# Patient Record
Sex: MALE | Race: OTHER | NOT HISPANIC OR LATINO | URBAN - METROPOLITAN AREA
[De-identification: names, ages, dates, MRNs, and addresses within clinical notes are randomized per-mention and may not be internally consistent; named-entity substitution may affect disease eponyms.]

---

## 2017-07-22 ENCOUNTER — EMERGENCY (EMERGENCY)
Facility: HOSPITAL | Age: 57
LOS: 1 days | Discharge: HOME | End: 2017-07-22
Attending: EMERGENCY MEDICINE | Admitting: EMERGENCY MEDICINE
Payer: COMMERCIAL

## 2017-07-22 VITALS
SYSTOLIC BLOOD PRESSURE: 120 MMHG | HEART RATE: 56 BPM | DIASTOLIC BLOOD PRESSURE: 72 MMHG | OXYGEN SATURATION: 100 % | TEMPERATURE: 98 F | RESPIRATION RATE: 14 BRPM

## 2017-07-22 VITALS
RESPIRATION RATE: 18 BRPM | SYSTOLIC BLOOD PRESSURE: 108 MMHG | OXYGEN SATURATION: 97 % | DIASTOLIC BLOOD PRESSURE: 72 MMHG | TEMPERATURE: 98 F | HEART RATE: 73 BPM

## 2017-07-22 LAB
ALBUMIN SERPL ELPH-MCNC: 4.3 G/DL — SIGNIFICANT CHANGE UP (ref 3.3–5)
ALP SERPL-CCNC: 85 U/L — SIGNIFICANT CHANGE UP (ref 40–120)
ALT FLD-CCNC: 27 U/L — SIGNIFICANT CHANGE UP (ref 4–41)
APTT BLD: 28.5 SEC — SIGNIFICANT CHANGE UP (ref 27.5–37.4)
AST SERPL-CCNC: 30 U/L — SIGNIFICANT CHANGE UP (ref 4–40)
BASOPHILS # BLD AUTO: 0.02 K/UL — SIGNIFICANT CHANGE UP (ref 0–0.2)
BASOPHILS NFR BLD AUTO: 0.6 % — SIGNIFICANT CHANGE UP (ref 0–2)
BILIRUB SERPL-MCNC: 0.8 MG/DL — SIGNIFICANT CHANGE UP (ref 0.2–1.2)
BUN SERPL-MCNC: 13 MG/DL — SIGNIFICANT CHANGE UP (ref 7–23)
CALCIUM SERPL-MCNC: 9.3 MG/DL — SIGNIFICANT CHANGE UP (ref 8.4–10.5)
CHLORIDE SERPL-SCNC: 102 MMOL/L — SIGNIFICANT CHANGE UP (ref 98–107)
CO2 SERPL-SCNC: 27 MMOL/L — SIGNIFICANT CHANGE UP (ref 22–31)
CREAT SERPL-MCNC: 1.23 MG/DL — SIGNIFICANT CHANGE UP (ref 0.5–1.3)
EOSINOPHIL # BLD AUTO: 0.04 K/UL — SIGNIFICANT CHANGE UP (ref 0–0.5)
EOSINOPHIL NFR BLD AUTO: 1.3 % — SIGNIFICANT CHANGE UP (ref 0–6)
GLUCOSE SERPL-MCNC: 75 MG/DL — SIGNIFICANT CHANGE UP (ref 70–99)
HCT VFR BLD CALC: 40.3 % — SIGNIFICANT CHANGE UP (ref 39–50)
HGB BLD-MCNC: 13.4 G/DL — SIGNIFICANT CHANGE UP (ref 13–17)
IMM GRANULOCYTES # BLD AUTO: 0 # — SIGNIFICANT CHANGE UP
IMM GRANULOCYTES NFR BLD AUTO: 0 % — SIGNIFICANT CHANGE UP (ref 0–1.5)
INR BLD: 1.24 — HIGH (ref 0.88–1.17)
LYMPHOCYTES # BLD AUTO: 1.42 K/UL — SIGNIFICANT CHANGE UP (ref 1–3.3)
LYMPHOCYTES # BLD AUTO: 45.2 % — HIGH (ref 13–44)
MCHC RBC-ENTMCNC: 32.6 PG — SIGNIFICANT CHANGE UP (ref 27–34)
MCHC RBC-ENTMCNC: 33.3 % — SIGNIFICANT CHANGE UP (ref 32–36)
MCV RBC AUTO: 98.1 FL — SIGNIFICANT CHANGE UP (ref 80–100)
MONOCYTES # BLD AUTO: 0.34 K/UL — SIGNIFICANT CHANGE UP (ref 0–0.9)
MONOCYTES NFR BLD AUTO: 10.8 % — SIGNIFICANT CHANGE UP (ref 2–14)
NEUTROPHILS # BLD AUTO: 1.32 K/UL — LOW (ref 1.8–7.4)
NEUTROPHILS NFR BLD AUTO: 42.1 % — LOW (ref 43–77)
NRBC # FLD: 0 — SIGNIFICANT CHANGE UP
PLATELET # BLD AUTO: 165 K/UL — SIGNIFICANT CHANGE UP (ref 150–400)
PMV BLD: 10.1 FL — SIGNIFICANT CHANGE UP (ref 7–13)
POTASSIUM SERPL-MCNC: 4.7 MMOL/L — SIGNIFICANT CHANGE UP (ref 3.5–5.3)
POTASSIUM SERPL-SCNC: 4.7 MMOL/L — SIGNIFICANT CHANGE UP (ref 3.5–5.3)
PROT SERPL-MCNC: 7.5 G/DL — SIGNIFICANT CHANGE UP (ref 6–8.3)
PROTHROM AB SERPL-ACNC: 13.9 SEC — HIGH (ref 9.8–13.1)
RBC # BLD: 4.11 M/UL — LOW (ref 4.2–5.8)
RBC # FLD: 12 % — SIGNIFICANT CHANGE UP (ref 10.3–14.5)
SODIUM SERPL-SCNC: 139 MMOL/L — SIGNIFICANT CHANGE UP (ref 135–145)
WBC # BLD: 3.14 K/UL — LOW (ref 3.8–10.5)
WBC # FLD AUTO: 3.14 K/UL — LOW (ref 3.8–10.5)

## 2017-07-22 PROCEDURE — 74177 CT ABD & PELVIS W/CONTRAST: CPT | Mod: 26

## 2017-07-22 PROCEDURE — 70450 CT HEAD/BRAIN W/O DYE: CPT | Mod: 26

## 2017-07-22 PROCEDURE — 99285 EMERGENCY DEPT VISIT HI MDM: CPT | Mod: 25

## 2017-07-22 PROCEDURE — 71020: CPT | Mod: 26

## 2017-07-22 PROCEDURE — 99053 MED SERV 10PM-8AM 24 HR FAC: CPT

## 2017-07-22 RX ORDER — HALOPERIDOL DECANOATE 100 MG/ML
5 INJECTION INTRAMUSCULAR ONCE
Qty: 0 | Refills: 0 | Status: COMPLETED | OUTPATIENT
Start: 2017-07-22 | End: 2017-07-22

## 2017-07-22 NOTE — ED BEHAVIORAL HEALTH NOTE - BEHAVIORAL HEALTH NOTE
Psychiatry was consult to make a capacity determination whether the patient refused CT with contrast as purposed by the EM team to find out if the patient has internal bleeding. The patient was given the information and the patient unable to explained back and neither rationally manipulate the information.  In the light of risks to benefit, benefits clearly outweigh the risk. The patient lack the capacity to refuse CT with contrast.

## 2017-07-22 NOTE — ED ADULT NURSE NOTE - OBJECTIVE STATEMENT
Patient is a 58 y/o male, awake, A&O x 3, NAD, from [facility], presents to ED complaining of left flank pain after falling out of bed.  Patient is Xarelto for DVT of right leg per patient.  Patient has a history of seizure, DVT and paranoid schizophrenia.  Patient denies SOB, chest pain, dizziness, nausea, or vomiting.  Patient is lying on the stretcher and seem to be mumbling and talking to self.  No edema, redness or bruising noted to left flank, mild tenderness upon palpation.  IV placed, labs drawn and sent, vitals taken, MD at bedside and will continue to monitor patient.  Primary RN Michael given patient report.

## 2017-07-22 NOTE — ED PROVIDER NOTE - PROGRESS NOTE DETAILS
Head CT w/ frontal scalp swelling. CXR neg for rib fxs. Will get CT abd/pel based on pt's medical condition. Pt initially refused CT scan abd/pel, psych consulted and pt found to lack capacity to make decisions. Pt later consented to study. CT abd w/ concern for malignancy in L renal kidney. Discussed findings with [facility] staff and reiterated the need for imaging follow up w/ pt's physician.

## 2017-07-22 NOTE — ED PROVIDER NOTE - MEDICAL DECISION MAKING DETAILS
Poor historian on DVT, fell from bed. Will get labs, head CT w/o contrast, CXR and CT abd/pel. Reassess.

## 2017-07-22 NOTE — ED PROVIDER NOTE - ATTENDING CONTRIBUTION TO CARE
DR Bloch- Patient with schizoaffective disorder, from [facility], hx of Subdural, dvt, on xarelto, c/o fall off bed c/o rib pain hit on wood of bed. pain on inspiration, no abd pain,  Patient verbally aggressive, no change in mental status, NCAT heent nml, lungs clear, no ecchymosis, heart sounds nml, abd soft nontender. In view of poor hx on xarelto and location of pain left lat lower ribs, will CT abd.CT head, cxr ;abs

## 2017-07-22 NOTE — ED PROVIDER NOTE - OBJECTIVE STATEMENT
56yo male hx of schizoaffective disorder and DVT on Xarelton p/w from [facility] after a fall. Pt is a poor historian, but reports that he was sleeping and rolled off his bed falling on his L side. Worse on inspiration. Denies hitting his head or LOC. Denies sob, n/v, fever, dizziness. 58yo male hx of schizoaffective disorder and DVT on Xarelto on p/w from [facility] after a fall last night Pt is a poor historian, but reports that he was sleeping and rolled off his bed falling on his L side. Worse on inspiration. Denies hitting his head or LOC. Denies sob, n/v, fever, dizziness.No abdominal pain, n/v, no change in MS 58yo male hx of schizoaffective disorder and DVT on Xarelto on p/w from [facility] after a fall last night Pt is a poor historian, but reports that he was sleeping and rolled off his bed falling on his L side. Worse on inspiration. Denies hitting his head or LOC. Denies sob, n/v, fever, dizziness. No abdominal pain, n/v, no change in MS

## 2017-07-22 NOTE — ED ADULT NURSE NOTE - CHIEF COMPLAINT QUOTE
Pt arrives via EMS from [facility] In[facility] accompanied by two [facility] workers. Pt states he fell out of bed this morning and his L rib cage slammed against the wooden base of the bed. Pt is on Xarelto. Pt denies LOC, denies hitting head.

## 2017-07-22 NOTE — ED PROVIDER NOTE - PLAN OF CARE
We did a chest xray and there were no fractures found. A Head CT was ordered to check for acute bleed or trauma. We also ordered a CT scan of your abdomen and pelvis to rule out any intraabdominal bleed. The results of your blood work and imaging has been printed out. Please follow up with your PCP within a week for further management. Come to the ED immediately for worsening symptoms like increased abdominal pain, fever, difficulty breathing, chest pain.

## 2017-07-22 NOTE — ED PROVIDER NOTE - CARE PLAN
Principal Discharge DX:	Fall Principal Discharge DX:	Fall  Instructions for follow-up, activity and diet:	We did a chest xray and there were no fractures found. A Head CT was ordered to check for acute bleed or trauma. We also ordered a CT scan of your abdomen and pelvis to rule out any intraabdominal bleed. The results of your blood work and imaging has been printed out. Please follow up with your PCP within a week for further management. Come to the ED immediately for worsening symptoms like increased abdominal pain, fever, difficulty breathing, chest pain.

## 2017-07-22 NOTE — ED ADULT TRIAGE NOTE - CHIEF COMPLAINT QUOTE
Pt arrives via EMS from [facility], In[facility] accompanied by two [facility] workers. Pt states he fell out of bed this morning and his L rib cage slammed against the wooden base of the bed. Pt is on Xarelto. Pt denies LOC, denies hitting head.

## 2017-08-14 ENCOUNTER — APPOINTMENT (OUTPATIENT)
Dept: UROLOGY | Facility: CLINIC | Age: 57
End: 2017-08-14

## 2017-09-07 ENCOUNTER — APPOINTMENT (OUTPATIENT)
Dept: MRI IMAGING | Facility: IMAGING CENTER | Age: 57
End: 2017-09-07
Payer: COMMERCIAL

## 2017-09-07 ENCOUNTER — OUTPATIENT (OUTPATIENT)
Dept: OUTPATIENT SERVICES | Facility: HOSPITAL | Age: 57
LOS: 1 days | End: 2017-09-07
Payer: COMMERCIAL

## 2017-09-07 DIAGNOSIS — Z00.8 ENCOUNTER FOR OTHER GENERAL EXAMINATION: ICD-10-CM

## 2017-09-07 PROCEDURE — 72197 MRI PELVIS W/O & W/DYE: CPT | Mod: 26

## 2017-09-07 PROCEDURE — 74183 MRI ABD W/O CNTR FLWD CNTR: CPT | Mod: 26

## 2017-09-07 PROCEDURE — 72197 MRI PELVIS W/O & W/DYE: CPT

## 2017-09-07 PROCEDURE — A9585: CPT

## 2017-09-07 PROCEDURE — 74183 MRI ABD W/O CNTR FLWD CNTR: CPT

## 2017-10-05 ENCOUNTER — APPOINTMENT (OUTPATIENT)
Dept: UROLOGY | Facility: CLINIC | Age: 57
End: 2017-10-05
Payer: COMMERCIAL

## 2017-10-05 DIAGNOSIS — D49.519 NEOPLASM OF UNSPECIFIED BEHAVIOR OF UNSPECIFIED KIDNEY: ICD-10-CM

## 2017-10-05 DIAGNOSIS — Z12.5 ENCOUNTER FOR SCREENING FOR MALIGNANT NEOPLASM OF PROSTATE: ICD-10-CM

## 2017-10-05 PROCEDURE — 99205 OFFICE O/P NEW HI 60 MIN: CPT

## 2017-10-08 ENCOUNTER — RESULT REVIEW (OUTPATIENT)
Age: 57
End: 2017-10-08

## 2017-10-08 LAB
APPEARANCE: ABNORMAL
BACTERIA: ABNORMAL
BILIRUBIN URINE: NEGATIVE
BLOOD URINE: NEGATIVE
COLOR: YELLOW
CORE LAB FLUID CYTOLOGY: NORMAL
GLUCOSE QUALITATIVE U: NEGATIVE MG/DL
KETONES URINE: NEGATIVE
LEUKOCYTE ESTERASE URINE: ABNORMAL
MICROSCOPIC-UA: NORMAL
NITRITE URINE: NEGATIVE
PH URINE: 6
PROTEIN URINE: NEGATIVE MG/DL
PSA SERPL-MCNC: 1.22 NG/ML
RED BLOOD CELLS URINE: 2 /HPF
SPECIFIC GRAVITY URINE: 1.02
SQUAMOUS EPITHELIAL CELLS: 3 /HPF
UROBILINOGEN URINE: NEGATIVE MG/DL
WHITE BLOOD CELLS URINE: 5 /HPF

## 2018-01-04 ENCOUNTER — MOBILE ON CALL (OUTPATIENT)
Age: 58
End: 2018-01-04

## 2018-01-08 ENCOUNTER — APPOINTMENT (OUTPATIENT)
Dept: UROLOGY | Facility: CLINIC | Age: 58
End: 2018-01-08

## 2018-04-23 ENCOUNTER — EMERGENCY (EMERGENCY)
Facility: HOSPITAL | Age: 58
LOS: 1 days | Discharge: HOME | End: 2018-04-23
Attending: EMERGENCY MEDICINE | Admitting: EMERGENCY MEDICINE
Payer: COMMERCIAL

## 2018-04-23 VITALS
DIASTOLIC BLOOD PRESSURE: 76 MMHG | RESPIRATION RATE: 17 BRPM | SYSTOLIC BLOOD PRESSURE: 122 MMHG | HEART RATE: 74 BPM | OXYGEN SATURATION: 100 % | TEMPERATURE: 98 F

## 2018-04-23 LAB
ALBUMIN SERPL ELPH-MCNC: 4.4 G/DL — SIGNIFICANT CHANGE UP (ref 3.3–5)
ALP SERPL-CCNC: 123 U/L — HIGH (ref 40–120)
ALT FLD-CCNC: 26 U/L — SIGNIFICANT CHANGE UP (ref 4–41)
APPEARANCE UR: CLEAR — SIGNIFICANT CHANGE UP
APTT BLD: 30.4 SEC — SIGNIFICANT CHANGE UP (ref 27.5–37.4)
AST SERPL-CCNC: 27 U/L — SIGNIFICANT CHANGE UP (ref 4–40)
BACTERIA # UR AUTO: SIGNIFICANT CHANGE UP
BASOPHILS # BLD AUTO: 0.01 K/UL — SIGNIFICANT CHANGE UP (ref 0–0.2)
BASOPHILS NFR BLD AUTO: 0.2 % — SIGNIFICANT CHANGE UP (ref 0–2)
BILIRUB SERPL-MCNC: 0.6 MG/DL — SIGNIFICANT CHANGE UP (ref 0.2–1.2)
BILIRUB UR-MCNC: NEGATIVE — SIGNIFICANT CHANGE UP
BLOOD UR QL VISUAL: NEGATIVE — SIGNIFICANT CHANGE UP
BUN SERPL-MCNC: 12 MG/DL — SIGNIFICANT CHANGE UP (ref 7–23)
CALCIUM SERPL-MCNC: 9.7 MG/DL — SIGNIFICANT CHANGE UP (ref 8.4–10.5)
CHLORIDE SERPL-SCNC: 100 MMOL/L — SIGNIFICANT CHANGE UP (ref 98–107)
CO2 SERPL-SCNC: 29 MMOL/L — SIGNIFICANT CHANGE UP (ref 22–31)
COLOR SPEC: SIGNIFICANT CHANGE UP
CREAT SERPL-MCNC: 0.89 MG/DL — SIGNIFICANT CHANGE UP (ref 0.5–1.3)
EOSINOPHIL # BLD AUTO: 0.11 K/UL — SIGNIFICANT CHANGE UP (ref 0–0.5)
EOSINOPHIL NFR BLD AUTO: 2.5 % — SIGNIFICANT CHANGE UP (ref 0–6)
GLUCOSE SERPL-MCNC: 89 MG/DL — SIGNIFICANT CHANGE UP (ref 70–99)
GLUCOSE UR-MCNC: NEGATIVE — SIGNIFICANT CHANGE UP
HCT VFR BLD CALC: 40.1 % — SIGNIFICANT CHANGE UP (ref 39–50)
HGB BLD-MCNC: 13.6 G/DL — SIGNIFICANT CHANGE UP (ref 13–17)
IMM GRANULOCYTES # BLD AUTO: 0 # — SIGNIFICANT CHANGE UP
IMM GRANULOCYTES NFR BLD AUTO: 0 % — SIGNIFICANT CHANGE UP (ref 0–1.5)
INR BLD: 1.39 — HIGH (ref 0.88–1.17)
KETONES UR-MCNC: NEGATIVE — SIGNIFICANT CHANGE UP
LEUKOCYTE ESTERASE UR-ACNC: NEGATIVE — SIGNIFICANT CHANGE UP
LYMPHOCYTES # BLD AUTO: 1.6 K/UL — SIGNIFICANT CHANGE UP (ref 1–3.3)
LYMPHOCYTES # BLD AUTO: 36.3 % — SIGNIFICANT CHANGE UP (ref 13–44)
MCHC RBC-ENTMCNC: 32.9 PG — SIGNIFICANT CHANGE UP (ref 27–34)
MCHC RBC-ENTMCNC: 33.9 % — SIGNIFICANT CHANGE UP (ref 32–36)
MCV RBC AUTO: 96.9 FL — SIGNIFICANT CHANGE UP (ref 80–100)
MONOCYTES # BLD AUTO: 0.52 K/UL — SIGNIFICANT CHANGE UP (ref 0–0.9)
MONOCYTES NFR BLD AUTO: 11.8 % — SIGNIFICANT CHANGE UP (ref 2–14)
MUCOUS THREADS # UR AUTO: SIGNIFICANT CHANGE UP
NEUTROPHILS # BLD AUTO: 2.17 K/UL — SIGNIFICANT CHANGE UP (ref 1.8–7.4)
NEUTROPHILS NFR BLD AUTO: 49.2 % — SIGNIFICANT CHANGE UP (ref 43–77)
NITRITE UR-MCNC: NEGATIVE — SIGNIFICANT CHANGE UP
NON-SQ EPI CELLS # UR AUTO: <1 — SIGNIFICANT CHANGE UP
NRBC # FLD: 0 — SIGNIFICANT CHANGE UP
PH UR: 6 — SIGNIFICANT CHANGE UP (ref 4.6–8)
PLATELET # BLD AUTO: 199 K/UL — SIGNIFICANT CHANGE UP (ref 150–400)
PMV BLD: 9.8 FL — SIGNIFICANT CHANGE UP (ref 7–13)
POTASSIUM SERPL-MCNC: 4.1 MMOL/L — SIGNIFICANT CHANGE UP (ref 3.5–5.3)
POTASSIUM SERPL-SCNC: 4.1 MMOL/L — SIGNIFICANT CHANGE UP (ref 3.5–5.3)
PROT SERPL-MCNC: 7.8 G/DL — SIGNIFICANT CHANGE UP (ref 6–8.3)
PROT UR-MCNC: NEGATIVE MG/DL — SIGNIFICANT CHANGE UP
PROTHROM AB SERPL-ACNC: 15.5 SEC — HIGH (ref 9.8–13.1)
RBC # BLD: 4.14 M/UL — LOW (ref 4.2–5.8)
RBC # FLD: 12.3 % — SIGNIFICANT CHANGE UP (ref 10.3–14.5)
RBC CASTS # UR COMP ASSIST: SIGNIFICANT CHANGE UP (ref 0–?)
SODIUM SERPL-SCNC: 140 MMOL/L — SIGNIFICANT CHANGE UP (ref 135–145)
SP GR SPEC: 1.01 — SIGNIFICANT CHANGE UP (ref 1–1.04)
SQUAMOUS # UR AUTO: SIGNIFICANT CHANGE UP
UROBILINOGEN FLD QL: NORMAL MG/DL — SIGNIFICANT CHANGE UP
WBC # BLD: 4.41 K/UL — SIGNIFICANT CHANGE UP (ref 3.8–10.5)
WBC # FLD AUTO: 4.41 K/UL — SIGNIFICANT CHANGE UP (ref 3.8–10.5)
WBC UR QL: SIGNIFICANT CHANGE UP (ref 0–?)

## 2018-04-23 PROCEDURE — 99285 EMERGENCY DEPT VISIT HI MDM: CPT | Mod: GC

## 2018-04-23 RX ORDER — DEXAMETHASONE 0.5 MG/5ML
10 ELIXIR ORAL ONCE
Qty: 0 | Refills: 0 | Status: COMPLETED | OUTPATIENT
Start: 2018-04-23 | End: 2018-04-23

## 2018-04-23 RX ADMIN — Medication 102 MILLIGRAM(S): at 20:09

## 2018-04-23 NOTE — ED PROVIDER NOTE - OBJECTIVE STATEMENT
58M with PMH of seizure disorder, DVT, schizoaffective disorder presenting from [facility] for weakness x2 weeks. Patient states that his legs feel like they are giving out and has been dependent on a wheelchair. Endorses chronic shoulder and b/l leg pain x4 years, unchanged in nature. As per aid patient also has been having episodes where he refuses to walk to the bathroom and refuses a bucket/urinal and urinates on self. No events of nocturnal urinary incontinence. Denies fever, chills, cp, sob, n/v/d, dizziness, headache, changes in vision. 58M with PMH of seizure disorder, DVT, schizoaffective disorder presenting from [facility] for weakness x2 weeks. Patient states that his legs feel like they are giving out and has been dependent on a wheelchair. Endorses chronic shoulder and b/l leg pain x4 years, unchanged in nature. As per aid patient also has been having episodes where he refuses to walk to the bathroom and refuses a bucket/urinal and urinates on self. No events of nocturnal urinary incontinence. As per covering MD at [facility], patient was sent to ED to r/o cord compression given symptoms of weakness and urinary retention. Denies fever, chills, cp, sob, n/v/d, dizziness, headache, changes in vision. 58M with PMH of seizure disorder, DVT, schizoaffective disorder presenting from [facility] for weakness x2 weeks. Patient states that his legs feel like they are giving out and has been dependent on a wheelchair. Endorses chronic shoulder and b/l leg pain x4 years, unchanged in nature. As per aid patient also has been having episodes where he refuses to walk to the bathroom and refuses a bucket/urinal and urinates on self. No events of nocturnal urinary incontinence. As per covering MD at [facility], patient was sent to ED to r/o cord compression given symptoms of weakness and urinary retention. Denies fever, chills, cp, sob, n/v/d, dizziness, headache, changes in vision. Tetanus UTD upon chart review. 58M with PMH of seizure disorder, DVT, schizoaffective disorder presenting from [facility] for weakness x2 weeks. Patient states that his legs feel like they are giving out and has been dependent on a wheelchair. Endorses chronic shoulder and b/l leg pain x4 years, unchanged in nature. As per aid patient also has been having episodes where he refuses to walk to the bathroom and refuses a bucket/urinal and urinates on self. No events of nocturnal urinary incontinence. As per covering MD at [facility], patient was sent to ED to r/o cord compression given symptoms of weakness and urinary retention. Denies fever, chills, cp, sob, n/v/d, dizziness, headache, changes in vision, night sweats, weight loss. Tetanus UTD upon chart review.

## 2018-04-23 NOTE — ED ADULT NURSE NOTE - CHIEF COMPLAINT QUOTE
pt from [facility]. as per staff pt has been refusing to walking and urinating on himself. pt states he prefers the wheelchair and is aware of when he need to urinate but the staff " will not get him the urinal quick enough" pt able to move all extremities in triage.

## 2018-04-23 NOTE — ED ADULT NURSE REASSESSMENT NOTE - NS ED NURSE REASSESS COMMENT FT1
RN Break Coverage: Alert and oriented x 4. Pt received from [facility]ASH[facility] Renteria in no distress. VSS. Pt resting well. [facility] staff at bedside. Will continue to monitor.

## 2018-04-23 NOTE — ED PROVIDER NOTE - ATTENDING CONTRIBUTION TO CARE
I performed a face to face bedside interview with patient regarding history of present illness, review of symptoms and past medical history. I completed an independent physical exam.  I have discussed patient's plan of care.   I agree with note as stated above, having amended the EMR as needed to reflect my findings. I have discussed the assessment and plan of care.  This includes during the time I functioned as the attending physician for this patient.  Attending Contribution to Care: agree with plan of resident. pt p/w concern fro spinal issue after inability to ambulate over last 2 weeks. pt with normal rectal tone, has appropriate strength in prox and distal muscles. pt pending MRI at sign out. labs wnl. pt sleeping comfortbalby in bed. unlikely cord compression but possible secondary to story and pt's mental status

## 2018-04-23 NOTE — ED PROVIDER NOTE - PROGRESS NOTE DETAILS
bedside post void residual at 103cc Resident: patient signed out to me. MRI prelim read negative for cord compression, shows stenosis. Will dc back to [facility].

## 2018-04-23 NOTE — ED ADULT TRIAGE NOTE - CHIEF COMPLAINT QUOTE
pt from [facility]. as per staff pt has been refusing to walking and urinating on himself. pt states he prefers the wheelchair and is aware of when he need to urinate but the staff " will not get him the urinal quick enough" pt able to move all extremities in triage.

## 2018-04-23 NOTE — ED PROVIDER NOTE - MEDICAL DECISION MAKING DETAILS
58M presenting from [facility] for r/o cord compression. Low suspicion of cord compression with full strength except for mild weakness of dorsiflexion of foot. Patient however, unable to ambulate stating weakness. Normal rectal tone. No focal neurological deficits. Will obtain basics, urine and reassess

## 2018-04-24 VITALS
HEART RATE: 66 BPM | SYSTOLIC BLOOD PRESSURE: 120 MMHG | OXYGEN SATURATION: 100 % | DIASTOLIC BLOOD PRESSURE: 77 MMHG | TEMPERATURE: 98 F

## 2018-04-24 PROCEDURE — 72148 MRI LUMBAR SPINE W/O DYE: CPT | Mod: 26

## 2018-04-26 ENCOUNTER — EMERGENCY (EMERGENCY)
Facility: HOSPITAL | Age: 58
LOS: 1 days | Discharge: HOME | End: 2018-04-26
Attending: EMERGENCY MEDICINE | Admitting: EMERGENCY MEDICINE
Payer: COMMERCIAL

## 2018-04-26 VITALS
HEART RATE: 90 BPM | RESPIRATION RATE: 16 BRPM | SYSTOLIC BLOOD PRESSURE: 122 MMHG | TEMPERATURE: 98 F | DIASTOLIC BLOOD PRESSURE: 78 MMHG | OXYGEN SATURATION: 100 %

## 2018-04-26 VITALS
RESPIRATION RATE: 16 BRPM | OXYGEN SATURATION: 99 % | HEART RATE: 68 BPM | SYSTOLIC BLOOD PRESSURE: 129 MMHG | TEMPERATURE: 99 F | DIASTOLIC BLOOD PRESSURE: 73 MMHG

## 2018-04-26 LAB
HCT VFR BLD CALC: 43.4 % — SIGNIFICANT CHANGE UP (ref 39–50)
HGB BLD-MCNC: 14.4 G/DL — SIGNIFICANT CHANGE UP (ref 13–17)
MANUAL SMEAR VERIFICATION: SIGNIFICANT CHANGE UP
MCHC RBC-ENTMCNC: 32.8 PG — SIGNIFICANT CHANGE UP (ref 27–34)
MCHC RBC-ENTMCNC: 33.2 % — SIGNIFICANT CHANGE UP (ref 32–36)
MCV RBC AUTO: 98.9 FL — SIGNIFICANT CHANGE UP (ref 80–100)
NRBC # FLD: 0 — SIGNIFICANT CHANGE UP
PLATELET # BLD AUTO: 80 K/UL — LOW (ref 150–400)
PLATELET CLUMP BLD QL SMEAR: SIGNIFICANT CHANGE UP
PLATELET COUNT - ESTIMATE: NORMAL — SIGNIFICANT CHANGE UP
PMV BLD: 11.4 FL — SIGNIFICANT CHANGE UP (ref 7–13)
RBC # BLD: 4.39 M/UL — SIGNIFICANT CHANGE UP (ref 4.2–5.8)
RBC # FLD: 12.2 % — SIGNIFICANT CHANGE UP (ref 10.3–14.5)
WBC # BLD: 4.58 K/UL — SIGNIFICANT CHANGE UP (ref 3.8–10.5)
WBC # FLD AUTO: 4.58 K/UL — SIGNIFICANT CHANGE UP (ref 3.8–10.5)

## 2018-04-26 PROCEDURE — 73030 X-RAY EXAM OF SHOULDER: CPT | Mod: 26,50

## 2018-04-26 PROCEDURE — 99284 EMERGENCY DEPT VISIT MOD MDM: CPT | Mod: 25,GC

## 2018-04-26 PROCEDURE — 70450 CT HEAD/BRAIN W/O DYE: CPT | Mod: 26

## 2018-04-26 PROCEDURE — 72125 CT NECK SPINE W/O DYE: CPT | Mod: 26

## 2018-04-26 NOTE — ED ADULT NURSE REASSESSMENT NOTE - NS ED NURSE REASSESS COMMENT FT1
unable to get iv line- attempts made by jamie fagan and jamie gresham- cbc was drawn and sent- resident aware- no new orders.

## 2018-04-26 NOTE — ED PROVIDER NOTE - MEDICAL DECISION MAKING DETAILS
57y/o M hx schizophrenia sent from [facility] for fall, no LOC, received in C-collar, with bilateral shoulder pain, will order CT head and C-spine, bilateral shoulder x-rays. Andrew att: 59y/o M hx schizophrenia sent from [facility] for fall, no LOC, received in C-collar, with bilateral shoulder pain, will order CT head and C-spine, bilateral shoulder x-rays, and will d/c back to [facility] if imaging is negative.

## 2018-04-26 NOTE — ED ADULT NURSE NOTE - CHIEF COMPLAINT QUOTE
pt arrives from [facility] trying to get out of bed and is wheelchair bound and fell sustaining laceration to left side eye. pt also c/o neck and back pain.  pt arrives w/ c=collar in place. pt is an involuntary admission here with two staff members from [facility] more

## 2018-04-26 NOTE — ED ADULT NURSE NOTE - OBJECTIVE STATEMENT
Pt received into room 24 c-collar in place C/O head, neck and B/L shoulder pain S/P fall OOB. Pt A&Ox4, appears comfortable and in NAD. Pt involuntary admission to [facility] for Schizophrenia was attempting to get OOB to urinate, fell down and hit left side of face. Pt states he is non-ambulatory and wheelchair bound, pt able to move lower extremities slowly and appears weak. Pt has approximately 2cm laceration to left cheek line, small amount dry blood observed, no active bleeding. Pt states he is on coumadin for treatment of DVT. [facility] staff at bedside. Pt awaiting Md evaluations. VS as noted. Will continue to monitor.

## 2018-04-26 NOTE — ED ADULT TRIAGE NOTE - CHIEF COMPLAINT QUOTE
pt arrives from [facility] trying to get out of bed and is wheelchair bound and fell sustaining laceration to left side eye. pt also c/o neck and back pain.  pt arrives w/ c=collar in place. pt is an involuntary admission here with two staff members from [facility] more

## 2018-04-26 NOTE — ED PROVIDER NOTE - PROGRESS NOTE DETAILS
Patient seen and examined at the bedside, comfortable appearing, complaining of bilateral shoulder pain , will order CT head and C-spine CT head, C spine and bilateral xrays negative, will d/c to [facility]

## 2018-04-26 NOTE — ED PROVIDER NOTE - OBJECTIVE STATEMENT
59y/o M PMH schizophrenia with involuntary admission to [facility], presenting from [facility] for fall. Patient has had lower extremity weakness for a month and has been wheelchair bound. Pt states he was attempting to get out of bed to use the urinal but fell down an hit the side of left face. He is also complaining of bilateral shoulder pain. No headache, no dizziness, chest pain, shortness of breath.

## 2018-05-10 ENCOUNTER — INPATIENT (INPATIENT)
Facility: HOSPITAL | Age: 58
LOS: 24 days | Discharge: NURSING, REHAB OR LONG-TERM CARE | End: 2018-06-04
Attending: HOSPITALIST | Admitting: HOSPITALIST
Payer: MEDICAID

## 2018-05-10 VITALS
TEMPERATURE: 98 F | RESPIRATION RATE: 18 BRPM | SYSTOLIC BLOOD PRESSURE: 113 MMHG | HEART RATE: 100 BPM | OXYGEN SATURATION: 100 % | DIASTOLIC BLOOD PRESSURE: 88 MMHG

## 2018-05-10 DIAGNOSIS — R29.898 OTHER SYMPTOMS AND SIGNS INVOLVING THE MUSCULOSKELETAL SYSTEM: ICD-10-CM

## 2018-05-10 LAB
ALBUMIN SERPL ELPH-MCNC: 3.9 G/DL — SIGNIFICANT CHANGE UP (ref 3.3–5)
ALP SERPL-CCNC: 131 U/L — HIGH (ref 40–120)
ALT FLD-CCNC: 34 U/L — SIGNIFICANT CHANGE UP (ref 4–41)
APTT BLD: 29.1 SEC — SIGNIFICANT CHANGE UP (ref 27.5–37.4)
AST SERPL-CCNC: 52 U/L — HIGH (ref 4–40)
BASOPHILS # BLD AUTO: 0.01 K/UL — SIGNIFICANT CHANGE UP (ref 0–0.2)
BASOPHILS NFR BLD AUTO: 0.2 % — SIGNIFICANT CHANGE UP (ref 0–2)
BILIRUB SERPL-MCNC: 0.5 MG/DL — SIGNIFICANT CHANGE UP (ref 0.2–1.2)
BUN SERPL-MCNC: 21 MG/DL — SIGNIFICANT CHANGE UP (ref 7–23)
CALCIUM SERPL-MCNC: 9.6 MG/DL — SIGNIFICANT CHANGE UP (ref 8.4–10.5)
CHLORIDE SERPL-SCNC: 97 MMOL/L — LOW (ref 98–107)
CO2 SERPL-SCNC: 17 MMOL/L — LOW (ref 22–31)
CREAT SERPL-MCNC: 0.95 MG/DL — SIGNIFICANT CHANGE UP (ref 0.5–1.3)
EOSINOPHIL # BLD AUTO: 0.11 K/UL — SIGNIFICANT CHANGE UP (ref 0–0.5)
EOSINOPHIL NFR BLD AUTO: 2.1 % — SIGNIFICANT CHANGE UP (ref 0–6)
GLUCOSE SERPL-MCNC: 83 MG/DL — SIGNIFICANT CHANGE UP (ref 70–99)
HCT VFR BLD CALC: 44.9 % — SIGNIFICANT CHANGE UP (ref 39–50)
HGB BLD-MCNC: 14.9 G/DL — SIGNIFICANT CHANGE UP (ref 13–17)
IMM GRANULOCYTES # BLD AUTO: 0.01 # — SIGNIFICANT CHANGE UP
IMM GRANULOCYTES NFR BLD AUTO: 0.2 % — SIGNIFICANT CHANGE UP (ref 0–1.5)
INR BLD: 1.04 — SIGNIFICANT CHANGE UP (ref 0.88–1.17)
LYMPHOCYTES # BLD AUTO: 1.55 K/UL — SIGNIFICANT CHANGE UP (ref 1–3.3)
LYMPHOCYTES # BLD AUTO: 29.5 % — SIGNIFICANT CHANGE UP (ref 13–44)
MCHC RBC-ENTMCNC: 33.1 PG — SIGNIFICANT CHANGE UP (ref 27–34)
MCHC RBC-ENTMCNC: 33.2 % — SIGNIFICANT CHANGE UP (ref 32–36)
MCV RBC AUTO: 99.8 FL — SIGNIFICANT CHANGE UP (ref 80–100)
MONOCYTES # BLD AUTO: 0.65 K/UL — SIGNIFICANT CHANGE UP (ref 0–0.9)
MONOCYTES NFR BLD AUTO: 12.4 % — SIGNIFICANT CHANGE UP (ref 2–14)
NEUTROPHILS # BLD AUTO: 2.93 K/UL — SIGNIFICANT CHANGE UP (ref 1.8–7.4)
NEUTROPHILS NFR BLD AUTO: 55.6 % — SIGNIFICANT CHANGE UP (ref 43–77)
NRBC # FLD: 0 — SIGNIFICANT CHANGE UP
PLATELET # BLD AUTO: 208 K/UL — SIGNIFICANT CHANGE UP (ref 150–400)
PMV BLD: 11.1 FL — SIGNIFICANT CHANGE UP (ref 7–13)
POTASSIUM SERPL-MCNC: 5.6 MMOL/L — HIGH (ref 3.5–5.3)
POTASSIUM SERPL-SCNC: 5.6 MMOL/L — HIGH (ref 3.5–5.3)
PROT SERPL-MCNC: 8 G/DL — SIGNIFICANT CHANGE UP (ref 6–8.3)
PROTHROM AB SERPL-ACNC: 11.5 SEC — SIGNIFICANT CHANGE UP (ref 9.8–13.1)
RBC # BLD: 4.5 M/UL — SIGNIFICANT CHANGE UP (ref 4.2–5.8)
RBC # FLD: 12.6 % — SIGNIFICANT CHANGE UP (ref 10.3–14.5)
SODIUM SERPL-SCNC: 134 MMOL/L — LOW (ref 135–145)
WBC # BLD: 5.26 K/UL — SIGNIFICANT CHANGE UP (ref 3.8–10.5)
WBC # FLD AUTO: 5.26 K/UL — SIGNIFICANT CHANGE UP (ref 3.8–10.5)

## 2018-05-10 PROCEDURE — 70450 CT HEAD/BRAIN W/O DYE: CPT | Mod: 26

## 2018-05-10 NOTE — ED PROVIDER NOTE - MEDICAL DECISION MAKING DETAILS
57[facility] weakness (R>L), bladder incontinence, concern for cord compression vs CVA - will send for mri and CT head, basic labs, consult neuro and admit.

## 2018-05-10 NOTE — ED ADULT TRIAGE NOTE - CHIEF COMPLAINT QUOTE
pt coming from [facility], sent in for eval of b/l lower extremity weakness, unable to ambulate. sent in for CT scan.

## 2018-05-10 NOTE — ED PROVIDER NOTE - ATTENDING CONTRIBUTION TO CARE
MD Forde:  I performed a face to face bedside interview with patient regarding history of present illness, review of symptoms and past medical history. I completed an independent physical exam(documented below).  I have discussed patient's plan of care with resident.   I agree with note as stated above, having amended the EMR as needed to reflect my findings. I have discussed the assessment and plan of care.  This includes during the time I functioned as the attending physician for this patient.  PE:  Gen: Alert, NAD  Head: NC, AT,  EOMI, normal lids/conjunctiva  ENT:  normal hearing, patent oropharynx without erythema/exudate, uvula midline  Neck: +supple, no tenderness/meningismus/JVD, +Trachea midline  Chest: no chest wall tenderness, equal chest rise  Pulm: Bilateral BS, normal resp effort, no wheeze/stridor/retractions  CV: tachy, no M/R/G, +dist pulses  Abd: soft, NT/ND, +BS, no hepatosplenomegaly  Rectal: deferred  Mskel: no edema/erythema/cyanosis  Skin: no rash  Neuro: AAOx3, 4/5 R hip flexion/knee flexion/knee extension/plantar flexion; 2/5 dorsiflexion of R foot  MDM:  57yo M, pmh inclusive of DM, seizure disorder, schizophrenia, inpt at [facility], sent in for eval of worsening RLE weakness for unknown time. Per staff, pt has had difficulty walking for months, progressively worsening and requiring wheelchair a few wks ago because of severe difficulty ambulating. Pt was evaluated by neurologist who seems to be concerned for retroperitoneal process and possibly cord compression. Pt c/o midline lower thoracic/upper lumbar pain, urinary incontinence, and has objective neuro deficits (weakness of RLE hip flexion/knee flexion, and severe weakness of R foot dorsiflexion). Denies IV drug use. No perineal paresthesias. Ddx includes CVA vs cord compression. Will bladder scan to r/o urinary retention, labs, CT head, MRI spine, likely admission.

## 2018-05-10 NOTE — ED ADULT NURSE NOTE - CHIEF COMPLAINT QUOTE
pt coming from [facility], sent in for eval of b/l lower extremity weakness, unable to ambulate. sent in for CT scan.

## 2018-05-10 NOTE — ED PROVIDER NOTE - OBJECTIVE STATEMENT
59yo M pmhx schizophrenia, dm, seizure disorder p/w CC weakness, inability to ambulate. Pt. states that symptoms have been worsening for past 3 days, also states he is urinating on himself, when asked about pain he says "my spine hurts and my shoulders". denies fever chills cp sob n/v/d.

## 2018-05-10 NOTE — ED ADULT NURSE REASSESSMENT NOTE - NS ED NURSE REASSESS COMMENT FT1
report received from day shift rn- pt in nad, vss. two aides from [facility] at bedside. pt awaiting mri, will continue to monitor.

## 2018-05-10 NOTE — ED ADULT NURSE NOTE - OBJECTIVE STATEMENT
Pt from [facility] accompanied by 2 staff members c/o incontinence, back pain, and increasing inability to ambulate x2-3 days.  Staff states symptoms have been getting worse over a longer period of time but pt is unable to ambulate today.  Denies trauma.  Presents with left sided weakness.  PMH schizophrenia.  PA at bedside

## 2018-05-11 DIAGNOSIS — G40.909 EPILEPSY, UNSPECIFIED, NOT INTRACTABLE, WITHOUT STATUS EPILEPTICUS: ICD-10-CM

## 2018-05-11 DIAGNOSIS — R29.898 OTHER SYMPTOMS AND SIGNS INVOLVING THE MUSCULOSKELETAL SYSTEM: ICD-10-CM

## 2018-05-11 DIAGNOSIS — F20.9 SCHIZOPHRENIA, UNSPECIFIED: ICD-10-CM

## 2018-05-11 DIAGNOSIS — Z29.9 ENCOUNTER FOR PROPHYLACTIC MEASURES, UNSPECIFIED: ICD-10-CM

## 2018-05-11 DIAGNOSIS — R33.9 RETENTION OF URINE, UNSPECIFIED: ICD-10-CM

## 2018-05-11 LAB
ALBUMIN SERPL ELPH-MCNC: 3.5 G/DL — SIGNIFICANT CHANGE UP (ref 3.3–5)
ALP SERPL-CCNC: 118 U/L — SIGNIFICANT CHANGE UP (ref 40–120)
ALT FLD-CCNC: 26 U/L — SIGNIFICANT CHANGE UP (ref 4–41)
APPEARANCE UR: CLEAR — SIGNIFICANT CHANGE UP
AST SERPL-CCNC: 22 U/L — SIGNIFICANT CHANGE UP (ref 4–40)
BILIRUB SERPL-MCNC: 0.5 MG/DL — SIGNIFICANT CHANGE UP (ref 0.2–1.2)
BILIRUB UR-MCNC: NEGATIVE — SIGNIFICANT CHANGE UP
BLOOD UR QL VISUAL: NEGATIVE — SIGNIFICANT CHANGE UP
BUN SERPL-MCNC: 20 MG/DL — SIGNIFICANT CHANGE UP (ref 7–23)
CALCIUM SERPL-MCNC: 9 MG/DL — SIGNIFICANT CHANGE UP (ref 8.4–10.5)
CHLORIDE SERPL-SCNC: 100 MMOL/L — SIGNIFICANT CHANGE UP (ref 98–107)
CK SERPL-CCNC: 73 U/L — SIGNIFICANT CHANGE UP (ref 30–200)
CO2 SERPL-SCNC: 25 MMOL/L — SIGNIFICANT CHANGE UP (ref 22–31)
COLOR SPEC: SIGNIFICANT CHANGE UP
CREAT SERPL-MCNC: 0.9 MG/DL — SIGNIFICANT CHANGE UP (ref 0.5–1.3)
CRP SERPL-MCNC: < 5 MG/L — SIGNIFICANT CHANGE UP
GLUCOSE BLDC GLUCOMTR-MCNC: 88 MG/DL — SIGNIFICANT CHANGE UP (ref 70–99)
GLUCOSE SERPL-MCNC: 100 MG/DL — HIGH (ref 70–99)
GLUCOSE UR-MCNC: NEGATIVE — SIGNIFICANT CHANGE UP
HCT VFR BLD CALC: 38.9 % — LOW (ref 39–50)
HGB BLD-MCNC: 13.4 G/DL — SIGNIFICANT CHANGE UP (ref 13–17)
KETONES UR-MCNC: NEGATIVE — SIGNIFICANT CHANGE UP
LEUKOCYTE ESTERASE UR-ACNC: HIGH
MAGNESIUM SERPL-MCNC: 2 MG/DL — SIGNIFICANT CHANGE UP (ref 1.6–2.6)
MCHC RBC-ENTMCNC: 33.8 PG — SIGNIFICANT CHANGE UP (ref 27–34)
MCHC RBC-ENTMCNC: 34.4 % — SIGNIFICANT CHANGE UP (ref 32–36)
MCV RBC AUTO: 98 FL — SIGNIFICANT CHANGE UP (ref 80–100)
MUCOUS THREADS # UR AUTO: SIGNIFICANT CHANGE UP
NITRITE UR-MCNC: NEGATIVE — SIGNIFICANT CHANGE UP
NRBC # FLD: 0 — SIGNIFICANT CHANGE UP
PH UR: 7.5 — SIGNIFICANT CHANGE UP (ref 4.6–8)
PHOSPHATE SERPL-MCNC: 2.9 MG/DL — SIGNIFICANT CHANGE UP (ref 2.5–4.5)
PLATELET # BLD AUTO: 153 K/UL — SIGNIFICANT CHANGE UP (ref 150–400)
PMV BLD: 10.3 FL — SIGNIFICANT CHANGE UP (ref 7–13)
POTASSIUM SERPL-MCNC: 4 MMOL/L — SIGNIFICANT CHANGE UP (ref 3.5–5.3)
POTASSIUM SERPL-SCNC: 4 MMOL/L — SIGNIFICANT CHANGE UP (ref 3.5–5.3)
PROT SERPL-MCNC: 6.8 G/DL — SIGNIFICANT CHANGE UP (ref 6–8.3)
PROT UR-MCNC: NEGATIVE MG/DL — SIGNIFICANT CHANGE UP
RBC # BLD: 3.97 M/UL — LOW (ref 4.2–5.8)
RBC # FLD: 12.4 % — SIGNIFICANT CHANGE UP (ref 10.3–14.5)
RBC CASTS # UR COMP ASSIST: HIGH (ref 0–?)
SODIUM SERPL-SCNC: 136 MMOL/L — SIGNIFICANT CHANGE UP (ref 135–145)
SP GR SPEC: 1.01 — SIGNIFICANT CHANGE UP (ref 1–1.04)
UROBILINOGEN FLD QL: NORMAL MG/DL — SIGNIFICANT CHANGE UP
WBC # BLD: 3.84 K/UL — SIGNIFICANT CHANGE UP (ref 3.8–10.5)
WBC # FLD AUTO: 3.84 K/UL — SIGNIFICANT CHANGE UP (ref 3.8–10.5)
WBC UR QL: HIGH (ref 0–?)

## 2018-05-11 PROCEDURE — 72157 MRI CHEST SPINE W/O & W/DYE: CPT | Mod: 26

## 2018-05-11 PROCEDURE — 72158 MRI LUMBAR SPINE W/O & W/DYE: CPT | Mod: 26

## 2018-05-11 PROCEDURE — 99223 1ST HOSP IP/OBS HIGH 75: CPT | Mod: GC

## 2018-05-11 PROCEDURE — 99222 1ST HOSP IP/OBS MODERATE 55: CPT

## 2018-05-11 PROCEDURE — 72156 MRI NECK SPINE W/O & W/DYE: CPT | Mod: 26

## 2018-05-11 PROCEDURE — 72127 CT NECK SPINE W/O & W/DYE: CPT | Mod: 26

## 2018-05-11 PROCEDURE — 12345: CPT | Mod: GC,NC

## 2018-05-11 PROCEDURE — 70498 CT ANGIOGRAPHY NECK: CPT | Mod: 26

## 2018-05-11 RX ORDER — HALOPERIDOL DECANOATE 100 MG/ML
10 INJECTION INTRAMUSCULAR
Qty: 0 | Refills: 0 | Status: DISCONTINUED | OUTPATIENT
Start: 2018-05-11 | End: 2018-06-04

## 2018-05-11 RX ORDER — DOCUSATE SODIUM 100 MG
100 CAPSULE ORAL THREE TIMES A DAY
Qty: 0 | Refills: 0 | Status: DISCONTINUED | OUTPATIENT
Start: 2018-05-11 | End: 2018-06-04

## 2018-05-11 RX ORDER — PETROLATUM,WHITE
1 JELLY (GRAM) TOPICAL
Qty: 0 | Refills: 0 | Status: DISCONTINUED | OUTPATIENT
Start: 2018-05-11 | End: 2018-06-04

## 2018-05-11 RX ORDER — QUETIAPINE FUMARATE 200 MG/1
200 TABLET, FILM COATED ORAL
Qty: 0 | Refills: 0 | Status: DISCONTINUED | OUTPATIENT
Start: 2018-05-11 | End: 2018-06-04

## 2018-05-11 RX ORDER — BENZTROPINE MESYLATE 1 MG
1 TABLET ORAL
Qty: 0 | Refills: 0 | Status: DISCONTINUED | OUTPATIENT
Start: 2018-05-11 | End: 2018-06-04

## 2018-05-11 RX ORDER — LEVETIRACETAM 250 MG/1
1000 TABLET, FILM COATED ORAL
Qty: 0 | Refills: 0 | Status: DISCONTINUED | OUTPATIENT
Start: 2018-05-11 | End: 2018-06-04

## 2018-05-11 RX ORDER — MEMANTINE HYDROCHLORIDE 10 MG/1
5 TABLET ORAL AT BEDTIME
Qty: 0 | Refills: 0 | Status: DISCONTINUED | OUTPATIENT
Start: 2018-05-11 | End: 2018-06-04

## 2018-05-11 RX ORDER — SENNA PLUS 8.6 MG/1
2 TABLET ORAL AT BEDTIME
Qty: 0 | Refills: 0 | Status: DISCONTINUED | OUTPATIENT
Start: 2018-05-11 | End: 2018-06-03

## 2018-05-11 RX ORDER — ENOXAPARIN SODIUM 100 MG/ML
40 INJECTION SUBCUTANEOUS DAILY
Qty: 0 | Refills: 0 | Status: DISCONTINUED | OUTPATIENT
Start: 2018-05-11 | End: 2018-05-21

## 2018-05-11 RX ADMIN — ENOXAPARIN SODIUM 40 MILLIGRAM(S): 100 INJECTION SUBCUTANEOUS at 11:41

## 2018-05-11 RX ADMIN — HALOPERIDOL DECANOATE 10 MILLIGRAM(S): 100 INJECTION INTRAMUSCULAR at 06:09

## 2018-05-11 RX ADMIN — Medication 1 MILLIGRAM(S): at 17:46

## 2018-05-11 RX ADMIN — Medication 1 APPLICATION(S): at 21:46

## 2018-05-11 RX ADMIN — Medication 1 APPLICATION(S): at 06:09

## 2018-05-11 RX ADMIN — QUETIAPINE FUMARATE 200 MILLIGRAM(S): 200 TABLET, FILM COATED ORAL at 06:09

## 2018-05-11 RX ADMIN — HALOPERIDOL DECANOATE 10 MILLIGRAM(S): 100 INJECTION INTRAMUSCULAR at 17:46

## 2018-05-11 RX ADMIN — QUETIAPINE FUMARATE 200 MILLIGRAM(S): 200 TABLET, FILM COATED ORAL at 17:46

## 2018-05-11 RX ADMIN — LEVETIRACETAM 1000 MILLIGRAM(S): 250 TABLET, FILM COATED ORAL at 06:09

## 2018-05-11 RX ADMIN — LEVETIRACETAM 1000 MILLIGRAM(S): 250 TABLET, FILM COATED ORAL at 17:46

## 2018-05-11 RX ADMIN — Medication 1 MILLIGRAM(S): at 06:09

## 2018-05-11 RX ADMIN — Medication 1 TABLET(S): at 11:41

## 2018-05-11 RX ADMIN — MEMANTINE HYDROCHLORIDE 5 MILLIGRAM(S): 10 TABLET ORAL at 21:46

## 2018-05-11 NOTE — CONSULT NOTE ADULT - SUBJECTIVE AND OBJECTIVE BOX
Neurology Consult    Name  DOMINIC WHITE    HPI:  59 yearold male from [facility] with PMH of schizophrenia, seizure disorder presents for weakness and urinary incontinence for 4 months. Patient is poor historian and does not provide alot of information so most of the information is from chart review. Patient difficulty progressively LE weakness started 4 months ago that acutely worsened in the past 2 months. He has increasing difficulty with ambulation to the point that he became wheel chair dependent two months ago. He reports urinary incontinence for 4 months with no associated dysuria, hematuria. ED reports that he has had urinary incontinence for the past 3 days. Patient is not sure why he currently has a balbuena and does not answer questions appropriately He also endorses constipation for the past few weeks and that he has to push to get stool out but after 5-10 minutes it comes out. He reports 9/10 cervical spinal and b/l shoulder pain. He states that weakness started when he was playing basketball and fell inuring his right ankle. He also states that he has had RUE weakness for the past 1.5 years but that is not documented anywhere. Has shoulder pain. Per [facility] documentation, pt was seen by neurology for progressive weakness and neurology recommended to sent patient to ED to t/o retroperitoneal process or cord compression.            MEDICATIONS  (STANDING):  AQUAPHOR (petrolatum Ointment) 1 Application(s) Topical two times a day  benztropine 1 milliGRAM(s) Oral two times a day  clotrimazole 1% Cream 1 Application(s) Topical two times a day  enoxaparin Injectable 40 milliGRAM(s) SubCutaneous daily  haloperidol     Tablet 10 milliGRAM(s) Oral two times a day  levETIRAcetam 1000 milliGRAM(s) Oral two times a day  memantine 5 milliGRAM(s) Oral at bedtime  multivitamin 1 Tablet(s) Oral daily  QUEtiapine 200 milliGRAM(s) Oral two times a day    MEDICATIONS  (PRN):  docusate sodium 100 milliGRAM(s) Oral three times a day PRN Constipation  senna 2 Tablet(s) Oral at bedtime PRN Constipation      Allergies    No Known Allergies    Intolerances        Objective:   Vital Signs Last 24 Hrs  T(C): 36.9 (11 May 2018 05:00), Max: 36.9 (11 May 2018 05:00)  T(F): 98.5 (11 May 2018 05:00), Max: 98.5 (11 May 2018 05:00)  HR: 87 (11 May 2018 05:00) (72 - 100)  BP: 127/72 (11 May 2018 05:00) (113/88 - 127/72)  BP(mean): --  RR: 18 (11 May 2018 05:00) (16 - 18)  SpO2: 100% (11 May 2018 05:00) (99% - 100%)    General Exam:   General appearance: No acute distress                   Neurological Exam:  Mental Status: AAOx3, fluent speech, follows commands    Cranial Nerves: EOMI, V1-V3 intact, facial symmetry intact, moderate dysarthria, tongue midline    Motor: 4+/5 RUE, 5/5 LUE, 4/5 R hip flexion/knee flexion/ knee extension/plantar flexion; 3/5 dorsiflexion of R foot      Sensation: decreased sensation to LT on RUE and RLE    Coordination: FTN intact b/l    Reflexes: 2+ bilateral biceps, brachioradialis. 3+ rt patellar, 2+ left paterllar         Labs:    05-10    134<L>  |  97<L>  |  21  ----------------------------<  83  5.6<H>   |  17<L>  |  0.95    Ca    9.6      10 May 2018 19:20    TPro  8.0  /  Alb  3.9  /  TBili  0.5  /  DBili  x   /  AST  52<H>  /  ALT  34  /  AlkPhos  131<H>  05-10    LIVER FUNCTIONS - ( 10 May 2018 19:20 )  Alb: 3.9 g/dL / Pro: 8.0 g/dL / ALK PHOS: 131 u/L / ALT: 34 u/L / AST: 52 u/L / GGT: x           CBC Full  -  ( 10 May 2018 19:20 )  WBC Count : 5.26 K/uL  Hemoglobin : 14.9 g/dL  Hematocrit : 44.9 %  Platelet Count - Automated : 208 K/uL  Mean Cell Volume : 99.8 fL  Mean Cell Hemoglobin : 33.1 pg  Mean Cell Hemoglobin Concentration : 33.2 %  Auto Neutrophil # : 2.93 K/uL  Auto Lymphocyte # : 1.55 K/uL  Auto Monocyte # : 0.65 K/uL  Auto Eosinophil # : 0.11 K/uL  Auto Basophil # : 0.01 K/uL  Auto Neutrophil % : 55.6 %  Auto Lymphocyte % : 29.5 %  Auto Monocyte % : 12.4 %  Auto Eosinophil % : 2.1 %  Auto Basophil % : 0.2 %      Radiology  CTH:  MRI Brain: Neurology Consult    Name  DOMINIC WHITE    HPI:  59 yearold male from [facility] with PMH of schizophrenia, seizure disorder presents for weakness and urinary incontinence for 4 months. Patient is poor historian and does not provide alot of information so most of the information is from chart review. Patient difficulty progressively LE weakness started 4 months ago that acutely worsened in the past 2 months. He has increasing difficulty with ambulation to the point that he became wheel chair dependent two months ago. He reports urinary incontinence for 4 months with no associated dysuria, hematuria. ED reports that he has had urinary incontinence for the past 3 days. Patient is not sure why he currently has a balbuena and does not answer questions appropriately He also endorses constipation for the past few weeks and that he has to push to get stool out but after 5-10 minutes it comes out. Denies saddle anesthesia although primary team states that he does have it along with decreased rectal tone. He reports 9/10 cervical spinal and b/l shoulder pain. He states that weakness started when he was playing basketball and fell inuring his right ankle. He also states that he has had RUE weakness for the past 1.5 years but that is not documented anywhere. Has shoulder pain. Per [facility] documentation, pt was seen by neurology for progressive weakness and neurology recommended to sent patient to ED to t/o retroperitoneal process or cord compression.            MEDICATIONS  (STANDING):  AQUAPHOR (petrolatum Ointment) 1 Application(s) Topical two times a day  benztropine 1 milliGRAM(s) Oral two times a day  clotrimazole 1% Cream 1 Application(s) Topical two times a day  enoxaparin Injectable 40 milliGRAM(s) SubCutaneous daily  haloperidol     Tablet 10 milliGRAM(s) Oral two times a day  levETIRAcetam 1000 milliGRAM(s) Oral two times a day  memantine 5 milliGRAM(s) Oral at bedtime  multivitamin 1 Tablet(s) Oral daily  QUEtiapine 200 milliGRAM(s) Oral two times a day    MEDICATIONS  (PRN):  docusate sodium 100 milliGRAM(s) Oral three times a day PRN Constipation  senna 2 Tablet(s) Oral at bedtime PRN Constipation      Allergies    No Known Allergies    Intolerances        Objective:   Vital Signs Last 24 Hrs  T(C): 36.9 (11 May 2018 05:00), Max: 36.9 (11 May 2018 05:00)  T(F): 98.5 (11 May 2018 05:00), Max: 98.5 (11 May 2018 05:00)  HR: 87 (11 May 2018 05:00) (72 - 100)  BP: 127/72 (11 May 2018 05:00) (113/88 - 127/72)  BP(mean): --  RR: 18 (11 May 2018 05:00) (16 - 18)  SpO2: 100% (11 May 2018 05:00) (99% - 100%)    General Exam:   General appearance: No acute distress                   Neurological Exam:  Mental Status: AAOx3, fluent speech, follows commands    Cranial Nerves: EOMI, V1-V3 intact, facial symmetry intact, moderate dysarthria, tongue midline    Motor: 4+/5 RUE, 5/5 LUE, 4/5 R hip flexion/knee flexion/ knee extension/plantar flexion; 3/5 dorsiflexion of R foot      Sensation: decreased sensation to LT on RUE and RLE    Coordination: FTN intact b/l    Reflexes: 2+ bilateral biceps, brachioradialis. 3+ rt patellar, 2+ left paterllar         Labs:    05-10    134<L>  |  97<L>  |  21  ----------------------------<  83  5.6<H>   |  17<L>  |  0.95    Ca    9.6      10 May 2018 19:20    TPro  8.0  /  Alb  3.9  /  TBili  0.5  /  DBili  x   /  AST  52<H>  /  ALT  34  /  AlkPhos  131<H>  05-10    LIVER FUNCTIONS - ( 10 May 2018 19:20 )  Alb: 3.9 g/dL / Pro: 8.0 g/dL / ALK PHOS: 131 u/L / ALT: 34 u/L / AST: 52 u/L / GGT: x           CBC Full  -  ( 10 May 2018 19:20 )  WBC Count : 5.26 K/uL  Hemoglobin : 14.9 g/dL  Hematocrit : 44.9 %  Platelet Count - Automated : 208 K/uL  Mean Cell Volume : 99.8 fL  Mean Cell Hemoglobin : 33.1 pg  Mean Cell Hemoglobin Concentration : 33.2 %  Auto Neutrophil # : 2.93 K/uL  Auto Lymphocyte # : 1.55 K/uL  Auto Monocyte # : 0.65 K/uL  Auto Eosinophil # : 0.11 K/uL  Auto Basophil # : 0.01 K/uL  Auto Neutrophil % : 55.6 %  Auto Lymphocyte % : 29.5 %  Auto Monocyte % : 12.4 %  Auto Eosinophil % : 2.1 %  Auto Basophil % : 0.2 %      Radiology  CTH:  MRI Brain: Neurology Consult    Name  DOMINIC WHITE    HPI:  59 yearold male from [facility] with PMH of schizophrenia, seizure disorder presents for weakness and urinary incontinence for 4 months. Patient is poor historian and does not provide alot of information so most of the information is from chart review. Patient difficulty progressively LE weakness started 4 months ago that acutely worsened in the past 2 months. He has increasing difficulty with ambulation to the point that he became wheel chair dependent two months ago. He reports urinary incontinence for 4 months with no associated dysuria, hematuria. ED reports that he has had urinary incontinence for the past 3 days. Patient is not sure why he currently has a balbuena and does not answer questions appropriately He also endorses constipation for the past few weeks and that he has to push to get stool out but after 5-10 minutes it comes out. Denies saddle anesthesia although primary team states that he does have it along with decreased rectal tone. He reports 9/10 cervical spinal and b/l shoulder pain. He states that weakness started when he was playing basketball and fell inuring his right ankle. He also states that he has had RUE weakness for the past 1.5 years but that is not documented anywhere. Has shoulder pain. Per [facility] documentation, pt was seen by neurology for progressive weakness and neurology recommended to sent patient to ED to t/o retroperitoneal process or cord compression.            MEDICATIONS  (STANDING):  AQUAPHOR (petrolatum Ointment) 1 Application(s) Topical two times a day  benztropine 1 milliGRAM(s) Oral two times a day  clotrimazole 1% Cream 1 Application(s) Topical two times a day  enoxaparin Injectable 40 milliGRAM(s) SubCutaneous daily  haloperidol     Tablet 10 milliGRAM(s) Oral two times a day  levETIRAcetam 1000 milliGRAM(s) Oral two times a day  memantine 5 milliGRAM(s) Oral at bedtime  multivitamin 1 Tablet(s) Oral daily  QUEtiapine 200 milliGRAM(s) Oral two times a day    MEDICATIONS  (PRN):  docusate sodium 100 milliGRAM(s) Oral three times a day PRN Constipation  senna 2 Tablet(s) Oral at bedtime PRN Constipation      Allergies    No Known Allergies    Intolerances        Objective:   Vital Signs Last 24 Hrs  T(C): 36.9 (11 May 2018 05:00), Max: 36.9 (11 May 2018 05:00)  T(F): 98.5 (11 May 2018 05:00), Max: 98.5 (11 May 2018 05:00)  HR: 87 (11 May 2018 05:00) (72 - 100)  BP: 127/72 (11 May 2018 05:00) (113/88 - 127/72)  BP(mean): --  RR: 18 (11 May 2018 05:00) (16 - 18)  SpO2: 100% (11 May 2018 05:00) (99% - 100%)    General Exam:   General appearance: No acute distress                   Neurological Exam:  Mental Status: AAOx3, fluent speech, follows commands    Cranial Nerves: EOMI, V1-V3 intact, facial symmetry intact, moderate dysarthria, tongue midline    Motor: 4+/5 RUE, 5/5 LUE, 4/5 R hip flexion/knee flexion/ knee extension/plantar flexion; 3/5 dorsiflexion of R foot      Sensation: decreased sensation to LT on RUE and RLE    Coordination: FTN intact b/l    Reflexes: 2+ bilateral biceps, brachioradialis. 3+ rt patellar, 2+ left paterllar         Labs:    05-10    134<L>  |  97<L>  |  21  ----------------------------<  83  5.6<H>   |  17<L>  |  0.95    Ca    9.6      10 May 2018 19:20    TPro  8.0  /  Alb  3.9  /  TBili  0.5  /  DBili  x   /  AST  52<H>  /  ALT  34  /  AlkPhos  131<H>  05-10    LIVER FUNCTIONS - ( 10 May 2018 19:20 )  Alb: 3.9 g/dL / Pro: 8.0 g/dL / ALK PHOS: 131 u/L / ALT: 34 u/L / AST: 52 u/L / GGT: x           CBC Full  -  ( 10 May 2018 19:20 )  WBC Count : 5.26 K/uL  Hemoglobin : 14.9 g/dL  Hematocrit : 44.9 %  Platelet Count - Automated : 208 K/uL  Mean Cell Volume : 99.8 fL  Mean Cell Hemoglobin : 33.1 pg  Mean Cell Hemoglobin Concentration : 33.2 %  Auto Neutrophil # : 2.93 K/uL  Auto Lymphocyte # : 1.55 K/uL  Auto Monocyte # : 0.65 K/uL  Auto Eosinophil # : 0.11 K/uL  Auto Basophil # : 0.01 K/uL  Auto Neutrophil % : 55.6 %  Auto Lymphocyte % : 29.5 %  Auto Monocyte % : 12.4 %  Auto Eosinophil % : 2.1 %  Auto Basophil % : 0.2 %      Radiology  CTH: IMPRESSION: No acute intracranial hemorrhage, mass effect, or shift of   the midline structures.     Microvascular disease.

## 2018-05-11 NOTE — H&P ADULT - HISTORY OF PRESENT ILLNESS
57 yo M [facility] resident with PMH of schizophrenia, seizure disorder sent in 57 yo M [facility] resident with PMH of schizophrenia, seizure disorder presents for weakness and urinary incontinence for 4 months. Patient difficulty progressively LE weakness started 4 months ago that acutely worsened in the past 2 months. He has increasing difficulty with ambulation to the point that he became wheel chair dependent two months ago. He reports urinary incontinence for 4 months with no associated dysuria, hematuria. 57 yo M [facility] resident with PMH of schizophrenia, seizure disorder presents for weakness and urinary incontinence for 4 months. Patient difficulty progressively LE weakness started 4 months ago that acutely worsened in the past 2 months. He has increasing difficulty with ambulation to the point that he became wheel chair dependent two months ago. He reports urinary incontinence for 4 months with no associated dysuria, hematuria. He also endorses constipation for the past few weeks and that he has to push to get stool out. He reports 9/10 cervical spinal and b/l shoulder pain. No f/c, recent trauma, n/v/d, HA, dizziness, numbness, tingling. Per [facility] documentation, pt was seen by neurology for progressive weakness and neurology recommended to sent patient to ED to t/o retroperitoneal process or cord compression.    In ED, T 98, -->72, /88, RR 18, Sat 100 RA. 59 yo M [facility] resident with PMH of schizophrenia, seizure disorder presents for weakness and urinary incontinence for 4 months. Patient difficulty progressively LE weakness started 4 months ago that acutely worsened in the past 2 months. He has increasing difficulty with ambulation to the point that he became wheel chair dependent two months ago. He reports urinary incontinence for 4 months with no associated dysuria, hematuria. He also endorses constipation for the past few weeks and that he has to push to get stool out. He reports 9/10 cervical spinal and b/l shoulder pain. No f/c, recent trauma, n/v/d, HA, dizziness, numbness, tingling, SI. Per [facility] documentation, pt was seen by neurology for progressive weakness and neurology recommended to sent patient to ED to t/o retroperitoneal process or cord compression.    In ED, T 98, -->72, /88, RR 18, Sat 100 RA. CTH noted microvascular disease and no acute pathology. MRI c/t/l ordered. 57 yo M [facility] resident with PMH of schizophrenia, seizure disorder presents for weakness and urinary incontinence for 4 months. Patient difficulty progressively LE weakness started 4 months ago that acutely worsened in the past 2 months. He has increasing difficulty with ambulation to the point that he became wheel chair dependent two months ago. He reports urinary incontinence for 4 months with no associated dysuria, hematuria. He also endorses constipation for the past few weeks and that he has to push to get stool out. He reports 9/10 cervical spinal and b/l shoulder pain. No f/c, recent trauma, weight loss, night sweat, n/v/d, HA, dizziness, numbness, tingling, SI. Per [facility] documentation, pt was seen by neurology for progressive weakness and neurology recommended to sent patient to ED to t/o retroperitoneal process or cord compression.    In ED, T 98, -->72, /88, RR 18, Sat 100 RA. CTH noted microvascular disease and no acute pathology. MRI c/t/l ordered.

## 2018-05-11 NOTE — CONSULT NOTE ADULT - ATTENDING COMMENTS
58 year old presents with neck pain, imaging suggests a C4 C5 facet joint infection.    He is afebrile with a normal white blood cell counts.  His symptoms have been ongoing for weeks.     I would favor establishing a micro diagnosis prior to starting abx.    Check blood cultures times two.    Pursue tissue diagnosis- surgical or IR    Check path and micro (routine, fungal, afb)    Can observe off antibiotics at this time. 58 year old presents with neck pain, imaging suggests a C4 C5 facet joint infection.    He is afebrile with a normal white blood cell counts.  His symptoms have been ongoing for weeks.     I would favor establishing a micro diagnosis prior to starting abx.    Check blood cultures times two.    Pursue tissue diagnosis- surgical or IR    Check path and micro (routine, fungal, afb)    Check HIV status (pt provided verbal consent)    Can observe off antibiotics at this time.

## 2018-05-11 NOTE — H&P ADULT - PROBLEM SELECTOR PLAN 2
-c/w home keppra for seizure ppx -bladder scan noted 375cc of urine  -balbuena ordered  -likely 2/2 neurogenic bladder in the setting associated neurologic c/o with reported urinary incontinence  -monitor Cr

## 2018-05-11 NOTE — CONSULT NOTE ADULT - PROBLEM SELECTOR RECOMMENDATION 9
Case to be d/w Dr. Johnson ESR/CRP  Blood Cultures  ID consult  UA/UC  Medical Clearance  Case to be d/w Dr. Johnson regarding role of surgery ESR/CRP  Blood Cultures  ID consult  UA/UC  Medical Clearance  CT w/wo contrast of cervical spine  Case to be d/w Dr. Johnson regarding role of surgery ESR/CRP  Blood Cultures  ID consult  UA/UC  Medical Clearance  CT w/wo contrast of cervical spine  CTA neck to evaluate R Vertebral artery stenosis w/ contrast  Case to be d/w Dr. Johnson regarding role of surgery

## 2018-05-11 NOTE — CONSULT NOTE ADULT - ATTENDING COMMENTS
Patient seen and examined with neurology team and above note reviewed and I agree with assessment and plan as outlined. Patient with neck and lower back pain and weakness in bilateral extremities. He has a balbuena however he cannot recall the reason for it.   He has weakness in his right> left leg and sensation is preserved throughout.  I reviewed his MRI of the cervical spine with neuroradiology and no evidence of cord compression however he has at C3, C4 and C5 right sided facet inflammation and soft tissue enhancement suggestive of an infectious or inflammatory process. No pheglom collection seen per se.  T and L spine no compression.   Would check ESR and CRP and ID consult and neurospine evaluation.   All questions answered.

## 2018-05-11 NOTE — H&P ADULT - NSHPREVIEWOFSYSTEMS_GEN_ALL_CORE
REVIEW OF SYSTEMS:  CONSTITUTIONAL: No fever, weight loss, or fatigue  EYES: No eye pain, visual disturbances, or discharge  ENMT:  No difficulty hearing, tinnitus, vertigo; No sinus or throat pain  NECK: No pain or stiffness  RESPIRATORY: No cough, wheezing, chills or hemoptysis; No shortness of breath  CARDIOVASCULAR: No chest pain, palpitations, dizziness, or leg swelling  GASTROINTESTINAL: No abdominal or epigastric pain. No nausea, vomiting, or hematemesis; No diarrhea or constipation. No melena or hematochezia.  GENITOURINARY: No dysuria, frequency, hematuria, or incontinence  NEUROLOGICAL: No headaches, memory loss, loss of strength, numbness, or tremors  SKIN: No itching, burning, rashes, or lesions   LYMPH NODES: No enlarged glands  ENDOCRINE: No heat or cold intolerance; No hair loss  MUSCULOSKELETAL: No joint pain or swelling; No muscle, back, or extremity pain  PSYCHIATRIC: No depression, anxiety, mood swings, or difficulty sleeping REVIEW OF SYSTEMS:  CONSTITUTIONAL: No fever, chill  EYES: No eye pain, visual disturbances, or discharge  ENMT:  No difficulty hearing, tinnitus, vertigo; No sinus or throat pain  NECK: +neck pain  RESPIRATORY: No cough, wheezing, chills or hemoptysis; No shortness of breath  CARDIOVASCULAR: No chest pain, palpitations, dizziness, or leg swelling  GASTROINTESTINAL: No abdominal or epigastric pain. No nausea, vomiting, or hematemesis; No melena or hematochezia, +constipation  GENITOURINARY: No dysuria, hematuria, + incontinence  NEUROLOGICAL: No headaches, memory loss, +weakness  SKIN: No itching, burning, rashes, or lesions   LYMPH NODES: No enlarged glands  ENDOCRINE: No heat or cold intolerance  MUSCULOSKELETAL: + neck pain and shoulder pain CONSTITUTIONAL: No fever, chill  EYES: No eye pain, visual disturbances, or discharge  ENMT:  No difficulty hearing, tinnitus, vertigo; No sinus or throat pain  NECK: +neck pain  RESPIRATORY: No cough, wheezing, chills or hemoptysis; No shortness of breath  CARDIOVASCULAR: No chest pain, palpitations, dizziness, or leg swelling  GASTROINTESTINAL: No abdominal or epigastric pain. No nausea, vomiting, or hematemesis; No melena or hematochezia, +constipation  GENITOURINARY: No dysuria, hematuria, + incontinence  NEUROLOGICAL: No headaches, memory loss, +weakness  SKIN: No itching, burning, rashes, or lesions   LYMPH NODES: No enlarged glands  ENDOCRINE: No heat or cold intolerance  MUSCULOSKELETAL: + neck pain and shoulder pain

## 2018-05-11 NOTE — H&P ADULT - PROBLEM SELECTOR PLAN 1
-progress LE weakness with associated urinary incontinence/retention, decreased anal sphincter tone concerning for cord compression  -CTH noted no acute pathology  -check MRI C/T/L for further evaluation  -fall precaution  -PT evaluation  -hold home atorvastatin -progress LE weakness with associated urinary incontinence/retention, decreased anal sphincter tone concerning for cord compression  -CTH noted no acute pathology  -check MRI C/T/L for further evaluation  -fall precaution  -PT evaluation  -hold home atorvastatin  -neurology consulted, f/u recc in AM -progress LE weakness with associated urinary incontinence/retention, decreased anal sphincter tone concerning for cord compression  -CTH noted no acute pathology  -check MRI C/T/L for further evaluation  -fall precaution  -PT evaluation  -hold home atorvastatin  -neurology consulted, f/u recc in AM  -check CK

## 2018-05-11 NOTE — PROGRESS NOTE ADULT - SUBJECTIVE AND OBJECTIVE BOX
Gregor Quezada MD, PhD, PGY1  Medicine Team 2  Pager: [phone]       Patient is a 58y old  Male who presents with a chief complaint of LE weakness, urinary incontinence. (11 May 2018 03:28)      Overnight Events: Admitted for progressive weakness from [facility] w/ concern for spinal compression.  Subjective: Pt with complaint of weakness this morning, chronic as in H&P. No new complaints. Patient with tired and with flat affect.    PHYSICAL EXAM:  Vitals: T(F): 98.5  HR: 87  BP: 127/72  RR: 18  SpO2: 100% on RA  GENERAL: NAD, well-developed, affect is flat  HEAD:  Atraumatic, Normocephalic  EYES: EOMI, PERRLA, conjunctiva and sclera clear  NECK: Tenderness of posterior neck/shoulders  CHEST/LUNG: Clear to auscultation bilaterally; No wheeze  HEART: Regular rate and rhythm; No murmurs, rubs, or gallops  ABDOMEN: Soft, Nontender, Nondistended; Bowel sounds present  EXTREMITIES:  2+ Peripheral Pulses, No clubbing, cyanosis, or edema  PSYCH: AAOx3  NEUROLOGY: Profound weakness of the lower extremity. Patient unable to lift legs against resistance. Mild distal weakness of the UE bilaterally.  SKIN: No rashes or lesions    LABS:  CAPILLARY BLOOD GLUCOSE        I&O's Summary    10 May 2018 07:01  -  11 May 2018 07:00  --------------------------------------------------------  IN: 0 mL / OUT: 400 mL / NET: -400 mL                              13.4   3.84  )-----------( 153      ( 11 May 2018 06:00 )             38.9     WBC Trend: 3.84<--, 5.26<--  05-11    136  |  100  |  20  ----------------------------<  100<H>  4.0   |  25  |  0.90    Ca    9.0      11 May 2018 06:00  Phos  2.9     05-11  Mg     2.0     05-11    TPro  6.8  /  Alb  3.5  /  TBili  0.5  /  DBili  x   /  AST  22  /  ALT  26  /  AlkPhos  118  05-11    Creatinine Trend: 0.90<--, 0.95<--  PT/INR - ( 10 May 2018 19:30 )   PT: 11.5 SEC;   INR: 1.04          PTT - ( 10 May 2018 19:30 )  PTT:29.1 SEC  CARDIAC MARKERS ( 11 May 2018 06:00 )  x     / x     / 73 u/L / x     / x                  MEDICATIONS  (STANDING):  AQUAPHOR (petrolatum Ointment) 1 Application(s) Topical two times a day  benztropine 1 milliGRAM(s) Oral two times a day  clotrimazole 1% Cream 1 Application(s) Topical two times a day  enoxaparin Injectable 40 milliGRAM(s) SubCutaneous daily  haloperidol     Tablet 10 milliGRAM(s) Oral two times a day  levETIRAcetam 1000 milliGRAM(s) Oral two times a day  memantine 5 milliGRAM(s) Oral at bedtime  multivitamin 1 Tablet(s) Oral daily  QUEtiapine 200 milliGRAM(s) Oral two times a day    MEDICATIONS  (PRN):  docusate sodium 100 milliGRAM(s) Oral three times a day PRN Constipation  senna 2 Tablet(s) Oral at bedtime PRN Constipation

## 2018-05-11 NOTE — CONSULT NOTE ADULT - ATTENDING COMMENTS
Patient seen and examined. Agree with the above assessment and plan. Patient would likely benefit from a cervical decompression given the severity of his weakness. That said, symptoms have been going on for sometime and patient has significant co-morbidities. Would recommend cardiac clearance prior to surgery. Will plan for OR early next week unless acute change in neurological status.

## 2018-05-11 NOTE — H&P ADULT - ASSESSMENT
59 yo M [facility] resident with PMH of schizophrenia, seizure disorder a/w progressing LE weakness and likely neurogenic bladder concerning for possible cord compression.

## 2018-05-11 NOTE — H&P ADULT - NSHPLABSRESULTS_GEN_ALL_CORE
.  LABS:                         14.9   5.26  )-----------( 208      ( 10 May 2018 19:20 )             44.9     05-10    134<L>  |  97<L>  |  21  ----------------------------<  83  5.6<H>   |  17<L>  |  0.95    Ca    9.6      10 May 2018 19:20    TPro  8.0  /  Alb  3.9  /  TBili  0.5  /  DBili  x   /  AST  52<H>  /  ALT  34  /  AlkPhos  131<H>  05-10    PT/INR - ( 10 May 2018 19:30 )   PT: 11.5 SEC;   INR: 1.04          PTT - ( 10 May 2018 19:30 )  PTT:29.1 SEC    < from: CT Head No Cont (05.10.18 @ 20:25) >    FINDINGS: There is no acute intracranial hemorrhage, mass effect, shift   of the midline structures, herniation, extra-axial fluid collection, or   hydrocephalus.    There is diffuse cerebral volume loss with prominence of the sulci,   fissures, and cisternal spaces which is normal for the patient's age.   There is mild deep and periventricular white matter hypoattenuation   statistically compatible with microvascular changes given calcific   atherosclerotic disease of the intracranial arteries.    The paranasal sinuses and mastoid air cells are clear. The calvarium is   intact. The imaged orbits are unremarkable.    IMPRESSION: No acute intracranial hemorrhage, mass effect, or shift of   the midline structures.     Microvascular disease.    < end of copied text >
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
## 2018-05-11 NOTE — H&P ADULT - NSHPPHYSICALEXAM_GEN_ALL_CORE
Vital Signs Last 24 Hrs  T(C): 36.8 (11 May 2018 00:45), Max: 36.8 (11 May 2018 00:45)  T(F): 98.2 (11 May 2018 00:45), Max: 98.2 (11 May 2018 00:45)  HR: 94 (11 May 2018 00:45) (72 - 100)  BP: 124/75 (11 May 2018 00:45) (113/88 - 124/75)  BP(mean): --  RR: 16 (11 May 2018 00:45) (16 - 18)  SpO2: 99% (11 May 2018 00:45) (99% - 100%)      PHYSICAL EXAM:  GENERAL: NAD, well-groomed, well-developed, flat affect  HEAD:  Atraumatic, Normocephalic  EYES: EOMI, PERRLA, conjunctiva and sclera clear  ENMT: No tonsillar erythema, exudates, or enlargement; Moist mucous membranes,   NECK: Supple, No JVD, Normal thyroid  HEART: Regular rate and rhythm; No murmurs, rubs, or gallops  RESPIRATORY: CTA B/L, No W/R/R  ABDOMEN: Soft, Nontender, Nondistended; Bowel sounds present  NEUROLOGY: A&Ox3, CN II-XII grossly intact, sensation intact, strength 4/5 RLE hip flexion/knee flexion, 2/5 R foot dorsiflexion, 5/5 all other extremities  EXTREMITIES:  2+ Peripheral Pulses, No clubbing, cyanosis, or edema  SKIN: warm, dry, normal color, no rash or abnormal lesions  Rectal: decreased anal sphincter tone Vital Signs Last 24 Hrs  T(C): 36.8 (11 May 2018 00:45), Max: 36.8 (11 May 2018 00:45)  T(F): 98.2 (11 May 2018 00:45), Max: 98.2 (11 May 2018 00:45)  HR: 94 (11 May 2018 00:45) (72 - 100)  BP: 124/75 (11 May 2018 00:45) (113/88 - 124/75)  BP(mean): --  RR: 16 (11 May 2018 00:45) (16 - 18)  SpO2: 99% (11 May 2018 00:45) (99% - 100%)      PHYSICAL EXAM:  GENERAL: NAD, well-groomed, well-developed, flat affect  EYES: EOMI, PERRLA, conjunctiva and sclera clear  ENMT: No tonsillar erythema, exudates, or enlargement; Moist mucous membranes,   NECK: Supple, No JVD, Normal thyroid  HEART: Regular rate and rhythm; No murmurs, rubs, or gallops  RESPIRATORY: CTA B/L, No W/R/R  ABDOMEN: Soft, Nontender, Nondistended; Bowel sounds present  NEUROLOGY: A&Ox3, CN II-XII grossly intact, sensation intact, strength 4/5 RLE hip flexion/knee flexion, 2/5 R foot dorsiflexion, 5/5 all other extremities  EXTREMITIES:  2+ Peripheral Pulses, No clubbing, cyanosis, or edema  SKIN: warm, dry, normal color, no rash or abnormal lesions  Rectal: decreased anal sphincter tone Vital Signs Last 24 Hrs  T(C): 36.8 (11 May 2018 00:45), Max: 36.8 (11 May 2018 00:45)  T(F): 98.2 (11 May 2018 00:45), Max: 98.2 (11 May 2018 00:45)  HR: 94 (11 May 2018 00:45) (72 - 100)  BP: 124/75 (11 May 2018 00:45) (113/88 - 124/75)  BP(mean): --  RR: 16 (11 May 2018 00:45) (16 - 18)  SpO2: 99% (11 May 2018 00:45) (99% - 100%)      PHYSICAL EXAM:  GENERAL: NAD, well-groomed, well-developed, flat affect  EYES: EOMI, PERRLA, conjunctiva and sclera clear  ENMT: No tonsillar erythema, exudates, or enlargement; Moist mucous membranes,   NECK: Supple, No JVD, Normal thyroid  HEART: Regular rate and rhythm; No murmurs, rubs, or gallops  RESPIRATORY: CTA B/L, No W/R/R  ABDOMEN: Soft, Nontender, Nondistended; Bowel sounds present  NEUROLOGY: A&Ox3, CN II-XII grossly intact, sensation intact, strength 4/5 RLE hip flexion/knee flexion, 2/5 R foot dorsiflexion, 5/5 all other extremities, +cervical spinal tenderness, active and passive ROM intact for neck  EXTREMITIES:  2+ Peripheral Pulses, No clubbing, cyanosis, or edema  SKIN: warm, dry, normal color, no rash or abnormal lesions  Rectal: decreased anal sphincter tone Vital Signs Last 24 Hrs  T(C): 36.8 (11 May 2018 00:45), Max: 36.8 (11 May 2018 00:45)  T(F): 98.2 (11 May 2018 00:45), Max: 98.2 (11 May 2018 00:45)  HR: 94 (11 May 2018 00:45) (72 - 100)  BP: 124/75 (11 May 2018 00:45) (113/88 - 124/75)  BP(mean): --  RR: 16 (11 May 2018 00:45) (16 - 18)  SpO2: 99% (11 May 2018 00:45) (99% - 100%)      PHYSICAL EXAM:  GENERAL: NAD, well-groomed, well-developed, flat affect  EYES: EOMI, PERRLA, conjunctiva and sclera clear  ENMT: No tonsillar erythema, exudates, or enlargement; Moist mucous membranes,   HEART: Regular rate and rhythm; No murmurs, rubs, or gallops  RESPIRATORY: CTA B/L, No W/R/R  ABDOMEN: Soft, Nontender, Nondistended; Bowel sounds present  NEUROLOGY: A&Ox3, CN II-XII grossly intact, sensation intact, strength 4/5 RLE hip flexion/knee flexion, 2/5 R foot dorsiflexion, 5/5 all other extremities, +cervical spinal tenderness, active and passive ROM intact for neck  EXTREMITIES:  2+ Peripheral Pulses, No clubbing, cyanosis, or edema  SKIN: warm, dry, normal color, no rash or abnormal lesions  Rectal: decreased anal sphincter tone

## 2018-05-11 NOTE — H&P ADULT - NSHPSOCIALHISTORY_GEN_ALL_CORE
[facility] resident for 3 years. former smoker with 5 pack year. Former EtoH use, last drink few months ago. No illicit drug use.

## 2018-05-11 NOTE — CONSULT NOTE ADULT - SUBJECTIVE AND OBJECTIVE BOX
HPI:  57 yo M [facility] resident with PMH of schizophrenia, seizure disorder presents for weakness and urinary incontinence for 4 months. Patient difficulty progressively LE weakness started 4 months ago that acutely worsened in the past 2 months. He has increasing difficulty with ambulation to the point that he became wheel chair dependent two months ago. He reports urinary incontinence for 4 months with no associated dysuria, hematuria. He also endorses constipation for the past few weeks and that he has to push to get stool out. He reports 9/10 cervical spinal and b/l shoulder pain. No f/c, recent trauma, weight loss, night sweat, n/v/d, HA, dizziness, numbness, tingling, SI. Per [facility] documentation, pt was seen by neurology for progressive weakness and neurology recommended to sent patient to ED to t/o retroperitoneal process or cord compression.    In ED, T 98, -->72, /88, RR 18, Sat 100 RA. CTH noted microvascular disease and no acute pathology. MRI c/t/l ordered. (11 May 2018 03:28)  Neurosurgery consulted for MRI of C spine showing C3-C5 abscess. Pt reports difficulty ambulated for 3 months due to leg weakness. He also reports numbness and tingling in hands.    PAST MEDICAL & SURGICAL HISTORY:  Schizophrenia  Seizure disorder  No significant past surgical history      AQUAPHOR (petrolatum Ointment) 1 Application(s) Topical two times a day  benztropine 1 milliGRAM(s) Oral two times a day  clotrimazole 1% Cream 1 Application(s) Topical two times a day  docusate sodium 100 milliGRAM(s) Oral three times a day PRN  enoxaparin Injectable 40 milliGRAM(s) SubCutaneous daily  haloperidol     Tablet 10 milliGRAM(s) Oral two times a day  levETIRAcetam 1000 milliGRAM(s) Oral two times a day  memantine 5 milliGRAM(s) Oral at bedtime  multivitamin 1 Tablet(s) Oral daily  QUEtiapine 200 milliGRAM(s) Oral two times a day  senna 2 Tablet(s) Oral at bedtime PRN    SOCIAL HISTORY:  FAMILY HISTORY:  No pertinent family history in first degree relatives    Vital Signs Last 24 Hrs  T(C): 36.9 (11 May 2018 05:00), Max: 36.9 (11 May 2018 05:00)  T(F): 98.5 (11 May 2018 05:00), Max: 98.5 (11 May 2018 05:00)  HR: 87 (11 May 2018 05:00) (72 - 100)  BP: 127/72 (11 May 2018 05:00) (113/88 - 127/72)  BP(mean): --  RR: 18 (11 May 2018 05:00) (16 - 18)  SpO2: 100% (11 May 2018 05:00) (99% - 100%)    PHYSICAL EXAM:  Awake Alert Attentive Affect appropriate Ox3  PERRL EOMI  Motor:   Tone: normal.                  Strength:     [X] Upper extremity                      Delt       Bicep    Tricep         Finger Abduction                                               R   4+/5       4+/5    4+/5       4-/5        2/5                                               L  4+/5        4+/5     4+/5       4-/5       2/5  [X] Lower extremity                       HF          KE          KF        DF         PF                                               R    5/5        5/5        5/5       0/5       5/5                                               L     5/5        5/5       5/5       5/5        5/5  +Hoffmans  No Clonus  3+ Reflexes throughout  LABS:                          13.4   3.84  )-----------( 153      ( 11 May 2018 06:00 )             38.9     05-11    136  |  100  |  20  ----------------------------<  100<H>  4.0   |  25  |  0.90    Ca    9.0      11 May 2018 06:00  Phos  2.9     05-11  Mg     2.0     05-11    TPro  6.8  /  Alb  3.5  /  TBili  0.5  /  DBili  x   /  AST  22  /  ALT  26  /  AlkPhos  118  05-11    PT/INR - ( 10 May 2018 19:30 )   PT: 11.5 SEC;   INR: 1.04          PTT - ( 10 May 2018 19:30 )  PTT:29.1 SEC      RADIOLOGY & ADDITIONAL STUDIES:  < from: MR Cervical Spine w/wo IV Cont (05.11.18 @ 11:42) >  Impression: Right facet joint infection is suspected at the C4-5 level   with surrounding area of abnormal signal enhancement in the soft tissue.   There is also evidence of compromise of flow involving the adjacent right   vertebral artery and involvement of the epidural space seen which is   likely due to phlegmon. CTA or MRA can be done for further evaluation. No   abnormal collections are seen at this time.  < end of copied text >

## 2018-05-12 LAB
BASOPHILS # BLD AUTO: 0.01 K/UL — SIGNIFICANT CHANGE UP (ref 0–0.2)
BASOPHILS NFR BLD AUTO: 0.2 % — SIGNIFICANT CHANGE UP (ref 0–2)
BUN SERPL-MCNC: 20 MG/DL — SIGNIFICANT CHANGE UP (ref 7–23)
CALCIUM SERPL-MCNC: 9.3 MG/DL — SIGNIFICANT CHANGE UP (ref 8.4–10.5)
CHLORIDE SERPL-SCNC: 103 MMOL/L — SIGNIFICANT CHANGE UP (ref 98–107)
CO2 SERPL-SCNC: 24 MMOL/L — SIGNIFICANT CHANGE UP (ref 22–31)
CREAT SERPL-MCNC: 0.9 MG/DL — SIGNIFICANT CHANGE UP (ref 0.5–1.3)
EOSINOPHIL # BLD AUTO: 0.12 K/UL — SIGNIFICANT CHANGE UP (ref 0–0.5)
EOSINOPHIL NFR BLD AUTO: 2.9 % — SIGNIFICANT CHANGE UP (ref 0–6)
ERYTHROCYTE [SEDIMENTATION RATE] IN BLOOD: 10 MM/HR — SIGNIFICANT CHANGE UP (ref 1–15)
GLUCOSE SERPL-MCNC: 96 MG/DL — SIGNIFICANT CHANGE UP (ref 70–99)
HCT VFR BLD CALC: 42.8 % — SIGNIFICANT CHANGE UP (ref 39–50)
HGB BLD-MCNC: 14.5 G/DL — SIGNIFICANT CHANGE UP (ref 13–17)
HIV 1+2 AB+HIV1 P24 AG SERPL QL IA: SIGNIFICANT CHANGE UP
IMM GRANULOCYTES # BLD AUTO: 0.01 # — SIGNIFICANT CHANGE UP
IMM GRANULOCYTES NFR BLD AUTO: 0.2 % — SIGNIFICANT CHANGE UP (ref 0–1.5)
LYMPHOCYTES # BLD AUTO: 1.51 K/UL — SIGNIFICANT CHANGE UP (ref 1–3.3)
LYMPHOCYTES # BLD AUTO: 36.6 % — SIGNIFICANT CHANGE UP (ref 13–44)
MAGNESIUM SERPL-MCNC: 1.9 MG/DL — SIGNIFICANT CHANGE UP (ref 1.6–2.6)
MCHC RBC-ENTMCNC: 33 PG — SIGNIFICANT CHANGE UP (ref 27–34)
MCHC RBC-ENTMCNC: 33.9 % — SIGNIFICANT CHANGE UP (ref 32–36)
MCV RBC AUTO: 97.3 FL — SIGNIFICANT CHANGE UP (ref 80–100)
MONOCYTES # BLD AUTO: 0.5 K/UL — SIGNIFICANT CHANGE UP (ref 0–0.9)
MONOCYTES NFR BLD AUTO: 12.1 % — SIGNIFICANT CHANGE UP (ref 2–14)
NEUTROPHILS # BLD AUTO: 1.98 K/UL — SIGNIFICANT CHANGE UP (ref 1.8–7.4)
NEUTROPHILS NFR BLD AUTO: 48 % — SIGNIFICANT CHANGE UP (ref 43–77)
NRBC # FLD: 0 — SIGNIFICANT CHANGE UP
PHOSPHATE SERPL-MCNC: 2.9 MG/DL — SIGNIFICANT CHANGE UP (ref 2.5–4.5)
PLATELET # BLD AUTO: 175 K/UL — SIGNIFICANT CHANGE UP (ref 150–400)
PMV BLD: 9.6 FL — SIGNIFICANT CHANGE UP (ref 7–13)
POTASSIUM SERPL-MCNC: 4.2 MMOL/L — SIGNIFICANT CHANGE UP (ref 3.5–5.3)
POTASSIUM SERPL-SCNC: 4.2 MMOL/L — SIGNIFICANT CHANGE UP (ref 3.5–5.3)
RBC # BLD: 4.4 M/UL — SIGNIFICANT CHANGE UP (ref 4.2–5.8)
RBC # FLD: 12.4 % — SIGNIFICANT CHANGE UP (ref 10.3–14.5)
SODIUM SERPL-SCNC: 138 MMOL/L — SIGNIFICANT CHANGE UP (ref 135–145)
SPECIMEN SOURCE: SIGNIFICANT CHANGE UP
WBC # BLD: 4.13 K/UL — SIGNIFICANT CHANGE UP (ref 3.8–10.5)
WBC # FLD AUTO: 4.13 K/UL — SIGNIFICANT CHANGE UP (ref 3.8–10.5)

## 2018-05-12 PROCEDURE — 99232 SBSQ HOSP IP/OBS MODERATE 35: CPT | Mod: GC

## 2018-05-12 PROCEDURE — 99233 SBSQ HOSP IP/OBS HIGH 50: CPT

## 2018-05-12 RX ADMIN — LEVETIRACETAM 1000 MILLIGRAM(S): 250 TABLET, FILM COATED ORAL at 17:41

## 2018-05-12 RX ADMIN — LEVETIRACETAM 1000 MILLIGRAM(S): 250 TABLET, FILM COATED ORAL at 07:01

## 2018-05-12 RX ADMIN — Medication 1 MILLIGRAM(S): at 17:40

## 2018-05-12 RX ADMIN — HALOPERIDOL DECANOATE 10 MILLIGRAM(S): 100 INJECTION INTRAMUSCULAR at 07:01

## 2018-05-12 RX ADMIN — Medication 1 TABLET(S): at 12:03

## 2018-05-12 RX ADMIN — Medication 1 APPLICATION(S): at 07:02

## 2018-05-12 RX ADMIN — ENOXAPARIN SODIUM 40 MILLIGRAM(S): 100 INJECTION SUBCUTANEOUS at 12:03

## 2018-05-12 RX ADMIN — Medication 1 APPLICATION(S): at 17:41

## 2018-05-12 RX ADMIN — MEMANTINE HYDROCHLORIDE 5 MILLIGRAM(S): 10 TABLET ORAL at 21:48

## 2018-05-12 RX ADMIN — Medication 1 APPLICATION(S): at 06:58

## 2018-05-12 RX ADMIN — HALOPERIDOL DECANOATE 10 MILLIGRAM(S): 100 INJECTION INTRAMUSCULAR at 17:41

## 2018-05-12 RX ADMIN — QUETIAPINE FUMARATE 200 MILLIGRAM(S): 200 TABLET, FILM COATED ORAL at 07:01

## 2018-05-12 RX ADMIN — Medication 1 MILLIGRAM(S): at 07:01

## 2018-05-12 RX ADMIN — QUETIAPINE FUMARATE 200 MILLIGRAM(S): 200 TABLET, FILM COATED ORAL at 17:41

## 2018-05-12 NOTE — PROGRESS NOTE ADULT - SUBJECTIVE AND OBJECTIVE BOX
ID Coverage    Patient is a 58y old  Male who presents with a chief complaint of LE weakness, urinary incontinence. (11 May 2018 03:28)    Being followed by ID for ?discitis    Interval history:Still some neck pain-occasional radiation to arms   No acute events      ROS:  No cough,SOB,CP  No N/V/D./abd pain  No other complaints      Antimicrobials:None       Other medications reviewed    Vital Signs Last 24 Hrs  T(C): 36.8 (05-12-18 @ 06:57), Max: 36.8 (05-11-18 @ 15:02)  T(F): 98.2 (05-12-18 @ 06:57), Max: 98.2 (05-11-18 @ 15:02)  HR: 73 (05-12-18 @ 06:57) (65 - 81)  BP: 114/65 (05-12-18 @ 06:57) (114/65 - 133/89)  BP(mean): --  RR: 18 (05-12-18 @ 06:57) (17 - 18)  SpO2: 100% (05-12-18 @ 06:57) (100% - 100%)    Physical Exam:        HEENT PERRLA EOMI    No oral exudate or erythema    Chest Good AE,CTA    CVS RRR S1 S2 WNl No murmur or rub or gallop    Abd soft BS normal No tenderness no masses    IV site no erythema tenderness or discharge    CNS AAO X 3 RLE 4/5 otherwise no focal     Lab Data:                          14.5   4.13  )-----------( 175      ( 12 May 2018 06:30 )             42.8       05-12    138  |  103  |  20  ----------------------------<  96  4.2   |  24  |  0.90    Ca    9.3      12 May 2018 06:30  Phos  2.9     05-12  Mg     1.9     05-12    TPro  6.8  /  Alb  3.5  /  TBili  0.5  /  DBili  x   /  AST  22  /  ALT  26  /  AlkPhos  118  05-11      Sedimentation Rate, Erythrocyte (05.12.18 @ 06:30)    Sedimentation Rate, Erythrocyte: 10 mm/hr    C-Reactive Protein, Serum: < 5.0 mg/L (05.11.18 @ 16:10)            < from: MR Cervical Spine w/wo IV Cont (05.11.18 @ 11:42) >    Impression: Right facet joint infection is suspected at the C4-5 level   with surrounding area of abnormal signal enhancement in the soft tissue.   There is also evidence of compromise of flow involving the adjacent right   vertebral artery and involvement of the epidural space seen which is   likely due to phlegmon. CTA or MRA can be done for further evaluation. No   abnormal collections are seen at this time.    < end of copied text >

## 2018-05-12 NOTE — PROGRESS NOTE ADULT - SUBJECTIVE AND OBJECTIVE BOX
Gregor Quezada MD, PhD, PGY1  Medicine Team 2  Pager: [phone]       Patient is a 58y old  Male who presents with a chief complaint of LE weakness, urinary incontinence. (11 May 2018 03:28)      Interval Events: MRI showed facet joint infection of cervical vertebra with extension into epidural space. Neurosurgery plans for surgery. ID recommends monitor off abx for now. Transferred to psychiatric unit.  Subjective: Continues with significant weakness, numbness in b/l hands, unable to write with right hand. No other complaints.  General: No fever or chills.  Head: No lightheadedness, dizziness, ha.  Chest: No SOB, cp, palpitations.  Abdomen: No n/v,  no d/c, no dysuria  Extremities: No swelling  Skin: No rashes, pruritis    PHYSICAL EXAM:  Vitals: T(F): 98.2  HR: 73  BP: 114/65  RR: 18  SpO2: 100% on RA  GENERAL: NAD, well-developed, affect less flat compared to yesterday  HEAD:  Atraumatic, Normocephalic  EYES: EOMI, PERRLA, conjunctiva and sclera clear  NECK: Tenderness of posterior neck/shoulders  CHEST/LUNG: Clear to auscultation bilaterally; No wheeze  HEART: Regular rate and rhythm; No murmurs, rubs, or gallops  ABDOMEN: Soft, Nontender, Nondistended; Bowel sounds present  EXTREMITIES:  2+ Peripheral Pulses, No clubbing, cyanosis, or edema  PSYCH: AAOx3, improved affect  NEUROLOGY: Profound weakness of the lower extremity. Patient unable to lift legs against resistance. Mild distal weakness of the UE bilaterally. Unchanged from yesterday  SKIN: No rashes or lesions    LABS:  CAPILLARY BLOOD GLUCOSE      POCT Blood Glucose.: 88 mg/dL (11 May 2018 12:00)    I&O's Summary    11 May 2018 07:01  -  12 May 2018 07:00  --------------------------------------------------------  IN: 0 mL / OUT: 1800 mL / NET: -1800 mL                              14.5   4.13  )-----------( 175      ( 12 May 2018 06:30 )             42.8     WBC Trend: 4.13<--, 3.84<--, 5.26<--  05-12    138  |  103  |  20  ----------------------------<  96  4.2   |  24  |  0.90    Ca    9.3      12 May 2018 06:30  Phos  2.9     05-12  Mg     1.9     12    TPro  6.8  /  Alb  3.5  /  TBili  0.5  /  DBili  x   /  AST  22  /  ALT  26  /  AlkPhos  118  05-11    Creatinine Trend: 0.90<--, 0.90<--, 0.95<--  PT/INR - ( 10 May 2018 19:30 )   PT: 11.5 SEC;   INR: 1.04          PTT - ( 10 May 2018 19:30 )  PTT:29.1 SEC  CARDIAC MARKERS ( 11 May 2018 06:00 )  x     / x     / 73 u/L / x     / x          Urinalysis Basic - ( 11 May 2018 14:40 )    Color: PLYEL / Appearance: CLEAR / S.012 / pH: 7.5  Gluc: NEGATIVE / Ketone: NEGATIVE  / Bili: NEGATIVE / Urobili: NORMAL mg/dL   Blood: NEGATIVE / Protein: NEGATIVE mg/dL / Nitrite: NEGATIVE   Leuk Esterase: SMALL / RBC: 5-10 / WBC 5-10   Sq Epi: x / Non Sq Epi: x / Bacteria: x       MR Cervical Spine w/wo IV Cont (18 @ 11:42)   Impression: Right facet joint infection is suspected at the C4-5 level   with surrounding area of abnormal signal enhancement in the soft tissue.   There is also evidence of compromise of flow involving the adjacent right   vertebral artery and involvement of the epidural space seen which is   likely due to phlegmon. CTA or MRA can be done for further evaluation. No   abnormal collections are seen at this time.     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 There is no significant stenosis by NASCET criteria   along extracranial vasculature.    Spine imaging redemonstrates changes of right C4-C5 facet changes with   widening of the facet joint, and bordering epidural phlegmon with   resultant severe spinal canal stenosis.            MEDICATIONS  (STANDING):  AQUAPHOR (petrolatum Ointment) 1 Application(s) Topical two times a day  benztropine 1 milliGRAM(s) Oral two times a day  clotrimazole 1% Cream 1 Application(s) Topical two times a day  enoxaparin Injectable 40 milliGRAM(s) SubCutaneous daily  haloperidol     Tablet 10 milliGRAM(s) Oral two times a day  levETIRAcetam 1000 milliGRAM(s) Oral two times a day  memantine 5 milliGRAM(s) Oral at bedtime  multivitamin 1 Tablet(s) Oral daily  QUEtiapine 200 milliGRAM(s) Oral two times a day    MEDICATIONS  (PRN):  docusate sodium 100 milliGRAM(s) Oral three times a day PRN Constipation  senna 2 Tablet(s) Oral at bedtime PRN Constipation

## 2018-05-12 NOTE — PROGRESS NOTE ADULT - ATTENDING COMMENTS
Patient seen and examined at bedside. Discussed case with PGY-1 Dr. Quezada and agree with his note above with following addendum.    58M schizophrenia and lives in long term psychiatric facility is admitted with progressive lower extremity weakness and bladder retention and is found on imaging to have a spinal cord collection causing cord compression. ID Dr. Garcia attending and NSGY Dr. Menchaca attending recommendations are appreciated, plan is to monitor off antibiotics then to perform drainage of spinal cord collection, this way if there is abscess the specific pathogen can be identified and antimicrobial can be appropriately targeted.    Francisco Szymanski M.D.  Medicine Attending  [phone] ([facility]) [phone] ([facility])

## 2018-05-13 ENCOUNTER — TRANSCRIPTION ENCOUNTER (OUTPATIENT)
Age: 58
End: 2018-05-13

## 2018-05-13 LAB
BACTERIA UR CULT: SIGNIFICANT CHANGE UP
BASOPHILS # BLD AUTO: 0.01 K/UL — SIGNIFICANT CHANGE UP (ref 0–0.2)
BASOPHILS NFR BLD AUTO: 0.2 % — SIGNIFICANT CHANGE UP (ref 0–2)
BUN SERPL-MCNC: 18 MG/DL — SIGNIFICANT CHANGE UP (ref 7–23)
CALCIUM SERPL-MCNC: 9.3 MG/DL — SIGNIFICANT CHANGE UP (ref 8.4–10.5)
CHLORIDE SERPL-SCNC: 101 MMOL/L — SIGNIFICANT CHANGE UP (ref 98–107)
CO2 SERPL-SCNC: 25 MMOL/L — SIGNIFICANT CHANGE UP (ref 22–31)
CREAT SERPL-MCNC: 0.92 MG/DL — SIGNIFICANT CHANGE UP (ref 0.5–1.3)
EOSINOPHIL # BLD AUTO: 0.12 K/UL — SIGNIFICANT CHANGE UP (ref 0–0.5)
EOSINOPHIL NFR BLD AUTO: 2.9 % — SIGNIFICANT CHANGE UP (ref 0–6)
GLUCOSE SERPL-MCNC: 101 MG/DL — HIGH (ref 70–99)
HCT VFR BLD CALC: 42.1 % — SIGNIFICANT CHANGE UP (ref 39–50)
HGB BLD-MCNC: 14.4 G/DL — SIGNIFICANT CHANGE UP (ref 13–17)
IMM GRANULOCYTES # BLD AUTO: 0.01 # — SIGNIFICANT CHANGE UP
IMM GRANULOCYTES NFR BLD AUTO: 0.2 % — SIGNIFICANT CHANGE UP (ref 0–1.5)
LYMPHOCYTES # BLD AUTO: 1.51 K/UL — SIGNIFICANT CHANGE UP (ref 1–3.3)
LYMPHOCYTES # BLD AUTO: 37 % — SIGNIFICANT CHANGE UP (ref 13–44)
MAGNESIUM SERPL-MCNC: 1.9 MG/DL — SIGNIFICANT CHANGE UP (ref 1.6–2.6)
MCHC RBC-ENTMCNC: 33 PG — SIGNIFICANT CHANGE UP (ref 27–34)
MCHC RBC-ENTMCNC: 34.2 % — SIGNIFICANT CHANGE UP (ref 32–36)
MCV RBC AUTO: 96.3 FL — SIGNIFICANT CHANGE UP (ref 80–100)
MONOCYTES # BLD AUTO: 0.61 K/UL — SIGNIFICANT CHANGE UP (ref 0–0.9)
MONOCYTES NFR BLD AUTO: 15 % — HIGH (ref 2–14)
NEUTROPHILS # BLD AUTO: 1.82 K/UL — SIGNIFICANT CHANGE UP (ref 1.8–7.4)
NEUTROPHILS NFR BLD AUTO: 44.7 % — SIGNIFICANT CHANGE UP (ref 43–77)
NRBC # FLD: 0 — SIGNIFICANT CHANGE UP
PHOSPHATE SERPL-MCNC: 3 MG/DL — SIGNIFICANT CHANGE UP (ref 2.5–4.5)
PLATELET # BLD AUTO: 181 K/UL — SIGNIFICANT CHANGE UP (ref 150–400)
PMV BLD: 9.9 FL — SIGNIFICANT CHANGE UP (ref 7–13)
POTASSIUM SERPL-MCNC: 4 MMOL/L — SIGNIFICANT CHANGE UP (ref 3.5–5.3)
POTASSIUM SERPL-SCNC: 4 MMOL/L — SIGNIFICANT CHANGE UP (ref 3.5–5.3)
RBC # BLD: 4.37 M/UL — SIGNIFICANT CHANGE UP (ref 4.2–5.8)
RBC # FLD: 12.6 % — SIGNIFICANT CHANGE UP (ref 10.3–14.5)
SODIUM SERPL-SCNC: 138 MMOL/L — SIGNIFICANT CHANGE UP (ref 135–145)
SPECIMEN SOURCE: SIGNIFICANT CHANGE UP
WBC # BLD: 4.08 K/UL — SIGNIFICANT CHANGE UP (ref 3.8–10.5)
WBC # FLD AUTO: 4.08 K/UL — SIGNIFICANT CHANGE UP (ref 3.8–10.5)

## 2018-05-13 PROCEDURE — 99232 SBSQ HOSP IP/OBS MODERATE 35: CPT | Mod: GC

## 2018-05-13 RX ADMIN — Medication 1 MILLIGRAM(S): at 17:12

## 2018-05-13 RX ADMIN — LEVETIRACETAM 1000 MILLIGRAM(S): 250 TABLET, FILM COATED ORAL at 05:52

## 2018-05-13 RX ADMIN — HALOPERIDOL DECANOATE 10 MILLIGRAM(S): 100 INJECTION INTRAMUSCULAR at 05:52

## 2018-05-13 RX ADMIN — Medication 1 APPLICATION(S): at 17:13

## 2018-05-13 RX ADMIN — ENOXAPARIN SODIUM 40 MILLIGRAM(S): 100 INJECTION SUBCUTANEOUS at 11:52

## 2018-05-13 RX ADMIN — Medication 1 MILLIGRAM(S): at 05:52

## 2018-05-13 RX ADMIN — QUETIAPINE FUMARATE 200 MILLIGRAM(S): 200 TABLET, FILM COATED ORAL at 05:52

## 2018-05-13 RX ADMIN — Medication 1 APPLICATION(S): at 05:50

## 2018-05-13 RX ADMIN — MEMANTINE HYDROCHLORIDE 5 MILLIGRAM(S): 10 TABLET ORAL at 21:01

## 2018-05-13 RX ADMIN — Medication 1 TABLET(S): at 11:52

## 2018-05-13 RX ADMIN — LEVETIRACETAM 1000 MILLIGRAM(S): 250 TABLET, FILM COATED ORAL at 17:12

## 2018-05-13 RX ADMIN — QUETIAPINE FUMARATE 200 MILLIGRAM(S): 200 TABLET, FILM COATED ORAL at 17:13

## 2018-05-13 RX ADMIN — HALOPERIDOL DECANOATE 10 MILLIGRAM(S): 100 INJECTION INTRAMUSCULAR at 17:12

## 2018-05-13 RX ADMIN — Medication 1 APPLICATION(S): at 05:52

## 2018-05-13 RX ADMIN — Medication 1 APPLICATION(S): at 17:14

## 2018-05-13 NOTE — DISCHARGE NOTE ADULT - HOSPITAL COURSE
Mr. Betancourt is a 58y M with a history of schizophrenia (resident of [facility]) who presented to [facility] on May 10th for concern of 4 months of progressive weakness in the upper and lower extremities. There were no remarkable infectious complaints on presentation including no f/c, and there was no lab abnormalities to suggest infectious etiology. Neurology was consulted and an MRI of the spine (cervical, thoracic, and lumbar) were performed. The MRI demonstrated a facet joint infection at the C4-C5 level with extension into the epidural space forming a phlegmon, compressing the spinal cord. Neurosurgery and infectious diseases were consulted. He was monitored off antibiotics pending surgery of the cervical spine given his lack of septic features. Mr. Betancourt is a 58y M with a history of schizophrenia (resident of [facility]) who presented to [facility] on May 10th for concern of 4 months of progressive weakness in the upper and lower extremities. There were no remarkable infectious complaints on presentation including no fevers, and there was no lab abnormalities to suggest infectious etiology.  Neurology was consulted and an MRI of the spine (cervical, thoracic, and lumbar) were performed. The MRI demonstrated a facet joint infection at the C4-C5 level with extension into the epidural space forming a phlegmon, compressing the spinal cord. Neurosurgery and infectious diseases were consulted. He was monitored off antibiotics pending surgery of the cervical spine given his lack of septic features.  Neurosurgery performed a spinal decompression and facet joint removal.  Pathology of the specimen did not reveal any active infection, and surgical cultures continued to be negative.  The infectious disease doctor felt that the patient did not need antibiotics.  Physical therapy did an evaluation and felt that the patient would benefit from rehabilitation services for the leg weakness.  He was medically stable to discharge to rehab. Mr. Betancourt is a 58y M with a history of schizophrenia (resident of [facility]) who presented to [facility] on May 10th for concern of 4 months of progressive weakness in the upper and lower extremities. There were no remarkable infectious complaints on presentation including no fevers, and there was no lab abnormalities to suggest infectious etiology.  Neurology was consulted and an MRI of the spine (cervical, thoracic, and lumbar) were performed. The MRI demonstrated a facet joint infection at the C4-C5 level with extension into the epidural space forming a phlegmon, compressing the spinal cord. Neurosurgery and infectious diseases were consulted. He was monitored off antibiotics pending surgery of the cervical spine given his lack of septic features.  Neurosurgery performed a spinal decompression and facet joint removal.  Pathology of the specimen did not reveal any active infection, and surgical cultures continued to be negative.  The infectious disease doctor felt that the patient did not need antibiotics.  Physical medicine and rehabilitation services evaluated the patient and felt that he would benefit from acute rehabilitation services.  He was medically stable to discharge to rehab.

## 2018-05-13 NOTE — DISCHARGE NOTE ADULT - MEDICATION SUMMARY - MEDICATIONS TO STOP TAKING
I will STOP taking the medications listed below when I get home from the hospital:    lactulose 10 g/15 mL oral solution  -- 20 gram(s) by mouth 2 times a day

## 2018-05-13 NOTE — DISCHARGE NOTE ADULT - PLAN OF CARE
Prevention of increasing symptoms You were admitted to the hospital with lower extremity weakness which was due to spinal cord impingement by a phlegmon.  You had neurosurgery to relieve the obstruction.  No organisms were grown on the surgical specimen, and during this hospitalization you had no fevers.  An infectious disease doctor evaluated you and felt that you did not need antibiotics.  If you experience high fevers please contact your physician. Your leg weakness was due to the cervical stenosis.  After surgery, your leg weakness can improve, but you will have to participate as fully as possible in rehabilitation services. Please continue your home medications. Please continue home medications.

## 2018-05-13 NOTE — DISCHARGE NOTE ADULT - CARE PROVIDER_API CALL
Torrie Johnson), [facility] Neurosurgery  General  [address] [address]  Phone: [phone]  Fax: [phone]

## 2018-05-13 NOTE — PROGRESS NOTE ADULT - SUBJECTIVE AND OBJECTIVE BOX
Kevin Enrique MD PGY2  [phone] / [phone]     Patient is a 58y old  Male who presents with a chief complaint of LE weakness, urinary incontinence. (13 May 2018 07:53)      SUBJECTIVE / OVERNIGHT EVENTS:  No acute overnight events.   Patient reports continued weakness and numbness in mainly his RLE but also a little in RUE.   Denies any CP, SOB, N/V, abd pain, F/C or palpitations.       MEDICATIONS  (STANDING):  AQUAPHOR (petrolatum Ointment) 1 Application(s) Topical two times a day  benztropine 1 milliGRAM(s) Oral two times a day  clotrimazole 1% Cream 1 Application(s) Topical two times a day  enoxaparin Injectable 40 milliGRAM(s) SubCutaneous daily  haloperidol     Tablet 10 milliGRAM(s) Oral two times a day  levETIRAcetam 1000 milliGRAM(s) Oral two times a day  memantine 5 milliGRAM(s) Oral at bedtime  multivitamin 1 Tablet(s) Oral daily  QUEtiapine 200 milliGRAM(s) Oral two times a day    MEDICATIONS  (PRN):  docusate sodium 100 milliGRAM(s) Oral three times a day PRN Constipation  senna 2 Tablet(s) Oral at bedtime PRN Constipation      Vital Signs Last 24 Hrs  T(C): 36.4 (13 May 2018 05:47), Max: 36.9 (12 May 2018 14:44)  T(F): 97.6 (13 May 2018 05:47), Max: 98.5 (12 May 2018 14:44)  HR: 84 (13 May 2018 05:47) (77 - 88)  BP: 133/81 (13 May 2018 05:47) (133/81 - 149/88)  BP(mean): --  RR: 18 (13 May 2018 05:47) (17 - 18)  SpO2: 100% (13 May 2018 05:47) (100% - 100%)  CAPILLARY BLOOD GLUCOSE        I&O's Summary    12 May 2018 07:01  -  13 May 2018 07:00  --------------------------------------------------------  IN: 0 mL / OUT: 2700 mL / NET: -2700 mL        PHYSICAL EXAM:  GENERAL: NAD, well-developed  HEAD:  Atraumatic, Normocephalic  EYES: EOMI, PERRLA, conjunctiva and sclera clear  NECK: Supple, No JVD  CHEST/LUNG: Clear to auscultation bilaterally; No wheeze  HEART: Regular rate and rhythm; No murmurs, rubs, or gallops  ABDOMEN: Soft, Nontender, Nondistended; Bowel sounds present  EXTREMITIES:  2+ Peripheral Pulses, No clubbing, cyanosis, or edema  PSYCH: AAOx3  NEUROLOGY: strength RLE 2/5, RUE 4/5, LUE and LLE 5/5, decreased sensation in RLE and RUE  SKIN: No rashes or lesions    LABS:                        14.4   4.08  )-----------( 181      ( 13 May 2018 05:50 )             42.1     05-13    138  |  101  |  18  ----------------------------<  101<H>  4.0   |  25  |  0.92    Ca    9.3      13 May 2018 05:50  Phos  3.0     05-13  Mg     1.9     05-13            Urinalysis Basic - ( 11 May 2018 14:40 )    Color: PLYEL / Appearance: CLEAR / S.012 / pH: 7.5  Gluc: NEGATIVE / Ketone: NEGATIVE  / Bili: NEGATIVE / Urobili: NORMAL mg/dL   Blood: NEGATIVE / Protein: NEGATIVE mg/dL / Nitrite: NEGATIVE   Leuk Esterase: SMALL / RBC: 5-10 / WBC 5-10   Sq Epi: x / Non Sq Epi: x / Bacteria: x        RADIOLOGY & ADDITIONAL TESTS:    Imaging Personally Reviewed:    Consultant(s) Notes Reviewed:      Care Discussed with Consultants/Other Providers:

## 2018-05-13 NOTE — DISCHARGE NOTE ADULT - CARE PLAN
Principal Discharge DX:	Cervical stenosis of spinal canal  Goal:	Prevention of increasing symptoms  Assessment and plan of treatment:	You were admitted to the hospital with lower extremity weakness which was due to spinal cord impingement by a phlegmon.  You had neurosurgery to relieve the obstruction.  No organisms were grown on the surgical specimen, and during this hospitalization you had no fevers.  An infectious disease doctor evaluated you and felt that you did not need antibiotics.  If you experience high fevers please contact your physician.  Secondary Diagnosis:	Leg weakness  Assessment and plan of treatment:	Your leg weakness was due to the cervical stenosis.  After surgery, your leg weakness can improve, but you will have to participate as fully as possible in rehabilitation services.  Secondary Diagnosis:	Schizophrenia  Assessment and plan of treatment:	Please continue your home medications.  Secondary Diagnosis:	Seizure disorder  Goal:	Please continue home medications. Principal Discharge DX:	Cervical stenosis of spinal canal  Goal:	Prevention of increasing symptoms  Assessment and plan of treatment:	You were admitted to the hospital with lower extremity weakness which was due to spinal cord impingement by a phlegmon.  You had neurosurgery to relieve the obstruction.  No organisms were grown on the surgical specimen, and during this hospitalization you had no fevers.  An infectious disease doctor evaluated you and felt that you did not need antibiotics.  If you experience high fevers please contact your physician.  Secondary Diagnosis:	Leg weakness  Assessment and plan of treatment:	Your leg weakness was due to the cervical stenosis.  After surgery, your leg weakness can improve, but you will have to participate as fully as possible in rehabilitation services.  Secondary Diagnosis:	Schizophrenia  Assessment and plan of treatment:	Please continue your home medications.  Secondary Diagnosis:	Seizure disorder  Assessment and plan of treatment:	Please continue home medications.

## 2018-05-13 NOTE — DISCHARGE NOTE ADULT - MEDICATION SUMMARY - MEDICATIONS TO TAKE
I will START or STAY ON the medications listed below when I get home from the hospital:    Tylenol 325 mg oral tablet  -- 2 tab(s) by mouth every 4 hours, As Needed  -- Indication: For Cervical stenosis of spinal canal    levETIRAcetam 1000 mg oral tablet  -- 1 tab(s) by mouth 2 times a day  -- Indication: For Schizophrenia    atorvastatin 10 mg oral tablet  -- 1 tab(s) by mouth once a day  -- Indication: For Hyperlipidemia    benztropine 1 mg oral tablet  -- 1 tab(s) by mouth 2 times a day  -- Indication: For Schizophrenia    Haldol 10 mg oral tablet  -- 1 tab(s) by mouth 2 times a day  -- Indication: For Schizophrenia    QUEtiapine 200 mg oral tablet  -- 1 tab(s) by mouth 2 times a day  -- Indication: For Schizophrenia    petrolatum topical ointment  -- Apply on skin to affected area 2 times a day  -- Indication: For Dry Skin    clotrimazole 1% topical cream  -- Apply on skin to affected area 2 times a day  -- Indication: For Dry Skin    polyethylene glycol 3350 oral powder for reconstitution  -- 17 gram(s) by mouth 2 times a day, As needed, Constipation  -- Indication: For Constipation    senna oral tablet  -- 2 tab(s) by mouth once a day (at bedtime)  -- Indication: For Constipation    memantine 5 mg oral tablet  -- 1 tab(s) by mouth once a day (at bedtime)  -- Indication: For Schizophrenia    Multiple Vitamins with Minerals oral tablet, chewable  -- 1 tab(s) by mouth once a day  -- Indication: For Health Maintenance

## 2018-05-13 NOTE — DISCHARGE NOTE ADULT - OTHER SIGNIFICANT FINDINGS
< from: MR Cervical Spine w/wo IV Cont (05.11.18 @ 11:42) >  Impression: Right facet joint infection is suspected at the C4-5 level   with surrounding area of abnormal signal enhancement in the soft tissue.   There is also evidence of compromise of flow involving the adjacent right   vertebral artery and involvement of the epidural space seen which is   likely due to phlegmon. CTA or MRA can be done for further evaluation. No   abnormal collections are seen at this time.    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 There is no significant stenosis by NASCET criteria   along extracranial vasculature.    Spine imaging redemonstrates changes of right C4-C5 facet changes with   widening of the facet joint, and bordering epidural phlegmon with   resultant severe spinal canal stenosis.    < end of copied text >

## 2018-05-13 NOTE — PROGRESS NOTE ADULT - ATTENDING COMMENTS
Patient seen and examined at bedside. Discussed case with PGY-2 Dr. Enrique and agree with his note above with following addendum.    58M schizophrenia and lives in long term psychiatric facility is admitted with progressive lower extremity weakness and bladder retention and is found on imaging to have a spinal cord collection causing cord compression. ID Dr. Garcia attending and NSGY Dr. Menchaca attending recommendations are appreciated, plan is to monitor off antibiotics then to perform neurosurgery of affected area, this way if there is infection the specific pathogen can be identified and antimicrobial can be appropriately targeted.    Francisco Szymanski M.D.  Medicine Attending  [phone] ([place]) [phone] ([facility])

## 2018-05-14 PROCEDURE — 93010 ELECTROCARDIOGRAM REPORT: CPT

## 2018-05-14 PROCEDURE — 99232 SBSQ HOSP IP/OBS MODERATE 35: CPT

## 2018-05-14 PROCEDURE — 99233 SBSQ HOSP IP/OBS HIGH 50: CPT | Mod: GC

## 2018-05-14 RX ADMIN — Medication 1 APPLICATION(S): at 05:34

## 2018-05-14 RX ADMIN — LEVETIRACETAM 1000 MILLIGRAM(S): 250 TABLET, FILM COATED ORAL at 05:34

## 2018-05-14 RX ADMIN — Medication 1 APPLICATION(S): at 17:10

## 2018-05-14 RX ADMIN — QUETIAPINE FUMARATE 200 MILLIGRAM(S): 200 TABLET, FILM COATED ORAL at 17:06

## 2018-05-14 RX ADMIN — LEVETIRACETAM 1000 MILLIGRAM(S): 250 TABLET, FILM COATED ORAL at 17:07

## 2018-05-14 RX ADMIN — Medication 1 MILLIGRAM(S): at 05:34

## 2018-05-14 RX ADMIN — HALOPERIDOL DECANOATE 10 MILLIGRAM(S): 100 INJECTION INTRAMUSCULAR at 17:07

## 2018-05-14 RX ADMIN — Medication 1 TABLET(S): at 11:48

## 2018-05-14 RX ADMIN — ENOXAPARIN SODIUM 40 MILLIGRAM(S): 100 INJECTION SUBCUTANEOUS at 11:48

## 2018-05-14 RX ADMIN — HALOPERIDOL DECANOATE 10 MILLIGRAM(S): 100 INJECTION INTRAMUSCULAR at 05:34

## 2018-05-14 RX ADMIN — QUETIAPINE FUMARATE 200 MILLIGRAM(S): 200 TABLET, FILM COATED ORAL at 05:34

## 2018-05-14 RX ADMIN — Medication 1 MILLIGRAM(S): at 17:07

## 2018-05-14 RX ADMIN — MEMANTINE HYDROCHLORIDE 5 MILLIGRAM(S): 10 TABLET ORAL at 21:36

## 2018-05-14 NOTE — PROGRESS NOTE ADULT - ATTENDING COMMENTS
58M schizophrenia and lives in long term psychiatric facility is admitted with progressive lower extremity weakness and bladder retention and is found on imaging to have a spinal cord collection causing cord compression.  --Appreciate ID recs, monitor off abx and await surgical path and cultures to guide therapy  --Plan for OR tomorrow vs 5/18 depending on surgeon availability 58M schizophrenia and lives in long term psychiatric facility is admitted with progressive lower extremity weakness and bladder retention and is found on imaging to have a spinal cord collection causing cord compression.  --Appreciate ID recs, monitor off abx and await surgical path and cultures to guide therapy  --Patient is medically optimized and may proceed to surgery without further testing  --Plan for OR tomorrow vs 5/18 depending on surgeon availability

## 2018-05-14 NOTE — PROGRESS NOTE ADULT - SUBJECTIVE AND OBJECTIVE BOX
Follow Up:      Inverval History/ROS:Patient is a 58y old  Male who presents with a chief complaint of LE weakness, urinary incontinence. (13 May 2018 07:53)  Moves all ext.   no fever.       Allergies    No Known Allergies    Intolerances        ANTIMICROBIALS:      OTHER MEDS:  AQUAPHOR (petrolatum Ointment) 1 Application(s) Topical two times a day  benztropine 1 milliGRAM(s) Oral two times a day  clotrimazole 1% Cream 1 Application(s) Topical two times a day  docusate sodium 100 milliGRAM(s) Oral three times a day PRN  enoxaparin Injectable 40 milliGRAM(s) SubCutaneous daily  haloperidol     Tablet 10 milliGRAM(s) Oral two times a day  levETIRAcetam 1000 milliGRAM(s) Oral two times a day  memantine 5 milliGRAM(s) Oral at bedtime  multivitamin 1 Tablet(s) Oral daily  QUEtiapine 200 milliGRAM(s) Oral two times a day  senna 2 Tablet(s) Oral at bedtime PRN      Vital Signs Last 24 Hrs  T(C): 36.6 (14 May 2018 13:11), Max: 37 (13 May 2018 20:58)  T(F): 97.9 (14 May 2018 13:11), Max: 98.6 (13 May 2018 20:58)  HR: 81 (14 May 2018 13:11) (74 - 92)  BP: 137/77 (14 May 2018 13:11) (116/72 - 137/77)  BP(mean): --  RR: 18 (14 May 2018 13:11) (18 - 20)  SpO2: 100% (14 May 2018 13:11) (100% - 100%)    PHYSICAL EXAM:  General: [x non-toxic  HEAD/EYES: [ ] PERRL [x ] white sclera [ ] icterus  ENT:  [ ] normal [x ] supple [ ] thrush [ ] pharyngeal exudate  Cardiovascular:   [ ] murmur [x ] normal [ ] PPM/AICD  Respiratory:  [ x] clear to ausculation bilaterally  GI:  [x ] soft, non-tender, normal bowel sounds  :  [ ] balbuena [ ] no CVA tenderness   Musculoskeletal:  [ ] no synovitis  Neurologic:  [ ] non-focal exam   Skin:  [x ] no rash  Lymph: [ ] no lymphadenopathy  Psychiatric:  [ ] appropriate affect [ ] alert & oriented  Lines:  [x ] no phlebitis [ ] central line                                14.4   4.08  )-----------( 181      ( 13 May 2018 05:50 )             42.1       05-13    138  |  101  |  18  ----------------------------<  101<H>  4.0   |  25  |  0.92    Ca    9.3      13 May 2018 05:50  Phos  3.0     05-13  Mg     1.9     05-13            MICROBIOLOGY:    RADIOLOGY:

## 2018-05-14 NOTE — PROGRESS NOTE ADULT - SUBJECTIVE AND OBJECTIVE BOX
Patient is a 58y old  Male who presents with a chief complaint of LE weakness, urinary incontinence. (13 May 2018 07:53)      SUBJECTIVE / OVERNIGHT EVENTS:  No events overnight.  The patient has no complaints this AM.      MEDICATIONS  (STANDING):  AQUAPHOR (petrolatum Ointment) 1 Application(s) Topical two times a day  benztropine 1 milliGRAM(s) Oral two times a day  clotrimazole 1% Cream 1 Application(s) Topical two times a day  enoxaparin Injectable 40 milliGRAM(s) SubCutaneous daily  haloperidol     Tablet 10 milliGRAM(s) Oral two times a day  levETIRAcetam 1000 milliGRAM(s) Oral two times a day  memantine 5 milliGRAM(s) Oral at bedtime  multivitamin 1 Tablet(s) Oral daily  QUEtiapine 200 milliGRAM(s) Oral two times a day      MEDICATIONS  (PRN):  docusate sodium 100 milliGRAM(s) Oral three times a day PRN Constipation  senna 2 Tablet(s) Oral at bedtime PRN Constipation      Vital Signs Last 24 Hrs  T(C): 36.9 (14 May 2018 05:33), Max: 37 (13 May 2018 20:58)  T(F): 98.5 (14 May 2018 05:33), Max: 98.6 (13 May 2018 20:58)  HR: 74 (14 May 2018 05:33) (74 - 94)  BP: 119/74 (14 May 2018 05:33) (116/72 - 140/80)  BP(mean): --  RR: 18 (14 May 2018 05:33) (18 - 20)  SpO2: 100% (14 May 2018 05:33) (100% - 100%)      I&O's Summary    13 May 2018 07:01  -  14 May 2018 07:00  --------------------------------------------------------  IN: 0 mL / OUT: 500 mL / NET: -500 mL      PHYSICAL EXAM:  GENERAL:  NAD, sitting upright in bed comfortably  EYES:  EOMI  ENMT: Dentition intact  NECK: No JVP noted  CHEST/LUNG: CTA bilaterally  HEART:  Regular rate and rhythm  ABDOMEN:  No tenderness, distension noted  EXTREMITIES:  Non-palpable peripheral pulses  PSYCH:  Flat affect, but euthymic  NEUROLOGY:  RLE:  Plantar flexion/extension 2/5, 1/5 on leg raise.  LLE:  Plantar flexion/extension 4/5, 2/5 on leg raise but questionable effort.  Bilateral UE 5/5, but coordination is dysmetria.    SKIN: Intact to gross observation      LABS:                        14.4   4.08  )-----------( 181      ( 13 May 2018 05:50 )             42.1     05-13    138  |  101  |  18  ----------------------------<  101<H>  4.0   |  25  |  0.92    Ca    9.3      13 May 2018 05:50  Phos  3.0     05-13  Mg     1.9     05-13      RADIOLOGY & ADDITIONAL TESTS:  Imaging Personally Reviewed: YES    Consultant(s) Notes Reviewed: YES    Care Discussed with Consultants/Other Providers: YES      Michael Gabriel MD PGY-1  Department of Medicine  [phone]

## 2018-05-15 PROCEDURE — 99233 SBSQ HOSP IP/OBS HIGH 50: CPT | Mod: GC

## 2018-05-15 PROCEDURE — 99232 SBSQ HOSP IP/OBS MODERATE 35: CPT

## 2018-05-15 RX ADMIN — QUETIAPINE FUMARATE 200 MILLIGRAM(S): 200 TABLET, FILM COATED ORAL at 05:20

## 2018-05-15 RX ADMIN — LEVETIRACETAM 1000 MILLIGRAM(S): 250 TABLET, FILM COATED ORAL at 17:45

## 2018-05-15 RX ADMIN — HALOPERIDOL DECANOATE 10 MILLIGRAM(S): 100 INJECTION INTRAMUSCULAR at 05:20

## 2018-05-15 RX ADMIN — HALOPERIDOL DECANOATE 10 MILLIGRAM(S): 100 INJECTION INTRAMUSCULAR at 17:45

## 2018-05-15 RX ADMIN — Medication 1 APPLICATION(S): at 05:20

## 2018-05-15 RX ADMIN — Medication 1 MILLIGRAM(S): at 05:20

## 2018-05-15 RX ADMIN — Medication 1 MILLIGRAM(S): at 17:45

## 2018-05-15 RX ADMIN — Medication 1 APPLICATION(S): at 17:46

## 2018-05-15 RX ADMIN — LEVETIRACETAM 1000 MILLIGRAM(S): 250 TABLET, FILM COATED ORAL at 05:20

## 2018-05-15 RX ADMIN — ENOXAPARIN SODIUM 40 MILLIGRAM(S): 100 INJECTION SUBCUTANEOUS at 11:53

## 2018-05-15 RX ADMIN — QUETIAPINE FUMARATE 200 MILLIGRAM(S): 200 TABLET, FILM COATED ORAL at 17:45

## 2018-05-15 RX ADMIN — MEMANTINE HYDROCHLORIDE 5 MILLIGRAM(S): 10 TABLET ORAL at 21:40

## 2018-05-15 RX ADMIN — Medication 1 TABLET(S): at 11:53

## 2018-05-15 NOTE — PROGRESS NOTE ADULT - ATTENDING COMMENTS
58M schizophrenia and lives in long term psychiatric facility is admitted with progressive lower extremity weakness and bladder retention and is found on imaging to have a spinal cord collection causing cord compression.  --Appreciate ID recs, monitor off abx and await surgical path and cultures to guide therapy  --Patient is medically optimized and may proceed to surgery without further testing  --Plan for OR 5/18  --Patient is agreeable with neurosurgical intervention but lacks capacity to adequately understand the risks and benefits of the procedure due to his chronic persistent severe mental illness.  Since surgical intervention cannot be delayed due to potentially life-threatening medical condition, consent should be provided by C, the neurosurgeon and I will concur.  Please see psych documentation today for concurring opinion regarding patient's decisional capacity

## 2018-05-15 NOTE — CHART NOTE - NSCHARTNOTEFT_GEN_A_CORE
Discussed case with primary attending, Dr. Mooney regarding need for consent for procedure. Dr. Mooney informs that pt lacks capacity to consent for treatment at this time. Needing concurrent opinion.  Mr. Bell is a 59yo male with a hx of schizophrenia presenting with a cervical abscess in need of urgent intervention. Mr. Bell presents as A&Ox3 (knows name, is in hospital, and the month and year).  States that he takes coumadin and Seroquel but that Seroquel is to make his "blood flow in the right way." Pt endorsed past hx of visual hallucinations and current auditory hallucinations. Positive for grandiose delusions, stating that he is God and people know that he is God. Can not give a logical account for the onset of current symptoms, stating that this is the direct result of a basketball injury 15 years prior. Can not give history of intervening years. Can not state the implications of his current illness. Agreeable to surgery when explained. Patient does not appreciate the severity of his illness nor weigh the benefits or risks of intervention. As a result, I concur with the opinion that the patient lacks capacity to make medical decisions at this time.  As such, patient lacks capacity to consent for treatment and, per primary attending, pt is medically acute and is in urgent need for neurosurgical intervention. Treatment can not be delayed without significant risk, including but not limited to loss of function and loss of life. Pt has no known next of kin, as per [place] documentation. Due to the urgent need for intervention and the patient's current lack of capacity to consent, combined with no known next of kin, it is recommended that the necessary procedures be consented through a 2 Physician Consent (2PC). Therefore  the attending physician performing the procedure should be one signator and the medical attending the other to meet the patient's medical needs.

## 2018-05-15 NOTE — PROGRESS NOTE ADULT - SUBJECTIVE AND OBJECTIVE BOX
Patient is a 58y old  Male who presents with a chief complaint of LE weakness, urinary incontinence. (13 May 2018 07:53)      SUBJECTIVE / OVERNIGHT EVENTS:  No events overnight.  No complaints this AM.      MEDICATIONS  (STANDING):  AQUAPHOR (petrolatum Ointment) 1 Application(s) Topical two times a day  benztropine 1 milliGRAM(s) Oral two times a day  clotrimazole 1% Cream 1 Application(s) Topical two times a day  enoxaparin Injectable 40 milliGRAM(s) SubCutaneous daily  haloperidol     Tablet 10 milliGRAM(s) Oral two times a day  levETIRAcetam 1000 milliGRAM(s) Oral two times a day  memantine 5 milliGRAM(s) Oral at bedtime  multivitamin 1 Tablet(s) Oral daily  QUEtiapine 200 milliGRAM(s) Oral two times a day      MEDICATIONS  (PRN):  docusate sodium 100 milliGRAM(s) Oral three times a day PRN Constipation  senna 2 Tablet(s) Oral at bedtime PRN Constipation      Vital Signs Last 24 Hrs  T(C): 36.8 (15 May 2018 05:19), Max: 36.8 (14 May 2018 19:41)  T(F): 98.2 (15 May 2018 05:19), Max: 98.3 (14 May 2018 19:41)  HR: 69 (15 May 2018 05:19) (69 - 88)  BP: 110/73 (15 May 2018 05:19) (110/73 - 137/77)  BP(mean): --  RR: 18 (15 May 2018 05:19) (18 - 20)  SpO2: 100% (15 May 2018 05:19) (100% - 100%)      I&O's Summary    14 May 2018 07:01  -  15 May 2018 07:00  --------------------------------------------------------  IN: 0 mL / OUT: 1500 mL / NET: -1500 mL    15 May 2018 07:01  -  15 May 2018 10:08  --------------------------------------------------------  IN: 0 mL / OUT: 300 mL / NET: -300 mL      PHYSICAL EXAM:  GENERAL:  NAD, sitting upright in bed comfortably  EYES:  EOMI  ENMT: Dentition intact  NECK: No JVP noted  CHEST/LUNG: CTA bilaterally  HEART:  Regular rate and rhythm  ABDOMEN:  No tenderness, distension noted  EXTREMITIES:  Non-palpable peripheral pulses  PSYCH:  Flat affect, but euthymic  NEUROLOGY:  RLE:  Plantar flexion/extension 2/5, 1/5 on leg raise.  LLE:  Plantar flexion/extension 4/5, 2/5 on leg raise but questionable effort.  Bilateral UE 5/5, but coordination is dysmetria.    SKIN: Intact to gross observation      RADIOLOGY & ADDITIONAL TESTS:  Imaging Personally Reviewed: YES    Consultant(s) Notes Reviewed: YES    Care Discussed with Consultants/Other Providers: YES      Michael Gabriel MD PGY-1  Department of Medicine  [phone]

## 2018-05-15 NOTE — PROGRESS NOTE ADULT - SUBJECTIVE AND OBJECTIVE BOX
Follow Up:      Inver[place] History/ROS:Patient is a 58y old  Male who presents with a chief complaint of LE weakness, urinary incontinence. (13 May 2018 07:53)      No events. moves all ext.     Allergies    No Known Allergies    Intolerances        ANTIMICROBIALS:      OTHER MEDS:  AQUAPHOR (petrolatum Ointment) 1 Application(s) Topical two times a day  benztropine 1 milliGRAM(s) Oral two times a day  clotrimazole 1% Cream 1 Application(s) Topical two times a day  docusate sodium 100 milliGRAM(s) Oral three times a day PRN  enoxaparin Injectable 40 milliGRAM(s) SubCutaneous daily  haloperidol     Tablet 10 milliGRAM(s) Oral two times a day  levETIRAcetam 1000 milliGRAM(s) Oral two times a day  memantine 5 milliGRAM(s) Oral at bedtime  multivitamin 1 Tablet(s) Oral daily  QUEtiapine 200 milliGRAM(s) Oral two times a day  senna 2 Tablet(s) Oral at bedtime PRN      Vital Signs Last 24 Hrs  T(C): 36.5 (15 May 2018 14:04), Max: 36.8 (14 May 2018 19:41)  T(F): 97.7 (15 May 2018 14:04), Max: 98.3 (14 May 2018 19:41)  HR: 91 (15 May 2018 14:04) (69 - 91)  BP: 106/58 (15 May 2018 14:04) (106/58 - 120/60)  BP(mean): --  RR: 18 (15 May 2018 14:04) (18 - 20)  SpO2: 99% (15 May 2018 14:04) (99% - 100%)    PHYSICAL EXAM:  General: [ x] non-toxic  HEAD/EYES: [ ] PERRL [x ] white sclera [ ] icterus  ENT:  [ ] normal [x ] supple [ ] thrush [ ] pharyngeal exudate  Cardiovascular:   [ ] murmur [ x] normal [ ] PPM/AICD  Respiratory:  [ x] clear to ausculation bilaterally  GI:  [ x] soft, non-tender, normal bowel sounds  :  [ ] balbuena [ ] no CVA tenderness   Musculoskeletal:  [ ] no synovitis  Neurologic:  [ ] non-focal exam   Skin:  [ x] no rash  Lymph: [ ] no lymphadenopathy  Psychiatric:  [ x] appropriate affect [ ] alert & oriented  Lines:  [x ] no phlebitis [ ] central line                          MICROBIOLOGY:    RADIOLOGY:

## 2018-05-16 LAB
BACTERIA BLD CULT: SIGNIFICANT CHANGE UP
HCT VFR BLD CALC: 44 % — SIGNIFICANT CHANGE UP (ref 39–50)
HGB BLD-MCNC: 14.9 G/DL — SIGNIFICANT CHANGE UP (ref 13–17)
MCHC RBC-ENTMCNC: 33.3 PG — SIGNIFICANT CHANGE UP (ref 27–34)
MCHC RBC-ENTMCNC: 33.9 % — SIGNIFICANT CHANGE UP (ref 32–36)
MCV RBC AUTO: 98.2 FL — SIGNIFICANT CHANGE UP (ref 80–100)
NRBC # FLD: 0 — SIGNIFICANT CHANGE UP
PLATELET # BLD AUTO: 165 K/UL — SIGNIFICANT CHANGE UP (ref 150–400)
PMV BLD: 9.4 FL — SIGNIFICANT CHANGE UP (ref 7–13)
RBC # BLD: 4.48 M/UL — SIGNIFICANT CHANGE UP (ref 4.2–5.8)
RBC # FLD: 12.4 % — SIGNIFICANT CHANGE UP (ref 10.3–14.5)
WBC # BLD: 4.49 K/UL — SIGNIFICANT CHANGE UP (ref 3.8–10.5)
WBC # FLD AUTO: 4.49 K/UL — SIGNIFICANT CHANGE UP (ref 3.8–10.5)

## 2018-05-16 PROCEDURE — 99233 SBSQ HOSP IP/OBS HIGH 50: CPT | Mod: GC

## 2018-05-16 RX ADMIN — Medication 1 APPLICATION(S): at 05:03

## 2018-05-16 RX ADMIN — HALOPERIDOL DECANOATE 10 MILLIGRAM(S): 100 INJECTION INTRAMUSCULAR at 05:03

## 2018-05-16 RX ADMIN — LEVETIRACETAM 1000 MILLIGRAM(S): 250 TABLET, FILM COATED ORAL at 19:07

## 2018-05-16 RX ADMIN — Medication 1 MILLIGRAM(S): at 19:07

## 2018-05-16 RX ADMIN — MEMANTINE HYDROCHLORIDE 5 MILLIGRAM(S): 10 TABLET ORAL at 21:55

## 2018-05-16 RX ADMIN — Medication 1 APPLICATION(S): at 18:07

## 2018-05-16 RX ADMIN — Medication 1 MILLIGRAM(S): at 05:03

## 2018-05-16 RX ADMIN — HALOPERIDOL DECANOATE 10 MILLIGRAM(S): 100 INJECTION INTRAMUSCULAR at 19:07

## 2018-05-16 RX ADMIN — QUETIAPINE FUMARATE 200 MILLIGRAM(S): 200 TABLET, FILM COATED ORAL at 19:07

## 2018-05-16 RX ADMIN — Medication 100 MILLIGRAM(S): at 19:00

## 2018-05-16 RX ADMIN — QUETIAPINE FUMARATE 200 MILLIGRAM(S): 200 TABLET, FILM COATED ORAL at 05:03

## 2018-05-16 RX ADMIN — ENOXAPARIN SODIUM 40 MILLIGRAM(S): 100 INJECTION SUBCUTANEOUS at 12:30

## 2018-05-16 RX ADMIN — Medication 1 TABLET(S): at 13:30

## 2018-05-16 RX ADMIN — LEVETIRACETAM 1000 MILLIGRAM(S): 250 TABLET, FILM COATED ORAL at 05:03

## 2018-05-16 NOTE — PROGRESS NOTE ADULT - ATTENDING COMMENTS
58M schizophrenia and lives in long term psychiatric facility is admitted with progressive lower extremity weakness and bladder retention and is found on imaging to have a spinal cord collection causing cord compression.  --Appreciate ID recs, monitor off abx and await surgical path and cultures to guide therapy  --Patient is medically optimized and may proceed to surgery without further testing  --Plan for OR 5/18  --Patient is agreeable with neurosurgical intervention but lacks capacity to adequately understand the risks and benefits of the procedure due to his chronic persistent severe mental illness.  Since surgical intervention cannot be delayed due to potentially life-threatening medical condition, consent should be provided by C, the neurosurgeon and I will concur.  Please see psych documentation on 5/15 Chart Note for concurring opinion regarding patient's decisional capacity

## 2018-05-16 NOTE — PROGRESS NOTE ADULT - SUBJECTIVE AND OBJECTIVE BOX
Patient is a 58y old  Male who presents with a chief complaint of LE weakness, urinary incontinence. (13 May 2018 07:53)      SUBJECTIVE / OVERNIGHT EVENTS:  No events overnight.  No concerns this AM.      MEDICATIONS  (STANDING):  AQUAPHOR (petrolatum Ointment) 1 Application(s) Topical two times a day  benztropine 1 milliGRAM(s) Oral two times a day  clotrimazole 1% Cream 1 Application(s) Topical two times a day  enoxaparin Injectable 40 milliGRAM(s) SubCutaneous daily  haloperidol     Tablet 10 milliGRAM(s) Oral two times a day  levETIRAcetam 1000 milliGRAM(s) Oral two times a day  memantine 5 milliGRAM(s) Oral at bedtime  multivitamin 1 Tablet(s) Oral daily  QUEtiapine 200 milliGRAM(s) Oral two times a day      MEDICATIONS  (PRN):  docusate sodium 100 milliGRAM(s) Oral three times a day PRN Constipation  senna 2 Tablet(s) Oral at bedtime PRN Constipation        Vital Signs Last 24 Hrs  T(C): 36.9 (16 May 2018 05:02), Max: 36.9 (16 May 2018 05:02)  T(F): 98.4 (16 May 2018 05:02), Max: 98.4 (16 May 2018 05:02)  HR: 75 (16 May 2018 05:02) (75 - 91)  BP: 136/80 (16 May 2018 05:02) (106/58 - 136/80)  BP(mean): --  RR: 18 (16 May 2018 05:02) (18 - 18)  SpO2: 99% (16 May 2018 05:02) (99% - 100%)      I&O's Summary    15 May 2018 07:01  -  16 May 2018 07:00  --------------------------------------------------------  IN: 0 mL / OUT: 700 mL / NET: -700 mL      PHYSICAL EXAM:  GENERAL:  NAD, sitting upright in bed comfortably  EYES:  EOMI  ENMT: Dentition intact  NECK: No JVP noted  CHEST/LUNG: CTA bilaterally  HEART:  Regular rate and rhythm  ABDOMEN:  No tenderness, distension noted  EXTREMITIES:  Non-palpable peripheral pulses  PSYCH:  Flat affect, but euthymic  NEUROLOGY:  RLE:  Plantar flexion/extension 2/5, 1/5 on leg raise.  LLE:  Plantar flexion/extension 4/5, 2/5 on leg raise but questionable effort.  Bilateral UE 5/5, but coordination is dysmetria.    SKIN: Intact to gross observation      LABS:                        14.9   4.49  )-----------( 165      ( 16 May 2018 05:20 )             44.0       RADIOLOGY & ADDITIONAL TESTS:  Imaging Personally Reviewed: YES    Consultant(s) Notes Reviewed: YES    Care Discussed with Consultants/Other Providers: YES      Michael Gabriel MD PGY-1  Department of Medicine  [phone]

## 2018-05-17 PROCEDURE — 99233 SBSQ HOSP IP/OBS HIGH 50: CPT | Mod: GC

## 2018-05-17 RX ADMIN — Medication 1 MILLIGRAM(S): at 05:23

## 2018-05-17 RX ADMIN — ENOXAPARIN SODIUM 40 MILLIGRAM(S): 100 INJECTION SUBCUTANEOUS at 11:31

## 2018-05-17 RX ADMIN — Medication 1 APPLICATION(S): at 05:23

## 2018-05-17 RX ADMIN — Medication 1 MILLIGRAM(S): at 17:29

## 2018-05-17 RX ADMIN — LEVETIRACETAM 1000 MILLIGRAM(S): 250 TABLET, FILM COATED ORAL at 17:32

## 2018-05-17 RX ADMIN — MEMANTINE HYDROCHLORIDE 5 MILLIGRAM(S): 10 TABLET ORAL at 21:17

## 2018-05-17 RX ADMIN — HALOPERIDOL DECANOATE 10 MILLIGRAM(S): 100 INJECTION INTRAMUSCULAR at 05:23

## 2018-05-17 RX ADMIN — QUETIAPINE FUMARATE 200 MILLIGRAM(S): 200 TABLET, FILM COATED ORAL at 05:23

## 2018-05-17 RX ADMIN — Medication 1 APPLICATION(S): at 17:28

## 2018-05-17 RX ADMIN — QUETIAPINE FUMARATE 200 MILLIGRAM(S): 200 TABLET, FILM COATED ORAL at 17:32

## 2018-05-17 RX ADMIN — HALOPERIDOL DECANOATE 10 MILLIGRAM(S): 100 INJECTION INTRAMUSCULAR at 17:32

## 2018-05-17 RX ADMIN — Medication 1 TABLET(S): at 11:31

## 2018-05-17 RX ADMIN — LEVETIRACETAM 1000 MILLIGRAM(S): 250 TABLET, FILM COATED ORAL at 05:23

## 2018-05-17 RX ADMIN — Medication 1 APPLICATION(S): at 17:29

## 2018-05-17 NOTE — PROGRESS NOTE ADULT - ATTENDING COMMENTS
58M schizophrenia and lives in long term psychiatric facility is admitted with progressive lower extremity weakness and bladder retention and is found on imaging to have a spinal cord collection causing cord compression.  --Appreciate ID recs, monitor off abx and await surgical path and cultures to guide therapy  --Patient is medically optimized and may proceed to surgery without further testing  --Plan for OR now 5/21  --Patient is agreeable with neurosurgical intervention but lacks capacity to adequately understand the risks and benefits of the procedure due to his chronic persistent severe mental illness.  Since surgical intervention cannot be delayed due to potentially life-threatening medical condition, consent should be provided by C, the neurosurgeon and I will concur.  Please see psych documentation on 5/15 Chart Note for concurring opinion regarding patient's decisional capacity

## 2018-05-17 NOTE — PROGRESS NOTE ADULT - SUBJECTIVE AND OBJECTIVE BOX
Patient is a 58y old  Male who presents with a chief complaint of LE weakness, urinary incontinence. (13 May 2018 07:53)      SUBJECTIVE / OVERNIGHT EVENTS:  No events overnight.  No concerns this AM.      MEDICATIONS  (STANDING):  AQUAPHOR (petrolatum Ointment) 1 Application(s) Topical two times a day  benztropine 1 milliGRAM(s) Oral two times a day  clotrimazole 1% Cream 1 Application(s) Topical two times a day  enoxaparin Injectable 40 milliGRAM(s) SubCutaneous daily  haloperidol     Tablet 10 milliGRAM(s) Oral two times a day  levETIRAcetam 1000 milliGRAM(s) Oral two times a day  memantine 5 milliGRAM(s) Oral at bedtime  multivitamin 1 Tablet(s) Oral daily  QUEtiapine 200 milliGRAM(s) Oral two times a day      MEDICATIONS  (PRN):  docusate sodium 100 milliGRAM(s) Oral three times a day PRN Constipation  senna 2 Tablet(s) Oral at bedtime PRN Constipation      Vital Signs Last 24 Hrs  T(C): 36.8 (17 May 2018 06:07), Max: 37.1 (16 May 2018 19:59)  T(F): 98.3 (17 May 2018 06:07), Max: 98.8 (16 May 2018 19:59)  HR: 70 (17 May 2018 06:07) (70 - 95)  BP: 109/67 (17 May 2018 06:07) (109/67 - 129/90)  BP(mean): --  RR: 18 (17 May 2018 06:07) (18 - 18)  SpO2: 100% (17 May 2018 06:07) (98% - 100%)      PHYSICAL EXAM:  GENERAL:  NAD, sitting upright in bed comfortably  EYES:  EOMI  ENMT: Dentition intact  NECK: No JVP noted  CHEST/LUNG: CTA bilaterally  HEART:  Regular rate and rhythm  ABDOMEN:  No tenderness, distension noted  EXTREMITIES:  Non-palpable peripheral pulses  PSYCH:  Flat affect, but euthymic  NEUROLOGY:  RLE:  Plantar flexion/extension 2/5, 1/5 on leg raise.  LLE:  Plantar flexion/extension 4/5, 2/5 on leg raise but questionable effort.  Bilateral UE 5/5, but coordination is dysmetria.    SKIN: Intact to gross observation      LABS:                        14.9   4.49  )-----------( 165      ( 16 May 2018 05:20 )             44.0       RADIOLOGY & ADDITIONAL TESTS:  Imaging Personally Reviewed: YES    Consultant(s) Notes Reviewed: YES    Care Discussed with Consultants/Other Providers: YES      Michael Gabriel MD PGY-1  Department of Medicine  [phone]

## 2018-05-18 PROCEDURE — 99233 SBSQ HOSP IP/OBS HIGH 50: CPT | Mod: GC

## 2018-05-18 RX ADMIN — Medication 1 APPLICATION(S): at 05:21

## 2018-05-18 RX ADMIN — LEVETIRACETAM 1000 MILLIGRAM(S): 250 TABLET, FILM COATED ORAL at 05:20

## 2018-05-18 RX ADMIN — Medication 1 MILLIGRAM(S): at 05:20

## 2018-05-18 RX ADMIN — Medication 1 APPLICATION(S): at 17:51

## 2018-05-18 RX ADMIN — Medication 1 TABLET(S): at 13:38

## 2018-05-18 RX ADMIN — HALOPERIDOL DECANOATE 10 MILLIGRAM(S): 100 INJECTION INTRAMUSCULAR at 17:51

## 2018-05-18 RX ADMIN — HALOPERIDOL DECANOATE 10 MILLIGRAM(S): 100 INJECTION INTRAMUSCULAR at 05:20

## 2018-05-18 RX ADMIN — ENOXAPARIN SODIUM 40 MILLIGRAM(S): 100 INJECTION SUBCUTANEOUS at 13:38

## 2018-05-18 RX ADMIN — Medication 1 MILLIGRAM(S): at 17:51

## 2018-05-18 RX ADMIN — QUETIAPINE FUMARATE 200 MILLIGRAM(S): 200 TABLET, FILM COATED ORAL at 17:51

## 2018-05-18 RX ADMIN — MEMANTINE HYDROCHLORIDE 5 MILLIGRAM(S): 10 TABLET ORAL at 21:00

## 2018-05-18 RX ADMIN — QUETIAPINE FUMARATE 200 MILLIGRAM(S): 200 TABLET, FILM COATED ORAL at 05:21

## 2018-05-18 RX ADMIN — LEVETIRACETAM 1000 MILLIGRAM(S): 250 TABLET, FILM COATED ORAL at 17:51

## 2018-05-18 NOTE — DIETITIAN INITIAL EVALUATION ADULT. - PROBLEM SELECTOR PLAN 1
-progress LE weakness with associated urinary incontinence/retention, decreased anal sphincter tone concerning for cord compression  -CTH noted no acute pathology  -check MRI C/T/L for further evaluation  -fall precaution  -PT evaluation  -hold home atorvastatin  -neurology consulted, f/u recc in AM  -check CK

## 2018-05-18 NOTE — PROGRESS NOTE ADULT - ATTENDING COMMENTS
58M schizophrenia and lives in long term psychiatric facility is admitted with progressive lower extremity weakness and is found on imaging to have a spinal cord collection causing cord compression.  --Appreciate ID recs, monitor off abx and await surgical path and cultures to guide therapy  --Patient is medically optimized and may proceed to surgery without further testing  --Plan for OR 5/21  --Patient is agreeable with neurosurgical intervention but lacks capacity to adequately understand the risks and benefits of the procedure due to his chronic persistent severe mental illness.  Since surgical intervention cannot be delayed due to potentially life-threatening medical condition, consent should be provided by C, the neurosurgeon and I will concur.  Please see psych documentation on 5/15 Chart Note for concurring opinion regarding patient's decisional capacity

## 2018-05-18 NOTE — DIETITIAN INITIAL EVALUATION ADULT. - NS AS NUTRI INTERV STRATEGIES
1- Continue current diet order, which remains appropriate at this time. 2- Monitor weights, labs, BM's, skin integrity, p.o. intake. 3- Please Encourage po intake, assist with meals and menu selections, provide alternatives PRN. 4- RD remains available, re-consult as needed. Alan Guzman RD Pager #[phone]

## 2018-05-18 NOTE — DIETITIAN INITIAL EVALUATION ADULT. - PROBLEM SELECTOR PLAN 2
-bladder scan noted 375cc of urine  -balbuena ordered  -likely 2/2 neurogenic bladder in the setting associated neurologic c/o with reported urinary incontinence  -monitor Cr

## 2018-05-18 NOTE — CHART NOTE - NSCHARTNOTEFT_GEN_A_CORE
[facility] Two physician administrative consent for neurosurgical procedure and anesthesia     Patient is agreeable with neurosurgical intervention but lacks capacity to adequately understand the risks and benefits of the procedure due to his chronic persistent severe mental illness.  The patient lacks next of kin or legal guardian to provide informed consent.  Since surgical intervention cannot be delayed due to potentially life-threatening medical condition, Dr. Johnson and I concur that the benefits of the procedure with anesthesia far outweigh the potential risks. Please see psych documentation on 5/15 Chart Note for concurring opinion regarding patient's decisional capacity.

## 2018-05-18 NOTE — DIETITIAN INITIAL EVALUATION ADULT. - OTHER INFO
Initial Dietitian Evaluation 2/2 to extended length of stay. Patient with good PO intake.  No GI distress (nausea/vomiting/diarrhea/constipation.) no difficulties chewing and swallowing. Able to make menu selections with assistance.

## 2018-05-18 NOTE — PROGRESS NOTE ADULT - SUBJECTIVE AND OBJECTIVE BOX
Patient is a 58y old  Male who presents with a chief complaint of LE weakness, urinary incontinence. (13 May 2018 07:53)      SUBJECTIVE / OVERNIGHT EVENTS:  No events overnight.  No complaints this AM.  Anticipating neurosurgery on Monday.      MEDICATIONS  (STANDING):  AQUAPHOR (petrolatum Ointment) 1 Application(s) Topical two times a day  benztropine 1 milliGRAM(s) Oral two times a day  clotrimazole 1% Cream 1 Application(s) Topical two times a day  enoxaparin Injectable 40 milliGRAM(s) SubCutaneous daily  haloperidol     Tablet 10 milliGRAM(s) Oral two times a day  levETIRAcetam 1000 milliGRAM(s) Oral two times a day  memantine 5 milliGRAM(s) Oral at bedtime  multivitamin 1 Tablet(s) Oral daily  QUEtiapine 200 milliGRAM(s) Oral two times a day      MEDICATIONS  (PRN):  docusate sodium 100 milliGRAM(s) Oral three times a day PRN Constipation  senna 2 Tablet(s) Oral at bedtime PRN Constipation      Vital Signs Last 24 Hrs  T(C): 36.4 (18 May 2018 05:13), Max: 36.8 (17 May 2018 13:43)  T(F): 97.5 (18 May 2018 05:13), Max: 98.2 (17 May 2018 13:43)  HR: 71 (18 May 2018 05:13) (71 - 87)  BP: 111/75 (18 May 2018 05:13) (111/75 - 125/82)  BP(mean): --  RR: 18 (18 May 2018 05:13) (18 - 18)  SpO2: 100% (18 May 2018 05:13) (99% - 100%)      PHYSICAL EXAM:  GENERAL:  NAD, sitting upright in bed comfortably  EYES:  EOMI  ENMT: Dentition intact  NECK: No JVP noted  CHEST/LUNG: CTA bilaterally  HEART:  Regular rate and rhythm  ABDOMEN:  No tenderness, distension noted  EXTREMITIES:  Non-palpable peripheral pulses  PSYCH:  Flat affect, but euthymic  NEUROLOGY:  RLE:  Plantar flexion/extension 2/5, 1/5 on leg raise.  LLE:  Plantar flexion/extension 4/5, 2/5 on leg raise but questionable effort.  Bilateral UE 5/5, but coordination is dysmetria.    SKIN: Intact to gross observation        RADIOLOGY & ADDITIONAL TESTS:  Imaging Personally Reviewed: YES    Consultant(s) Notes Reviewed: YES    Care Discussed with Consultants/Other Providers: YES      Michael Gabriel MD PGY-1  Department of Medicine  [phone]

## 2018-05-19 PROCEDURE — 99233 SBSQ HOSP IP/OBS HIGH 50: CPT | Mod: GC

## 2018-05-19 RX ADMIN — Medication 1 APPLICATION(S): at 17:29

## 2018-05-19 RX ADMIN — ENOXAPARIN SODIUM 40 MILLIGRAM(S): 100 INJECTION SUBCUTANEOUS at 12:53

## 2018-05-19 RX ADMIN — QUETIAPINE FUMARATE 200 MILLIGRAM(S): 200 TABLET, FILM COATED ORAL at 17:31

## 2018-05-19 RX ADMIN — Medication 1 APPLICATION(S): at 05:09

## 2018-05-19 RX ADMIN — LEVETIRACETAM 1000 MILLIGRAM(S): 250 TABLET, FILM COATED ORAL at 05:09

## 2018-05-19 RX ADMIN — Medication 1 MILLIGRAM(S): at 05:09

## 2018-05-19 RX ADMIN — Medication 1 TABLET(S): at 17:30

## 2018-05-19 RX ADMIN — Medication 1 MILLIGRAM(S): at 17:30

## 2018-05-19 RX ADMIN — Medication 1 APPLICATION(S): at 17:30

## 2018-05-19 RX ADMIN — MEMANTINE HYDROCHLORIDE 5 MILLIGRAM(S): 10 TABLET ORAL at 21:13

## 2018-05-19 RX ADMIN — HALOPERIDOL DECANOATE 10 MILLIGRAM(S): 100 INJECTION INTRAMUSCULAR at 05:10

## 2018-05-19 RX ADMIN — LEVETIRACETAM 1000 MILLIGRAM(S): 250 TABLET, FILM COATED ORAL at 17:30

## 2018-05-19 RX ADMIN — HALOPERIDOL DECANOATE 10 MILLIGRAM(S): 100 INJECTION INTRAMUSCULAR at 17:30

## 2018-05-19 RX ADMIN — QUETIAPINE FUMARATE 200 MILLIGRAM(S): 200 TABLET, FILM COATED ORAL at 05:09

## 2018-05-19 NOTE — PROGRESS NOTE ADULT - SUBJECTIVE AND OBJECTIVE BOX
Patient is a 58y old  Male who presents with a chief complaint of LE weakness, urinary incontinence. (13 May 2018 07:53)      SUBJECTIVE / OVERNIGHT EVENTS:  No events overnight.  No complaints this AM.      MEDICATIONS  (STANDING):  AQUAPHOR (petrolatum Ointment) 1 Application(s) Topical two times a day  benztropine 1 milliGRAM(s) Oral two times a day  clotrimazole 1% Cream 1 Application(s) Topical two times a day  enoxaparin Injectable 40 milliGRAM(s) SubCutaneous daily  haloperidol     Tablet 10 milliGRAM(s) Oral two times a day  levETIRAcetam 1000 milliGRAM(s) Oral two times a day  memantine 5 milliGRAM(s) Oral at bedtime  multivitamin 1 Tablet(s) Oral daily  QUEtiapine 200 milliGRAM(s) Oral two times a day      MEDICATIONS  (PRN):  docusate sodium 100 milliGRAM(s) Oral three times a day PRN Constipation  senna 2 Tablet(s) Oral at bedtime PRN Constipation      Vital Signs Last 24 Hrs  T(C): 37.1 (19 May 2018 04:49), Max: 37.1 (19 May 2018 04:49)  T(F): 98.8 (19 May 2018 04:49), Max: 98.8 (19 May 2018 04:49)  HR: 84 (19 May 2018 04:49) (80 - 90)  BP: 122/78 (19 May 2018 04:49) (116/74 - 130/75)  BP(mean): --  RR: 18 (19 May 2018 04:49) (18 - 20)  SpO2: 100% (19 May 2018 04:49) (100% - 100%)      PHYSICAL EXAM:  GENERAL:  NAD, sitting upright in bed comfortably  EYES:  EOMI  ENMT: Dentition intact  NECK: No JVP noted  CHEST/LUNG: CTA bilaterally  HEART:  Regular rate and rhythm  ABDOMEN:  No tenderness, distension noted  EXTREMITIES:  Non-palpable peripheral pulses  PSYCH:  Flat affect, but euthymic  NEUROLOGY:  RLE:  Plantar flexion/extension 2/5, 1/5 on leg raise.  LLE:  Plantar flexion/extension 4/5, 2/5 on leg raise but questionable effort.  Bilateral UE 5/5, but coordination is dysmetria.    SKIN: Intact to gross observation      RADIOLOGY & ADDITIONAL TESTS:  Imaging Personally Reviewed: YES    Consultant(s) Notes Reviewed: YES    Care Discussed with Consultants/Other Providers: YES      Michael Gabriel MD PGY-1  Department of Medicine  [phone]

## 2018-05-20 LAB
APTT BLD: 25.1 SEC — LOW (ref 27.5–37.4)
BLD GP AB SCN SERPL QL: NEGATIVE — SIGNIFICANT CHANGE UP
BUN SERPL-MCNC: 17 MG/DL — SIGNIFICANT CHANGE UP (ref 7–23)
CALCIUM SERPL-MCNC: 9 MG/DL — SIGNIFICANT CHANGE UP (ref 8.4–10.5)
CHLORIDE SERPL-SCNC: 103 MMOL/L — SIGNIFICANT CHANGE UP (ref 98–107)
CO2 SERPL-SCNC: 25 MMOL/L — SIGNIFICANT CHANGE UP (ref 22–31)
CREAT SERPL-MCNC: 0.9 MG/DL — SIGNIFICANT CHANGE UP (ref 0.5–1.3)
GLUCOSE SERPL-MCNC: 93 MG/DL — SIGNIFICANT CHANGE UP (ref 70–99)
HCT VFR BLD CALC: 42 % — SIGNIFICANT CHANGE UP (ref 39–50)
HGB BLD-MCNC: 14.3 G/DL — SIGNIFICANT CHANGE UP (ref 13–17)
INR BLD: 1.06 — SIGNIFICANT CHANGE UP (ref 0.88–1.17)
MAGNESIUM SERPL-MCNC: 1.9 MG/DL — SIGNIFICANT CHANGE UP (ref 1.6–2.6)
MCHC RBC-ENTMCNC: 32.5 PG — SIGNIFICANT CHANGE UP (ref 27–34)
MCHC RBC-ENTMCNC: 34 % — SIGNIFICANT CHANGE UP (ref 32–36)
MCV RBC AUTO: 95.5 FL — SIGNIFICANT CHANGE UP (ref 80–100)
NRBC # FLD: 0 — SIGNIFICANT CHANGE UP
PHOSPHATE SERPL-MCNC: 2.6 MG/DL — SIGNIFICANT CHANGE UP (ref 2.5–4.5)
PLATELET # BLD AUTO: 184 K/UL — SIGNIFICANT CHANGE UP (ref 150–400)
PMV BLD: 9.3 FL — SIGNIFICANT CHANGE UP (ref 7–13)
POTASSIUM SERPL-MCNC: 4.4 MMOL/L — SIGNIFICANT CHANGE UP (ref 3.5–5.3)
POTASSIUM SERPL-SCNC: 4.4 MMOL/L — SIGNIFICANT CHANGE UP (ref 3.5–5.3)
PROTHROM AB SERPL-ACNC: 11.8 SEC — SIGNIFICANT CHANGE UP (ref 9.8–13.1)
RBC # BLD: 4.4 M/UL — SIGNIFICANT CHANGE UP (ref 4.2–5.8)
RBC # FLD: 12.3 % — SIGNIFICANT CHANGE UP (ref 10.3–14.5)
RH IG SCN BLD-IMP: POSITIVE — SIGNIFICANT CHANGE UP
RH IG SCN BLD-IMP: POSITIVE — SIGNIFICANT CHANGE UP
SODIUM SERPL-SCNC: 139 MMOL/L — SIGNIFICANT CHANGE UP (ref 135–145)
WBC # BLD: 4.82 K/UL — SIGNIFICANT CHANGE UP (ref 3.8–10.5)
WBC # FLD AUTO: 4.82 K/UL — SIGNIFICANT CHANGE UP (ref 3.8–10.5)

## 2018-05-20 PROCEDURE — 99233 SBSQ HOSP IP/OBS HIGH 50: CPT | Mod: GC

## 2018-05-20 RX ADMIN — HALOPERIDOL DECANOATE 10 MILLIGRAM(S): 100 INJECTION INTRAMUSCULAR at 17:20

## 2018-05-20 RX ADMIN — HALOPERIDOL DECANOATE 10 MILLIGRAM(S): 100 INJECTION INTRAMUSCULAR at 05:51

## 2018-05-20 RX ADMIN — QUETIAPINE FUMARATE 200 MILLIGRAM(S): 200 TABLET, FILM COATED ORAL at 05:51

## 2018-05-20 RX ADMIN — Medication 1 TABLET(S): at 17:20

## 2018-05-20 RX ADMIN — QUETIAPINE FUMARATE 200 MILLIGRAM(S): 200 TABLET, FILM COATED ORAL at 17:20

## 2018-05-20 RX ADMIN — Medication 1 MILLIGRAM(S): at 05:51

## 2018-05-20 RX ADMIN — Medication 1 APPLICATION(S): at 05:51

## 2018-05-20 RX ADMIN — ENOXAPARIN SODIUM 40 MILLIGRAM(S): 100 INJECTION SUBCUTANEOUS at 11:53

## 2018-05-20 RX ADMIN — Medication 1 APPLICATION(S): at 17:20

## 2018-05-20 RX ADMIN — LEVETIRACETAM 1000 MILLIGRAM(S): 250 TABLET, FILM COATED ORAL at 17:20

## 2018-05-20 RX ADMIN — MEMANTINE HYDROCHLORIDE 5 MILLIGRAM(S): 10 TABLET ORAL at 21:10

## 2018-05-20 RX ADMIN — Medication 1 MILLIGRAM(S): at 17:20

## 2018-05-20 RX ADMIN — LEVETIRACETAM 1000 MILLIGRAM(S): 250 TABLET, FILM COATED ORAL at 05:51

## 2018-05-20 NOTE — PROGRESS NOTE ADULT - SUBJECTIVE AND OBJECTIVE BOX
Note Author: Nicolas Napier, PGY 2  [facility] Pager: [phone]  [facility] Pager: [phone]    Patient is a 58y old  Male who presents with a chief complaint of LE weakness, urinary incontinence. (13 May 2018 07:53)      SUBJECTIVE / OVERNIGHT EVENTS:  No acute events overnight. Patient seen and examined in AM. C/o of right ankle discomfort, otherwise no complaints at time of interview.     Patient denied fevers, CP, SOB, lower extremity pain.     MEDICATIONS  (STANDING):  AQUAPHOR (petrolatum Ointment) 1 Application(s) Topical two times a day  benztropine 1 milliGRAM(s) Oral two times a day  clotrimazole 1% Cream 1 Application(s) Topical two times a day  enoxaparin Injectable 40 milliGRAM(s) SubCutaneous daily  haloperidol     Tablet 10 milliGRAM(s) Oral two times a day  levETIRAcetam 1000 milliGRAM(s) Oral two times a day  memantine 5 milliGRAM(s) Oral at bedtime  multivitamin 1 Tablet(s) Oral daily  QUEtiapine 200 milliGRAM(s) Oral two times a day    MEDICATIONS  (PRN):  docusate sodium 100 milliGRAM(s) Oral three times a day PRN Constipation  senna 2 Tablet(s) Oral at bedtime PRN Constipation      CAPILLARY BLOOD GLUCOSE        I&O's Summary      Vital Signs Last 24 Hrs  T(C): 36.6 (20 May 2018 12:56), Max: 37 (20 May 2018 05:59)  T(F): 97.9 (20 May 2018 12:56), Max: 98.6 (20 May 2018 05:59)  HR: 91 (20 May 2018 12:56) (91 - 103)  BP: 116/70 (20 May 2018 12:56) (116/70 - 120/75)  BP(mean): --  RR: 18 (20 May 2018 12:56) (18 - 18)  SpO2: 99% (20 May 2018 12:56) (99% - 100%)    PHYSICAL EXAM:  General: NAD, well-developed  Eyes: EOMI  Neck: Supple, No JVD  Chest/Lung: Clear to auscultation bilaterally; No wheezes  Heart: Regular rate and rhythm; No murmurs, rubs, or gallops. Capillary refill WNL  Abdome: Soft, Nontender, Nondistended; Bowel sounds present  Extremities: No lower extremity edema.   Psych: AAOx3  Neurology: lumbar spinal TTP, Cervical spinal TTP, BL UE str 5/5,   - RLE:  Plantar flexion/extension 2/5, 2/5 on leg raise.    - LLE:  Plantar flexion/extension 4/5, 3/5 on leg raise but questionable effort.  Skin: No rashes or lesions    LABS:    EKG:   CXR:       RADIOLOGY & ADDITIONAL TESTS:    Imaging Personally Reviewed:    Consultant(s) Notes Reviewed:      Care Discussed with Consultants/Other Providers:

## 2018-05-21 DIAGNOSIS — M48.02 SPINAL STENOSIS, CERVICAL REGION: ICD-10-CM

## 2018-05-21 PROCEDURE — 99232 SBSQ HOSP IP/OBS MODERATE 35: CPT

## 2018-05-21 PROCEDURE — 99233 SBSQ HOSP IP/OBS HIGH 50: CPT | Mod: GC

## 2018-05-21 RX ADMIN — Medication 1 APPLICATION(S): at 05:25

## 2018-05-21 RX ADMIN — Medication 1 TABLET(S): at 17:13

## 2018-05-21 RX ADMIN — Medication 1 MILLIGRAM(S): at 05:24

## 2018-05-21 RX ADMIN — Medication 1 APPLICATION(S): at 17:12

## 2018-05-21 RX ADMIN — HALOPERIDOL DECANOATE 10 MILLIGRAM(S): 100 INJECTION INTRAMUSCULAR at 05:24

## 2018-05-21 RX ADMIN — LEVETIRACETAM 1000 MILLIGRAM(S): 250 TABLET, FILM COATED ORAL at 05:24

## 2018-05-21 RX ADMIN — QUETIAPINE FUMARATE 200 MILLIGRAM(S): 200 TABLET, FILM COATED ORAL at 05:24

## 2018-05-21 RX ADMIN — Medication 1 MILLIGRAM(S): at 17:12

## 2018-05-21 RX ADMIN — LEVETIRACETAM 1000 MILLIGRAM(S): 250 TABLET, FILM COATED ORAL at 17:13

## 2018-05-21 RX ADMIN — HALOPERIDOL DECANOATE 10 MILLIGRAM(S): 100 INJECTION INTRAMUSCULAR at 17:13

## 2018-05-21 RX ADMIN — QUETIAPINE FUMARATE 200 MILLIGRAM(S): 200 TABLET, FILM COATED ORAL at 17:12

## 2018-05-21 RX ADMIN — MEMANTINE HYDROCHLORIDE 5 MILLIGRAM(S): 10 TABLET ORAL at 22:01

## 2018-05-21 NOTE — PROGRESS NOTE ADULT - SUBJECTIVE AND OBJECTIVE BOX
Patient is a 58y old  Male who presents with a chief complaint of LE weakness, urinary incontinence. (13 May 2018 07:53)      SUBJECTIVE / OVERNIGHT EVENTS:  No events overnight.  No complaints this AM.      MEDICATIONS  (STANDING):  AQUAPHOR (petrolatum Ointment) 1 Application(s) Topical two times a day  benztropine 1 milliGRAM(s) Oral two times a day  clotrimazole 1% Cream 1 Application(s) Topical two times a day  enoxaparin Injectable 40 milliGRAM(s) SubCutaneous daily  haloperidol     Tablet 10 milliGRAM(s) Oral two times a day  levETIRAcetam 1000 milliGRAM(s) Oral two times a day  memantine 5 milliGRAM(s) Oral at bedtime  multivitamin 1 Tablet(s) Oral daily  QUEtiapine 200 milliGRAM(s) Oral two times a day      MEDICATIONS  (PRN):  docusate sodium 100 milliGRAM(s) Oral three times a day PRN Constipation  senna 2 Tablet(s) Oral at bedtime PRN Constipation      Vital Signs Last 24 Hrs  T(C): 36.5 (21 May 2018 05:22), Max: 36.9 (20 May 2018 19:06)  T(F): 97.7 (21 May 2018 05:22), Max: 98.4 (20 May 2018 19:06)  HR: 67 (21 May 2018 05:22) (67 - 98)  BP: 100/62 (21 May 2018 05:22) (100/62 - 118/68)  BP(mean): --  RR: 17 (21 May 2018 05:22) (17 - 18)  SpO2: 100% (21 May 2018 05:22) (98% - 100%)      PHYSICAL EXAM:  General: NAD, well-developed, sitting upright comfortably, reading  Eyes: EOMI  Neck: Supple, No JVD  Chest/Lung: Clear to auscultation bilaterally; No wheezes  Heart: Regular rate and rhythm; No murmurs, rubs, or gallops  Abdome: Soft, NT/ND; Bowel sounds present  Extremities: No lower extremity edema.   Psych: AAOx3  Neurology: lumbar spinal TTP, Cervical spinal TTP, BL UE str 5/5,   - RLE:  Plantar flexion/extension 2/5, 2/5 on leg raise.    - LLE:  Plantar flexion/extension 4/5, 3/5 on leg raise but questionable effort.  Skin: No rashes or lesions      LABS:                        14.3   4.82  )-----------( 184      ( 20 May 2018 15:50 )             42.0     05-20    139  |  103  |  17  ----------------------------<  93  4.4   |  25  |  0.90    Ca    9.0      20 May 2018 15:50  Phos  2.6     05-20  Mg     1.9     05-20    PT/INR - ( 20 May 2018 15:50 )   PT: 11.8 SEC;   INR: 1.06       PTT - ( 20 May 2018 15:50 )  PTT:25.1 SEC      RADIOLOGY & ADDITIONAL TESTS:  Imaging Personally Reviewed: YES    Consultant(s) Notes Reviewed: YES    Care Discussed with Consultants/Other Providers: YES      Michael Gabriel MD PGY-1  Department of Medicine  [phone]

## 2018-05-21 NOTE — PROGRESS NOTE ADULT - SUBJECTIVE AND OBJECTIVE BOX
Pre-op Note:   МАРИНА ALFAROT58yMale    Patient scheduled for C4-5 ACDF tomorrow at 13:00.      HPI:  57 yo M [facility] resident with PMH of schizophrenia, seizure disorder presents for weakness and urinary incontinence for 4 months. Patient difficulty progressively LE weakness started 4 months ago that acutely worsened in the past 2 months. He has increasing difficulty with ambulation to the point that he became wheel chair dependent two months ago. He reports urinary incontinence for 4 months with no associated dysuria, hematuria. He also endorses constipation for the past few weeks and that he has to push to get stool out. He reports 9/10 cervical spinal and b/l shoulder pain. No f/c, recent trauma, weight loss, night sweat, n/v/d, HA, dizziness, numbness, tingling, SI. Per [facility] documentation, pt was seen by neurology for progressive weakness and neurology recommended to sent patient to ED to t/o retroperitoneal process or cord compression.    PAST MEDICAL & SURGICAL HISTORY:  Schizophrenia  Seizure disorder  No significant past surgical history  No Known Allergies    T(C): 36.6 (05-21-18 @ 12:44), Max: 36.9 (05-20-18 @ 19:06)  HR: 85 (05-21-18 @ 12:44) (67 - 98)  BP: 143/84 (05-21-18 @ 12:44) (100/62 - 143/84)  RR: 18 (05-21-18 @ 12:44) (17 - 18)  SpO2: 96% (05-21-18 @ 12:44) (96% - 100%)  Wt(kg): --    05-20    139  |  103  |  17  ----------------------------<  93  4.4   |  25  |  0.90    Ca    9.0      20 May 2018 15:50  Phos  2.6     05-20  Mg     1.9     05-20    CBC Full  -  ( 20 May 2018 15:50 )  WBC Count : 4.82 K/uL  Hemoglobin : 14.3 g/dL  Hematocrit : 42.0 %  Platelet Count - Automated : 184 K/uL  Mean Cell Volume : 95.5 fL  Mean Cell Hemoglobin : 32.5 pg  Mean Cell Hemoglobin Concentration : 34.0 %  Auto Neutrophil # : x  Auto Lymphocyte # : x  Auto Monocyte # : x  Auto Eosinophil # : x  Auto Basophil # : x  Auto Neutrophil % : x  Auto Lymphocyte % : x  Auto Monocyte % : x  Auto Eosinophil % : x  Auto Basophil % : x    PT/INR - ( 20 May 2018 15:50 )   PT: 11.8 SEC;   INR: 1.06     PTT - ( 20 May 2018 15:50 )  PTT:25.1 SEC    Type & Screen (in past 72hrs): in blood bank 5/21/18    Medical clearance: in chart  Anticoagulants: none  Consent: in chart

## 2018-05-22 PROCEDURE — 99233 SBSQ HOSP IP/OBS HIGH 50: CPT | Mod: GC

## 2018-05-22 RX ORDER — SODIUM CHLORIDE 9 MG/ML
1000 INJECTION INTRAMUSCULAR; INTRAVENOUS; SUBCUTANEOUS
Qty: 0 | Refills: 0 | Status: DISCONTINUED | OUTPATIENT
Start: 2018-05-22 | End: 2018-05-25

## 2018-05-22 RX ADMIN — Medication 1 APPLICATION(S): at 06:17

## 2018-05-22 RX ADMIN — HALOPERIDOL DECANOATE 10 MILLIGRAM(S): 100 INJECTION INTRAMUSCULAR at 17:59

## 2018-05-22 RX ADMIN — LEVETIRACETAM 1000 MILLIGRAM(S): 250 TABLET, FILM COATED ORAL at 06:16

## 2018-05-22 RX ADMIN — Medication 1 MILLIGRAM(S): at 17:59

## 2018-05-22 RX ADMIN — HALOPERIDOL DECANOATE 10 MILLIGRAM(S): 100 INJECTION INTRAMUSCULAR at 06:17

## 2018-05-22 RX ADMIN — Medication 1 APPLICATION(S): at 17:55

## 2018-05-22 RX ADMIN — LEVETIRACETAM 1000 MILLIGRAM(S): 250 TABLET, FILM COATED ORAL at 17:59

## 2018-05-22 RX ADMIN — QUETIAPINE FUMARATE 200 MILLIGRAM(S): 200 TABLET, FILM COATED ORAL at 06:16

## 2018-05-22 RX ADMIN — Medication 1 MILLIGRAM(S): at 06:16

## 2018-05-22 RX ADMIN — Medication 1 APPLICATION(S): at 17:56

## 2018-05-22 RX ADMIN — Medication 1 TABLET(S): at 17:59

## 2018-05-22 RX ADMIN — MEMANTINE HYDROCHLORIDE 5 MILLIGRAM(S): 10 TABLET ORAL at 22:22

## 2018-05-22 RX ADMIN — QUETIAPINE FUMARATE 200 MILLIGRAM(S): 200 TABLET, FILM COATED ORAL at 17:59

## 2018-05-22 NOTE — PROGRESS NOTE ADULT - SUBJECTIVE AND OBJECTIVE BOX
Patient is a 58y old  Male who presents with a chief complaint of LE weakness, urinary incontinence. (13 May 2018 07:53)      SUBJECTIVE / OVERNIGHT EVENTS:  No events overnight.  Patient NPO for neurosurgery today.      MEDICATIONS  (STANDING):  AQUAPHOR (petrolatum Ointment) 1 Application(s) Topical two times a day  benztropine 1 milliGRAM(s) Oral two times a day  clotrimazole 1% Cream 1 Application(s) Topical two times a day  haloperidol     Tablet 10 milliGRAM(s) Oral two times a day  levETIRAcetam 1000 milliGRAM(s) Oral two times a day  memantine 5 milliGRAM(s) Oral at bedtime  multivitamin 1 Tablet(s) Oral daily  QUEtiapine 200 milliGRAM(s) Oral two times a day  sodium chloride 0.9%. 1000 milliLiter(s) (75 mL/Hr) IV Continuous <Continuous>      MEDICATIONS  (PRN):  docusate sodium 100 milliGRAM(s) Oral three times a day PRN Constipation  senna 2 Tablet(s) Oral at bedtime PRN Constipation      Vital Signs Last 24 Hrs  T(C): 36.6 (22 May 2018 06:08), Max: 36.6 (21 May 2018 12:44)  T(F): 97.8 (22 May 2018 06:08), Max: 97.9 (21 May 2018 12:44)  HR: 76 (22 May 2018 06:08) (76 - 94)  BP: 120/79 (22 May 2018 06:08) (120/79 - 148/78)  BP(mean): --  RR: 17 (22 May 2018 06:08) (17 - 18)  SpO2: 100% (22 May 2018 06:08) (96% - 100%)      PHYSICAL EXAM:  General: NAD, well-developed, sitting upright comfortably, reading  Eyes: EOMI  Neck: Supple, No JVD  Chest/Lung: Clear to auscultation bilaterally; No wheezes  Heart: Regular rate and rhythm; No murmurs, rubs, or gallops  Abdome: Soft, NT/ND; Bowel sounds present  Extremities: No lower extremity edema.   Psych:  Persistent bizarre thoughts and behavior  Neurology: lumbar spinal TTP, Cervical spinal TTP, BL UE str 5/5,   - RLE:  Plantar flexion/extension 2/5, 2/5 on leg raise.    - LLE:  Plantar flexion/extension 4/5, 3/5 on leg raise but questionable effort.  Skin: No rashes or lesions      LABS:                        14.3   4.82  )-----------( 184      ( 20 May 2018 15:50 )             42.0     05-20    139  |  103  |  17  ----------------------------<  93  4.4   |  25  |  0.90    Ca    9.0      20 May 2018 15:50  Phos  2.6     05-20  Mg     1.9     05-20    PT/INR - ( 20 May 2018 15:50 )   PT: 11.8 SEC;   INR: 1.06       PTT - ( 20 May 2018 15:50 )  PTT:25.1 SEC      RADIOLOGY & ADDITIONAL TESTS:  Imaging Personally Reviewed: YES    Consultant(s) Notes Reviewed: YES    Care Discussed with Consultants/Other Providers: YES      Michael Gabriel MD PGY-1  Department of Medicine  [phone]

## 2018-05-22 NOTE — PROGRESS NOTE ADULT - ATTENDING COMMENTS
58M schizophrenia and lives in long term psychiatric facility is admitted with progressive lower extremity weakness and is found on imaging to have a spinal cord collection causing cord compression.  --Appreciate ID recs, monitor off abx and await surgical path and cultures to guide therapy  --Patient is medically optimized and may proceed to surgery without further testing  --Plan for OR 5/22  --Patient is agreeable with neurosurgical intervention but lacks capacity to adequately understand the risks and benefits of the procedure due to his chronic persistent severe mental illness.  Since surgical intervention cannot be delayed due to potentially life-threatening medical condition, consent should be provided by C, the neurosurgeon and I will concur.  Please see psych documentation on 5/15 Chart Note for concurring opinion regarding patient's decisional capacity

## 2018-05-22 NOTE — PROGRESS NOTE ADULT - SUBJECTIVE AND OBJECTIVE BOX
Patient has been evaluated and followed by the neurosurgery team. He has moderate cord compression at C4-5 consistent with a chronic epidural abscess. He has significant neurological deficits from this lesion which may progress if he continues to go untreated. I am therefore recommending he undergo a C4-5 ACDF for decompression of a likely infectious process. This will provide both stability to hopefully prevent further neurologic decline and potentially an infectious diagnosis. The benefits of this surgery outweigh the risks and I have provided a signed adminstrative consent alongside Dr. Mooney to allow for surgery to proceed.

## 2018-05-23 ENCOUNTER — TRANSCRIPTION ENCOUNTER (OUTPATIENT)
Age: 58
End: 2018-05-23

## 2018-05-23 LAB
ALBUMIN SERPL ELPH-MCNC: 4.1 G/DL — SIGNIFICANT CHANGE UP (ref 3.3–5)
ALP SERPL-CCNC: 172 U/L — HIGH (ref 40–120)
ALT FLD-CCNC: 36 U/L — SIGNIFICANT CHANGE UP (ref 4–41)
AST SERPL-CCNC: 43 U/L — HIGH (ref 4–40)
BILIRUB SERPL-MCNC: 0.7 MG/DL — SIGNIFICANT CHANGE UP (ref 0.2–1.2)
BLD GP AB SCN SERPL QL: NEGATIVE — SIGNIFICANT CHANGE UP
BUN SERPL-MCNC: 18 MG/DL — SIGNIFICANT CHANGE UP (ref 7–23)
CALCIUM SERPL-MCNC: 9.9 MG/DL — SIGNIFICANT CHANGE UP (ref 8.4–10.5)
CHLORIDE SERPL-SCNC: 97 MMOL/L — LOW (ref 98–107)
CO2 SERPL-SCNC: 17 MMOL/L — LOW (ref 22–31)
CREAT SERPL-MCNC: 0.77 MG/DL — SIGNIFICANT CHANGE UP (ref 0.5–1.3)
GLUCOSE SERPL-MCNC: 75 MG/DL — SIGNIFICANT CHANGE UP (ref 70–99)
HCT VFR BLD CALC: 49.1 % — SIGNIFICANT CHANGE UP (ref 39–50)
HGB BLD-MCNC: 16.4 G/DL — SIGNIFICANT CHANGE UP (ref 13–17)
MAGNESIUM SERPL-MCNC: 2 MG/DL — SIGNIFICANT CHANGE UP (ref 1.6–2.6)
MCHC RBC-ENTMCNC: 32.7 PG — SIGNIFICANT CHANGE UP (ref 27–34)
MCHC RBC-ENTMCNC: 33.4 % — SIGNIFICANT CHANGE UP (ref 32–36)
MCV RBC AUTO: 97.8 FL — SIGNIFICANT CHANGE UP (ref 80–100)
NRBC # FLD: 0 — SIGNIFICANT CHANGE UP
PHOSPHATE SERPL-MCNC: 3.5 MG/DL — SIGNIFICANT CHANGE UP (ref 2.5–4.5)
PLATELET # BLD AUTO: 181 K/UL — SIGNIFICANT CHANGE UP (ref 150–400)
PMV BLD: 9.8 FL — SIGNIFICANT CHANGE UP (ref 7–13)
POTASSIUM SERPL-MCNC: 5.2 MMOL/L — SIGNIFICANT CHANGE UP (ref 3.5–5.3)
POTASSIUM SERPL-SCNC: 5.2 MMOL/L — SIGNIFICANT CHANGE UP (ref 3.5–5.3)
PROT SERPL-MCNC: 9 G/DL — HIGH (ref 6–8.3)
RBC # BLD: 5.02 M/UL — SIGNIFICANT CHANGE UP (ref 4.2–5.8)
RBC # FLD: 12.5 % — SIGNIFICANT CHANGE UP (ref 10.3–14.5)
RH IG SCN BLD-IMP: POSITIVE — SIGNIFICANT CHANGE UP
SODIUM SERPL-SCNC: 131 MMOL/L — LOW (ref 135–145)
WBC # BLD: 5.08 K/UL — SIGNIFICANT CHANGE UP (ref 3.8–10.5)
WBC # FLD AUTO: 5.08 K/UL — SIGNIFICANT CHANGE UP (ref 3.8–10.5)

## 2018-05-23 PROCEDURE — 99233 SBSQ HOSP IP/OBS HIGH 50: CPT | Mod: GC

## 2018-05-23 RX ADMIN — Medication 1 APPLICATION(S): at 17:01

## 2018-05-23 RX ADMIN — MEMANTINE HYDROCHLORIDE 5 MILLIGRAM(S): 10 TABLET ORAL at 21:58

## 2018-05-23 RX ADMIN — HALOPERIDOL DECANOATE 10 MILLIGRAM(S): 100 INJECTION INTRAMUSCULAR at 05:16

## 2018-05-23 RX ADMIN — Medication 1 APPLICATION(S): at 05:16

## 2018-05-23 RX ADMIN — HALOPERIDOL DECANOATE 10 MILLIGRAM(S): 100 INJECTION INTRAMUSCULAR at 17:00

## 2018-05-23 RX ADMIN — Medication 1 TABLET(S): at 16:59

## 2018-05-23 RX ADMIN — QUETIAPINE FUMARATE 200 MILLIGRAM(S): 200 TABLET, FILM COATED ORAL at 05:16

## 2018-05-23 RX ADMIN — QUETIAPINE FUMARATE 200 MILLIGRAM(S): 200 TABLET, FILM COATED ORAL at 17:00

## 2018-05-23 RX ADMIN — LEVETIRACETAM 1000 MILLIGRAM(S): 250 TABLET, FILM COATED ORAL at 05:16

## 2018-05-23 RX ADMIN — Medication 1 MILLIGRAM(S): at 05:16

## 2018-05-23 RX ADMIN — Medication 1 APPLICATION(S): at 05:17

## 2018-05-23 RX ADMIN — LEVETIRACETAM 1000 MILLIGRAM(S): 250 TABLET, FILM COATED ORAL at 17:00

## 2018-05-23 RX ADMIN — Medication 1 MILLIGRAM(S): at 17:00

## 2018-05-23 NOTE — PROGRESS NOTE ADULT - ATTENDING COMMENTS
58M schizophrenia and lives in long term psychiatric facility is admitted with progressive lower extremity weakness and is found on imaging to have a spinal cord collection causing cord compression.  --Appreciate ID recs, monitor off abx and await surgical path and cultures to guide therapy  --Patient is medically optimized and may proceed to surgery without further testing  --Plan for OR 5/24  --Patient is agreeable with neurosurgical intervention but lacks capacity to adequately understand the risks and benefits of the procedure due to his chronic persistent severe mental illness.  Since surgical intervention cannot be delayed due to potentially life-threatening medical condition, consent should be provided by C, the neurosurgeon and I will concur.  Please see psych documentation on 5/15 Chart Note for concurring opinion regarding patient's decisional capacity

## 2018-05-23 NOTE — PROGRESS NOTE ADULT - SUBJECTIVE AND OBJECTIVE BOX
Patient is a 58y old  Male who presents with a chief complaint of LE weakness, urinary incontinence. (13 May 2018 07:53)      SUBJECTIVE / OVERNIGHT EVENTS:  Patient had been scheduled for surgery yesterday, but due to legal issues had to be rescheduled to Thursday.    This AM patient has no complaints.      MEDICATIONS  (STANDING):  AQUAPHOR (petrolatum Ointment) 1 Application(s) Topical two times a day  benztropine 1 milliGRAM(s) Oral two times a day  clotrimazole 1% Cream 1 Application(s) Topical two times a day  haloperidol     Tablet 10 milliGRAM(s) Oral two times a day  levETIRAcetam 1000 milliGRAM(s) Oral two times a day  memantine 5 milliGRAM(s) Oral at bedtime  multivitamin 1 Tablet(s) Oral daily  QUEtiapine 200 milliGRAM(s) Oral two times a day  sodium chloride 0.9%. 1000 milliLiter(s) (75 mL/Hr) IV Continuous <Continuous>      MEDICATIONS  (PRN):  docusate sodium 100 milliGRAM(s) Oral three times a day PRN Constipation  senna 2 Tablet(s) Oral at bedtime PRN Constipation      Vital Signs Last 24 Hrs  T(C): 36.8 (23 May 2018 05:15), Max: 36.8 (22 May 2018 19:19)  T(F): 98.3 (23 May 2018 05:15), Max: 98.3 (23 May 2018 05:15)  HR: 80 (23 May 2018 05:15) (71 - 88)  BP: 109/75 (23 May 2018 05:15) (109/75 - 136/86)  BP(mean): --  RR: 18 (23 May 2018 05:15) (16 - 18)  SpO2: 96% (23 May 2018 05:15) (96% - 100%)      I&O's Summary    22 May 2018 07:01  -  23 May 2018 07:00  --------------------------------------------------------  IN: 0 mL / OUT: 200 mL / NET: -200 mL      PHYSICAL EXAM:  General: NAD, well-developed, sleeping comfortably  Eyes: EOMI  Neck: Supple, No JVD  Chest/Lung: Clear to auscultation bilaterally; No wheezes  Heart: Regular rate and rhythm; No murmurs, rubs, or gallops  Abdome: Soft, NT/ND; Bowel sounds present  Extremities: No lower extremity edema.   Psych:  Persistent bizarre thoughts and behavior  Neurology: lumbar spinal TTP, Cervical spinal TTP, BL UE str 5/5,   - RLE:  Plantar flexion/extension 2/5, 2/5 on leg raise.    - LLE:  Plantar flexion/extension 4/5, 3/5 on leg raise but questionable effort.  Skin: No rashes or lesions      RADIOLOGY & ADDITIONAL TESTS:  Imaging Personally Reviewed: YES    Consultant(s) Notes Reviewed: YES    Care Discussed with Consultants/Other Providers: YES      Michael Gabriel MD PGY-1  Department of Medicine  [phone]

## 2018-05-23 NOTE — CHART NOTE - NSCHARTNOTEFT_GEN_A_CORE
Patient continues to be followed closely by medicine and ID teams. He has moderate cord compression at C4-5 consistent with a chronic epidural abscess. He has significant neurological deficits from this lesion which may progress if he continues to go untreated. Treatment includes the neurosurgical procedure planned by Dr Johnson and further treatment will be guided by the findings from surgery, including treatment of an infectious etiology if diagnosed.  Progression of neurosurgical deficits may occur if there are further delays in diagnosis and treatment and therefore surgery should proceed as soon as possible. The benefits of this surgery outweigh the risks.  I have provided a signed administrative consent alongside Dr. Johnson to allow for surgery to proceed.  I have also asked an internist not involved in the treatment of this patient to review the case to provide a concurring opinion regarding the urgent need for neurosurgery with anesthesia.

## 2018-05-23 NOTE — CHART NOTE - NSCHARTNOTEFT_GEN_A_CORE
Asked to assess patient.  Discussed with the medical team.  He has cervical cord compression with an epidural abscess, with neurologic deficits that will likely continue to progress if left untreated.  This patient lacks capacity to make a decision about surgery because of his severe mental illness.  This procedure is urgently needed.  The potential benefits of the procedure greatly outweigh potential risks.

## 2018-05-24 ENCOUNTER — APPOINTMENT (OUTPATIENT)
Dept: NEUROSURGERY | Facility: HOSPITAL | Age: 58
End: 2018-05-24

## 2018-05-24 ENCOUNTER — RESULT REVIEW (OUTPATIENT)
Age: 58
End: 2018-05-24

## 2018-05-24 LAB
ALBUMIN SERPL ELPH-MCNC: 4.1 G/DL — SIGNIFICANT CHANGE UP (ref 3.3–5)
ALP SERPL-CCNC: 143 U/L — HIGH (ref 40–120)
ALT FLD-CCNC: 30 U/L — SIGNIFICANT CHANGE UP (ref 4–41)
APTT BLD: 27.5 SEC — SIGNIFICANT CHANGE UP (ref 27.5–37.4)
AST SERPL-CCNC: 21 U/L — SIGNIFICANT CHANGE UP (ref 4–40)
BILIRUB SERPL-MCNC: 0.5 MG/DL — SIGNIFICANT CHANGE UP (ref 0.2–1.2)
BUN SERPL-MCNC: 16 MG/DL — SIGNIFICANT CHANGE UP (ref 7–23)
CALCIUM SERPL-MCNC: 9.5 MG/DL — SIGNIFICANT CHANGE UP (ref 8.4–10.5)
CHLORIDE SERPL-SCNC: 100 MMOL/L — SIGNIFICANT CHANGE UP (ref 98–107)
CO2 SERPL-SCNC: 27 MMOL/L — SIGNIFICANT CHANGE UP (ref 22–31)
CREAT SERPL-MCNC: 0.94 MG/DL — SIGNIFICANT CHANGE UP (ref 0.5–1.3)
GLUCOSE SERPL-MCNC: 99 MG/DL — SIGNIFICANT CHANGE UP (ref 70–99)
GRAM STN WND: SIGNIFICANT CHANGE UP
INR BLD: 1.09 — SIGNIFICANT CHANGE UP (ref 0.88–1.17)
MAGNESIUM SERPL-MCNC: 1.9 MG/DL — SIGNIFICANT CHANGE UP (ref 1.6–2.6)
PHOSPHATE SERPL-MCNC: 3 MG/DL — SIGNIFICANT CHANGE UP (ref 2.5–4.5)
POTASSIUM SERPL-MCNC: 4.2 MMOL/L — SIGNIFICANT CHANGE UP (ref 3.5–5.3)
POTASSIUM SERPL-SCNC: 4.2 MMOL/L — SIGNIFICANT CHANGE UP (ref 3.5–5.3)
PROT SERPL-MCNC: 7.5 G/DL — SIGNIFICANT CHANGE UP (ref 6–8.3)
PROTHROM AB SERPL-ACNC: 12.5 SEC — SIGNIFICANT CHANGE UP (ref 9.8–13.1)
SODIUM SERPL-SCNC: 137 MMOL/L — SIGNIFICANT CHANGE UP (ref 135–145)
SPECIMEN SOURCE: SIGNIFICANT CHANGE UP

## 2018-05-24 PROCEDURE — 22551 ARTHRD ANT NTRBDY CERVICAL: CPT

## 2018-05-24 PROCEDURE — 88304 TISSUE EXAM BY PATHOLOGIST: CPT | Mod: 26

## 2018-05-24 PROCEDURE — 22853 INSJ BIOMECHANICAL DEVICE: CPT

## 2018-05-24 PROCEDURE — 22853 INSJ BIOMECHANICAL DEVICE: CPT | Mod: AS

## 2018-05-24 PROCEDURE — 22551 ARTHRD ANT NTRBDY CERVICAL: CPT | Mod: AS

## 2018-05-24 PROCEDURE — 99233 SBSQ HOSP IP/OBS HIGH 50: CPT | Mod: GC

## 2018-05-24 PROCEDURE — 88311 DECALCIFY TISSUE: CPT | Mod: 26

## 2018-05-24 RX ORDER — FENTANYL CITRATE 50 UG/ML
25 INJECTION INTRAVENOUS
Qty: 0 | Refills: 0 | Status: DISCONTINUED | OUTPATIENT
Start: 2018-05-24 | End: 2018-05-24

## 2018-05-24 RX ORDER — HYDROMORPHONE HYDROCHLORIDE 2 MG/ML
0.5 INJECTION INTRAMUSCULAR; INTRAVENOUS; SUBCUTANEOUS
Qty: 0 | Refills: 0 | Status: DISCONTINUED | OUTPATIENT
Start: 2018-05-24 | End: 2018-05-24

## 2018-05-24 RX ORDER — SODIUM CHLORIDE 9 MG/ML
500 INJECTION INTRAMUSCULAR; INTRAVENOUS; SUBCUTANEOUS ONCE
Qty: 0 | Refills: 0 | Status: COMPLETED | OUTPATIENT
Start: 2018-05-24 | End: 2018-05-24

## 2018-05-24 RX ORDER — CEFAZOLIN SODIUM 1 G
2000 VIAL (EA) INJECTION EVERY 8 HOURS
Qty: 0 | Refills: 0 | Status: DISCONTINUED | OUTPATIENT
Start: 2018-05-24 | End: 2018-05-25

## 2018-05-24 RX ORDER — MORPHINE SULFATE 50 MG/1
2 CAPSULE, EXTENDED RELEASE ORAL EVERY 4 HOURS
Qty: 0 | Refills: 0 | Status: DISCONTINUED | OUTPATIENT
Start: 2018-05-24 | End: 2018-05-24

## 2018-05-24 RX ORDER — OXYCODONE HYDROCHLORIDE 5 MG/1
5 TABLET ORAL EVERY 4 HOURS
Qty: 0 | Refills: 0 | Status: DISCONTINUED | OUTPATIENT
Start: 2018-05-24 | End: 2018-05-30

## 2018-05-24 RX ADMIN — HYDROMORPHONE HYDROCHLORIDE 0.5 MILLIGRAM(S): 2 INJECTION INTRAMUSCULAR; INTRAVENOUS; SUBCUTANEOUS at 16:15

## 2018-05-24 RX ADMIN — HALOPERIDOL DECANOATE 10 MILLIGRAM(S): 100 INJECTION INTRAMUSCULAR at 17:26

## 2018-05-24 RX ADMIN — Medication 1 APPLICATION(S): at 17:26

## 2018-05-24 RX ADMIN — LEVETIRACETAM 1000 MILLIGRAM(S): 250 TABLET, FILM COATED ORAL at 17:27

## 2018-05-24 RX ADMIN — HYDROMORPHONE HYDROCHLORIDE 0.5 MILLIGRAM(S): 2 INJECTION INTRAMUSCULAR; INTRAVENOUS; SUBCUTANEOUS at 16:30

## 2018-05-24 RX ADMIN — Medication 1 APPLICATION(S): at 06:24

## 2018-05-24 RX ADMIN — Medication 1 MILLIGRAM(S): at 17:26

## 2018-05-24 RX ADMIN — SODIUM CHLORIDE 1000 MILLILITER(S): 9 INJECTION INTRAMUSCULAR; INTRAVENOUS; SUBCUTANEOUS at 18:09

## 2018-05-24 RX ADMIN — LEVETIRACETAM 1000 MILLIGRAM(S): 250 TABLET, FILM COATED ORAL at 06:23

## 2018-05-24 RX ADMIN — Medication 1 MILLIGRAM(S): at 06:23

## 2018-05-24 RX ADMIN — QUETIAPINE FUMARATE 200 MILLIGRAM(S): 200 TABLET, FILM COATED ORAL at 06:23

## 2018-05-24 RX ADMIN — HALOPERIDOL DECANOATE 10 MILLIGRAM(S): 100 INJECTION INTRAMUSCULAR at 06:23

## 2018-05-24 RX ADMIN — Medication 100 MILLIGRAM(S): at 22:42

## 2018-05-24 RX ADMIN — Medication 1 TABLET(S): at 17:27

## 2018-05-24 RX ADMIN — MEMANTINE HYDROCHLORIDE 5 MILLIGRAM(S): 10 TABLET ORAL at 22:42

## 2018-05-24 RX ADMIN — QUETIAPINE FUMARATE 200 MILLIGRAM(S): 200 TABLET, FILM COATED ORAL at 17:27

## 2018-05-24 NOTE — PROGRESS NOTE ADULT - SUBJECTIVE AND OBJECTIVE BOX
Neurosurgery preop                          16.4   5.08  )-----------( 181      ( 23 May 2018 13:26 )             49.1   05-23    131<L>  |  97<L>  |  18  ----------------------------<  75  5.2   |  17<L>  |  0.77    Ca    9.9      23 May 2018 14:47  Phos  3.5     05-23  Mg     2.0     05-23    TPro  9.0<H>  /  Alb  4.1  /  TBili  0.7  /  DBili  x   /  AST  43<H>  /  ALT  36  /  AlkPhos  172<H>  05-23    Type + Screen (05.23.18 @ 11:28)    ABO Interpretation: B    Rh Interpretation: Positive    Antibody Screen: Negative

## 2018-05-24 NOTE — PROGRESS NOTE ADULT - SUBJECTIVE AND OBJECTIVE BOX
Patient is a 58y old  Male who presents with a chief complaint of LE weakness, urinary incontinence. (13 May 2018 07:53)      SUBJECTIVE / OVERNIGHT EVENTS:  Patient currently in PACU awaiting neurosurgery; will see and evaluate patient after procedure.      MEDICATIONS  (STANDING):  AQUAPHOR (petrolatum Ointment) 1 Application(s) Topical two times a day  benztropine 1 milliGRAM(s) Oral two times a day  clotrimazole 1% Cream 1 Application(s) Topical two times a day  haloperidol     Tablet 10 milliGRAM(s) Oral two times a day  levETIRAcetam 1000 milliGRAM(s) Oral two times a day  memantine 5 milliGRAM(s) Oral at bedtime  multivitamin 1 Tablet(s) Oral daily  QUEtiapine 200 milliGRAM(s) Oral two times a day  sodium chloride 0.9%. 1000 milliLiter(s) (75 mL/Hr) IV Continuous <Continuous>      MEDICATIONS  (PRN):  docusate sodium 100 milliGRAM(s) Oral three times a day PRN Constipation  senna 2 Tablet(s) Oral at bedtime PRN Constipation      Vital Signs Last 24 Hrs  T(C): 36.3 (24 May 2018 07:29), Max: 36.8 (23 May 2018 14:13)  T(F): 97.3 (24 May 2018 07:29), Max: 98.3 (23 May 2018 14:13)  HR: 82 (24 May 2018 07:29) (82 - 90)  BP: 125/76 (24 May 2018 07:29) (110/79 - 134/68)  BP(mean): --  RR: 16 (24 May 2018 07:29) (16 - 20)  SpO2: 96% (24 May 2018 07:29) (96% - 100%)      PHYSICAL EXAM:  GENERAL:   HEAD:    EYES:   ENMT:   NECK:   CHEST/LUNG:   HEART:   ABDOMEN:   EXTREMITIES:    PSYCH:   NEUROLOGY:   SKIN:       LABS:                        16.4   5.08  )-----------( 181      ( 23 May 2018 13:26 )             49.1     05-24    137  |  100  |  16  ----------------------------<  99  4.2   |  27  |  0.94    Ca    9.5      24 May 2018 06:54  Phos  3.0     05-24  Mg     1.9     05-24    TPro  7.5  /  Alb  4.1  /  TBili  0.5  /  DBili  x   /  AST  21  /  ALT  30  /  AlkPhos  143<H>  05-24    LIVER FUNCTIONS - ( 24 May 2018 06:54 )  Alb: 4.1 g/dL / Pro: 7.5 g/dL / ALK PHOS: 143 u/L / ALT: 30 u/L / AST: 21 u/L / GGT: x           PT/INR - ( 24 May 2018 06:54 )   PT: 12.5 SEC;   INR: 1.09       PTT - ( 24 May 2018 06:54 )  PTT:27.5 SEC      RADIOLOGY & ADDITIONAL TESTS:  Imaging Personally Reviewed: YES    Consultant(s) Notes Reviewed: YES    Care Discussed with Consultants/Other Providers: YES      Michael Gabriel MD PGY-1  Department of Medicine  [phone]

## 2018-05-24 NOTE — PROGRESS NOTE ADULT - ATTENDING COMMENTS
58M schizophrenia and lives in long term psychiatric facility is admitted with progressive lower extremity weakness and is found on imaging to have a spinal cord collection causing cord compression.  --Appreciate ID recs, monitor off abx and await surgical path and cultures to guide therapy  --s/p OR today  --f/u nsgy, ID recs

## 2018-05-24 NOTE — PROGRESS NOTE ADULT - SUBJECTIVE AND OBJECTIVE BOX
Visit Summary: Patient seen and evaluated at bedside s/p C4-5 ACDF. Patient is awake and moving everything well postoperatively.      Exam:  T(C): 34.9 (05-24-18 @ 16:15), Max: 36.8 (05-24-18 @ 06:14)  HR: 75 (05-24-18 @ 16:30) (75 - 90)  BP: 125/64 (05-24-18 @ 16:30) (110/79 - 139/78)  RR: 13 (05-24-18 @ 16:30) (13 - 20)  SpO2: 99% (05-24-18 @ 16:30) (96% - 100%)  Wt(kg): --    AAOx3, EOS, FC  PERRL, EOMI  CHAUDHARI 5/5, no drift  incision c/d/i                          16.4   5.08  )-----------( 181      ( 23 May 2018 13:26 )             49.1     05-24    137  |  100  |  16  ----------------------------<  99  4.2   |  27  |  0.94    Ca    9.5      24 May 2018 06:54  Phos  3.0     05-24  Mg     1.9     05-24    TPro  7.5  /  Alb  4.1  /  TBili  0.5  /  DBili  x   /  AST  21  /  ALT  30  /  AlkPhos  143<H>  05-24  PT/INR - ( 24 May 2018 06:54 )   PT: 12.5 SEC;   INR: 1.09          PTT - ( 24 May 2018 06:54 )  PTT:27.5 SEC

## 2018-05-24 NOTE — PROGRESS NOTE ADULT - SUBJECTIVE AND OBJECTIVE BOX
NEUROSURGERY    Post op Check List    HPI:  57 yo M [facility] resident with PMH of schizophrenia, seizure disorder presents for weakness and urinary incontinence for 4 months. Patient difficulty progressively LE weakness started 4 months ago that acutely worsened in the past 2 months. He has increasing difficulty with ambulation to the point that he became wheel chair dependent two months ago. He reports urinary incontinence for 4 months with no associated dysuria, hematuria. He also endorses constipation for the past few weeks and that he has to push to get stool out. He reports 9/10 cervical spinal and b/l shoulder pain. No f/c, recent trauma, weight loss, night sweat, n/v/d, HA, dizziness, numbness, tingling, SI. Per [facility] documentation, pt was seen by neurology for progressive weakness and neurology recommended to sent patient to ED to t/o retroperitoneal process or cord compression.    PAST MEDICAL & SURGICAL HISTORY:  Schizophrenia  Seizure disorder  No significant past surgical history    ICU Vital Signs Last 24 Hrs  T(C): 36.2 (24 May 2018 19:00), Max: 36.8 (24 May 2018 06:14)  T(F): 97.2 (24 May 2018 19:00), Max: 98.2 (24 May 2018 06:14)  HR: 92 (24 May 2018 19:15) (75 - 99)  BP: 124/76 (24 May 2018 19:15) (115/72 - 143/73)  BP(mean): 83 (24 May 2018 19:00) (83 - 102)  ABP: 120/54 (24 May 2018 18:30) (120/54 - 149/70)  ABP(mean): 69 (24 May 2018 18:30) (69 - 100)  RR: 12 (24 May 2018 19:15) (10 - 19)  SpO2: 100% (24 May 2018 19:15) (96% - 100%)    Exam    Awake, alert, responsive, dysarthric  PERRLA, EOMI+  FC+ on all 4 ext, Rt sided weakness 4/5 , 3/5 LE   Rt dexerity decreased  Move lt side well  Wound dressing dry and intact

## 2018-05-24 NOTE — BRIEF OPERATIVE NOTE - PROCEDURE
<<-----Click on this checkbox to enter Procedure Anterior cervical discectomy between C4 and C5  05/24/2018    Active  SMORENA

## 2018-05-25 PROCEDURE — 99232 SBSQ HOSP IP/OBS MODERATE 35: CPT

## 2018-05-25 PROCEDURE — 99233 SBSQ HOSP IP/OBS HIGH 50: CPT | Mod: GC

## 2018-05-25 RX ADMIN — Medication 1 MILLIGRAM(S): at 18:07

## 2018-05-25 RX ADMIN — MEMANTINE HYDROCHLORIDE 5 MILLIGRAM(S): 10 TABLET ORAL at 21:30

## 2018-05-25 RX ADMIN — Medication 1 MILLIGRAM(S): at 05:47

## 2018-05-25 RX ADMIN — SODIUM CHLORIDE 75 MILLILITER(S): 9 INJECTION INTRAMUSCULAR; INTRAVENOUS; SUBCUTANEOUS at 05:49

## 2018-05-25 RX ADMIN — HALOPERIDOL DECANOATE 10 MILLIGRAM(S): 100 INJECTION INTRAMUSCULAR at 05:48

## 2018-05-25 RX ADMIN — LEVETIRACETAM 1000 MILLIGRAM(S): 250 TABLET, FILM COATED ORAL at 18:07

## 2018-05-25 RX ADMIN — Medication 1 APPLICATION(S): at 18:07

## 2018-05-25 RX ADMIN — Medication 1 APPLICATION(S): at 05:48

## 2018-05-25 RX ADMIN — Medication 1 APPLICATION(S): at 18:06

## 2018-05-25 RX ADMIN — HALOPERIDOL DECANOATE 10 MILLIGRAM(S): 100 INJECTION INTRAMUSCULAR at 18:07

## 2018-05-25 RX ADMIN — QUETIAPINE FUMARATE 200 MILLIGRAM(S): 200 TABLET, FILM COATED ORAL at 18:09

## 2018-05-25 RX ADMIN — Medication 1 APPLICATION(S): at 05:47

## 2018-05-25 RX ADMIN — LEVETIRACETAM 1000 MILLIGRAM(S): 250 TABLET, FILM COATED ORAL at 05:48

## 2018-05-25 RX ADMIN — Medication 1 TABLET(S): at 18:06

## 2018-05-25 RX ADMIN — Medication 100 MILLIGRAM(S): at 05:48

## 2018-05-25 RX ADMIN — QUETIAPINE FUMARATE 200 MILLIGRAM(S): 200 TABLET, FILM COATED ORAL at 05:48

## 2018-05-25 NOTE — PHYSICAL THERAPY INITIAL EVALUATION ADULT - PERTINENT HX OF CURRENT PROBLEM, REHAB EVAL
This is a 57 yo M [facility] resident with PMH of schizophrenia, seizure disorder a/w progressing LE weakness and likely neurogenic bladder concerning for possible cord compression.

## 2018-05-25 NOTE — PROGRESS NOTE ADULT - SUBJECTIVE AND OBJECTIVE BOX
Patient is a 58y old  Male who presents with a chief complaint of LE weakness, urinary incontinence. (13 May 2018 07:53)      SUBJECTIVE / OVERNIGHT EVENTS:  No events overnight.  Yesterday patient received his surgery; this AM he complains of some neck burning, localized to the back of the neck.  He does not endorse any pain, but states it is hot.      MEDICATIONS  (STANDING):  AQUAPHOR (petrolatum Ointment) 1 Application(s) Topical two times a day  benztropine 1 milliGRAM(s) Oral two times a day  ceFAZolin   IVPB 2000 milliGRAM(s) IV Intermittent every 8 hours  clotrimazole 1% Cream 1 Application(s) Topical two times a day  haloperidol     Tablet 10 milliGRAM(s) Oral two times a day  levETIRAcetam 1000 milliGRAM(s) Oral two times a day  memantine 5 milliGRAM(s) Oral at bedtime  multivitamin 1 Tablet(s) Oral daily  QUEtiapine 200 milliGRAM(s) Oral two times a day  sodium chloride 0.9%. 1000 milliLiter(s) (75 mL/Hr) IV Continuous <Continuous>      MEDICATIONS  (PRN):  docusate sodium 100 milliGRAM(s) Oral three times a day PRN Constipation  morphine  - Injectable 2 milliGRAM(s) IV Push every 4 hours PRN Severe Pain (7 - 10)  oxyCODONE    IR 5 milliGRAM(s) Oral every 4 hours PRN Moderate Pain (4 - 6)  senna 2 Tablet(s) Oral at bedtime PRN Constipation      Vital Signs Last 24 Hrs  T(C): 36.7 (25 May 2018 06:10), Max: 36.7 (25 May 2018 06:10)  T(F): 98 (25 May 2018 06:10), Max: 98 (25 May 2018 06:10)  HR: 92 (25 May 2018 06:10) (75 - 99)  BP: 134/76 (25 May 2018 06:10) (115/72 - 143/73)  BP(mean): 83 (24 May 2018 19:00) (83 - 102)  RR: 18 (25 May 2018 06:10) (10 - 19)  SpO2: 99% (25 May 2018 06:10) (97% - 100%)      I&O's Summary    24 May 2018 07:01  -  25 May 2018 07:00  --------------------------------------------------------  IN: 1040 mL / OUT: 725 mL / NET: 315 mL      PHYSICAL EXAM:  General: NAD, well-developed, sitting in chair comfortably  Eyes: EOMI  Neck: ROM intact; bandage on anterior neck near thyroid cartilage; bandage is clear of drainage  Chest/Lung: Clear to auscultation bilaterally; No wheezes  Heart: Regular rate and rhythm; No murmurs, rubs, or gallops  Abdome: Soft, NT/ND; Bowel sounds present  Extremities: No lower extremity edema.   Psych:  Persistent bizarre thoughts and behavior  Neurology: Patient spontaneously moving all 4 extremities.  Due to patient positioning unable to evaluate lower extremity strength at this time but patient does have the strength to stand.      LABS:                        16.4   5.08  )-----------( 181      ( 23 May 2018 13:26 )             49.1     05-24    137  |  100  |  16  ----------------------------<  99  4.2   |  27  |  0.94    Ca    9.5      24 May 2018 06:54  Phos  3.0     05-24  Mg     1.9     05-24    TPro  7.5  /  Alb  4.1  /  TBili  0.5  /  DBili  x   /  AST  21  /  ALT  30  /  AlkPhos  143<H>  05-24    LIVER FUNCTIONS - ( 24 May 2018 06:54 )  Alb: 4.1 g/dL / Pro: 7.5 g/dL / ALK PHOS: 143 u/L / ALT: 30 u/L / AST: 21 u/L / GGT: x           PT/INR - ( 24 May 2018 06:54 )   PT: 12.5 SEC;   INR: 1.09        PTT - ( 24 May 2018 06:54 )  PTT:27.5 SEC      RADIOLOGY & ADDITIONAL TESTS:  Imaging Personally Reviewed: YES    Consultant(s) Notes Reviewed: YES    Care Discussed with Consultants/Other Providers: YES      Michael Gabrile MD PGY-1  Department of Medicine  [phone]

## 2018-05-25 NOTE — PROGRESS NOTE ADULT - ATTENDING COMMENTS
58M schizophrenia and lives in long term psychiatric facility is admitted with progressive lower extremity weakness and is found on imaging to have a spinal cord collection causing cord compression.  --Appreciate ID recs, monitor off abx and await surgical path and cultures to guide therapy  --s/p OR 5/24  --f/u nsgy, ID recs

## 2018-05-25 NOTE — PROGRESS NOTE ADULT - SUBJECTIVE AND OBJECTIVE BOX
Follow Up:      Inverval History/ROS:Patient is a 58y old  Male who presents with a chief complaint of LE weakness, urinary incontinence. (13 May 2018 07:53)    S/p anterior approach/ discectomy on 5/24    No fever  Rocvoering    Allergies    No Known Allergies    Intolerances        ANTIMICROBIALS:  ceFAZolin   IVPB 2000 every 8 hours      OTHER MEDS:  AQUAPHOR (petrolatum Ointment) 1 Application(s) Topical two times a day  benztropine 1 milliGRAM(s) Oral two times a day  clotrimazole 1% Cream 1 Application(s) Topical two times a day  docusate sodium 100 milliGRAM(s) Oral three times a day PRN  haloperidol     Tablet 10 milliGRAM(s) Oral two times a day  levETIRAcetam 1000 milliGRAM(s) Oral two times a day  memantine 5 milliGRAM(s) Oral at bedtime  morphine  - Injectable 2 milliGRAM(s) IV Push every 4 hours PRN  multivitamin 1 Tablet(s) Oral daily  oxyCODONE    IR 5 milliGRAM(s) Oral every 4 hours PRN  QUEtiapine 200 milliGRAM(s) Oral two times a day  senna 2 Tablet(s) Oral at bedtime PRN  sodium chloride 0.9%. 1000 milliLiter(s) IV Continuous <Continuous>      Vital Signs Last 24 Hrs  T(C): 36.7 (25 May 2018 06:10), Max: 36.7 (25 May 2018 06:10)  T(F): 98 (25 May 2018 06:10), Max: 98 (25 May 2018 06:10)  HR: 92 (25 May 2018 06:10) (75 - 99)  BP: 134/76 (25 May 2018 06:10) (115/72 - 143/73)  BP(mean): 83 (24 May 2018 19:00) (83 - 102)  RR: 18 (25 May 2018 06:10) (10 - 19)  SpO2: 99% (25 May 2018 06:10) (97% - 100%)    PHYSICAL EXAM:  General: [x ] non-toxic  HEAD/EYES: [ ] PERRL [ x] white sclera [ ] icterus  ENT:  [ ] normal [x ] supple [ ] thrush [ ] pharyngeal exudate  Cardiovascular:   [ ] murmur [x ] normal [ ] PPM/AICD  Respiratory:  [ x] clear to ausculation bilaterally  GI:  [x ] soft, non-tender, normal bowel sounds  :  [ ] balbuena [ ] no CVA tenderness   Musculoskeletal:  [ ] no synovitis  Neurologic:  [ ] non-focal exam   Skin:  [x ] no rash  Lymph: [ ] no lymphadenopathy  Psychiatric:  [ ] appropriate affect [x ] alert & oriented  Lines:  [ x] no phlebitis [ ] central line              05-24    137  |  100  |  16  ----------------------------<  99  4.2   |  27  |  0.94    Ca    9.5      24 May 2018 06:54  Phos  3.0     05-24  Mg     1.9     05-24    TPro  7.5  /  Alb  4.1  /  TBili  0.5  /  DBili  x   /  AST  21  /  ALT  30  /  AlkPhos  143<H>  05-24          MICROBIOLOGY:Culture - Surgical Site:   NO GROWTH - PRELIMINARY RESULTS (05-24-18 @ 15:04)      RADIOLOGY:

## 2018-05-25 NOTE — PROGRESS NOTE ADULT - SUBJECTIVE AND OBJECTIVE BOX
POST ANESTHESIA EVALUATION    58y Male POSTOP DAY 1 S/P ACDF    MENTAL STATUS: Patient participation [  x] Awake     [  ] Arousable     [  ] Sedated    AIRWAY PATENCY: [ x ] Satisfactory  [  ] Other:     Vital Signs Last 24 Hrs  T(C): 36.7 (25 May 2018 06:10), Max: 36.7 (25 May 2018 06:10)  T(F): 98 (25 May 2018 06:10), Max: 98 (25 May 2018 06:10)  HR: 92 (25 May 2018 06:10) (75 - 99)  BP: 134/76 (25 May 2018 06:10) (115/72 - 143/73)  BP(mean): 83 (24 May 2018 19:00) (83 - 102)  RR: 18 (25 May 2018 06:10) (10 - 19)  SpO2: 99% (25 May 2018 06:10) (97% - 100%)  I&O's Summary    24 May 2018 07:01  -  25 May 2018 07:00  --------------------------------------------------------  IN: 1040 mL / OUT: 725 mL / NET: 315 mL          NAUSEA/ VOMITTING:  [ x ] NONE  [  ] CONTROLLED [  ] OTHER     PAIN: [ x ] CONTROLLED WITH CURRENT REGIMEN  [  ] OTHER    [ x ] NO APPARENT ANESTHESIA COMPLICATIONS      Comments:

## 2018-05-26 PROCEDURE — 99233 SBSQ HOSP IP/OBS HIGH 50: CPT

## 2018-05-26 RX ORDER — POLYETHYLENE GLYCOL 3350 17 G/17G
17 POWDER, FOR SOLUTION ORAL
Qty: 0 | Refills: 0 | Status: DISCONTINUED | OUTPATIENT
Start: 2018-05-26 | End: 2018-06-04

## 2018-05-26 RX ORDER — ENOXAPARIN SODIUM 100 MG/ML
40 INJECTION SUBCUTANEOUS DAILY
Qty: 0 | Refills: 0 | Status: DISCONTINUED | OUTPATIENT
Start: 2018-05-26 | End: 2018-06-04

## 2018-05-26 RX ADMIN — Medication 100 MILLIGRAM(S): at 21:21

## 2018-05-26 RX ADMIN — POLYETHYLENE GLYCOL 3350 17 GRAM(S): 17 POWDER, FOR SOLUTION ORAL at 17:26

## 2018-05-26 RX ADMIN — Medication 1 APPLICATION(S): at 17:26

## 2018-05-26 RX ADMIN — ENOXAPARIN SODIUM 40 MILLIGRAM(S): 100 INJECTION SUBCUTANEOUS at 17:26

## 2018-05-26 RX ADMIN — HALOPERIDOL DECANOATE 10 MILLIGRAM(S): 100 INJECTION INTRAMUSCULAR at 17:26

## 2018-05-26 RX ADMIN — Medication 1 TABLET(S): at 17:26

## 2018-05-26 RX ADMIN — HALOPERIDOL DECANOATE 10 MILLIGRAM(S): 100 INJECTION INTRAMUSCULAR at 05:22

## 2018-05-26 RX ADMIN — QUETIAPINE FUMARATE 200 MILLIGRAM(S): 200 TABLET, FILM COATED ORAL at 05:21

## 2018-05-26 RX ADMIN — Medication 1 APPLICATION(S): at 05:21

## 2018-05-26 RX ADMIN — QUETIAPINE FUMARATE 200 MILLIGRAM(S): 200 TABLET, FILM COATED ORAL at 17:27

## 2018-05-26 RX ADMIN — Medication 100 MILLIGRAM(S): at 14:11

## 2018-05-26 RX ADMIN — LEVETIRACETAM 1000 MILLIGRAM(S): 250 TABLET, FILM COATED ORAL at 05:22

## 2018-05-26 RX ADMIN — OXYCODONE HYDROCHLORIDE 5 MILLIGRAM(S): 5 TABLET ORAL at 14:41

## 2018-05-26 RX ADMIN — Medication 1 MILLIGRAM(S): at 17:27

## 2018-05-26 RX ADMIN — SENNA PLUS 2 TABLET(S): 8.6 TABLET ORAL at 21:21

## 2018-05-26 RX ADMIN — MEMANTINE HYDROCHLORIDE 5 MILLIGRAM(S): 10 TABLET ORAL at 21:24

## 2018-05-26 RX ADMIN — Medication 1 APPLICATION(S): at 17:27

## 2018-05-26 RX ADMIN — Medication 1 MILLIGRAM(S): at 05:22

## 2018-05-26 RX ADMIN — LEVETIRACETAM 1000 MILLIGRAM(S): 250 TABLET, FILM COATED ORAL at 17:27

## 2018-05-26 RX ADMIN — OXYCODONE HYDROCHLORIDE 5 MILLIGRAM(S): 5 TABLET ORAL at 14:11

## 2018-05-26 NOTE — PROGRESS NOTE ADULT - SUBJECTIVE AND OBJECTIVE BOX
NEUROSURGERY    OVERNIGHT EVENTS  No interval issues    HPI:  57 yo M [facility] resident with PMH of schizophrenia, seizure disorder presents for weakness and urinary incontinence for 4 months. Patient difficulty progressively LE weakness started 4 months ago that acutely worsened in the past 2 months. He has increasing difficulty with ambulation to the point that he became wheel chair dependent two months ago. He reports urinary incontinence for 4 months with no associated dysuria, hematuria. He also endorses constipation for the past few weeks and that he has to push to get stool out. He reports 9/10 cervical spinal and b/l shoulder pain. No f/c, recent trauma, weight loss, night sweat, n/v/d, HA, dizziness, numbness, tingling, SI. Per [facility] documentation, pt was seen by neurology for progressive weakness and neurology recommended to sent patient to ED to t/o retroperitoneal process or cord compression.    ICU Vital Signs Last 24 Hrs  T(C): 36.4 (25 May 2018 21:50), Max: 36.8 (25 May 2018 14:00)  T(F): 97.6 (25 May 2018 21:50), Max: 98.3 (25 May 2018 14:00)  HR: 87 (25 May 2018 21:50) (87 - 92)  BP: 114/68 (25 May 2018 21:50) (114/68 - 134/76)  BP(mean): --  ABP: --  ABP(mean): --  RR: 18 (25 May 2018 21:50) (18 - 18)  SpO2: 100% (25 May 2018 21:50) (99% - 100%)    Exam    AAOx3, EOS, FC  PERRL, EOMI  CHAUDHARI 5/5, no drift  incision c/d/i    MEDICATIONS  (STANDING):  AQUAPHOR (petrolatum Ointment) 1 Application(s) Topical two times a day  benztropine 1 milliGRAM(s) Oral two times a day  clotrimazole 1% Cream 1 Application(s) Topical two times a day  haloperidol     Tablet 10 milliGRAM(s) Oral two times a day  levETIRAcetam 1000 milliGRAM(s) Oral two times a day  memantine 5 milliGRAM(s) Oral at bedtime  multivitamin 1 Tablet(s) Oral daily  QUEtiapine 200 milliGRAM(s) Oral two times a day    MEDICATIONS  (PRN):  docusate sodium 100 milliGRAM(s) Oral three times a day PRN Constipation  morphine  - Injectable 2 milliGRAM(s) IV Push every 4 hours PRN Severe Pain (7 - 10)  oxyCODONE    IR 5 milliGRAM(s) Oral every 4 hours PRN Moderate Pain (4 - 6)  senna 2 Tablet(s) Oral at bedtime PRN Constipation

## 2018-05-26 NOTE — PROGRESS NOTE ADULT - SUBJECTIVE AND OBJECTIVE BOX
Patient is a 58y old  Male who presents with a chief complaint of LE weakness, urinary incontinence. (13 May 2018 07:53)      SUBJECTIVE / OVERNIGHT EVENTS:  No events overnight.  Per neurosurgery, can start dispo planning.      MEDICATIONS  (STANDING):  AQUAPHOR (petrolatum Ointment) 1 Application(s) Topical two times a day  benztropine 1 milliGRAM(s) Oral two times a day  clotrimazole 1% Cream 1 Application(s) Topical two times a day  haloperidol     Tablet 10 milliGRAM(s) Oral two times a day  levETIRAcetam 1000 milliGRAM(s) Oral two times a day  memantine 5 milliGRAM(s) Oral at bedtime  multivitamin 1 Tablet(s) Oral daily  QUEtiapine 200 milliGRAM(s) Oral two times a day      MEDICATIONS  (PRN):  docusate sodium 100 milliGRAM(s) Oral three times a day PRN Constipation  morphine  - Injectable 2 milliGRAM(s) IV Push every 4 hours PRN Severe Pain (7 - 10)  oxyCODONE    IR 5 milliGRAM(s) Oral every 4 hours PRN Moderate Pain (4 - 6)  senna 2 Tablet(s) Oral at bedtime PRN Constipation      Vital Signs Last 24 Hrs  T(C): 36.7 (26 May 2018 05:22), Max: 36.8 (25 May 2018 14:00)  T(F): 98 (26 May 2018 05:22), Max: 98.3 (25 May 2018 14:00)  HR: 86 (26 May 2018 05:22) (86 - 91)  BP: 131/75 (26 May 2018 05:22) (114/68 - 132/77)  BP(mean): --  RR: 18 (26 May 2018 05:22) (18 - 18)  SpO2: 99% (26 May 2018 05:22) (99% - 100%)      PHYSICAL EXAM:  GENERAL:   HEAD:    EYES:   ENMT:   NECK:   CHEST/LUNG:   HEART:   ABDOMEN:   EXTREMITIES:    PSYCH:   NEUROLOGY:   SKIN:       RADIOLOGY & ADDITIONAL TESTS:  Imaging Personally Reviewed: YES    Consultant(s) Notes Reviewed: YES    Care Discussed with Consultants/Other Providers: YES      Michael Gabriel MD PGY-1  Department of Medicine  [phone] Patient is a 58y old  Male who presents with a chief complaint of LE weakness, urinary incontinence. (13 May 2018 07:53)      SUBJECTIVE / OVERNIGHT EVENTS:  No events overnight.  Patient complaining of continued numbness in his hands and his right leg.  Otherwise no complaints this AM.  Per neurosurgery, can start dispo planning.      MEDICATIONS  (STANDING):  AQUAPHOR (petrolatum Ointment) 1 Application(s) Topical two times a day  benztropine 1 milliGRAM(s) Oral two times a day  clotrimazole 1% Cream 1 Application(s) Topical two times a day  haloperidol     Tablet 10 milliGRAM(s) Oral two times a day  levETIRAcetam 1000 milliGRAM(s) Oral two times a day  memantine 5 milliGRAM(s) Oral at bedtime  multivitamin 1 Tablet(s) Oral daily  QUEtiapine 200 milliGRAM(s) Oral two times a day      MEDICATIONS  (PRN):  docusate sodium 100 milliGRAM(s) Oral three times a day PRN Constipation  morphine  - Injectable 2 milliGRAM(s) IV Push every 4 hours PRN Severe Pain (7 - 10)  oxyCODONE    IR 5 milliGRAM(s) Oral every 4 hours PRN Moderate Pain (4 - 6)  senna 2 Tablet(s) Oral at bedtime PRN Constipation      Vital Signs Last 24 Hrs  T(C): 36.7 (26 May 2018 05:22), Max: 36.8 (25 May 2018 14:00)  T(F): 98 (26 May 2018 05:22), Max: 98.3 (25 May 2018 14:00)  HR: 86 (26 May 2018 05:22) (86 - 91)  BP: 131/75 (26 May 2018 05:22) (114/68 - 132/77)  BP(mean): --  RR: 18 (26 May 2018 05:22) (18 - 18)  SpO2: 99% (26 May 2018 05:22) (99% - 100%)      PHYSICAL EXAM:  General: NAD, well-developed, sitting in chair comfortably  Eyes: EOMI  Neck: ROM intact; bandage on anterior neck near thyroid cartilage; bandage is clear of drainage  Chest/Lung: Clear to auscultation bilaterally; No wheezes  Heart: Regular rate and rhythm; No murmurs, rubs, or gallops  Abdome: Soft, NT/ND; Bowel sounds present  Extremities: No lower extremity edema.   Psych:  Persistent bizarre thoughts and behavior  Neurology: Patient spontaneously moving all 4 extremities.  Plantar flexion/extension 5/5 bilateral extremities; leg raise 4/5 on right and 5/5 on left.  Bilateral 5/5 upper extremities.      RADIOLOGY & ADDITIONAL TESTS:  Imaging Personally Reviewed: YES    Consultant(s) Notes Reviewed: YES    Care Discussed with Consultants/Other Providers: YES      Michael Gabriel MD PGY-1  Department of Medicine  [phone]

## 2018-05-27 PROCEDURE — 99233 SBSQ HOSP IP/OBS HIGH 50: CPT

## 2018-05-27 RX ADMIN — HALOPERIDOL DECANOATE 10 MILLIGRAM(S): 100 INJECTION INTRAMUSCULAR at 17:05

## 2018-05-27 RX ADMIN — Medication 1 APPLICATION(S): at 06:14

## 2018-05-27 RX ADMIN — Medication 1 MILLIGRAM(S): at 06:14

## 2018-05-27 RX ADMIN — OXYCODONE HYDROCHLORIDE 5 MILLIGRAM(S): 5 TABLET ORAL at 13:10

## 2018-05-27 RX ADMIN — QUETIAPINE FUMARATE 200 MILLIGRAM(S): 200 TABLET, FILM COATED ORAL at 17:05

## 2018-05-27 RX ADMIN — Medication 100 MILLIGRAM(S): at 12:40

## 2018-05-27 RX ADMIN — Medication 1 MILLIGRAM(S): at 17:05

## 2018-05-27 RX ADMIN — HALOPERIDOL DECANOATE 10 MILLIGRAM(S): 100 INJECTION INTRAMUSCULAR at 06:14

## 2018-05-27 RX ADMIN — OXYCODONE HYDROCHLORIDE 5 MILLIGRAM(S): 5 TABLET ORAL at 12:40

## 2018-05-27 RX ADMIN — Medication 1 APPLICATION(S): at 17:05

## 2018-05-27 RX ADMIN — MEMANTINE HYDROCHLORIDE 5 MILLIGRAM(S): 10 TABLET ORAL at 21:36

## 2018-05-27 RX ADMIN — QUETIAPINE FUMARATE 200 MILLIGRAM(S): 200 TABLET, FILM COATED ORAL at 06:14

## 2018-05-27 RX ADMIN — LEVETIRACETAM 1000 MILLIGRAM(S): 250 TABLET, FILM COATED ORAL at 06:14

## 2018-05-27 RX ADMIN — LEVETIRACETAM 1000 MILLIGRAM(S): 250 TABLET, FILM COATED ORAL at 17:05

## 2018-05-27 RX ADMIN — Medication 1 TABLET(S): at 17:05

## 2018-05-27 RX ADMIN — ENOXAPARIN SODIUM 40 MILLIGRAM(S): 100 INJECTION SUBCUTANEOUS at 17:05

## 2018-05-27 NOTE — PROGRESS NOTE ADULT - SUBJECTIVE AND OBJECTIVE BOX
Patient is a 58y old  Male who presents with a chief complaint of LE weakness, urinary incontinence. (13 May 2018 07:53)      SUBJECTIVE / OVERNIGHT EVENTS:  No events overnight.  Patient without concerns this AM.      MEDICATIONS  (STANDING):  AQUAPHOR (petrolatum Ointment) 1 Application(s) Topical two times a day  benztropine 1 milliGRAM(s) Oral two times a day  clotrimazole 1% Cream 1 Application(s) Topical two times a day  enoxaparin Injectable 40 milliGRAM(s) SubCutaneous daily  haloperidol     Tablet 10 milliGRAM(s) Oral two times a day  levETIRAcetam 1000 milliGRAM(s) Oral two times a day  memantine 5 milliGRAM(s) Oral at bedtime  multivitamin 1 Tablet(s) Oral daily  QUEtiapine 200 milliGRAM(s) Oral two times a day      MEDICATIONS  (PRN):  docusate sodium 100 milliGRAM(s) Oral three times a day PRN Constipation  morphine  - Injectable 2 milliGRAM(s) IV Push every 4 hours PRN Severe Pain (7 - 10)  oxyCODONE    IR 5 milliGRAM(s) Oral every 4 hours PRN Moderate Pain (4 - 6)  polyethylene glycol 3350 17 Gram(s) Oral two times a day PRN Constipation  senna 2 Tablet(s) Oral at bedtime PRN Constipation      Vital Signs Last 24 Hrs  T(C): 37.1 (27 May 2018 06:10), Max: 37.3 (26 May 2018 20:22)  T(F): 98.8 (27 May 2018 06:10), Max: 99.1 (26 May 2018 20:22)  HR: 86 (27 May 2018 06:10) (85 - 100)  BP: 118/82 (27 May 2018 06:10) (114/80 - 162/87)  BP(mean): --  RR: 18 (27 May 2018 06:10) (18 - 18)  SpO2: 99% (27 May 2018 06:10) (99% - 99%)      PHYSICAL EXAM:  General: NAD, well-developed, sitting in chair comfortably  Eyes: EOMI  Neck: ROM intact; surgical incision clear without any drainage or surrounding erythema noted  Chest/Lung: Clear to auscultation bilaterally; No wheezes  Heart: Regular rate and rhythm; No murmurs, rubs, or gallops  Abdome: Soft, NT/ND; Bowel sounds present  Extremities: No lower extremity edema.   Psych:  Persistent bizarre thoughts and behavior  Neurology: Patient spontaneously moving all 4 extremities.  Plantar flexion/extension 5/5 bilateral extremities; leg raise 4/5 on right and 5/5 on left.  Bilateral 5/5 upper extremities.        RADIOLOGY & ADDITIONAL TESTS:  Imaging Personally Reviewed: YES    Consultant(s) Notes Reviewed: YES    Care Discussed with Consultants/Other Providers: YES      Michael Gabriel MD PGY-1  Department of Medicine  [phone]

## 2018-05-28 PROCEDURE — 99233 SBSQ HOSP IP/OBS HIGH 50: CPT | Mod: GC

## 2018-05-28 RX ADMIN — HALOPERIDOL DECANOATE 10 MILLIGRAM(S): 100 INJECTION INTRAMUSCULAR at 17:24

## 2018-05-28 RX ADMIN — Medication 1 APPLICATION(S): at 05:53

## 2018-05-28 RX ADMIN — MEMANTINE HYDROCHLORIDE 5 MILLIGRAM(S): 10 TABLET ORAL at 21:18

## 2018-05-28 RX ADMIN — OXYCODONE HYDROCHLORIDE 5 MILLIGRAM(S): 5 TABLET ORAL at 09:29

## 2018-05-28 RX ADMIN — QUETIAPINE FUMARATE 200 MILLIGRAM(S): 200 TABLET, FILM COATED ORAL at 05:53

## 2018-05-28 RX ADMIN — HALOPERIDOL DECANOATE 10 MILLIGRAM(S): 100 INJECTION INTRAMUSCULAR at 05:53

## 2018-05-28 RX ADMIN — Medication 1 MILLIGRAM(S): at 17:24

## 2018-05-28 RX ADMIN — Medication 1 APPLICATION(S): at 17:24

## 2018-05-28 RX ADMIN — QUETIAPINE FUMARATE 200 MILLIGRAM(S): 200 TABLET, FILM COATED ORAL at 17:24

## 2018-05-28 RX ADMIN — Medication 1 TABLET(S): at 17:24

## 2018-05-28 RX ADMIN — LEVETIRACETAM 1000 MILLIGRAM(S): 250 TABLET, FILM COATED ORAL at 05:53

## 2018-05-28 RX ADMIN — OXYCODONE HYDROCHLORIDE 5 MILLIGRAM(S): 5 TABLET ORAL at 08:59

## 2018-05-28 RX ADMIN — LEVETIRACETAM 1000 MILLIGRAM(S): 250 TABLET, FILM COATED ORAL at 17:25

## 2018-05-28 RX ADMIN — Medication 1 MILLIGRAM(S): at 05:53

## 2018-05-28 RX ADMIN — ENOXAPARIN SODIUM 40 MILLIGRAM(S): 100 INJECTION SUBCUTANEOUS at 17:24

## 2018-05-28 NOTE — PROGRESS NOTE ADULT - SUBJECTIVE AND OBJECTIVE BOX
HPI:  59 yo M [facility] resident with PMH of schizophrenia, seizure disorder presents for weakness and urinary incontinence for 4 months. Patient difficulty progressively LE weakness started 4 months ago that acutely worsened in the past 2 months. He has increasing difficulty with ambulation to the point that he became wheel chair dependent two months ago. He reports urinary incontinence for 4 months with no associated dysuria, hematuria. He also endorses constipation for the past few weeks and that he has to push to get stool out. He reports 9/10 cervical spinal and b/l shoulder pain. No f/c, recent trauma, weight loss, night sweat, n/v/d, HA, dizziness, numbness, tingling, SI. Per [facility] documentation, pt was seen by neurology for progressive weakness and neurology recommended to sent patient to ED to t/o retroperitoneal process or cord compression.    In ED, T 98, -->72, /88, RR 18, Sat 100 RA. CTH noted microvascular disease and no acute pathology. MRI c/t/l ordered. (11 May 2018 03:28)      OVERNIGHT EVENTS:  No acute events overnight. OR culture NGTD.     Vital Signs Last 24 Hrs  T(C): 37 (28 May 2018 06:59), Max: 37 (28 May 2018 06:59)  T(F): 98.6 (28 May 2018 06:59), Max: 98.6 (28 May 2018 06:59)  HR: 84 (28 May 2018 06:59) (84 - 91)  BP: 108/62 (28 May 2018 06:59) (108/62 - 113/66)  BP(mean): --  RR: 18 (28 May 2018 06:59) (18 - 18)  SpO2: 100% (28 May 2018 06:59) (100% - 100%)    I&O's Summary      PHYSICAL EXAM:  Mental Staus: Awake, Alert, Affect appropriate  PERRL, EOMI  Motor:  MAEx4 w/ improving strength  No drift  Incision/Wound: c/d/i        CULTURES:    Culture - Surgical Site:   NO GROWTH - PRELIMINARY RESULTS  NO ORGANISMS ISOLATED AT 24 HOURS  NO ORGANISMS ISOLATED AT 48 HRS. (05-24 @ 15:04)      MEDICATIONS:  Antibiotics:    Neuro:  benztropine 1 milliGRAM(s) Oral two times a day  haloperidol     Tablet 10 milliGRAM(s) Oral two times a day  levETIRAcetam 1000 milliGRAM(s) Oral two times a day  memantine 5 milliGRAM(s) Oral at bedtime  morphine  - Injectable 2 milliGRAM(s) IV Push every 4 hours PRN  oxyCODONE    IR 5 milliGRAM(s) Oral every 4 hours PRN  QUEtiapine 200 milliGRAM(s) Oral two times a day    Anticoagulation  enoxaparin Injectable 40 milliGRAM(s) SubCutaneous daily    OTHER:  AQUAPHOR (petrolatum Ointment) 1 Application(s) Topical two times a day  clotrimazole 1% Cream 1 Application(s) Topical two times a day  docusate sodium 100 milliGRAM(s) Oral three times a day PRN  polyethylene glycol 3350 17 Gram(s) Oral two times a day PRN  senna 2 Tablet(s) Oral at bedtime PRN    IVF:  multivitamin 1 Tablet(s) Oral daily

## 2018-05-28 NOTE — PROGRESS NOTE ADULT - SUBJECTIVE AND OBJECTIVE BOX
Patient is a 58y old  Male who presents with a chief complaint of LE weakness, urinary incontinence. (13 May 2018 07:53)      SUBJECTIVE / OVERNIGHT EVENTS:  No events overnight.  No concerns this AM.      MEDICATIONS  (STANDING):  AQUAPHOR (petrolatum Ointment) 1 Application(s) Topical two times a day  benztropine 1 milliGRAM(s) Oral two times a day  clotrimazole 1% Cream 1 Application(s) Topical two times a day  enoxaparin Injectable 40 milliGRAM(s) SubCutaneous daily  haloperidol     Tablet 10 milliGRAM(s) Oral two times a day  levETIRAcetam 1000 milliGRAM(s) Oral two times a day  memantine 5 milliGRAM(s) Oral at bedtime  multivitamin 1 Tablet(s) Oral daily  QUEtiapine 200 milliGRAM(s) Oral two times a day      MEDICATIONS  (PRN):  docusate sodium 100 milliGRAM(s) Oral three times a day PRN Constipation  morphine  - Injectable 2 milliGRAM(s) IV Push every 4 hours PRN Severe Pain (7 - 10)  oxyCODONE    IR 5 milliGRAM(s) Oral every 4 hours PRN Moderate Pain (4 - 6)  polyethylene glycol 3350 17 Gram(s) Oral two times a day PRN Constipation  senna 2 Tablet(s) Oral at bedtime PRN Constipation      Vital Signs Last 24 Hrs  T(C): 37 (28 May 2018 06:59), Max: 37 (28 May 2018 06:59)  T(F): 98.6 (28 May 2018 06:59), Max: 98.6 (28 May 2018 06:59)  HR: 84 (28 May 2018 06:59) (84 - 91)  BP: 108/62 (28 May 2018 06:59) (108/62 - 113/66)  BP(mean): --  RR: 18 (28 May 2018 06:59) (18 - 18)  SpO2: 100% (28 May 2018 06:59) (100% - 100%)      PHYSICAL EXAM:  General: NAD, well-developed, sitting in chair comfortably  Eyes: EOMI  Neck: ROM intact; surgical incision clear without any drainage or surrounding erythema noted  Chest/Lung: Clear to auscultation bilaterally; No wheezes  Heart: Regular rate and rhythm; No murmurs, rubs, or gallops  Abdome: Soft, NT/ND; Bowel sounds present  Extremities: No lower extremity edema.   Psych:  Persistent bizarre thoughts and behavior  Neurology: Patient spontaneously moving all 4 extremities.  Plantar flexion/extension 5/5 bilateral extremities; leg raise 4/5 on right and 5/5 on left.  Bilateral 5/5 upper extremities.      RADIOLOGY & ADDITIONAL TESTS:  Imaging Personally Reviewed: YES    Consultant(s) Notes Reviewed: YES    Care Discussed with Consultants/Other Providers: YES      Michael Gabriel MD PGY-1  Department of Medicine  [phone]

## 2018-05-29 PROCEDURE — 99232 SBSQ HOSP IP/OBS MODERATE 35: CPT | Mod: GC

## 2018-05-29 PROCEDURE — 99232 SBSQ HOSP IP/OBS MODERATE 35: CPT

## 2018-05-29 RX ADMIN — Medication 1 APPLICATION(S): at 18:18

## 2018-05-29 RX ADMIN — LEVETIRACETAM 1000 MILLIGRAM(S): 250 TABLET, FILM COATED ORAL at 05:41

## 2018-05-29 RX ADMIN — HALOPERIDOL DECANOATE 10 MILLIGRAM(S): 100 INJECTION INTRAMUSCULAR at 18:17

## 2018-05-29 RX ADMIN — Medication 1 APPLICATION(S): at 05:41

## 2018-05-29 RX ADMIN — HALOPERIDOL DECANOATE 10 MILLIGRAM(S): 100 INJECTION INTRAMUSCULAR at 05:41

## 2018-05-29 RX ADMIN — LEVETIRACETAM 1000 MILLIGRAM(S): 250 TABLET, FILM COATED ORAL at 18:17

## 2018-05-29 RX ADMIN — POLYETHYLENE GLYCOL 3350 17 GRAM(S): 17 POWDER, FOR SOLUTION ORAL at 18:18

## 2018-05-29 RX ADMIN — QUETIAPINE FUMARATE 200 MILLIGRAM(S): 200 TABLET, FILM COATED ORAL at 18:17

## 2018-05-29 RX ADMIN — ENOXAPARIN SODIUM 40 MILLIGRAM(S): 100 INJECTION SUBCUTANEOUS at 12:30

## 2018-05-29 RX ADMIN — MEMANTINE HYDROCHLORIDE 5 MILLIGRAM(S): 10 TABLET ORAL at 21:47

## 2018-05-29 RX ADMIN — Medication 1 MILLIGRAM(S): at 05:41

## 2018-05-29 RX ADMIN — Medication 1 MILLIGRAM(S): at 18:18

## 2018-05-29 RX ADMIN — Medication 1 TABLET(S): at 12:30

## 2018-05-29 RX ADMIN — QUETIAPINE FUMARATE 200 MILLIGRAM(S): 200 TABLET, FILM COATED ORAL at 05:41

## 2018-05-29 NOTE — PROGRESS NOTE ADULT - PROBLEM SELECTOR PLAN 5
- DVT PPx: Lovenox SQ  - Dispo: PT initially recommended [facility]; however, will ask to reevaluate as patient's strength is improved. If no need for [facility], will plan for dispo back to [place] with PT

## 2018-05-29 NOTE — PROGRESS NOTE ADULT - SUBJECTIVE AND OBJECTIVE BOX
Patient is a 58y old  Male who presents with a chief complaint of LE weakness, urinary incontinence. (13 May 2018 07:53)      SUBJECTIVE / OVERNIGHT EVENTS:    MEDICATIONS  (STANDING):  AQUAPHOR (petrolatum Ointment) 1 Application(s) Topical two times a day  benztropine 1 milliGRAM(s) Oral two times a day  clotrimazole 1% Cream 1 Application(s) Topical two times a day  enoxaparin Injectable 40 milliGRAM(s) SubCutaneous daily  haloperidol     Tablet 10 milliGRAM(s) Oral two times a day  levETIRAcetam 1000 milliGRAM(s) Oral two times a day  memantine 5 milliGRAM(s) Oral at bedtime  multivitamin 1 Tablet(s) Oral daily  QUEtiapine 200 milliGRAM(s) Oral two times a day    MEDICATIONS  (PRN):  docusate sodium 100 milliGRAM(s) Oral three times a day PRN Constipation  morphine  - Injectable 2 milliGRAM(s) IV Push every 4 hours PRN Severe Pain (7 - 10)  oxyCODONE    IR 5 milliGRAM(s) Oral every 4 hours PRN Moderate Pain (4 - 6)  polyethylene glycol 3350 17 Gram(s) Oral two times a day PRN Constipation  senna 2 Tablet(s) Oral at bedtime PRN Constipation        CAPILLARY BLOOD GLUCOSE        I&O's Summary    Vital Signs Last 24 Hrs  T(C): 36.7 (29 May 2018 06:24), Max: 37 (28 May 2018 20:16)  T(F): 98.1 (29 May 2018 06:24), Max: 98.6 (28 May 2018 20:16)  HR: 81 (29 May 2018 06:24) (81 - 95)  BP: 137/71 (29 May 2018 06:24) (131/88 - 137/71)  BP(mean): --  RR: 18 (29 May 2018 06:24) (18 - 18)  SpO2: 100% (29 May 2018 06:24) (100% - 100%)    PHYSICAL EXAM:  GENERAL: NAD, well-developed  HEAD:  Atraumatic, Normocephalic  EYES: EOMI, PERRLA, conjunctiva and sclera clear  NECK: Supple, No JVD  CHEST/LUNG: Clear to auscultation bilaterally; No wheeze  HEART: Regular rate and rhythm; No murmurs, rubs, or gallops  ABDOMEN: Soft, Nontender, Nondistended; Bowel sounds present  EXTREMITIES:  2+ Peripheral Pulses, No clubbing, cyanosis, or edema  PSYCH: AAOx3  NEUROLOGY: non-focal  SKIN: No rashes or lesions    LABS:                    RADIOLOGY & ADDITIONAL TESTS:    Imaging Personally Reviewed:    Consultant(s) Notes Reviewed:    Neurosurgery    Care Discussed with Consultants/Other Providers: Patient is a 58y old  Male who presents with a chief complaint of LE weakness, urinary incontinence. (13 May 2018 07:53)    SUBJECTIVE / OVERNIGHT EVENTS:  No acute events overnight. Patient seen and examined. Patient reports that he feels well this AM. No complaints of chest pain, shortness of breath, nausea, vomiting, or abdominal pain. He is tolerating PO intake well. Patient was afebrile overnight.    MEDICATIONS  (STANDING):  AQUAPHOR (petrolatum Ointment) 1 Application(s) Topical two times a day  benztropine 1 milliGRAM(s) Oral two times a day  clotrimazole 1% Cream 1 Application(s) Topical two times a day  enoxaparin Injectable 40 milliGRAM(s) SubCutaneous daily  haloperidol     Tablet 10 milliGRAM(s) Oral two times a day  levETIRAcetam 1000 milliGRAM(s) Oral two times a day  memantine 5 milliGRAM(s) Oral at bedtime  multivitamin 1 Tablet(s) Oral daily  QUEtiapine 200 milliGRAM(s) Oral two times a day    MEDICATIONS  (PRN):  docusate sodium 100 milliGRAM(s) Oral three times a day PRN Constipation  morphine  - Injectable 2 milliGRAM(s) IV Push every 4 hours PRN Severe Pain (7 - 10)  oxyCODONE    IR 5 milliGRAM(s) Oral every 4 hours PRN Moderate Pain (4 - 6)  polyethylene glycol 3350 17 Gram(s) Oral two times a day PRN Constipation  senna 2 Tablet(s) Oral at bedtime PRN Constipation        CAPILLARY BLOOD GLUCOSE        I&O's Summary    Vital Signs Last 24 Hrs  T(C): 36.7 (29 May 2018 06:24), Max: 37 (28 May 2018 20:16)  T(F): 98.1 (29 May 2018 06:24), Max: 98.6 (28 May 2018 20:16)  HR: 81 (29 May 2018 06:24) (81 - 95)  BP: 137/71 (29 May 2018 06:24) (131/88 - 137/71)  BP(mean): --  RR: 18 (29 May 2018 06:24) (18 - 18)  SpO2: 100% (29 May 2018 06:24) (100% - 100%)    PHYSICAL EXAM:  GENERAL: NAD, well-developed  HEAD:  Atraumatic, Normocephalic  EYES: EOMI, PERRLA, conjunctiva and sclera clear  NECK: Supple, No JVD  CHEST/LUNG: Clear to auscultation bilaterally; No wheeze  HEART: Regular rate and rhythm; No murmurs, rubs, or gallops  ABDOMEN: Soft, Nontender, Nondistended; Bowel sounds present  EXTREMITIES:  2+ Peripheral Pulses, No clubbing, cyanosis, or edema  PSYCH: AAOx3  NEUROLOGY: non-focal  SKIN: No rashes or lesions    LABS:                    RADIOLOGY & ADDITIONAL TESTS:    Imaging Personally Reviewed:    Consultant(s) Notes Reviewed:    Neurosurgery    Care Discussed with Consultants/Other Providers: Patient is a 58y old  Male who presents with a chief complaint of LE weakness, urinary incontinence. (13 May 2018 07:53)    SUBJECTIVE / OVERNIGHT EVENTS:  No acute events overnight. Patient seen and examined. Patient reports that he feels well this AM. His strength is slowly improving on the R side. No complaints of chest pain, shortness of breath, nausea, vomiting, or abdominal pain. He is tolerating PO intake well. Patient was afebrile overnight.    MEDICATIONS  (STANDING):  AQUAPHOR (petrolatum Ointment) 1 Application(s) Topical two times a day  benztropine 1 milliGRAM(s) Oral two times a day  clotrimazole 1% Cream 1 Application(s) Topical two times a day  enoxaparin Injectable 40 milliGRAM(s) SubCutaneous daily  haloperidol     Tablet 10 milliGRAM(s) Oral two times a day  levETIRAcetam 1000 milliGRAM(s) Oral two times a day  memantine 5 milliGRAM(s) Oral at bedtime  multivitamin 1 Tablet(s) Oral daily  QUEtiapine 200 milliGRAM(s) Oral two times a day    MEDICATIONS  (PRN):  docusate sodium 100 milliGRAM(s) Oral three times a day PRN Constipation  morphine  - Injectable 2 milliGRAM(s) IV Push every 4 hours PRN Severe Pain (7 - 10)  oxyCODONE    IR 5 milliGRAM(s) Oral every 4 hours PRN Moderate Pain (4 - 6)  polyethylene glycol 3350 17 Gram(s) Oral two times a day PRN Constipation  senna 2 Tablet(s) Oral at bedtime PRN Constipation        CAPILLARY BLOOD GLUCOSE        I&O's Summary    Vital Signs Last 24 Hrs  T(C): 36.7 (29 May 2018 06:24), Max: 37 (28 May 2018 20:16)  T(F): 98.1 (29 May 2018 06:24), Max: 98.6 (28 May 2018 20:16)  HR: 81 (29 May 2018 06:24) (81 - 95)  BP: 137/71 (29 May 2018 06:24) (131/88 - 137/71)  BP(mean): --  RR: 18 (29 May 2018 06:24) (18 - 18)  SpO2: 100% (29 May 2018 06:24) (100% - 100%)    PHYSICAL EXAM:  GENERAL: NAD  HEAD: NC/AT, EOMI  NECK: Supple, Surgical incision on anterior neck which appears c/d/i, No surrounding erythema noted  CHEST/LUNG: CTAB; No wheeze or rhonchi appreciated, Respirations nonlabored  HEART: RRR; +S1/S2  ABDOMEN: +BS, Soft, NT, No rigidity  EXTREMITIES: No peripheral edema  NEUROLOGY: Answers questions and follows commands appropriately, Spontaneously moves all 4 extremities, Leg raise 3+-4/5 on right and 5/5 on left.  5/5 strength of upper extremities,  SKIN: Warm and dry  PSYCH: Calm and cooperative    LABS:                    RADIOLOGY & ADDITIONAL TESTS:    Imaging Personally Reviewed:    Consultant(s) Notes Reviewed:    Neurosurgery    Care Discussed with Consultants/Other Providers:

## 2018-05-29 NOTE — PROGRESS NOTE ADULT - SUBJECTIVE AND OBJECTIVE BOX
Follow Up:      [place] History/ROS:Patient is a 58y old  Male who presents with a chief complaint of LE weakness, urinary incontinence. (13 May 2018 07:53)    Pt feels well, less pain.    Allergies    No Known Allergies    Intolerances        ANTIMICROBIALS:      OTHER MEDS:  AQUAPHOR (petrolatum Ointment) 1 Application(s) Topical two times a day  benztropine 1 milliGRAM(s) Oral two times a day  clotrimazole 1% Cream 1 Application(s) Topical two times a day  docusate sodium 100 milliGRAM(s) Oral three times a day PRN  enoxaparin Injectable 40 milliGRAM(s) SubCutaneous daily  haloperidol     Tablet 10 milliGRAM(s) Oral two times a day  levETIRAcetam 1000 milliGRAM(s) Oral two times a day  memantine 5 milliGRAM(s) Oral at bedtime  morphine  - Injectable 2 milliGRAM(s) IV Push every 4 hours PRN  multivitamin 1 Tablet(s) Oral daily  oxyCODONE    IR 5 milliGRAM(s) Oral every 4 hours PRN  polyethylene glycol 3350 17 Gram(s) Oral two times a day PRN  QUEtiapine 200 milliGRAM(s) Oral two times a day  senna 2 Tablet(s) Oral at bedtime PRN      Vital Signs Last 24 Hrs  T(C): 37.4 (29 May 2018 13:37), Max: 37.4 (29 May 2018 13:37)  T(F): 99.3 (29 May 2018 13:37), Max: 99.3 (29 May 2018 13:37)  HR: 104 (29 May 2018 13:37) (81 - 104)  BP: 129/84 (29 May 2018 13:37) (129/84 - 137/71)  BP(mean): --  RR: 18 (29 May 2018 13:37) (18 - 18)  SpO2: 100% (29 May 2018 13:37) (100% - 100%)    PHYSICAL EXAM:  General: [x ] non-toxic  HEAD/EYES: [ ] PERRL [ x] white sclera [ ] icterus  ENT:  [ ] normal [x ] supple [ ] thrush [ ] pharyngeal exudate  Cardiovascular:   [ ] murmur [ x] normal [ ] PPM/AICD  Respiratory:  [x ] clear to ausculation bilaterally  GI:  [ ] soft, non-tender, normal bowel sounds  :  [ ] balbuena [ ] no CVA tenderness   Musculoskeletal:  [ ] no synovitis  Neurologic:  [x ] non-focal exam   Skin:  [x ] no rash  Lymph: [ ] no lymphadenopathy  Psychiatric:  [x ] appropriate affect [ ] alert & oriented  Lines:  [x ] no phlebitis [ ] central line                          MICROBIOLOGY:Culture - Surgical Site:   NO GROWTH - PRELIMINARY RESULTS  NO ORGANISMS ISOLATED AT 24 HOURS  NO ORGANISMS ISOLATED AT 48 HRS.  NO ORGANISMS ISOLATED AT 72 HRS.  NO GROWTH AFTER 4 DAYS INCUBATION (05-24-18 @ 15:04)      RADIOLOGY:

## 2018-05-29 NOTE — PROGRESS NOTE ADULT - ATTENDING COMMENTS
RUE and RLE strength now 4/5, much improved from admission. No further workup indicated. Anticipate D/C to [facility] with on-site physical therapy. Time planning discharge 35 minutes.

## 2018-05-30 LAB — CULTURE - SURGICAL SITE: SIGNIFICANT CHANGE UP

## 2018-05-30 PROCEDURE — 99232 SBSQ HOSP IP/OBS MODERATE 35: CPT | Mod: GC

## 2018-05-30 RX ADMIN — Medication 1 APPLICATION(S): at 06:10

## 2018-05-30 RX ADMIN — HALOPERIDOL DECANOATE 10 MILLIGRAM(S): 100 INJECTION INTRAMUSCULAR at 17:05

## 2018-05-30 RX ADMIN — Medication 1 APPLICATION(S): at 17:06

## 2018-05-30 RX ADMIN — Medication 1 MILLIGRAM(S): at 06:10

## 2018-05-30 RX ADMIN — OXYCODONE HYDROCHLORIDE 5 MILLIGRAM(S): 5 TABLET ORAL at 06:45

## 2018-05-30 RX ADMIN — QUETIAPINE FUMARATE 200 MILLIGRAM(S): 200 TABLET, FILM COATED ORAL at 17:05

## 2018-05-30 RX ADMIN — Medication 1 APPLICATION(S): at 17:05

## 2018-05-30 RX ADMIN — Medication 1 TABLET(S): at 11:41

## 2018-05-30 RX ADMIN — LEVETIRACETAM 1000 MILLIGRAM(S): 250 TABLET, FILM COATED ORAL at 17:05

## 2018-05-30 RX ADMIN — HALOPERIDOL DECANOATE 10 MILLIGRAM(S): 100 INJECTION INTRAMUSCULAR at 06:10

## 2018-05-30 RX ADMIN — OXYCODONE HYDROCHLORIDE 5 MILLIGRAM(S): 5 TABLET ORAL at 06:19

## 2018-05-30 RX ADMIN — MEMANTINE HYDROCHLORIDE 5 MILLIGRAM(S): 10 TABLET ORAL at 21:11

## 2018-05-30 RX ADMIN — LEVETIRACETAM 1000 MILLIGRAM(S): 250 TABLET, FILM COATED ORAL at 06:10

## 2018-05-30 RX ADMIN — Medication 1 MILLIGRAM(S): at 17:05

## 2018-05-30 RX ADMIN — ENOXAPARIN SODIUM 40 MILLIGRAM(S): 100 INJECTION SUBCUTANEOUS at 11:41

## 2018-05-30 RX ADMIN — QUETIAPINE FUMARATE 200 MILLIGRAM(S): 200 TABLET, FILM COATED ORAL at 06:09

## 2018-05-30 NOTE — PROGRESS NOTE ADULT - SUBJECTIVE AND OBJECTIVE BOX
Patient is neurosurgically cleared to return to [facility].   He is medically stable to return to a facility/[facility] with other patients.   He is able to resume activity, but should not lift anything heavier then 5 pounds.   No collar needed. may shower and get incision wet.

## 2018-05-30 NOTE — PROGRESS NOTE ADULT - PROBLEM SELECTOR PLAN 5
- DVT PPx: Lovenox SQ  - Dispo: PT initially recommended [facility]; however, will ask to reevaluate as patient's strength is improved. If no need for [facility], will plan for dispo back to [facility] with PT    Michael Gabriel MD PGY-1  Department of Medicine  [phone] / [phone]

## 2018-05-30 NOTE — PROGRESS NOTE ADULT - ATTENDING COMMENTS
Pt is medically stable for discharge. Unclear if [facility] will be able to accommodate PT needs for this patient. Team has faxed PT recommendations, so will await decision. Time planning discharge 35 minutes.

## 2018-05-30 NOTE — PROGRESS NOTE ADULT - SUBJECTIVE AND OBJECTIVE BOX
Patient is a 58y old  Male who presents with a chief complaint of LE weakness, urinary incontinence. (13 May 2018 07:53)      SUBJECTIVE / OVERNIGHT EVENTS:  No events overnight.  Patient still complaining of significant left leg weakness and the sensation that his hands are asleep.      MEDICATIONS  (STANDING):  AQUAPHOR (petrolatum Ointment) 1 Application(s) Topical two times a day  benztropine 1 milliGRAM(s) Oral two times a day  clotrimazole 1% Cream 1 Application(s) Topical two times a day  enoxaparin Injectable 40 milliGRAM(s) SubCutaneous daily  haloperidol     Tablet 10 milliGRAM(s) Oral two times a day  levETIRAcetam 1000 milliGRAM(s) Oral two times a day  memantine 5 milliGRAM(s) Oral at bedtime  multivitamin 1 Tablet(s) Oral daily  QUEtiapine 200 milliGRAM(s) Oral two times a day      MEDICATIONS  (PRN):  docusate sodium 100 milliGRAM(s) Oral three times a day PRN Constipation  morphine  - Injectable 2 milliGRAM(s) IV Push every 4 hours PRN Severe Pain (7 - 10)  oxyCODONE    IR 5 milliGRAM(s) Oral every 4 hours PRN Moderate Pain (4 - 6)  polyethylene glycol 3350 17 Gram(s) Oral two times a day PRN Constipation  senna 2 Tablet(s) Oral at bedtime PRN Constipation      Vital Signs Last 24 Hrs  T(C): 36.8 (30 May 2018 06:07), Max: 37.4 (29 May 2018 13:37)  T(F): 98.3 (30 May 2018 06:07), Max: 99.3 (29 May 2018 13:37)  HR: 72 (30 May 2018 06:07) (72 - 104)  BP: 127/676 (30 May 2018 06:07) (127/676 - 132/78)  BP(mean): --  RR: 18 (30 May 2018 06:07) (18 - 18)  SpO2: 100% (30 May 2018 06:07) (100% - 100%)      PHYSICAL EXAM:  GENERAL:  Well-appearing, sleeping  HEAD:    EYES:   ENMT:   NECK:   CHEST/LUNG:   HEART:   ABDOMEN:   EXTREMITIES:    PSYCH:   NEUROLOGY:   SKIN:       RADIOLOGY & ADDITIONAL TESTS:  Imaging Personally Reviewed: YES    Consultant(s) Notes Reviewed: YES    Care Discussed with Consultants/Other Providers: YES      Michael Gabriel MD PGY-1  Department of Medicine  [phone] Patient is a 58y old  Male who presents with a chief complaint of LE weakness, urinary incontinence. (13 May 2018 07:53)      SUBJECTIVE / OVERNIGHT EVENTS:  No events overnight.  Patient still complaining of significant left leg weakness and the sensation that his hands are asleep.      MEDICATIONS  (STANDING):  AQUAPHOR (petrolatum Ointment) 1 Application(s) Topical two times a day  benztropine 1 milliGRAM(s) Oral two times a day  clotrimazole 1% Cream 1 Application(s) Topical two times a day  enoxaparin Injectable 40 milliGRAM(s) SubCutaneous daily  haloperidol     Tablet 10 milliGRAM(s) Oral two times a day  levETIRAcetam 1000 milliGRAM(s) Oral two times a day  memantine 5 milliGRAM(s) Oral at bedtime  multivitamin 1 Tablet(s) Oral daily  QUEtiapine 200 milliGRAM(s) Oral two times a day      MEDICATIONS  (PRN):  docusate sodium 100 milliGRAM(s) Oral three times a day PRN Constipation  morphine  - Injectable 2 milliGRAM(s) IV Push every 4 hours PRN Severe Pain (7 - 10)  oxyCODONE    IR 5 milliGRAM(s) Oral every 4 hours PRN Moderate Pain (4 - 6)  polyethylene glycol 3350 17 Gram(s) Oral two times a day PRN Constipation  senna 2 Tablet(s) Oral at bedtime PRN Constipation      Vital Signs Last 24 Hrs  T(C): 36.8 (30 May 2018 06:07), Max: 37.4 (29 May 2018 13:37)  T(F): 98.3 (30 May 2018 06:07), Max: 99.3 (29 May 2018 13:37)  HR: 72 (30 May 2018 06:07) (72 - 104)  BP: 127/676 (30 May 2018 06:07) (127/676 - 132/78)  BP(mean): --  RR: 18 (30 May 2018 06:07) (18 - 18)  SpO2: 100% (30 May 2018 06:07) (100% - 100%)      PHYSICAL EXAM:  GENERAL:  Well-appearing, sleeping comfortably  EYES: EOMI  NECK: ROM intact.  Surgical incision on anterior neck without drainage, erythema, or irritation  CHEST/LUNG:  CTA bilaterally  HEART:  RRR, no appreciate murmur  ABDOMEN: +BS, NT/D  EXTREMITIES:  5/5 plantar flexion and extension bilterally.  Patient with 1/5 strength in left leg and 4/5 in right leg; suspect this is due to poor effort as patient was sleeping.  Will reassess today.  PSYCH:  Pleasant but with bizarre thoughts and behaviors  NEUROLOGY:  A&Ox2  SKIN:  Intact to gross observation      RADIOLOGY & ADDITIONAL TESTS:  Imaging Personally Reviewed: YES    Consultant(s) Notes Reviewed: YES    Care Discussed with Consultants/Other Providers: YES      Michael Gabriel MD PGY-1  Department of Medicine  [phone]

## 2018-05-31 PROCEDURE — 99222 1ST HOSP IP/OBS MODERATE 55: CPT | Mod: GC

## 2018-05-31 PROCEDURE — 99232 SBSQ HOSP IP/OBS MODERATE 35: CPT | Mod: GC

## 2018-05-31 RX ADMIN — Medication 1 APPLICATION(S): at 05:04

## 2018-05-31 RX ADMIN — LEVETIRACETAM 1000 MILLIGRAM(S): 250 TABLET, FILM COATED ORAL at 05:04

## 2018-05-31 RX ADMIN — Medication 1 MILLIGRAM(S): at 05:04

## 2018-05-31 RX ADMIN — Medication 1 APPLICATION(S): at 16:45

## 2018-05-31 RX ADMIN — ENOXAPARIN SODIUM 40 MILLIGRAM(S): 100 INJECTION SUBCUTANEOUS at 16:44

## 2018-05-31 RX ADMIN — QUETIAPINE FUMARATE 200 MILLIGRAM(S): 200 TABLET, FILM COATED ORAL at 16:44

## 2018-05-31 RX ADMIN — HALOPERIDOL DECANOATE 10 MILLIGRAM(S): 100 INJECTION INTRAMUSCULAR at 05:04

## 2018-05-31 RX ADMIN — Medication 1 MILLIGRAM(S): at 16:44

## 2018-05-31 RX ADMIN — HALOPERIDOL DECANOATE 10 MILLIGRAM(S): 100 INJECTION INTRAMUSCULAR at 16:44

## 2018-05-31 RX ADMIN — MEMANTINE HYDROCHLORIDE 5 MILLIGRAM(S): 10 TABLET ORAL at 21:05

## 2018-05-31 RX ADMIN — LEVETIRACETAM 1000 MILLIGRAM(S): 250 TABLET, FILM COATED ORAL at 16:44

## 2018-05-31 RX ADMIN — QUETIAPINE FUMARATE 200 MILLIGRAM(S): 200 TABLET, FILM COATED ORAL at 05:04

## 2018-05-31 RX ADMIN — Medication 1 TABLET(S): at 16:44

## 2018-05-31 NOTE — PROGRESS NOTE ADULT - SUBJECTIVE AND OBJECTIVE BOX
Patient is a 58y old  Male who presents with a chief complaint of LE weakness, urinary incontinence. (13 May 2018 07:53)      SUBJECTIVE / OVERNIGHT EVENTS:        MEDICATIONS  (STANDING):  AQUAPHOR (petrolatum Ointment) 1 Application(s) Topical two times a day  benztropine 1 milliGRAM(s) Oral two times a day  clotrimazole 1% Cream 1 Application(s) Topical two times a day  enoxaparin Injectable 40 milliGRAM(s) SubCutaneous daily  haloperidol     Tablet 10 milliGRAM(s) Oral two times a day  levETIRAcetam 1000 milliGRAM(s) Oral two times a day  memantine 5 milliGRAM(s) Oral at bedtime  multivitamin 1 Tablet(s) Oral daily  QUEtiapine 200 milliGRAM(s) Oral two times a day      MEDICATIONS  (PRN):  docusate sodium 100 milliGRAM(s) Oral three times a day PRN Constipation  morphine  - Injectable 2 milliGRAM(s) IV Push every 4 hours PRN Severe Pain (7 - 10)  oxyCODONE    IR 5 milliGRAM(s) Oral every 4 hours PRN Moderate Pain (4 - 6)  polyethylene glycol 3350 17 Gram(s) Oral two times a day PRN Constipation  senna 2 Tablet(s) Oral at bedtime PRN Constipation      Vital Signs Last 24 Hrs  T(C): 36.9 (31 May 2018 05:02), Max: 37 (30 May 2018 19:15)  T(F): 98.5 (31 May 2018 05:02), Max: 98.6 (30 May 2018 19:15)  HR: 89 (31 May 2018 05:02) (89 - 109)  BP: 130/69 (31 May 2018 05:02) (114/78 - 130/69)  BP(mean): --  RR: 18 (31 May 2018 05:02) (18 - 20)  SpO2: 100% (31 May 2018 05:02) (100% - 100%)      PHYSICAL EXAM:  GENERAL:   HEAD:    EYES:   ENMT:   NECK:   CHEST/LUNG:   HEART:   ABDOMEN:   EXTREMITIES:    PSYCH:   NEUROLOGY:   SKIN:       RADIOLOGY & ADDITIONAL TESTS:  Imaging Personally Reviewed: YES    Consultant(s) Notes Reviewed: YES    Care Discussed with Consultants/Other Providers: YES      Michael Gabriel MD PGY-1  Department of Medicine  [phone] Patient is a 58y old  Male who presents with a chief complaint of LE weakness, urinary incontinence. (13 May 2018 07:53)      SUBJECTIVE / OVERNIGHT EVENTS:  No events overnight.  Patient complaining of leg pain this AM and is less able to move the leg.      MEDICATIONS  (STANDING):  AQUAPHOR (petrolatum Ointment) 1 Application(s) Topical two times a day  benztropine 1 milliGRAM(s) Oral two times a day  clotrimazole 1% Cream 1 Application(s) Topical two times a day  enoxaparin Injectable 40 milliGRAM(s) SubCutaneous daily  haloperidol     Tablet 10 milliGRAM(s) Oral two times a day  levETIRAcetam 1000 milliGRAM(s) Oral two times a day  memantine 5 milliGRAM(s) Oral at bedtime  multivitamin 1 Tablet(s) Oral daily  QUEtiapine 200 milliGRAM(s) Oral two times a day      MEDICATIONS  (PRN):  docusate sodium 100 milliGRAM(s) Oral three times a day PRN Constipation  morphine  - Injectable 2 milliGRAM(s) IV Push every 4 hours PRN Severe Pain (7 - 10)  oxyCODONE    IR 5 milliGRAM(s) Oral every 4 hours PRN Moderate Pain (4 - 6)  polyethylene glycol 3350 17 Gram(s) Oral two times a day PRN Constipation  senna 2 Tablet(s) Oral at bedtime PRN Constipation      Vital Signs Last 24 Hrs  T(C): 36.9 (31 May 2018 05:02), Max: 37 (30 May 2018 19:15)  T(F): 98.5 (31 May 2018 05:02), Max: 98.6 (30 May 2018 19:15)  HR: 89 (31 May 2018 05:02) (89 - 109)  BP: 130/69 (31 May 2018 05:02) (114/78 - 130/69)  BP(mean): --  RR: 18 (31 May 2018 05:02) (18 - 20)  SpO2: 100% (31 May 2018 05:02) (100% - 100%)      PHYSICAL EXAM:  GENERAL:  Well-appearing, sleeping comfortably  EYES: EOMI  NECK: ROM intact.  Surgical incision on anterior neck without drainage, erythema, or irritation  CHEST/LUNG:  CTA bilaterally  HEART:  RRR, no appreciate murmur  ABDOMEN: +BS, NT/D  EXTREMITIES:  5/5 plantar flexion and extension bilaterally.  Patient with 1/5 strength in right leg and 5/5 in right leg; upper extremities remain 5/5 in all strength fields.  PSYCH:  Pleasant but with bizarre thoughts and behaviors  NEUROLOGY:  A&Ox2  SKIN:  Intact to gross observation      RADIOLOGY & ADDITIONAL TESTS:  Imaging Personally Reviewed: YES    Consultant(s) Notes Reviewed: YES    Care Discussed with Consultants/Other Providers: YES      Michael Gabriel MD PGY-1  Department of Medicine  [phone]

## 2018-05-31 NOTE — CONSULT NOTE ADULT - SUBJECTIVE AND OBJECTIVE BOX
HPI:  57 yo M [facility] resident with PMH of schizophrenia, seizure disorder presents for weakness and urinary incontinence for 4 months. Patient difficulty progressively LE weakness started 4 months ago that acutely worsened in the past 2 months. He has increasing difficulty with ambulation to the point that he became wheel chair dependent two months ago. He reports urinary incontinence for 4 months with no associated dysuria, hematuria. He also endorses constipation for the past few weeks and that he has to push to get stool out. He reports 9/10 cervical spinal and b/l shoulder pain. No f/c, recent trauma, weight loss, night sweat, n/v/d, HA, dizziness, numbness, tingling, SI. Per [facility] documentation, pt was seen by neurology for progressive weakness.     MRI raised concern for  C4-C5 right facet joint infection with abnormal enhancement, OR cultures negative so far, off abx.   S/P s/p C4-5 ACDF POD # 6      REVIEW OF SYSTEMS: No chest pain, shortness of breath, nausea, vomiting or diarhea.      PAST MEDICAL & SURGICAL HISTORY  Schizophrenia  Seizure disorder  No significant past surgical history      SOCIAL HISTORY  Smoking - Denied, EtOH - Denied, Drugs - Denied    FUNCTIONAL HISTORY:   Lives   Independent    CURRENT FUNCTIONAL STATUS:      FAMILY HISTORY   No pertinent family history in first degree relatives      RECENT LABS/IMAGING              VITALS  T(C): 36.9 (05-31-18 @ 05:02), Max: 37 (05-30-18 @ 19:15)  HR: 89 (05-31-18 @ 05:02) (89 - 109)  BP: 130/69 (05-31-18 @ 05:02) (114/78 - 130/69)  RR: 18 (05-31-18 @ 05:02) (18 - 20)  SpO2: 100% (05-31-18 @ 05:02) (100% - 100%)  Wt(kg): --    ALLERGIES  No Known Allergies      MEDICATIONS   AQUAPHOR (petrolatum Ointment) 1 Application(s) Topical two times a day  benztropine 1 milliGRAM(s) Oral two times a day  clotrimazole 1% Cream 1 Application(s) Topical two times a day  docusate sodium 100 milliGRAM(s) Oral three times a day PRN  enoxaparin Injectable 40 milliGRAM(s) SubCutaneous daily  haloperidol     Tablet 10 milliGRAM(s) Oral two times a day  levETIRAcetam 1000 milliGRAM(s) Oral two times a day  memantine 5 milliGRAM(s) Oral at bedtime  morphine  - Injectable 2 milliGRAM(s) IV Push every 4 hours PRN  multivitamin 1 Tablet(s) Oral daily  oxyCODONE    IR 5 milliGRAM(s) Oral every 4 hours PRN  polyethylene glycol 3350 17 Gram(s) Oral two times a day PRN  QUEtiapine 200 milliGRAM(s) Oral two times a day  senna 2 Tablet(s) Oral at bedtime PRN      ----------------------------------------------------------------------------------------  PHYSICAL EXAM  Constitutional - NAD, Comfortable  HEENT - NCAT, EOMI  Neck - Supple, No limited ROM  Chest - CTA bilaterally, No wheeze, No rhonchi, No crackles  Cardiovascular - RRR, S1S2, No murmurs  Abdomen - BS+, Soft, NTND  Extremities - No C/C/E, No calf tenderness   Neurologic Exam -                    Cognitive - Awake, Alert, AAO to self, place, date, year, situation     Communication - Fluent, No dysarthria, no aphasia     Cranial Nerves - CN 2-12 intact     Motor - No focal deficits                       Sensory - Intact to LT     Reflexes - DTR Intact, No primitive reflexive     Balance - WNL Static  Psychiatric - Mood stable, Affect WNL HPI:  59 yo M [facility] resident with PMH of schizophrenia, seizure disorder presents for weakness and urinary incontinence for 4 months. Patient difficulty progressively LE weakness started 4 months ago that acutely worsened in the past 2 months. He has increasing difficulty with ambulation to the point that he became wheel chair dependent two months ago. He reports urinary incontinence for 4 months with no associated dysuria, hematuria. He also endorses constipation for the past few weeks and that he has to push to get stool out. He reports 9/10 cervical spinal and b/l shoulder pain. No f/c, recent trauma, weight loss, night sweat, n/v/d, HA, dizziness, numbness, tingling, SI. Per [facility] documentation, pt was seen by neurology for progressive weakness.     MRI raised concern for  C4-C5 right facet joint infection with abnormal enhancement, OR cultures negative so far, off abx.   S/P s/p C4-5 ACDF POD # 6  No pain. Participating in PT       REVIEW OF SYSTEMS: No chest pain, shortness of breath, nausea, vomiting or diarhea.      PAST MEDICAL & SURGICAL HISTORY  Schizophrenia  Seizure disorder  No significant past surgical history      SOCIAL HISTORY  Smoking - Denied, EtOH - Denied, Drugs - Denied    FUNCTIONAL HISTORY:   Lives in [facility]   Walked 6 month ago I prior to weakness       CURRENT FUNCTIONAL STATUS: mod a       FAMILY HISTORY   No pertinent family history in first degree relatives      RECENT LABS/IMAGING              VITALS  T(C): 36.9 (05-31-18 @ 05:02), Max: 37 (05-30-18 @ 19:15)  HR: 89 (05-31-18 @ 05:02) (89 - 109)  BP: 130/69 (05-31-18 @ 05:02) (114/78 - 130/69)  RR: 18 (05-31-18 @ 05:02) (18 - 20)  SpO2: 100% (05-31-18 @ 05:02) (100% - 100%)  Wt(kg): --    ALLERGIES  No Known Allergies      MEDICATIONS   AQUAPHOR (petrolatum Ointment) 1 Application(s) Topical two times a day  benztropine 1 milliGRAM(s) Oral two times a day  clotrimazole 1% Cream 1 Application(s) Topical two times a day  docusate sodium 100 milliGRAM(s) Oral three times a day PRN  enoxaparin Injectable 40 milliGRAM(s) SubCutaneous daily  haloperidol     Tablet 10 milliGRAM(s) Oral two times a day  levETIRAcetam 1000 milliGRAM(s) Oral two times a day  memantine 5 milliGRAM(s) Oral at bedtime  morphine  - Injectable 2 milliGRAM(s) IV Push every 4 hours PRN  multivitamin 1 Tablet(s) Oral daily  oxyCODONE    IR 5 milliGRAM(s) Oral every 4 hours PRN  polyethylene glycol 3350 17 Gram(s) Oral two times a day PRN  QUEtiapine 200 milliGRAM(s) Oral two times a day  senna 2 Tablet(s) Oral at bedtime PRN      ----------------------------------------------------------------------------------------  PHYSICAL EXAM  Constitutional - NAD, Comfortable  HEENT - NCAT, EOMI  Neck - Supple, No limited ROM  Chest - CTA bilaterally, No wheeze, No rhonchi, No crackles  Cardiovascular - RRR, S1S2, No murmurs  Abdomen - BS+, Soft, NTND  Extremities - No C/C/E, No calf tenderness   Neurologic Exam -                    Cognitive - Awake, Alert, AAO to self, place, date, year, situation     Communication - Fluent, No dysarthria, no aphasia     Cranial Nerves - CN 2-12 intact     Motor - LLE 4/5, RLE 3/5, b/l UE 4/5     Sensory - decreased to  LT     Reflexes - DTR Intact, No primitive reflexive     Balance -poor standing   Psychiatric - Mood stable,flat  Affect

## 2018-05-31 NOTE — PROGRESS NOTE ADULT - PROBLEM SELECTOR PLAN 5
- DVT PPx: Lovenox SQ  - Dispo: PT initially recommended KAMILAH; however, will ask to reevaluate as patient's strength is improved. If no need for KAMILAH, will plan for dispo back to [facility] with PT    Michael Gabriel MD PGY-1  Department of Medicine  [phone] / [phone]

## 2018-05-31 NOTE — CHART NOTE - NSCHARTNOTEFT_GEN_A_CORE
Source: Patient [X ]    Family [ ]     other [ ]    Current Diet : Regular  Reported:  [ ] nausea  [ ] vomiting [ ] diarrhea [ ] constipation  [ ]chewing problems [ ] swallowing issues  [ ] other:   PO intake:  < 50% [ ] 50-75% [ ]   % [ X]  other :  Current Weight: 79.8 kgs 5/22. Pt in chair at time of visit, suggest obtaining current Wt. as feasible. Admission Wt. 77kgs (5/11)  Nutrition follow-up for extended length of stay. Patient is a 59 y/o M who resides at [facility].  His PMHx is inclusive of schizophrenia and seizure disorder who was admitted progressing LE weakness and found to have cervical abscess, Pt.  s/p surgical intervention on C4/C5 facet joint. Per staff, Pt with good PO intake and appetite.  No reports of nausea/vomiting/diarrhea/constipation or difficulty chewing and swallowing.       __________________ Pertinent Medications__________________   MEDICATIONS  (STANDING):  AQUAPHOR (petrolatum Ointment) 1 Application(s) Topical two times a day  benztropine 1 milliGRAM(s) Oral two times a day  clotrimazole 1% Cream 1 Application(s) Topical two times a day  enoxaparin Injectable 40 milliGRAM(s) SubCutaneous daily  haloperidol     Tablet 10 milliGRAM(s) Oral two times a day  levETIRAcetam 1000 milliGRAM(s) Oral two times a day  memantine 5 milliGRAM(s) Oral at bedtime  multivitamin 1 Tablet(s) Oral daily  QUEtiapine 200 milliGRAM(s) Oral two times a day    MEDICATIONS  (PRN):  docusate sodium 100 milliGRAM(s) Oral three times a day PRN Constipation  morphine  - Injectable 2 milliGRAM(s) IV Push every 4 hours PRN Severe Pain (7 - 10)  oxyCODONE    IR 5 milliGRAM(s) Oral every 4 hours PRN Moderate Pain (4 - 6)  polyethylene glycol 3350 17 Gram(s) Oral two times a day PRN Constipation  senna 2 Tablet(s) Oral at bedtime PRN Constipation      Estimated Needs:   [X ] no change since previous assessment  [ ] recalculated:       Previous Nutrition Diagnosis:     [ ] Inadequate Energy Intake [ ]Inadequate Oral Intake [ ] Excessive Energy Intake   [ ] Underweight [ ] Increased Nutrient Needs [ ] Overweight/Obesity   [ ] Altered GI Function [ ] Unintended Weight Loss [ ] Food & Nutrition Related Knowledge Deficit [ ] Malnutrition     Nutrition Diagnosis is [ ] ongoing  [ ] resolved [ ] not applicable     New Nutrition Diagnosis: [x ] not applicable    [ ] Inadequate Protein Energy Intake   [ ]Inadequate Oral Intake   [ ] Excessive Energy Intake   [ ] Underweight   [ ] Increased Nutrient Needs   [ ] Overweight/Obesity   [ ] Altered GI Function   [ ] Unintended Weight Loss   [ ] Food & Nutrition Related Knowledge Deficit  [ ] Limited Adherence to nutrition related recommendations   [ ] Malnutrition    [ ] other:     Related to:   As evidenced by:     Nutrition Recommendations:  1- Continue current diet order, which remains appropriate at this time.   2- Monitor weights, labs, BM's, skin integrity, p.o. intake.   3- Please Encourage po intake, assist with meals and menu selections, provide alternatives PRN.   4- RD remains available, re-consult as needed. Alan Guzman RD Pager #[phone]

## 2018-05-31 NOTE — CONSULT NOTE ADULT - ASSESSMENT
58year old male with weakness x 3 months found to have right facet joint infection at C4-5 level.
59 yearold male from [facility] with PMH of schizophrenia, seizure disorder presents for weakness and urinary incontinence for 4 months. Patient is poor historian and does not provide alot of information so most of the information is from chart review. Patient difficulty progressively LE weakness started 4 months ago that acutely worsened in the past 2 months. He has increasing difficulty with ambulation to the point that he became wheel chair dependent two months ago. Per [facility] documentation, pt was seen by neurology for progressive weakness and neurology recommended to sent patient to ED to t/o retroperitoneal process or cord compression. On exam he is 4/5 R hip flexion/knee flexion/ knee extension/plantar flexion; 3/5 dorsiflexion of R foot, decreased sensation on RUE and RLE and brisk reflex on rt patellar.     Plan:  MRI brain w/o con  MRI c, t and L spine w/ w/o contrast
LE weakness, gait abnormality     1. PT- bed mobility,transfers, gait and balance training  2. OT- ADL'S  3. cord compression s/p surgery   4. Patient would benefit from acute rehab, needs a multidisciplinary team including PT, OT . Can tolerate 3 hours of therapy a day.  Will follow.
58M with Schizophrenia and Seizure disorder admitted 5/10/18 for months of lower extremity weakness and neck pain. MRI with C4-C5 right facet joint infection.   Not septic. Neurological deficits are mild and not acute.     Recommend  -delay starting antibiotics until microbiological data is obtained   -two sets of blood cultures now   -f/u neurosurgery regarding intervention, if planned please obtain specimen cultures     Discussed with Dr Garcia and primary team [phone]

## 2018-06-01 PROCEDURE — 99232 SBSQ HOSP IP/OBS MODERATE 35: CPT | Mod: GC

## 2018-06-01 RX ADMIN — Medication 1 APPLICATION(S): at 17:07

## 2018-06-01 RX ADMIN — HALOPERIDOL DECANOATE 10 MILLIGRAM(S): 100 INJECTION INTRAMUSCULAR at 05:09

## 2018-06-01 RX ADMIN — Medication 1 MILLIGRAM(S): at 17:08

## 2018-06-01 RX ADMIN — HALOPERIDOL DECANOATE 10 MILLIGRAM(S): 100 INJECTION INTRAMUSCULAR at 17:08

## 2018-06-01 RX ADMIN — QUETIAPINE FUMARATE 200 MILLIGRAM(S): 200 TABLET, FILM COATED ORAL at 05:09

## 2018-06-01 RX ADMIN — Medication 1 MILLIGRAM(S): at 05:09

## 2018-06-01 RX ADMIN — LEVETIRACETAM 1000 MILLIGRAM(S): 250 TABLET, FILM COATED ORAL at 05:09

## 2018-06-01 RX ADMIN — Medication 1 APPLICATION(S): at 05:10

## 2018-06-01 RX ADMIN — QUETIAPINE FUMARATE 200 MILLIGRAM(S): 200 TABLET, FILM COATED ORAL at 17:08

## 2018-06-01 RX ADMIN — MEMANTINE HYDROCHLORIDE 5 MILLIGRAM(S): 10 TABLET ORAL at 21:31

## 2018-06-01 RX ADMIN — Medication 1 TABLET(S): at 17:08

## 2018-06-01 RX ADMIN — LEVETIRACETAM 1000 MILLIGRAM(S): 250 TABLET, FILM COATED ORAL at 17:08

## 2018-06-01 RX ADMIN — ENOXAPARIN SODIUM 40 MILLIGRAM(S): 100 INJECTION SUBCUTANEOUS at 17:08

## 2018-06-01 NOTE — OCCUPATIONAL THERAPY INITIAL EVALUATION ADULT - ADDITIONAL COMMENTS
Pt endorses that he has been wheelchair bound for last 2 months and has been increasingly reliant on staff for ADLs and mobility.

## 2018-06-01 NOTE — PROGRESS NOTE ADULT - SUBJECTIVE AND OBJECTIVE BOX
HPI:  59 yo M [facility] resident with PMH of schizophrenia, seizure disorder presents for weakness and urinary incontinence for 4 months. Patient difficulty progressively LE weakness started 4 months ago that acutely worsened in the past 2 months. He has increasing difficulty with ambulation to the point that he became wheel chair dependent two months ago. He reports urinary incontinence for 4 months with no associated dysuria, hematuria. He also endorses constipation for the past few weeks and that he has to push to get stool out. He reports 9/10 cervical spinal and b/l shoulder pain. No f/c, recent trauma, weight loss, night sweat, n/v/d, HA, dizziness, numbness, tingling, SI. Per [facility] documentation, pt was seen by neurology for progressive weakness.     MRI raised concern for  C4-C5 right facet joint infection with abnormal enhancement, OR cultures negative so far, off abx.   S/P s/p C4-5 ACDF POD # 7  No pain. Participating in PT. + constipation      REVIEW OF SYSTEMS: No chest pain, shortness of breath, nausea, vomiting or diarhea.          CURRENT FUNCTIONAL STATUS: max a     MEDICATIONS  (STANDING):  AQUAPHOR (petrolatum Ointment) 1 Application(s) Topical two times a day  benztropine 1 milliGRAM(s) Oral two times a day  clotrimazole 1% Cream 1 Application(s) Topical two times a day  enoxaparin Injectable 40 milliGRAM(s) SubCutaneous daily  haloperidol     Tablet 10 milliGRAM(s) Oral two times a day  levETIRAcetam 1000 milliGRAM(s) Oral two times a day  memantine 5 milliGRAM(s) Oral at bedtime  multivitamin 1 Tablet(s) Oral daily  QUEtiapine 200 milliGRAM(s) Oral two times a day    MEDICATIONS  (PRN):  docusate sodium 100 milliGRAM(s) Oral three times a day PRN Constipation  polyethylene glycol 3350 17 Gram(s) Oral two times a day PRN Constipation  senna 2 Tablet(s) Oral at bedtime PRN Constipation        ICU Vital Signs Last 24 Hrs  T(C): 36.8 (01 Jun 2018 15:39), Max: 36.9 (01 Jun 2018 05:08)  T(F): 98.3 (01 Jun 2018 15:39), Max: 98.4 (01 Jun 2018 05:08)  HR: 96 (01 Jun 2018 15:39) (75 - 96)  BP: 145/88 (01 Jun 2018 15:39) (106/55 - 145/88)  BP(mean): --  ABP: --  ABP(mean): --  RR: 16 (01 Jun 2018 15:39) (16 - 18)  SpO2: 98% (01 Jun 2018 15:39) (98% - 100%)      ----------------------------------------------------------------------------------------  PHYSICAL EXAM  Constitutional - NAD, Comfortable  HEENT - NCAT, EOMI  Neck - Supple, No limited ROM  Chest - CTA bilaterally, No wheeze, No rhonchi, No crackles  Cardiovascular - RRR, S1S2, No murmurs  Abdomen - BS+, Soft, NTND  Extremities - No C/C/E, No calf tenderness   Neurologic Exam -                    Cognitive - Awake, Alert, AAO to self, place, date, year, situation     Communication - Fluent, No dysarthria, no aphasia     Cranial Nerves - CN 2-12 intact     Motor - LLE 4/5, RLE 3/5, b/l UE 4/5     Sensory - decreased to  LT     Reflexes - DTR Intact, No primitive reflexive     Balance -poor standing   Psychiatric - Mood stable,flat  Affect

## 2018-06-01 NOTE — PROGRESS NOTE ADULT - SUBJECTIVE AND OBJECTIVE BOX
Patient is a 58y old  Male who presents with a chief complaint of LE weakness, urinary incontinence. (13 May 2018 07:53)      SUBJECTIVE / OVERNIGHT EVENTS:  No events overnight.      MEDICATIONS  (STANDING):  AQUAPHOR (petrolatum Ointment) 1 Application(s) Topical two times a day  benztropine 1 milliGRAM(s) Oral two times a day  clotrimazole 1% Cream 1 Application(s) Topical two times a day  enoxaparin Injectable 40 milliGRAM(s) SubCutaneous daily  haloperidol     Tablet 10 milliGRAM(s) Oral two times a day  levETIRAcetam 1000 milliGRAM(s) Oral two times a day  memantine 5 milliGRAM(s) Oral at bedtime  multivitamin 1 Tablet(s) Oral daily  QUEtiapine 200 milliGRAM(s) Oral two times a day      MEDICATIONS  (PRN):  docusate sodium 100 milliGRAM(s) Oral three times a day PRN Constipation  polyethylene glycol 3350 17 Gram(s) Oral two times a day PRN Constipation  senna 2 Tablet(s) Oral at bedtime PRN Constipation      Vital Signs Last 24 Hrs  T(C): 36.9 (01 Jun 2018 05:08), Max: 36.9 (01 Jun 2018 05:08)  T(F): 98.4 (01 Jun 2018 05:08), Max: 98.4 (01 Jun 2018 05:08)  HR: 75 (01 Jun 2018 05:08) (75 - 89)  BP: 106/55 (01 Jun 2018 05:08) (106/55 - 130/60)  BP(mean): --  RR: 18 (01 Jun 2018 05:08) (18 - 18)  SpO2: 100% (01 Jun 2018 05:08) (99% - 100%)      PHYSICAL EXAM:  GENERAL:   HEAD:    EYES:   ENMT:   NECK:   CHEST/LUNG:   HEART:   ABDOMEN:   EXTREMITIES:    PSYCH:   NEUROLOGY:   SKIN:       RADIOLOGY & ADDITIONAL TESTS:  Imaging Personally Reviewed: YES    Consultant(s) Notes Reviewed: YES    Care Discussed with Consultants/Other Providers: YES      Michael Gabriel MD PGY-1  Department of Medicine  [phone] Patient is a 58y old  Male who presents with a chief complaint of LE weakness, urinary incontinence. (13 May 2018 07:53)      SUBJECTIVE / OVERNIGHT EVENTS:  No events overnight.      MEDICATIONS  (STANDING):  AQUAPHOR (petrolatum Ointment) 1 Application(s) Topical two times a day  benztropine 1 milliGRAM(s) Oral two times a day  clotrimazole 1% Cream 1 Application(s) Topical two times a day  enoxaparin Injectable 40 milliGRAM(s) SubCutaneous daily  haloperidol     Tablet 10 milliGRAM(s) Oral two times a day  levETIRAcetam 1000 milliGRAM(s) Oral two times a day  memantine 5 milliGRAM(s) Oral at bedtime  multivitamin 1 Tablet(s) Oral daily  QUEtiapine 200 milliGRAM(s) Oral two times a day      MEDICATIONS  (PRN):  docusate sodium 100 milliGRAM(s) Oral three times a day PRN Constipation  polyethylene glycol 3350 17 Gram(s) Oral two times a day PRN Constipation  senna 2 Tablet(s) Oral at bedtime PRN Constipation      Vital Signs Last 24 Hrs  T(C): 36.9 (01 Jun 2018 05:08), Max: 36.9 (01 Jun 2018 05:08)  T(F): 98.4 (01 Jun 2018 05:08), Max: 98.4 (01 Jun 2018 05:08)  HR: 75 (01 Jun 2018 05:08) (75 - 89)  BP: 106/55 (01 Jun 2018 05:08) (106/55 - 130/60)  BP(mean): --  RR: 18 (01 Jun 2018 05:08) (18 - 18)  SpO2: 100% (01 Jun 2018 05:08) (99% - 100%)      PHYSICAL EXAM:  GENERAL:  Well-appearing, sleeping comfortably  EYES: EOMI  NECK: ROM intact.  Surgical incision on anterior neck without drainage, erythema, or irritation  CHEST/LUNG:  CTA bilaterally  HEART:  RRR, no appreciate murmur  ABDOMEN: +BS, NT/D  EXTREMITIES:  5/5 plantar flexion and extension bilterally.  Patient with 1/5 strength in left leg and 4/5 in right leg; suspect this is due to poor effort as patient was sleeping.  Will reassess today.  PSYCH:  Pleasant but with bizarre thoughts and behaviors  NEUROLOGY:  A&Ox2  SKIN:  Intact to gross observation      RADIOLOGY & ADDITIONAL TESTS:  Imaging Personally Reviewed: YES    Consultant(s) Notes Reviewed: YES    Care Discussed with Consultants/Other Providers: YES      Michael Gabriel MD PGY-1  Department of Medicine  [phone]

## 2018-06-01 NOTE — OCCUPATIONAL THERAPY INITIAL EVALUATION ADULT - PLANNED THERAPY INTERVENTIONS, OT EVAL
balance training/ADL retraining/neuromuscular re-education/bed mobility training/ROM/strengthening/fine motor coordination training/transfer training

## 2018-06-01 NOTE — OCCUPATIONAL THERAPY INITIAL EVALUATION ADULT - MD ORDER
Occupational Therapy to evaluate and treat. Occupational Therapy to evaluate and treat.  Out of bed to chair.

## 2018-06-01 NOTE — OCCUPATIONAL THERAPY INITIAL EVALUATION ADULT - PERTINENT HX OF CURRENT PROBLEM, REHAB EVAL
59 yo M [facility] resident with PMH of schizophrenia, seizure disorder presents for weakness and urinary incontinence for 4 months. Patient difficulty progressively LE weakness started 4 months ago that acutely worsened in the past 2 months.  He reports urinary incontinence for 4 months and constipation for the past few weeks. Pt now s/p Anterior cervical discectomy between C4, C5.

## 2018-06-01 NOTE — OCCUPATIONAL THERAPY INITIAL EVALUATION ADULT - DIAGNOSIS, OT EVAL
bilateral UE/LE ataxia, decreased postural control, decreased functional mobility, decreased ADL independence s/p Anterior cervical discectomy between C4, C5; bilateral UE/LE ataxia, decreased postural control, decreased functional mobility, decreased ADL independence

## 2018-06-02 PROCEDURE — 99232 SBSQ HOSP IP/OBS MODERATE 35: CPT | Mod: GC

## 2018-06-02 RX ORDER — ACETAMINOPHEN 500 MG
650 TABLET ORAL EVERY 6 HOURS
Qty: 0 | Refills: 0 | Status: DISCONTINUED | OUTPATIENT
Start: 2018-06-02 | End: 2018-06-04

## 2018-06-02 RX ADMIN — HALOPERIDOL DECANOATE 10 MILLIGRAM(S): 100 INJECTION INTRAMUSCULAR at 05:34

## 2018-06-02 RX ADMIN — LEVETIRACETAM 1000 MILLIGRAM(S): 250 TABLET, FILM COATED ORAL at 17:28

## 2018-06-02 RX ADMIN — Medication 1 TABLET(S): at 11:28

## 2018-06-02 RX ADMIN — Medication 1 MILLIGRAM(S): at 17:28

## 2018-06-02 RX ADMIN — Medication 1 APPLICATION(S): at 17:28

## 2018-06-02 RX ADMIN — Medication 1 MILLIGRAM(S): at 05:35

## 2018-06-02 RX ADMIN — HALOPERIDOL DECANOATE 10 MILLIGRAM(S): 100 INJECTION INTRAMUSCULAR at 17:28

## 2018-06-02 RX ADMIN — ENOXAPARIN SODIUM 40 MILLIGRAM(S): 100 INJECTION SUBCUTANEOUS at 11:28

## 2018-06-02 RX ADMIN — Medication 1 APPLICATION(S): at 05:34

## 2018-06-02 RX ADMIN — MEMANTINE HYDROCHLORIDE 5 MILLIGRAM(S): 10 TABLET ORAL at 21:24

## 2018-06-02 RX ADMIN — LEVETIRACETAM 1000 MILLIGRAM(S): 250 TABLET, FILM COATED ORAL at 05:35

## 2018-06-02 RX ADMIN — QUETIAPINE FUMARATE 200 MILLIGRAM(S): 200 TABLET, FILM COATED ORAL at 17:28

## 2018-06-02 RX ADMIN — QUETIAPINE FUMARATE 200 MILLIGRAM(S): 200 TABLET, FILM COATED ORAL at 05:35

## 2018-06-02 NOTE — PROGRESS NOTE ADULT - PROBLEM SELECTOR PLAN 5
- DVT PPx: Lovenox SQ  - Dispo: KAMILAH Gabriel MD PGY-1  Department of Medicine  [phone] / [phone] - DVT PPx: Lovenox SQ  - Dispo: KAMILAH Prajapati MD PGY-2  Department of Medicine  [phone]

## 2018-06-02 NOTE — PROGRESS NOTE ADULT - SUBJECTIVE AND OBJECTIVE BOX
Patient is a 58y old  Male who presents with a chief complaint of LE weakness, urinary incontinence. (13 May 2018 07:53)    SUBJECTIVE / OVERNIGHT EVENTS:  No acute events overnight.      MEDICATIONS  (STANDING):  AQUAPHOR (petrolatum Ointment) 1 Application(s) Topical two times a day  benztropine 1 milliGRAM(s) Oral two times a day  clotrimazole 1% Cream 1 Application(s) Topical two times a day  enoxaparin Injectable 40 milliGRAM(s) SubCutaneous daily  haloperidol     Tablet 10 milliGRAM(s) Oral two times a day  levETIRAcetam 1000 milliGRAM(s) Oral two times a day  memantine 5 milliGRAM(s) Oral at bedtime  multivitamin 1 Tablet(s) Oral daily  QUEtiapine 200 milliGRAM(s) Oral two times a day    MEDICATIONS  (PRN):  docusate sodium 100 milliGRAM(s) Oral three times a day PRN Constipation  polyethylene glycol 3350 17 Gram(s) Oral two times a day PRN Constipation  senna 2 Tablet(s) Oral at bedtime PRN Constipation        Vital Signs Last 24 Hrs  Vital Signs Last 24 Hrs  T(C): 36.8 (02 Jun 2018 05:35), Max: 36.9 (01 Jun 2018 19:31)  T(F): 98.3 (02 Jun 2018 05:35), Max: 98.4 (01 Jun 2018 19:31)  HR: 81 (02 Jun 2018 05:35) (81 - 96)  BP: 134/79 (02 Jun 2018 05:35) (126/86 - 145/88)  BP(mean): --  RR: 18 (02 Jun 2018 05:35) (16 - 18)  SpO2: 98% (02 Jun 2018 05:35) (98% - 99%)    PHYSICAL EXAM:  GENERAL:  Well-appearing, sleeping comfortably  EYES: EOMI  NECK: ROM intact.  Surgical incision on anterior neck without drainage, erythema, or irritation  CHEST/LUNG:  CTA bilaterally  HEART:  RRR, no appreciate murmur  ABDOMEN: +BS, NT/D  EXTREMITIES:  5/5 plantar flexion and extension bilterally.  Patient with 1/5 strength in left leg and 4/5 in right leg; suspect this is due to poor effort as patient was sleeping.  Will reassess today.  PSYCH:  Pleasant but with bizarre thoughts and behaviors  NEUROLOGY:  A&Ox2  SKIN:  Intact to gross observation      RADIOLOGY & ADDITIONAL TESTS:  Imaging Personally Reviewed: YES    Consultant(s) Notes Reviewed: YES    Care Discussed with Consultants/Other Providers: YES      Michael Gabriel MD PGY-1  Department of Medicine  [phone] Patient is a 58y old  Male who presents with a chief complaint of LE weakness, urinary incontinence. (13 May 2018 07:53)    SUBJECTIVE / OVERNIGHT EVENTS:  No acute events overnight. No complaints       MEDICATIONS  (STANDING):  AQUAPHOR (petrolatum Ointment) 1 Application(s) Topical two times a day  benztropine 1 milliGRAM(s) Oral two times a day  clotrimazole 1% Cream 1 Application(s) Topical two times a day  enoxaparin Injectable 40 milliGRAM(s) SubCutaneous daily  haloperidol     Tablet 10 milliGRAM(s) Oral two times a day  levETIRAcetam 1000 milliGRAM(s) Oral two times a day  memantine 5 milliGRAM(s) Oral at bedtime  multivitamin 1 Tablet(s) Oral daily  QUEtiapine 200 milliGRAM(s) Oral two times a day    MEDICATIONS  (PRN):  docusate sodium 100 milliGRAM(s) Oral three times a day PRN Constipation  polyethylene glycol 3350 17 Gram(s) Oral two times a day PRN Constipation  senna 2 Tablet(s) Oral at bedtime PRN Constipation        Vital Signs Last 24 Hrs  Vital Signs Last 24 Hrs  T(C): 36.8 (02 Jun 2018 05:35), Max: 36.9 (01 Jun 2018 19:31)  T(F): 98.3 (02 Jun 2018 05:35), Max: 98.4 (01 Jun 2018 19:31)  HR: 81 (02 Jun 2018 05:35) (81 - 96)  BP: 134/79 (02 Jun 2018 05:35) (126/86 - 145/88)  BP(mean): --  RR: 18 (02 Jun 2018 05:35) (16 - 18)  SpO2: 98% (02 Jun 2018 05:35) (98% - 99%)    PHYSICAL EXAM:  GENERAL:  Well-appearing, sleeping comfortably  EYES: EOMI  NECK: ROM intact.  Surgical incision on anterior neck without drainage, erythema, or irritation  CHEST/LUNG:  CTA bilaterally  HEART:  RRR, no appreciate murmur  ABDOMEN: +BS, NT/D  EXTREMITIES:  5/5 plantar flexion and extension bilterally.  Patient with 1/5 strength in left leg and 4/5 in right leg; suspect this is due to poor effort as patient was sleeping.  Will reassess today.  PSYCH:  Pleasant but with bizarre thoughts and behaviors  NEUROLOGY:  A&Ox2  SKIN:  Intact to gross observation      RADIOLOGY & ADDITIONAL TESTS:  Imaging Personally Reviewed: YES    Consultant(s) Notes Reviewed: YES    Care Discussed with Consultants/Other Providers: YES

## 2018-06-03 PROCEDURE — 99232 SBSQ HOSP IP/OBS MODERATE 35: CPT | Mod: GC

## 2018-06-03 RX ORDER — SENNA PLUS 8.6 MG/1
2 TABLET ORAL AT BEDTIME
Qty: 0 | Refills: 0 | Status: DISCONTINUED | OUTPATIENT
Start: 2018-06-03 | End: 2018-06-04

## 2018-06-03 RX ADMIN — Medication 1 APPLICATION(S): at 17:26

## 2018-06-03 RX ADMIN — Medication 650 MILLIGRAM(S): at 21:00

## 2018-06-03 RX ADMIN — LEVETIRACETAM 1000 MILLIGRAM(S): 250 TABLET, FILM COATED ORAL at 05:38

## 2018-06-03 RX ADMIN — ENOXAPARIN SODIUM 40 MILLIGRAM(S): 100 INJECTION SUBCUTANEOUS at 12:37

## 2018-06-03 RX ADMIN — Medication 650 MILLIGRAM(S): at 21:55

## 2018-06-03 RX ADMIN — Medication 1 APPLICATION(S): at 05:38

## 2018-06-03 RX ADMIN — HALOPERIDOL DECANOATE 10 MILLIGRAM(S): 100 INJECTION INTRAMUSCULAR at 05:38

## 2018-06-03 RX ADMIN — SENNA PLUS 2 TABLET(S): 8.6 TABLET ORAL at 21:00

## 2018-06-03 RX ADMIN — MEMANTINE HYDROCHLORIDE 5 MILLIGRAM(S): 10 TABLET ORAL at 21:00

## 2018-06-03 RX ADMIN — Medication 1 TABLET(S): at 12:37

## 2018-06-03 RX ADMIN — QUETIAPINE FUMARATE 200 MILLIGRAM(S): 200 TABLET, FILM COATED ORAL at 17:26

## 2018-06-03 RX ADMIN — Medication 1 MILLIGRAM(S): at 05:38

## 2018-06-03 RX ADMIN — LEVETIRACETAM 1000 MILLIGRAM(S): 250 TABLET, FILM COATED ORAL at 17:26

## 2018-06-03 RX ADMIN — QUETIAPINE FUMARATE 200 MILLIGRAM(S): 200 TABLET, FILM COATED ORAL at 05:38

## 2018-06-03 RX ADMIN — HALOPERIDOL DECANOATE 10 MILLIGRAM(S): 100 INJECTION INTRAMUSCULAR at 17:26

## 2018-06-03 RX ADMIN — Medication 1 MILLIGRAM(S): at 17:26

## 2018-06-03 NOTE — PROGRESS NOTE ADULT - SUBJECTIVE AND OBJECTIVE BOX
Patient is a 58y old  Male who presents with a chief complaint of LE weakness, urinary incontinence. (13 May 2018 07:53)      SUBJECTIVE / OVERNIGHT EVENTS:  No events overnight.  This morning complaining of constipation, but otherwise without any complaints.      MEDICATIONS  (STANDING):  AQUAPHOR (petrolatum Ointment) 1 Application(s) Topical two times a day  benztropine 1 milliGRAM(s) Oral two times a day  clotrimazole 1% Cream 1 Application(s) Topical two times a day  enoxaparin Injectable 40 milliGRAM(s) SubCutaneous daily  haloperidol     Tablet 10 milliGRAM(s) Oral two times a day  levETIRAcetam 1000 milliGRAM(s) Oral two times a day  memantine 5 milliGRAM(s) Oral at bedtime  multivitamin 1 Tablet(s) Oral daily  QUEtiapine 200 milliGRAM(s) Oral two times a day  senna 2 Tablet(s) Oral at bedtime      MEDICATIONS  (PRN):  acetaminophen   Tablet. 650 milliGRAM(s) Oral every 6 hours PRN Moderate Pain (4 - 6)  docusate sodium 100 milliGRAM(s) Oral three times a day PRN Constipation  polyethylene glycol 3350 17 Gram(s) Oral two times a day PRN Constipation      Vital Signs Last 24 Hrs  T(C): 36.3 (03 Jun 2018 05:38), Max: 37 (02 Jun 2018 19:05)  T(F): 97.3 (03 Jun 2018 05:38), Max: 98.6 (02 Jun 2018 19:05)  HR: 82 (03 Jun 2018 05:38) (82 - 90)  BP: 124/73 (03 Jun 2018 05:38) (110/76 - 124/73)  BP(mean): --  RR: 18 (03 Jun 2018 05:38) (18 - 18)  SpO2: 100% (03 Jun 2018 05:38) (99% - 100%)      PHYSICAL EXAM:  GENERAL:  Well-appearing, sleeping comfortably  EYES: EOMI  NECK: ROM intact.  Surgical incision on anterior neck without drainage, erythema, or irritation  CHEST/LUNG:  CTA bilaterally  HEART:  RRR, no appreciate murmur  ABDOMEN: +BS, NT/D  EXTREMITIES:  5/5 plantar flexion and extension bilaterally; right leg is 4+/5 and left leg is 5/5  PSYCH:  Pleasant but with bizarre thoughts and behaviors  NEUROLOGY:  A&Ox2  SKIN:  Intact to gross observation      RADIOLOGY & ADDITIONAL TESTS:  Imaging Personally Reviewed: YES    Consultant(s) Notes Reviewed: YES    Care Discussed with Consultants/Other Providers: YES      Michael Gabriel MD PGY-1  Department of Medicine  [phone]

## 2018-06-04 ENCOUNTER — INPATIENT (INPATIENT)
Facility: HOSPITAL | Age: 58
LOS: 9 days | Discharge: PSYCHIATRIC OR SUBSTANCE USE FACILITY | DRG: 949 | End: 2018-06-14
Attending: STUDENT IN AN ORGANIZED HEALTH CARE EDUCATION/TRAINING PROGRAM | Admitting: STUDENT IN AN ORGANIZED HEALTH CARE EDUCATION/TRAINING PROGRAM
Payer: MEDICARE

## 2018-06-04 VITALS
TEMPERATURE: 99 F | RESPIRATION RATE: 18 BRPM | DIASTOLIC BLOOD PRESSURE: 60 MMHG | HEART RATE: 75 BPM | OXYGEN SATURATION: 100 % | SYSTOLIC BLOOD PRESSURE: 103 MMHG

## 2018-06-04 DIAGNOSIS — G95.20 UNSPECIFIED CORD COMPRESSION: ICD-10-CM

## 2018-06-04 PROCEDURE — 99232 SBSQ HOSP IP/OBS MODERATE 35: CPT | Mod: GC

## 2018-06-04 PROCEDURE — 99239 HOSP IP/OBS DSCHRG MGMT >30: CPT

## 2018-06-04 RX ORDER — SENNA PLUS 8.6 MG/1
2 TABLET ORAL
Qty: 0 | Refills: 0 | COMMUNITY
Start: 2018-06-04

## 2018-06-04 RX ORDER — POLYETHYLENE GLYCOL 3350 17 G/17G
17 POWDER, FOR SOLUTION ORAL
Qty: 0 | Refills: 0 | COMMUNITY
Start: 2018-06-04

## 2018-06-04 RX ADMIN — Medication 1 APPLICATION(S): at 05:28

## 2018-06-04 RX ADMIN — QUETIAPINE FUMARATE 200 MILLIGRAM(S): 200 TABLET, FILM COATED ORAL at 05:28

## 2018-06-04 RX ADMIN — Medication 1 MILLIGRAM(S): at 05:29

## 2018-06-04 RX ADMIN — ENOXAPARIN SODIUM 40 MILLIGRAM(S): 100 INJECTION SUBCUTANEOUS at 12:06

## 2018-06-04 RX ADMIN — HALOPERIDOL DECANOATE 10 MILLIGRAM(S): 100 INJECTION INTRAMUSCULAR at 05:29

## 2018-06-04 RX ADMIN — Medication 1 TABLET(S): at 12:06

## 2018-06-04 RX ADMIN — LEVETIRACETAM 1000 MILLIGRAM(S): 250 TABLET, FILM COATED ORAL at 05:29

## 2018-06-04 NOTE — PROGRESS NOTE ADULT - PROBLEM SELECTOR PROBLEM 1
Leg weakness
Leg weakness
Cervical stenosis of spinal canal
Leg weakness

## 2018-06-04 NOTE — PROGRESS NOTE ADULT - PROBLEM SELECTOR PROBLEM 2
Urinary retention

## 2018-06-04 NOTE — PROGRESS NOTE ADULT - PROBLEM SELECTOR PLAN 2
- Secondary to cord compression  - Leon removed; passed trial of void
- Secondary to cord compression  - Leon removed; passed trial of void
- Secondary to cord compression  - Continue with balbuena; will re-assess after surgery
- Secondary to cord compression  - Leon removed; passed trial of void
- Secondary to cord compression  - Trial of void ongoing
- Secondary to cord compression  - Trial of void ongoing
-bladder scan noted 375cc of urine  -balbuena ordered  -likely 2/2 neurogenic bladder from cord compression  -monitor Cr
-bladder scan noted 375cc of urine  -balbuena ordered  -likely 2/2 neurogenic bladder in the setting associated neurologic c/o with reported urinary incontinence  -monitor Cr
- Secondary to cord compression  - Leon removed; passed trial of void
-bladder scan noted 375cc of urine  -balbuena ordered  -likely 2/2 neurogenic bladder from cord compression  -monitor Cr

## 2018-06-04 NOTE — PROGRESS NOTE ADULT - NSHPATTENDINGPLANDISCUSS_GEN_ALL_CORE
Dr. Suarez
Dr. Enrique
Dr. Napier, patient
Dr. Quezada
Dr. Suarez, patient
HS
Resident

## 2018-06-04 NOTE — PROGRESS NOTE ADULT - PROBLEM SELECTOR PLAN 1
- S/P discectomy and cord decompression 5/24  - Neurological deficits are slowly improving and there are no concerning signs for hematoma  - Per ID, will monitor off ABx as patient has had no fevers, and cultures/gram stain from surgery were negative (although pathology positive for osteomyelitis)  - C/w fall precautions  - Per PT patient can go to [facility] with home PT considering improvement from admission  - [facility] unwilling to take patient without neurosurgery clearance and PM&R evaluation;
- S/P diskectomy and cord decompression yesterday  - No increasing neurological deficits on exam and no concerning signs for hematoma  - Now on cefazolin q8 hrs; awaiting final biopsy results  - Will touch base with ID regarding long-term ABx  - Fall precaution and PT evaluation
- C4/C5 facet joint infection with phlegmon causing cord compression, visualized on CT cervical and MRI  - Neurosurgery delayed until Monday 5/21  - Due to lack of capacity, 2 provider consent to be done (between attending and neurosurgeon) prior to operation  - No signs of active infection; per ID will hold off on ABx as patient   - Fall precaution and PT evaluation
- C4/C5 facet joint infection with phlegmon causing cord compression, visualized on CT cervical and MRI  - Patient has been NPO since midnight for neurosurgery today  - Preoperative laboratory work without abnormalities  - Due to lack of capacity, 2 provider consent to be done (between attending and neurosurgeon) prior to operation  - No signs of active infection; per ID will hold off on ABx as patient   - Fall precaution and PT evaluation
- C4/C5 facet joint infection with phlegmon causing cord compression, visualized on CT cervical and MRI  - Patient has been NPO since midnight for neurosurgery today  - Preoperative laboratory work without abnormalities  - Due to lack of capacity, 2 provider consent to be done (between attending and neurosurgeon) prior to operation  - No signs of active infection; per ID will hold off on ABx as patient   - Fall precaution and PT evaluation
- C4/C5 facet joint infection with phlegmon causing cord compression, visualized on CT cervical and MRI  - Per NS will have operation on Friday  - No signs of active infection; per ID will hold off on ABx as patient   - Fall precaution and PT evaluation
- C4/C5 facet joint infection with phlegmon causing cord compression, visualized on CT cervical and MRI  - Per NS will have operation on Friday  - No signs of active infection; per ID will hold off on ABx as patient   - Fall precaution and PT evaluation
- C4/C5 facet joint infection with phlegmon causing cord compression, visualized on CT cervical and MRI  - Per NS will have operation on Friday  - Preop labs ordered  - Due to lack of capacity, 2 provider consent to be done today  - No signs of active infection; per ID will hold off on ABx as patient   - Fall precaution and PT evaluation
- C4/C5 facet joint infection with phlegmon causing cord compression, visualized on CT cervical and MRI  - Plan for neurosurgical intervention; anticipating tomorrow, but will touch base with NS today  - No signs of active infection; per ID will hold off on ABx as patient   - Fall precaution and PT evaluation
- C4/C5 facet joint infection with phlegmon causing cord compression, visualized on CT cervical and MRI  - Plan for surgery tomorrow  - Will draw preop labs today  - Preoperative laboratory work without abnormalities  - Due to lack of capacity, 2 provider consent to be done (between attending and neurosurgeon) prior to operation  - No signs of active infection; per ID will hold off on ABx as patient   - Fall precaution and PT evaluation
- S/P discectomy and cord decompression 5/24  - Neurological deficits are slowly improving and there are no concerning signs for hematoma  - Per ID, will monitor off ABx as patient has had no fevers, and cultures/gram stain from surgery were negative (although pathology positive for osteomyelitis)  - C/w fall precautions  - Awaiting transfer to Acute rehab
- S/P discectomy and cord decompression 5/24  - Neurological deficits are slowly improving and there are no concerning signs for hematoma  - Per ID, will monitor off ABx as patient has had no fevers, and cultures/gram stain from surgery were negative (although pathology positive for osteomyelitis)  - C/w fall precautions  - Awaiting transfer to Acute rehab
- S/P discectomy and cord decompression 5/24  - Neurological deficits are slowly improving and there are no concerning signs for hematoma  - Per ID, will monitor off ABx as patient has had no fevers, and cultures/gram stain from surgery were negative (although pathology positive for osteomyelitis)  - C/w fall precautions  - Per PT patient can go to [facility] with home PT considering improvement from admission  - [facility] unwilling to take patient without neurosurgery clearance and PM&R evaluation; will obtain today
- S/P diskectomy and cord decompression 5/24  - No increasing neurological deficits on exam and no concerning signs for hematoma  - Per ID will monitor off ABx as patient has had no fevers, and cultures/gram stain from surgery is negative  - Fall precaution  - PT/OT evaluation; PT recommending rehab, will start dispo planning
-progress LE weakness with associated urinary incontinence/retention, decreased anal sphincter tone concerning for cord compression  -CTH noted no acute pathology    - MRI C/T/L w/ w/o contrast    - MRI brain w/o contast  -fall precaution  -PT evaluation  -hold home atorvastatin  -neurology recommendations appreciated  -CK 73
1. NPO after midnight with IVF  2. hold all anticoagulants  3. Dr. Mooney to sign rep consents in AM
Cord compression likely 2/2 to infectious process of the cervical spine. This likely explain upper and lower extremity findings as well as bladder retention  -CTH noted no acute pathology  - MRI -> Facet joint infection at c4-c5 with epidural involvement leading to spinal cord compression      - re-demonstrated on CT neck  - Neurosurgery and infectious recommendations appreciated  - Will monitor off abx,  - Plan for neurosurgical intervention  -fall precaution  -PT evaluation
PT/OT   d/c planning
Pain Management  Antibiotics  PT/OT consult
Stable post op  Pt cleared from discharge from neurosurgical standpoint  Pt to follow up with Dr. Johnson in the office upon discharge  Case d/w Dr. Johnson
- S/P discectomy and cord decompression 5/24  - Neurological deficits are slowly improving and there are no concerning signs for hematoma  - Per ID, will monitor off ABx as patient has had no fevers, and cultures/gram stain from surgery were negative  - C/w fall precautions  - Per PT patient can go to [facility] with home PT considering improvement from admission
- S/P discectomy and cord decompression 5/24  - Neurological deficits are slowly improving and there are no concerning signs for hematoma  - Per ID, will monitor off ABx as patient has had no fevers, and cultures/gram stain from surgery were negative  - C/w fall precautions  - PT following who initially recommended rehab. However, given patient's improvement in strength, will ask PT to reevaluate patient today and determine if KAMILAH is still needed
- S/P discectomy and cord decompression 5/24  - Neurological deficits are slowly improving and there are no concerning signs for hematoma  - Per ID, will monitor off ABx as patient has had no fevers, and cultures/gram stain from surgery were negative (although pathology positive for osteomyelitis)  - C/w fall precautions  - Per PT patient can go to [facility] with home PT considering improvement from admission  - [facility] unwilling to take patient without neurosurgery clearance and PM&R evaluation; will obtain today
Cord compression likely 2/2 to infectious process of the cervical spine. This likely explain upper and lower extremity findings as well as bladder retention  -CTH noted no acute pathology  - MRI -> Facet joint infection at c4-c5 with epidural involvement leading to spinal cord compression      - re-demonstrated on CT neck  - Neurosurgery and infectious recommendations appreciated  - Will monitor off abx,  - Plan for neurosurgical intervention  -fall precaution  -PT evaluation

## 2018-06-04 NOTE — PROGRESS NOTE ADULT - PROBLEM SELECTOR PLAN 3
-c/w home keppra for seizure ppx
- C/w home Keppra for seizure ppx
-c/w home keppra for seizure ppx
-c/w home keppra for seizure ppx
- C/w home Keppra for seizure ppx

## 2018-06-04 NOTE — PROGRESS NOTE ADULT - ASSESSMENT
57 y/o M [facility] resident w/ a PMHx of schizophrenia and seizure disorder who p/w progressing LE weakness and likely neurogenic bladder concerning for possible cord compression, now s/p surgical intervention on C4/C5 facet joint, clinically improving.
59 yo M [facility] resident with PMH of schizophrenia, seizure disorder a/w progressing LE weakness and likely neurogenic bladder concerning for possible cord compression.
57 y/o M [facility] resident w/ a PMHx of schizophrenia and seizure disorder who p/w progressing LE weakness and likely neurogenic bladder concerning for possible cord compression, now s/p surgical intervention on C4/C5 facet joint, clinically improving.
57 y/o M [facility] resident w/ a PMHx of schizophrenia and seizure disorder who p/w progressing LE weakness and likely neurogenic bladder concerning for possible cord compression, now s/p surgical intervention on C4/C5 facet joint, clinically improving.
57 yo M [facility] resident with PMH of schizophrenia, seizure disorder a/w progressing LE weakness and likely neurogenic bladder concerning for possible cord compression, now s/p surgical intervention on C4/C5 facet joint.
57 yo M [facility] resident with PMH of schizophrenia, seizure disorder a/w progressing LE weakness and likely neurogenic bladder concerning for possible cord compression.
57 yo M [facility] resident with PMH of schizophrenia, seizure disorder a/w progressing LE weakness and likely neurogenic bladder concerning for possible cord compression, now s/p surgical intervention on C4/C5 facet joint.
57 yo M [facility] resident with PMH of schizophrenia, seizure disorder a/w progressing LE weakness and likely neurogenic bladder concerning for possible cord compression.
58M POD#0 s/p C4-5 ACDF. He is doing well w/ no complaints.    q4 neuro checks  pain control  PT/OT  tx to floor when meets PACU criteria
58M with Schizophrenia and Seizure disorder admitted 5/10/18 for months of lower extremity weakness and neck pain.     S/p surgery for stenosis.    OR cultures, off abx, without bacterial growth.    Montior off abx.  Outpt follow up with [facility].    No ID contraindication to discharge. Call with questions
58M with Schizophrenia and Seizure disorder admitted 5/10/18 for months of lower extremity weakness and neck pain.   MRI raised concern for  C4-C5 right facet joint infection.     Await path.  Aait OR culture.     He is stable/ afebrile off abx  Monitor off abx.     Left message to discuss OR finding with neurosurgery attending.
58M with Schizophrenia and Seizure disorder admitted 5/10/18 for months of lower extremity weakness and neck pain.   MRI with C4-C5 right facet joint infection.   Not septic.   RLE mild weakness(chronic per pt)  radiation to arm but no other deficit grossly     Await tissue diagnosis    Send tissue for path and culture (routine, fugal, [employer])
58M with Schizophrenia and Seizure disorder admitted 5/10/18 for months of lower extremity weakness and neck pain.   MRI with C4-C5 right facet joint infection.   Not septic.   RLE mild weakness(chronic per pt)  radiation to arm but no other deficit grossly     Await tissue diagnosis    Send tissue for path and culture (routine, fugal, [employer])    Will follow up after OR.
58M with Schizophrenia and Seizure disorder admitted 5/10/18 for months of lower extremity weakness and neck pain.   MRI with C4-C5 right facet joint infection.   Not septic.   RLE mild weakness(chronic per pt)  radiation to arm but no other deficit grossly     Await tissue diagnosis    Send tissue for path and culture (routine, fugal, [employer])    Will follow up after OR.
58M with Schizophrenia and Seizure disorder admitted 5/10/18 for months of lower extremity weakness and neck pain.   MRI with C4-C5 right facet joint infection.   Not septic.   RLE mild weakness(chronic per pt)  radiation to arm but no other deficit grossly     ESR CRP low-would be atypical for an infectious etiology  Blood Cx testing  IF negative ?IR aspiration V biopsy  Would help in establishing microbiology as well as confirming diagnosis .  Would continue observation off antimicrobials pending above.  Monitor neurologically  Will tailor plan for ID issues  per course,results.Will defer to primary team on management of other issues.  Infectious Diseases Service will cover over rest of weekend.  Please call if issues,
58year old male s/p C4-5 ACDF POD # 4.
59 y/o M [facility] resident w/ a PMHx of schizophrenia and seizure disorder who p/w progressing LE weakness and likely neurogenic bladder concerning for possible cord compression, now s/p surgical intervention on C4/C5 facet joint, clinically improving.
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
59 yo M [facility] resident with PMH of schizophrenia, seizure disorder a/w progressing LE weakness and likely neurogenic bladder concerning for possible cord compression.
59 yo M [facility] resident with PMH of schizophrenia, seizure disorder a/w progressing LE weakness and likely neurogenic bladder concerning for possible cord compression.
59 yo M [facility] resident with PMH of schizophrenia, seizure disorder a/w progressing LE weakness and likely neurogenic bladder concerning for possible cord compression.
59 yo M [facility] resident with PMH of schizophrenia, seizure disorder a/w progressing LE weakness and likely neurogenic bladder concerning for possible cord compression.
59 yo M [facility] resident with PMH of schizophrenia, seizure disorder a/w progressing LE weakness and likely neurogenic bladder concerning for possible cord compression.
59 yo M [facility] resident with PMH of schizophrenia, seizure disorder a/w progressing LE weakness and likely neurogenic bladder concerning for possible cord compression.
59 yo M [facility] resident with PMH of schizophrenia, seizure disorder a/w progressing LE weakness and likely neurogenic bladder concerning for possible cord compression.
59 yo M [facility] resident with PMH of schizophrenia, seizure disorder a/w progressing LE weakness and likely neurogenic bladder concerning for possible cord compression.
59 yo M [facility] resident with PMH of schizophrenia, seizure disorder a/w progressing LE weakness and likely neurogenic bladder concerning for possible cord compression.
59 yo M [facility] resident with PMH of schizophrenia, seizure disorder a/w progressing LE weakness and likely neurogenic bladder concerning for possible cord compression.
59 yo M [facility] resident with PMH of schizophrenia, seizure disorder a/w progressing LE weakness and likely neurogenic bladder concerning for possible cord compression.
LE weakness, gait abnormality     1. PT- bed mobility,transfers, gait and balance training  2. OT- ADL'S  3. cord compression s/p surgery   4. constipation- add miralex daily   4. Patient would benefit from acute rehab, needs a multidisciplinary team including PT, OT . Can tolerate 3 hours of therapy a day.  Will follow.
LE weakness, gait abnormality     1. PT- bed mobility,transfers, gait and balance training  2. OT- ADL'S  3. cord compression s/p surgery   4. constipation-miralex daily   4. Patient would benefit from acute rehab, needs a multidisciplinary team including PT, OT . Can tolerate 3 hours of therapy a day.  Will follow.
S/P C4-5 ACDF
S/PCervical C4-5 ACDF  S/P cervical cord compression
57 y/o M [facility] resident w/ a PMHx of schizophrenia and seizure disorder who p/w progressing LE weakness and likely neurogenic bladder concerning for possible cord compression, now s/p surgical intervention on C4/C5 facet joint, clinically improving.
59 y/o M [facility] resident w/ a PMHx of schizophrenia and seizure disorder who p/w progressing LE weakness and likely neurogenic bladder concerning for possible cord compression, now s/p surgical intervention on C4/C5 facet joint, clinically improving.
57 y/o M [facility] resident w/ a PMHx of schizophrenia and seizure disorder who p/w progressing LE weakness and likely neurogenic bladder concerning for possible cord compression, now s/p surgical intervention on C4/C5 facet joint, clinically improving.
57 yo M [facility] resident with PMH of schizophrenia, seizure disorder a/w progressing LE weakness and likely neurogenic bladder concerning for possible cord compression.

## 2018-06-04 NOTE — PROGRESS NOTE ADULT - PROVIDER SPECIALTY LIST ADULT
Anesthesia
Infectious Disease
Internal Medicine
Neurosurgery
Rehab Medicine
Rehab Medicine
Internal Medicine
Internal Medicine

## 2018-06-04 NOTE — PROGRESS NOTE ADULT - SUBJECTIVE AND OBJECTIVE BOX
HPI:  57 yo M [facility] resident with PMH of schizophrenia, seizure disorder presents for weakness and urinary incontinence for 4 months. Patient difficulty progressively LE weakness started 4 months ago that acutely worsened in the past 2 months. He has increasing difficulty with ambulation to the point that he became wheel chair dependent two months ago. He reports urinary incontinence for 4 months with no associated dysuria, hematuria. He also endorses constipation for the past few weeks and that he has to push to get stool out. He reports 9/10 cervical spinal and b/l shoulder pain. No f/c, recent trauma, weight loss, night sweat, n/v/d, HA, dizziness, numbness, tingling, SI. Per [facility] documentation, pt was seen by neurology for progressive weakness.     MRI raised concern for  C4-C5 right facet joint infection with abnormal enhancement, OR cultures negative so far, off abx.   S/P s/p C4-5 ACDF POD # 9  No pain. Participating in PT. calm.       REVIEW OF SYSTEMS: No chest pain, shortness of breath, nausea, vomiting or diarhea.          CURRENT FUNCTIONAL STATUS: max a     MEDICATIONS  (STANDING):  AQUAPHOR (petrolatum Ointment) 1 Application(s) Topical two times a day  benztropine 1 milliGRAM(s) Oral two times a day  clotrimazole 1% Cream 1 Application(s) Topical two times a day  enoxaparin Injectable 40 milliGRAM(s) SubCutaneous daily  haloperidol     Tablet 10 milliGRAM(s) Oral two times a day  levETIRAcetam 1000 milliGRAM(s) Oral two times a day  memantine 5 milliGRAM(s) Oral at bedtime  multivitamin 1 Tablet(s) Oral daily  QUEtiapine 200 milliGRAM(s) Oral two times a day  senna 2 Tablet(s) Oral at bedtime    MEDICATIONS  (PRN):  acetaminophen   Tablet. 650 milliGRAM(s) Oral every 6 hours PRN Moderate Pain (4 - 6)  docusate sodium 100 milliGRAM(s) Oral three times a day PRN Constipation  polyethylene glycol 3350 17 Gram(s) Oral two times a day PRN Constipation    Vital Signs Last 24 Hrs  T(C): 37 (04 Jun 2018 04:58), Max: 37 (04 Jun 2018 04:58)  T(F): 98.6 (04 Jun 2018 04:58), Max: 98.6 (04 Jun 2018 04:58)  HR: 75 (04 Jun 2018 04:58) (75 - 91)  BP: 103/60 (04 Jun 2018 04:58) (103/60 - 129/57)  BP(mean): --  RR: 18 (04 Jun 2018 04:58) (18 - 18)  SpO2: 100% (04 Jun 2018 04:58) (100% - 100%)      ----------------------------------------------------------------------------------------  PHYSICAL EXAM  Constitutional - NAD, Comfortable  HEENT - NCAT, EOMI  Neck - Supple, No limited ROM  Chest - CTA bilaterally, No wheeze, No rhonchi, No crackles  Cardiovascular - RRR, S1S2, No murmurs  Abdomen - BS+, Soft, NTND  Extremities - No C/C/E, No calf tenderness   Neurologic Exam -                    Cognitive - Awake, Alert, AAO to self, place, date, year, situation     Communication - Fluent, No dysarthria, no aphasia     Cranial Nerves - CN 2-12 intact     Motor - LLE 4/5, RLE 3/5, b/l UE 4/5     Sensory - decreased to  LT     Reflexes - DTR Intact, No primitive reflexive     Balance -poor standing   Psychiatric - Mood stable,flat  Affect

## 2018-06-04 NOTE — PROGRESS NOTE ADULT - PROBLEM SELECTOR PLAN 4
-c/w home haldo, memantine, benztropine
- C/w home haldol, memantine, benztropine  - No signs of acute decompensation
- C/w home haldol, memantine, benztropine  - No signs of acute decompensation, although patient with bizarre behavior
- C/w home haldol, memantine, benztropine  - No signs of acute decompensation
- C/w home haldol, memantine, benztropine  - No signs of acute decompensation
- C/w home haldol, memantine, benztropine  - No signs of acute decompensation, although patient with bizarre behavior
- c/w home haldol, memantine, benztropine  - No signs of acute decompensation
- c/w home haldol, memantine, benztropine  - No signs of acute decompensation, although patient with bizarre behavior
- c/w home haldol, memantine, benztropine  - No signs of acute decompensation, although patient with bizarre behavior
- c/w home haldol, memantine, benztropine  - No signs of acute decompensation, although patient with bizarre behavior  - Will have psychiatry evaluate today
-c/w home haldo, memantine, benztropine
-c/w home haldo, memantine, benztropine  - stable
- C/w home haldol, memantine, benztropine  - No signs of acute decompensation, although patient with bizarre behavior

## 2018-06-04 NOTE — PROGRESS NOTE ADULT - PROBLEM SELECTOR PROBLEM 5
Need for prophylactic measure

## 2018-06-04 NOTE — PROGRESS NOTE ADULT - PROBLEM SELECTOR PROBLEM 3
Seizure disorder

## 2018-06-04 NOTE — PROGRESS NOTE ADULT - PROBLEM SELECTOR PROBLEM 4
Schizophrenia

## 2018-06-04 NOTE — PROGRESS NOTE ADULT - ATTENDING COMMENTS
Patient seen and examined by me. Case discussed with resident and agree with the resident's findings and plan as documented in the resident's note.  -plan to discharge to rehab today  -see discharge summary in sunrise  -time spent discharging patient = 42min.

## 2018-06-04 NOTE — PROGRESS NOTE ADULT - SUBJECTIVE AND OBJECTIVE BOX
Patient is a 58y old  Male who presents with a chief complaint of LE weakness, urinary incontinence. (13 May 2018 07:53)      SUBJECTIVE / OVERNIGHT EVENTS:  No events overnight.  No concerns this AM.      MEDICATIONS  (STANDING):  AQUAPHOR (petrolatum Ointment) 1 Application(s) Topical two times a day  benztropine 1 milliGRAM(s) Oral two times a day  clotrimazole 1% Cream 1 Application(s) Topical two times a day  enoxaparin Injectable 40 milliGRAM(s) SubCutaneous daily  haloperidol     Tablet 10 milliGRAM(s) Oral two times a day  levETIRAcetam 1000 milliGRAM(s) Oral two times a day  memantine 5 milliGRAM(s) Oral at bedtime  multivitamin 1 Tablet(s) Oral daily  QUEtiapine 200 milliGRAM(s) Oral two times a day  senna 2 Tablet(s) Oral at bedtime      MEDICATIONS  (PRN):  acetaminophen   Tablet. 650 milliGRAM(s) Oral every 6 hours PRN Moderate Pain (4 - 6)  docusate sodium 100 milliGRAM(s) Oral three times a day PRN Constipation  polyethylene glycol 3350 17 Gram(s) Oral two times a day PRN Constipation      Vital Signs Last 24 Hrs  T(C): 37 (04 Jun 2018 04:58), Max: 37 (04 Jun 2018 04:58)  T(F): 98.6 (04 Jun 2018 04:58), Max: 98.6 (04 Jun 2018 04:58)  HR: 75 (04 Jun 2018 04:58) (75 - 91)  BP: 103/60 (04 Jun 2018 04:58) (103/60 - 134/82)  BP(mean): --  RR: 18 (04 Jun 2018 04:58) (18 - 18)  SpO2: 100% (04 Jun 2018 04:58) (100% - 100%)      I&O's Summary    03 Jun 2018 07:01  -  04 Jun 2018 07:00  --------------------------------------------------------  IN: 500 mL / OUT: 600 mL / NET: -100 mL      PHYSICAL EXAM:  GENERAL:  Well-appearing, sleeping comfortably  EYES: EOMI  NECK: ROM intact.  Surgical incision on anterior neck without drainage, erythema, or irritation  CHEST/LUNG:  CTA bilaterally  HEART:  RRR, no appreciate murmur  ABDOMEN: +BS, NT/D  EXTREMITIES:  5/5 plantar flexion and extension bilaterally; right leg is 4+/5 and left leg is 5/5  PSYCH:  Pleasant but with bizarre thoughts and behaviors  NEUROLOGY:  A&Ox2  SKIN:  Intact to gross observation      RADIOLOGY & ADDITIONAL TESTS:  Imaging Personally Reviewed: YES    Consultant(s) Notes Reviewed: YES    Care Discussed with Consultants/Other Providers: YES      Michael Gabriel MD PGY-1  Department of Medicine  [phone]

## 2018-06-05 PROCEDURE — 90792 PSYCH DIAG EVAL W/MED SRVCS: CPT

## 2018-06-05 PROCEDURE — 99255 IP/OBS CONSLTJ NEW/EST HI 80: CPT

## 2018-06-05 PROCEDURE — 99223 1ST HOSP IP/OBS HIGH 75: CPT

## 2018-06-05 RX ORDER — LACTULOSE 10 G/15ML
20 SOLUTION ORAL
Qty: 0 | Refills: 0 | COMMUNITY

## 2018-06-06 PROCEDURE — 99232 SBSQ HOSP IP/OBS MODERATE 35: CPT

## 2018-06-07 DIAGNOSIS — G06.1 INTRASPINAL ABSCESS AND GRANULOMA: ICD-10-CM

## 2018-06-07 PROCEDURE — 99232 SBSQ HOSP IP/OBS MODERATE 35: CPT

## 2018-06-08 PROCEDURE — 99232 SBSQ HOSP IP/OBS MODERATE 35: CPT

## 2018-06-09 PROCEDURE — 99232 SBSQ HOSP IP/OBS MODERATE 35: CPT

## 2018-06-10 PROCEDURE — 99232 SBSQ HOSP IP/OBS MODERATE 35: CPT

## 2018-06-11 PROCEDURE — 99232 SBSQ HOSP IP/OBS MODERATE 35: CPT

## 2018-06-11 PROCEDURE — 93010 ELECTROCARDIOGRAM REPORT: CPT

## 2018-06-12 PROCEDURE — 99232 SBSQ HOSP IP/OBS MODERATE 35: CPT

## 2018-06-13 PROCEDURE — 99233 SBSQ HOSP IP/OBS HIGH 50: CPT

## 2018-06-13 PROCEDURE — 99232 SBSQ HOSP IP/OBS MODERATE 35: CPT

## 2018-06-14 PROCEDURE — 99233 SBSQ HOSP IP/OBS HIGH 50: CPT

## 2018-06-27 PROBLEM — F20.9 SCHIZOPHRENIA, UNSPECIFIED: Chronic | Status: ACTIVE | Noted: 2018-05-10

## 2018-06-27 PROBLEM — G40.909 EPILEPSY, UNSPECIFIED, NOT INTRACTABLE, WITHOUT STATUS EPILEPTICUS: Chronic | Status: ACTIVE | Noted: 2018-05-10

## 2018-06-27 PROCEDURE — 80053 COMPREHEN METABOLIC PANEL: CPT

## 2018-06-27 PROCEDURE — 97167 OT EVAL HIGH COMPLEX 60 MIN: CPT

## 2018-06-27 PROCEDURE — 97530 THERAPEUTIC ACTIVITIES: CPT

## 2018-06-27 PROCEDURE — 93005 ELECTROCARDIOGRAM TRACING: CPT

## 2018-06-27 PROCEDURE — 85025 COMPLETE CBC W/AUTO DIFF WBC: CPT

## 2018-06-27 PROCEDURE — 97535 SELF CARE MNGMENT TRAINING: CPT

## 2018-06-27 PROCEDURE — 97163 PT EVAL HIGH COMPLEX 45 MIN: CPT

## 2018-06-27 PROCEDURE — 83735 ASSAY OF MAGNESIUM: CPT

## 2018-06-27 PROCEDURE — 97110 THERAPEUTIC EXERCISES: CPT

## 2018-06-27 PROCEDURE — 84100 ASSAY OF PHOSPHORUS: CPT

## 2018-06-27 PROCEDURE — 97116 GAIT TRAINING THERAPY: CPT

## 2018-06-27 PROCEDURE — 81003 URINALYSIS AUTO W/O SCOPE: CPT

## 2018-06-28 ENCOUNTER — APPOINTMENT (OUTPATIENT)
Dept: NEUROSURGERY | Facility: CLINIC | Age: 58
End: 2018-06-28
Payer: COMMERCIAL

## 2018-06-28 VITALS
BODY MASS INDEX: 22.22 KG/M2 | HEIGHT: 74.5 IN | DIASTOLIC BLOOD PRESSURE: 73 MMHG | HEART RATE: 99 BPM | WEIGHT: 175 LBS | SYSTOLIC BLOOD PRESSURE: 131 MMHG

## 2018-06-28 DIAGNOSIS — R56.9 UNSPECIFIED CONVULSIONS: ICD-10-CM

## 2018-06-28 DIAGNOSIS — M47.12 OTHER SPONDYLOSIS WITH MYELOPATHY, CERVICAL REGION: ICD-10-CM

## 2018-06-28 DIAGNOSIS — Z98.1 ARTHRODESIS STATUS: ICD-10-CM

## 2018-06-28 DIAGNOSIS — G06.1 INTRASPINAL ABSCESS AND GRANULOMA: ICD-10-CM

## 2018-06-28 DIAGNOSIS — Z86.59 PERSONAL HISTORY OF OTHER MENTAL AND BEHAVIORAL DISORDERS: ICD-10-CM

## 2018-06-28 DIAGNOSIS — M48.02 SPINAL STENOSIS, CERVICAL REGION: ICD-10-CM

## 2018-06-28 PROCEDURE — 99024 POSTOP FOLLOW-UP VISIT: CPT

## 2018-06-28 RX ORDER — BENZTROPINE MESYLATE 1 MG/1
1 TABLET ORAL
Refills: 0 | Status: ACTIVE | COMMUNITY

## 2018-06-28 RX ORDER — MULTIVITAMIN
CAPSULE ORAL
Refills: 0 | Status: ACTIVE | COMMUNITY

## 2018-06-28 RX ORDER — ACETAMINOPHEN 500 MG/1
TABLET ORAL
Refills: 0 | Status: ACTIVE | COMMUNITY

## 2018-06-28 RX ORDER — RIVAROXABAN 20 MG/1
20 TABLET, FILM COATED ORAL
Refills: 0 | Status: DISCONTINUED | COMMUNITY

## 2018-06-28 RX ORDER — HYDROCORTISONE 1 %
12 CREAM (GRAM) TOPICAL
Refills: 0 | Status: ACTIVE | COMMUNITY

## 2018-06-28 RX ORDER — HALOPERIDOL 10 MG/1
10 TABLET ORAL
Refills: 0 | Status: ACTIVE | COMMUNITY

## 2018-06-28 RX ORDER — CARBAMAZEPINE 100 MG/1
100 TABLET, CHEWABLE ORAL
Refills: 0 | Status: ACTIVE | COMMUNITY

## 2018-06-28 RX ORDER — ENOXAPARIN SODIUM 100 MG/ML
40 INJECTION SUBCUTANEOUS
Refills: 0 | Status: ACTIVE | COMMUNITY

## 2018-06-28 RX ORDER — QUETIAPINE FUMARATE 200 MG/1
200 TABLET ORAL
Refills: 0 | Status: ACTIVE | COMMUNITY

## 2018-06-28 RX ORDER — MEMANTINE HYDROCHLORIDE 5 MG/1
5 TABLET, FILM COATED ORAL
Refills: 0 | Status: ACTIVE | COMMUNITY

## 2018-06-28 RX ORDER — CLOTRIMAZOLE 10 MG/G
1 CREAM TOPICAL
Refills: 0 | Status: ACTIVE | COMMUNITY

## 2018-06-28 RX ORDER — LACTULOSE 10 G/15ML
20 SOLUTION ORAL
Refills: 0 | Status: ACTIVE | COMMUNITY

## 2018-06-28 RX ORDER — MAG HYDROX/ALUMINUM HYD/SIMETH 200-200-20
SUSPENSION, ORAL (FINAL DOSE FORM) ORAL
Refills: 0 | Status: ACTIVE | COMMUNITY

## 2018-06-28 RX ORDER — SENNOSIDES 8.6 MG TABLETS 8.6 MG/1
TABLET ORAL
Refills: 0 | Status: ACTIVE | COMMUNITY

## 2018-06-28 RX ORDER — LEVETIRACETAM 1000 MG/1
1000 TABLET, FILM COATED ORAL
Refills: 0 | Status: ACTIVE | COMMUNITY

## 2018-06-28 RX ORDER — ATORVASTATIN CALCIUM 10 MG/1
10 TABLET, FILM COATED ORAL
Refills: 0 | Status: ACTIVE | COMMUNITY

## 2018-07-27 PROBLEM — R56.9 SEIZURE: Status: RESOLVED | Noted: 2018-06-28 | Resolved: 2018-07-27

## 2019-02-14 ENCOUNTER — INPATIENT (INPATIENT)
Facility: HOSPITAL | Age: 59
LOS: 28 days | Discharge: PSYCHIATRIC OR SUBSTANCE USE FACILITY | End: 2019-03-15
Attending: INTERNAL MEDICINE | Admitting: INTERNAL MEDICINE
Payer: MEDICAID

## 2019-02-14 VITALS
OXYGEN SATURATION: 97 % | DIASTOLIC BLOOD PRESSURE: 90 MMHG | SYSTOLIC BLOOD PRESSURE: 136 MMHG | HEART RATE: 83 BPM | RESPIRATION RATE: 18 BRPM

## 2019-02-14 DIAGNOSIS — Z98.890 OTHER SPECIFIED POSTPROCEDURAL STATES: Chronic | ICD-10-CM

## 2019-02-14 DIAGNOSIS — I63.9 CEREBRAL INFARCTION, UNSPECIFIED: ICD-10-CM

## 2019-02-14 LAB
ALBUMIN SERPL ELPH-MCNC: 4.5 G/DL — SIGNIFICANT CHANGE UP (ref 3.3–5)
ALP SERPL-CCNC: 127 U/L — HIGH (ref 40–120)
ALT FLD-CCNC: 27 U/L — SIGNIFICANT CHANGE UP (ref 4–41)
ANION GAP SERPL CALC-SCNC: 14 MMO/L — SIGNIFICANT CHANGE UP (ref 7–14)
APTT BLD: 27.3 SEC — LOW (ref 27.5–36.3)
AST SERPL-CCNC: 22 U/L — SIGNIFICANT CHANGE UP (ref 4–40)
BASOPHILS # BLD AUTO: 0.01 K/UL — SIGNIFICANT CHANGE UP (ref 0–0.2)
BASOPHILS NFR BLD AUTO: 0.2 % — SIGNIFICANT CHANGE UP (ref 0–2)
BILIRUB SERPL-MCNC: 0.3 MG/DL — SIGNIFICANT CHANGE UP (ref 0.2–1.2)
BUN SERPL-MCNC: 15 MG/DL — SIGNIFICANT CHANGE UP (ref 7–23)
CALCIUM SERPL-MCNC: 9.5 MG/DL — SIGNIFICANT CHANGE UP (ref 8.4–10.5)
CHLORIDE SERPL-SCNC: 96 MMOL/L — LOW (ref 98–107)
CO2 SERPL-SCNC: 26 MMOL/L — SIGNIFICANT CHANGE UP (ref 22–31)
CREAT SERPL-MCNC: 0.98 MG/DL — SIGNIFICANT CHANGE UP (ref 0.5–1.3)
EOSINOPHIL # BLD AUTO: 0.07 K/UL — SIGNIFICANT CHANGE UP (ref 0–0.5)
EOSINOPHIL NFR BLD AUTO: 1.2 % — SIGNIFICANT CHANGE UP (ref 0–6)
GLUCOSE SERPL-MCNC: 106 MG/DL — HIGH (ref 70–99)
HCT VFR BLD CALC: 45.6 % — SIGNIFICANT CHANGE UP (ref 39–50)
HGB BLD-MCNC: 15.5 G/DL — SIGNIFICANT CHANGE UP (ref 13–17)
IMM GRANULOCYTES NFR BLD AUTO: 0.3 % — SIGNIFICANT CHANGE UP (ref 0–1.5)
INR BLD: 1.1 — SIGNIFICANT CHANGE UP (ref 0.88–1.17)
LYMPHOCYTES # BLD AUTO: 0.81 K/UL — LOW (ref 1–3.3)
LYMPHOCYTES # BLD AUTO: 13.5 % — SIGNIFICANT CHANGE UP (ref 13–44)
MCHC RBC-ENTMCNC: 32.4 PG — SIGNIFICANT CHANGE UP (ref 27–34)
MCHC RBC-ENTMCNC: 34 % — SIGNIFICANT CHANGE UP (ref 32–36)
MCV RBC AUTO: 95.4 FL — SIGNIFICANT CHANGE UP (ref 80–100)
MONOCYTES # BLD AUTO: 0.46 K/UL — SIGNIFICANT CHANGE UP (ref 0–0.9)
MONOCYTES NFR BLD AUTO: 7.7 % — SIGNIFICANT CHANGE UP (ref 2–14)
NEUTROPHILS # BLD AUTO: 4.62 K/UL — SIGNIFICANT CHANGE UP (ref 1.8–7.4)
NEUTROPHILS NFR BLD AUTO: 77.1 % — HIGH (ref 43–77)
NRBC # FLD: 0 K/UL — LOW (ref 25–125)
PLATELET # BLD AUTO: 176 K/UL — SIGNIFICANT CHANGE UP (ref 150–400)
PMV BLD: 9.2 FL — SIGNIFICANT CHANGE UP (ref 7–13)
POTASSIUM SERPL-MCNC: 4.5 MMOL/L — SIGNIFICANT CHANGE UP (ref 3.5–5.3)
POTASSIUM SERPL-SCNC: 4.5 MMOL/L — SIGNIFICANT CHANGE UP (ref 3.5–5.3)
PROT SERPL-MCNC: 7.7 G/DL — SIGNIFICANT CHANGE UP (ref 6–8.3)
PROTHROM AB SERPL-ACNC: 12.3 SEC — SIGNIFICANT CHANGE UP (ref 9.8–13.1)
RBC # BLD: 4.78 M/UL — SIGNIFICANT CHANGE UP (ref 4.2–5.8)
RBC # FLD: 12.2 % — SIGNIFICANT CHANGE UP (ref 10.3–14.5)
SODIUM SERPL-SCNC: 136 MMOL/L — SIGNIFICANT CHANGE UP (ref 135–145)
WBC # BLD: 5.99 K/UL — SIGNIFICANT CHANGE UP (ref 3.8–10.5)
WBC # FLD AUTO: 5.99 K/UL — SIGNIFICANT CHANGE UP (ref 3.8–10.5)

## 2019-02-14 PROCEDURE — 99291 CRITICAL CARE FIRST HOUR: CPT

## 2019-02-14 PROCEDURE — 70450 CT HEAD/BRAIN W/O DYE: CPT | Mod: 26

## 2019-02-14 RX ORDER — ATORVASTATIN CALCIUM 80 MG/1
80 TABLET, FILM COATED ORAL AT BEDTIME
Qty: 0 | Refills: 0 | Status: DISCONTINUED | OUTPATIENT
Start: 2019-02-14 | End: 2019-02-14

## 2019-02-14 RX ORDER — HALOPERIDOL DECANOATE 100 MG/ML
1 INJECTION INTRAMUSCULAR
Qty: 0 | Refills: 0 | COMMUNITY

## 2019-02-14 RX ORDER — QUETIAPINE FUMARATE 200 MG/1
1 TABLET, FILM COATED ORAL
Qty: 0 | Refills: 0 | COMMUNITY

## 2019-02-14 RX ORDER — ALTEPLASE 100 MG
74.2 KIT INTRAVENOUS ONCE
Qty: 0 | Refills: 0 | Status: COMPLETED | OUTPATIENT
Start: 2019-02-14 | End: 2019-02-14

## 2019-02-14 RX ORDER — MEMANTINE HYDROCHLORIDE 10 MG/1
1 TABLET ORAL
Qty: 0 | Refills: 0 | COMMUNITY

## 2019-02-14 RX ORDER — PETROLATUM,WHITE
1 JELLY (GRAM) TOPICAL
Qty: 0 | Refills: 0 | COMMUNITY

## 2019-02-14 RX ORDER — LEVETIRACETAM 250 MG/1
1 TABLET, FILM COATED ORAL
Qty: 0 | Refills: 0 | COMMUNITY

## 2019-02-14 RX ORDER — BENZTROPINE MESYLATE 1 MG
1 TABLET ORAL
Qty: 0 | Refills: 0 | COMMUNITY

## 2019-02-14 RX ORDER — ACETAMINOPHEN 500 MG
2 TABLET ORAL
Qty: 0 | Refills: 0 | COMMUNITY

## 2019-02-14 RX ORDER — ALTEPLASE 100 MG
8.3 KIT INTRAVENOUS ONCE
Qty: 0 | Refills: 0 | Status: COMPLETED | OUTPATIENT
Start: 2019-02-14 | End: 2019-02-14

## 2019-02-14 RX ORDER — LEVETIRACETAM 250 MG/1
1000 TABLET, FILM COATED ORAL EVERY 12 HOURS
Qty: 0 | Refills: 0 | Status: DISCONTINUED | OUTPATIENT
Start: 2019-02-14 | End: 2019-02-15

## 2019-02-14 RX ORDER — HALOPERIDOL DECANOATE 100 MG/ML
10 INJECTION INTRAMUSCULAR
Qty: 0 | Refills: 0 | Status: DISCONTINUED | OUTPATIENT
Start: 2019-02-14 | End: 2019-02-14

## 2019-02-14 RX ORDER — MULTIVIT-MIN/FERROUS GLUCONATE 9 MG/15 ML
1 LIQUID (ML) ORAL
Qty: 0 | Refills: 0 | COMMUNITY

## 2019-02-14 RX ORDER — ATORVASTATIN CALCIUM 80 MG/1
1 TABLET, FILM COATED ORAL
Qty: 0 | Refills: 0 | COMMUNITY

## 2019-02-14 RX ORDER — ATORVASTATIN CALCIUM 80 MG/1
80 TABLET, FILM COATED ORAL AT BEDTIME
Qty: 0 | Refills: 0 | Status: DISCONTINUED | OUTPATIENT
Start: 2019-02-15 | End: 2019-03-15

## 2019-02-14 RX ADMIN — LEVETIRACETAM 400 MILLIGRAM(S): 250 TABLET, FILM COATED ORAL at 15:38

## 2019-02-14 RX ADMIN — ALTEPLASE 498 MILLIGRAM(S): KIT at 14:36

## 2019-02-14 RX ADMIN — ALTEPLASE 74.2 MILLIGRAM(S): KIT at 13:55

## 2019-02-14 NOTE — H&P ADULT - HISTORY OF PRESENT ILLNESS
In the ED:  Vital Signs Last 24 Hrs  HR: 83 (02-14-19 @ 13:03) (83 - 83)  BP: 136/90 (02-14-19 @ 13:03) (136/90 - 136/90)  RR: 18 (02-14-19 @ 13:03) (18 - 18)  SpO2: 97% (02-14-19 @ 13:03) (97% - 97%)    Labs:  Received: tPA @ 1:35 PM 59yoM pmh Schizophrenia (lives at [facility]), DVT on lovenox, w/ recent admission for cervical cord compression s/p discectomy presented to the ED for acute onset of severely slurred speech, RUE weakness and R facial droop around noon. Per [facility] aid, patient was sitting at lunch and was unable to transfer himself from chair to wheelchair due to weakness and staff noted slurring of his speech.  Pt is in wheelchair at baseline and receives PT for chronic RLE weakness, but staff noticed his right side weakness was significantly worse than usual and had additional weakness of RUE. In the ED, the weakness mildly improved over the hour but the patient's speech difficulty continued. Patient is able to follow commands and denies any pain but is having difficulty verbalizing anything.      In the ED:  Vital Signs Last 24 Hrs  HR: 83 (02-14-19 @ 13:03) (83 - 83)  BP: 136/90 (02-14-19 @ 13:03) (136/90 - 136/90)  RR: 18 (02-14-19 @ 13:03) (18 - 18)  SpO2: 97% (02-14-19 @ 13:03) (97% - 97%)    Labs:  Received: tPA @ 1:35 PM

## 2019-02-14 NOTE — PATIENT PROFILE ADULT - NSTOBACCO TYPE_GEN_A_CORE_RD
large right sided inguinal hernia - not tender at the moment (received morphine 4mg IV recently)
Cigarettes

## 2019-02-14 NOTE — ED PROVIDER NOTE - OBJECTIVE STATEMENT
59yoM pw ED for acute onset of severely slurred speech, RUE weakness and R facial droop, last known normal around noon. Per aid, he was sitting and then able to transfer due to weakness and slurry his speech. The weakness mild improved over the hour but the speech difficulty continues. Patient able to follow up commands and deny any pain but otherwise unable to obtain full history.

## 2019-02-14 NOTE — CONSULT NOTE ADULT - SUBJECTIVE AND OBJECTIVE BOX
HPI:  Patient is a 59 year old RH AA male from [facility] with PMHx of schizophrenia, DVT (? unknown laterality, on lovenox 40 subq daily), seizure disorder, s/p surgical intervention in June 2018 c4/c5 facet joint, s/p discectomy and cord compression in 5/2018 who presents to ED for acute onset of severely slurred speech, RUE weakness and R facial droop, LKN at 12:00 pm on 2/14/19, presented to ED at 12:56 PM. At baseline he is in a wheelchair due to previous surgeries but is able to get up and walk a few steps but needs assistance. Per aide, patient was at his baseline and had no complaints, he trying to stand up but fell onto the aide instead because he wasn't able to stand, was slurring speech and unable to move his right arm. When evaluated, his was able to move his RUE AG, but had drift, and RLE was AG with no drift, but he was still severely dysarthric and selectively following commands, also had ?slight nasolabial fold flattening on R. tPa was discussed with his caregiver and the patient, after explaining risks and benefits the patient and caregiver both agreed for tPa.     tPa bolus 8.4 cc, infusion 74.2 cc  NIHSS 7, MRS 3    Updated meds from [facility]:   memantine 5 at bedtime  carbamazepine 100 mg BID  benztropine 1 mg BID  atorvastatin 10 mg at bedtime  Haldol 10 BID  seroquel 20 BID  lovenox 40 subq daily  multivitamin  senna, PPI, docusate, bisacodyl    MEDICATIONS  (STANDING):  alteplase    Bolus 8 milliGRAM(s) IV Bolus once  alteplase    IVPB 74 milliGRAM(s) IV Intermittent once    PAST MEDICAL & SURGICAL HISTORY:  Seizure  Paranoid schizophrenia  DVT (deep venous thrombosis)  No significant past surgical history    FAMILY HISTORY:  No pertinent family history in first degree relatives    Allergies  No Known Allergies    SHx - No smoking, No ETOH, No drug abuse    Review of Systems:  CONSTITUTIONAL:  No weight loss, fever, chills, weakness or fatigue.  HEENT:  Eyes:  No visual loss, blurred vision, double vision or yellow sclerae. Ears, Nose, Throat:  No hearing loss, sneezing, congestion, runny nose or sore throat.  CARDIOVASCULAR:  No chest pain, chest pressure or chest discomfort. No palpitations or edema.  GASTROINTESTINAL:  No anorexia, nausea, vomiting or diarrhea. No abdominal pain or blood.  NEUROLOGICAL: See HPI  MUSCULOSKELETAL:  No muscle, back pain, joint pain or stiffness.  PSYCHIATRIC:  No history of depression or anxiety.    Vital Signs Last 24 Hrs  T(C): --  T(F): --  HR: 83 (14 Feb 2019 13:03) (83 - 83)  BP: 136/90 (14 Feb 2019 13:03) (136/90 - 136/90)  BP(mean): --  RR: 18 (14 Feb 2019 13:03) (18 - 18)  SpO2: 97% (14 Feb 2019 13:03) (97% - 97%)    General Exam:   General appearance: No acute distress                 Neurological Exam:  Mental Status: unable to assess orientation, dysarthric speech    Cranial Nerves: CN I - not tested.  PERRL, EOMI, VFF, no nystagmus or diplopia.    + slight r nasolabial fold flattening    Motor:   Tone: normal.                  Strength:     [] Upper extremity                      Delt       Bicep    Tricep                                                  R         4/5        4/5        4/5       4/5                                               L          5/5        5/5        5/5       5/5  [] Lower extremity                       HF          KE          KF        DF         PF                                               R        4/5        4/5        4/5       4/5       4/5                                               L         5/5        5/5       5/5       5/5        5/5  Pronator drift: + on RUE                Dysmetria + on RUE  No truncal ataxia.    Tremor: No resting, postural or action tremor.  No myoclonus.    Sensation: ?intact (patient not cooperative with exam)  Toes mute  Gait: deferred due to known instability at baseline

## 2019-02-14 NOTE — ED PROVIDER NOTE - CLINICAL SUMMARY MEDICAL DECISION MAKING FREE TEXT BOX
male with concern for Stroke. Code stroke called. order set placed. fingerstick normal. no trauma. consider giving tpa and monitor.

## 2019-02-14 NOTE — ED PROVIDER NOTE - PMH
DVT (deep venous thrombosis)    Paranoid schizophrenia    Schizophrenia    Seizure    Seizure disorder

## 2019-02-14 NOTE — PHYSICAL THERAPY INITIAL EVALUATION ADULT - ADDITIONAL COMMENTS
[facility] resident; patient's  from [facility] reports since patient's spinal sx patient is mainly independently bed to chair. Patient can ambulate with rolling walker, and physical therapist. Patient needs assistance due to right knee buckling

## 2019-02-14 NOTE — ED ADULT NURSE NOTE - OBJECTIVE STATEMENT
59M on sub Q Lovenox for prior DVT presents from [facility], as per staff patient was last normal at 1200 today and then developed aphasia, slurred speech and right arm weakness.  Code stroke called, TPA given by neuro. Pt has unintelligible speech, frustrated, intermittently following commands.

## 2019-02-14 NOTE — H&P ADULT - NSHPSOCIALHISTORY_GEN_ALL_CORE
Marital Status:  (   )    (   ) Single    (   )    (  )   Occupation:   Lives with: (  ) alone  (  ) children   (  ) spouse   (  ) parents  ( x ) other - in assisted living facility  Substance Use (street drugs): (  ) never used  (  ) other:  Tobacco Usage:  (   ) never smoked   (   ) former smoker   (   ) current smoker  (     ) pack year  (        ) last cigarette date  Alcohol Usage:  Sexual History:

## 2019-02-14 NOTE — H&P ADULT - ATTENDING COMMENTS
59 year old man with schizophrenia resident at [facility],  h/o CVA, recent cervical cord compression, s/p discectomy, wheel chair bound at baseline presented with sudden onset aphasia, and right sided weakness given tPA in the ED admitted to MICU for neurochecks    - alert but not speaking part of it maybe volitional unclear if aphasia improved post tpa  - continue psych medications  - check echo  - repeat head CT  - follow up neurology recommendations    CC time 35 minutes

## 2019-02-14 NOTE — ED PROVIDER NOTE - PROGRESS NOTE DETAILS
MD Riggs: 59M h/o CVA presents with sudden onset aphasia, inability to ambulate, RUE weakness at 12p (1hr prior to eval). On exam aphasia, follows simple commands, R pronator drift. Code stroke called, neuro aware, CT head ordered. Pt with mildly improving right sided weakness, but worsening dysarthria. neuro recommend tpa, pt received tpa bolus.

## 2019-02-14 NOTE — H&P ADULT - ASSESSMENT
59M w/ schizophrenia and h/o CVA w/ recent cervical cord compression s/p discectomy presents with sudden onset aphasia, inability to ambulate and RUE weakness which started at noon, now s/p tPA, admitted to MICU for neurochecks and intensive monitoring    # Neurology - pt presented w/ sudden onset aphasia, inability to ambulate and RUE weakness; pt was within window and received tPA @ 1:35 PM  - q1 neurochecks  - f/u neuro recs   - repeat CTH in 24 hours - already ordered for 1:35 PM  - will get TTE w/ Bubble to r/o PFO  - will get duplex US of LE to r/o DVT  - will get PT, OT, and PMR c/s for post-stroke evaluation   - will start Atorvastatin 80 as soon as pt can swallow; can start ASA and DVT PPx if repeat CTH shows no bleed   - will get A1c, lipid profile, and TSH     # Cardiovascular  - no active issues  - will get TTE w/ Bubble to r/o PFO  - will get duplex US of LE to r/o DVT    # Pulmonary   - no active issues  - monitor O2 sats    # GI  - no active issues  - NPO for 24 hours; will get dysphagia screening after 24 hours; can get speech/swallow eval if necessary    # Renal/Electrolytes/Metabolic  - no active issues  - trend BUN/Cr; monitor electrolytes and replete as needed  - monitor urine output    # Endocrine  - no active issues  - monitor FSG q6 given NPO     # ID  - no active issues; no leukocytosis or stigmata of infection     # Heme  - no leukocytosis, anemia, or thrombocytopenia  - trend CBC QD     # Prophylaxis  - currently holding DVT ppx in setting of recent tPA administration; will resume prophylaxis once repeat CTH is negative for bleed  - no indication for GI prophylaxis unless pt takes PPI or H2 blocker as home med     # Ethics  - full code

## 2019-02-14 NOTE — H&P ADULT - NSHPPHYSICALEXAM_GEN_ALL_CORE
Vital Signs Last 24 Hrs  HR: 83 (02-14-19 @ 13:03) (83 - 83)  BP: 136/90 (02-14-19 @ 13:03) (136/90 - 136/90)  RR: 18 (02-14-19 @ 13:03) (18 - 18)  SpO2: 97% (02-14-19 @ 13:03) (97% - 97%)    PHYSICAL EXAM:  GENERAL: NAD, well-groomed, well-developed  HEAD:  Atraumatic, Normocephalic  EYES: EOMI, PERRLA, conjunctiva and sclera clear  ENMT: No tonsillar erythema, exudates, or enlargement; Moist mucous membranes, Good dentition, No lesions  NECK: Supple, No JVD, Normal thyroid  CHEST/LUNG: Clear to percussion bilaterally; No rales, rhonchi, wheezing, or rubs  HEART: Regular rate and rhythm; No murmurs, rubs, or gallops  ABDOMEN: Soft, Nontender, Nondistended; Bowel sounds present  EXTREMITIES:  2+ Peripheral Pulses, No clubbing, cyanosis, or edema  LYMPH: No lymphadenopathy noted  SKIN: No rashes or lesions  NERVOUS SYSTEM:  Alert & Oriented X3, Good concentration; Motor Strength 5/5 B/L upper and lower extremities; DTRs 2+ intact and symmetric Vital Signs Last 24 Hrs  HR: 83 (02-14-19 @ 13:03) (83 - 83)  BP: 136/90 (02-14-19 @ 13:03) (136/90 - 136/90)  RR: 18 (02-14-19 @ 13:03) (18 - 18)  SpO2: 97% (02-14-19 @ 13:03) (97% - 97%)    PHYSICAL EXAM:  GENERAL: NAD, well-groomed, well-developed  HEAD:  Atraumatic, Normocephalic  EYES: EOMI, PERRLA, conjunctiva and sclera clear  ENMT: No tonsillar erythema, exudates, or enlargement; Moist mucous membranes,   NECK: Supple, No JVD, Normal thyroid  CHEST/LUNG: Clear to percussion bilaterally; No rales, rhonchi, wheezing, or rubs  HEART: Regular rate and rhythm; No murmurs, rubs, or gallops  ABDOMEN: Soft, Nontender, Nondistended; Bowel sounds present  EXTREMITIES:  2+ Peripheral Pulses, No clubbing, cyanosis, or edema  LYMPH: No lymphadenopathy noted  SKIN: No rashes or lesions  NERVOUS SYSTEM:  Alert & Oriented X3, Follows all commands, 5/5 strength LUE and LLE.  4/5 strength RUE and RLE.  + right pronator drift.  No facial droop.  +Slurred speech

## 2019-02-14 NOTE — H&P ADULT - NSHPREVIEWOFSYSTEMS_GEN_ALL_CORE
CONSTITUTIONAL: No weakness, fevers or chills  EYES/ENT: No visual changes;  No vertigo or throat pain   NECK: No pain or stiffness  RESPIRATORY: No cough, wheezing, hemoptysis; No shortness of breath  CARDIOVASCULAR: No chest pain or palpitations  GASTROINTESTINAL: No abdominal or epigastric pain. No nausea, vomiting, or hematemesis; No diarrhea or constipation. No melena or hematochezia.  GENITOURINARY: No dysuria, frequency or hematuria  NEUROLOGICAL: No numbness or weakness  SKIN: No itching, burning, rashes, or lesions   All other review of systems is negative unless indicated above. CONSTITUTIONAL: No fevers  EYES/ENT: No visual changes  RESPIRATORY: No cough, wheezing, hemoptysis; No shortness of breath  CARDIOVASCULAR: No chest pain  GASTROINTESTINAL: No abdominal or epigastric pain. No nausea, vomiting, or diarrhea  GENITOURINARY: No dysuria, frequency   NEUROLOGICAL: weakness of RUE and RLE, dysarthria   SKIN: No itching, rashes

## 2019-02-14 NOTE — ED ADULT NURSE REASSESSMENT NOTE - NS ED NURSE REASSESS COMMENT FT1
Pt received at 14:45 Pt exhibits severe dysarthria able to make needs known,, Right leg weakness s/p knee surgery, right arm drift noted however right limb otherwise strong, PERRL, good coordination noted, no visual changes/loss  Pt resting comfortably w/ [place] aide at bedside.  Stroke flowsheet and vitals noted q30 until 17:00 when Pt transported via stretcher accompanied by BASHIR Del Toro and ED luis eduardo Rosa to MICU 12 Report given to MICU BASHIR Brock.

## 2019-02-14 NOTE — H&P ADULT - NSHPLABSRESULTS_GEN_ALL_CORE
02-14    136  |  96<L>  |  15  ----------------------------<  106<H>  4.5   |  26  |  0.98    Ca    9.5      14 Feb 2019 13:30    TPro  7.7  /  Alb  4.5  /  TBili  0.3  /  DBili  x   /  AST  22  /  ALT  27  /  AlkPhos  127<H>  02-14      PT/INR - ( 14 Feb 2019 13:30 )   PT: 12.3 SEC;   INR: 1.10          PTT - ( 14 Feb 2019 13:30 )  PTT:27.3 SEC                                    15.5   5.99  )-----------( 176      ( 14 Feb 2019 13:30 )             45.6     CAPILLARY BLOOD GLUCOSE  119 (14 Feb 2019 13:24)      POCT Blood Glucose.: 119 mg/dL (14 Feb 2019 13:34)

## 2019-02-14 NOTE — PHYSICAL THERAPY INITIAL EVALUATION ADULT - PERTINENT HX OF CURRENT PROBLEM, REHAB EVAL
59M w/ schizophrenia and h/o CVA w/ recent cervical cord compression s/p discectomy presents with sudden onset aphasia, inability to ambulate and RUE weakness which started at noon, now s/p tPA, admitted to MICU for neurochecks and intensive monitoring

## 2019-02-14 NOTE — CONSULT NOTE ADULT - ASSESSMENT
59 year old RH AA male from [facility] with PMHx of schizophrenia, DVT (? unknown laterality, on lovenox 40 subq daily), seizure disorder, s/p surgical intervention in June 2018 c4/c5 facet joint, s/p discectomy and cord compression in 5/2018 who presents to ED for acute onset of severely slurred speech, RUE weakness and R facial droop, LKN at 12:00 pm on 2/14/19, presented to ED at 12:56 PM. At baseline he is in a wheelchair due to previous surgeries but is able to get up and walk a few steps but needs assistance. Per aide, patient was at his baseline and had no complaints, he trying to stand up but fell onto the aide instead because he wasn't able to stand, was slurring speech and unable to move his right arm. When evaluated, his was able to move his RUE AG, but had drift, and RLE was AG with no drift, but he was still severely dysarthric and selectively following commands, also had ?slight nasolabial fold flattening on R. tPa was discussed with his caregiver and the patient, after explaining risks and benefits the patient and caregiver both agreed for tPa.   tPa bolus 8.4 cc, infusion 74.2 cc at 1:35 PM.  NIHSS 7 MRS 3    Etiology: left hemispheric stroke, ESUS,  r/o cardioembolic source    Plan  *Permissive HTN for 24 hours up to 185/105    Imaging  [] Repeat CT head in 24 hours  [] stat repeat CTh if change in neurologic status  [] MRI brain without contrast  [] MRA head without contrast and neck with contrast    Studies  [] TTE with bubble study for possible valvular heart disease  [] telemetry    Meds  [] ASA 81 in 24 hours if CTh negative  [] DVT ppx lovenox in 24 hours if repeat Cth negative  [] lipitor 80 mg PO QHS  [] resume rest of home medications    Labs  [] HbA1c  [] LDL and continue with aggressive vascular risk factors modifications     Other  PT/OT/S&S eval 59 year old RH AA male from [facility] with PMHx of schizophrenia, DVT (? unknown laterality, on lovenox 40 subq daily), seizure disorder, s/p surgical intervention in June 2018 c4/c5 facet joint, s/p discectomy and cord compression in 5/2018 who presents to ED for acute onset of severely slurred speech, RUE weakness and R facial droop, LKN at 12:00 pm on 2/14/19, presented to ED at 12:56 PM. At baseline he is in a wheelchair due to previous surgeries but is able to get up and walk a few steps but needs assistance. Per aide, patient was at his baseline and had no complaints, he trying to stand up but fell onto the aide instead because he wasn't able to stand, was slurring speech and unable to move his right arm. When evaluated, his was able to move his RUE AG, but had drift, and RLE was AG with no drift, but he was still severely dysarthric and selectively following commands, also had ?slight nasolabial fold flattening on R. tPa was discussed with his caregiver and the patient, after explaining risks and benefits the patient and caregiver both agreed for tPa.   tPa bolus 8.4 cc, infusion 74.2 cc at 1:35 PM.  NIHSS 7 MRS 3    Etiology: left hemispheric stroke, ESUS,  r/o cardioembolic source    Plan  *Permissive HTN for 24 hours up to 185/105    Imaging  [] Repeat CT head in 24 hours  [] stat repeat CTh if change in neurologic status  [] MRI brain without contrast  [] MRA head without contrast and neck with contrast    Studies  [] TTE with bubble study for possible valvular heart disease  [] telemetry    Meds  [] ASA 81 in 24 hours if repeat CTh negative  [] DVT ppx lovenox in 24 hours if repeat Cth negative  [] lipitor 80 mg PO QHS  [] resume rest of home medications    Labs  [] HbA1c  [] LDL and continue with aggressive vascular risk factors modifications     Other  PT/OT/S&S eval

## 2019-02-15 LAB
ALBUMIN SERPL ELPH-MCNC: 4.8 G/DL — SIGNIFICANT CHANGE UP (ref 3.3–5)
ALP SERPL-CCNC: 142 U/L — HIGH (ref 40–120)
ALT FLD-CCNC: 28 U/L — SIGNIFICANT CHANGE UP (ref 4–41)
ANION GAP SERPL CALC-SCNC: 14 MMO/L — SIGNIFICANT CHANGE UP (ref 7–14)
APTT BLD: 26.4 SEC — LOW (ref 27.5–36.3)
AST SERPL-CCNC: 18 U/L — SIGNIFICANT CHANGE UP (ref 4–40)
BASOPHILS # BLD AUTO: 0.01 K/UL — SIGNIFICANT CHANGE UP (ref 0–0.2)
BASOPHILS NFR BLD AUTO: 0.2 % — SIGNIFICANT CHANGE UP (ref 0–2)
BILIRUB SERPL-MCNC: 0.4 MG/DL — SIGNIFICANT CHANGE UP (ref 0.2–1.2)
BUN SERPL-MCNC: 13 MG/DL — SIGNIFICANT CHANGE UP (ref 7–23)
CALCIUM SERPL-MCNC: 9.8 MG/DL — SIGNIFICANT CHANGE UP (ref 8.4–10.5)
CHLORIDE SERPL-SCNC: 98 MMOL/L — SIGNIFICANT CHANGE UP (ref 98–107)
CHOLEST SERPL-MCNC: 141 MG/DL — SIGNIFICANT CHANGE UP (ref 120–199)
CO2 SERPL-SCNC: 26 MMOL/L — SIGNIFICANT CHANGE UP (ref 22–31)
CREAT SERPL-MCNC: 1.03 MG/DL — SIGNIFICANT CHANGE UP (ref 0.5–1.3)
EOSINOPHIL # BLD AUTO: 0.15 K/UL — SIGNIFICANT CHANGE UP (ref 0–0.5)
EOSINOPHIL NFR BLD AUTO: 3.6 % — SIGNIFICANT CHANGE UP (ref 0–6)
GLUCOSE SERPL-MCNC: 92 MG/DL — SIGNIFICANT CHANGE UP (ref 70–99)
HBA1C BLD-MCNC: 5.5 % — SIGNIFICANT CHANGE UP (ref 4–5.6)
HCT VFR BLD CALC: 46.1 % — SIGNIFICANT CHANGE UP (ref 39–50)
HCV AB S/CO SERPL IA: 0.18 S/CO — SIGNIFICANT CHANGE UP
HCV AB SERPL-IMP: SIGNIFICANT CHANGE UP
HDLC SERPL-MCNC: 55 MG/DL — SIGNIFICANT CHANGE UP (ref 35–55)
HGB BLD-MCNC: 15.5 G/DL — SIGNIFICANT CHANGE UP (ref 13–17)
IMM GRANULOCYTES NFR BLD AUTO: 0.2 % — SIGNIFICANT CHANGE UP (ref 0–1.5)
INR BLD: 1.11 — SIGNIFICANT CHANGE UP (ref 0.88–1.17)
LIPID PNL WITH DIRECT LDL SERPL: 80 MG/DL — SIGNIFICANT CHANGE UP
LYMPHOCYTES # BLD AUTO: 1.26 K/UL — SIGNIFICANT CHANGE UP (ref 1–3.3)
LYMPHOCYTES # BLD AUTO: 30.6 % — SIGNIFICANT CHANGE UP (ref 13–44)
MAGNESIUM SERPL-MCNC: 2 MG/DL — SIGNIFICANT CHANGE UP (ref 1.6–2.6)
MCHC RBC-ENTMCNC: 32.4 PG — SIGNIFICANT CHANGE UP (ref 27–34)
MCHC RBC-ENTMCNC: 33.6 % — SIGNIFICANT CHANGE UP (ref 32–36)
MCV RBC AUTO: 96.2 FL — SIGNIFICANT CHANGE UP (ref 80–100)
MONOCYTES # BLD AUTO: 0.49 K/UL — SIGNIFICANT CHANGE UP (ref 0–0.9)
MONOCYTES NFR BLD AUTO: 11.9 % — SIGNIFICANT CHANGE UP (ref 2–14)
NEUTROPHILS # BLD AUTO: 2.2 K/UL — SIGNIFICANT CHANGE UP (ref 1.8–7.4)
NEUTROPHILS NFR BLD AUTO: 53.5 % — SIGNIFICANT CHANGE UP (ref 43–77)
NRBC # FLD: 0 K/UL — LOW (ref 25–125)
PHOSPHATE SERPL-MCNC: 3.2 MG/DL — SIGNIFICANT CHANGE UP (ref 2.5–4.5)
PLATELET # BLD AUTO: 178 K/UL — SIGNIFICANT CHANGE UP (ref 150–400)
PMV BLD: 9 FL — SIGNIFICANT CHANGE UP (ref 7–13)
POTASSIUM SERPL-MCNC: 4.2 MMOL/L — SIGNIFICANT CHANGE UP (ref 3.5–5.3)
POTASSIUM SERPL-SCNC: 4.2 MMOL/L — SIGNIFICANT CHANGE UP (ref 3.5–5.3)
PROT SERPL-MCNC: 8.3 G/DL — SIGNIFICANT CHANGE UP (ref 6–8.3)
PROTHROM AB SERPL-ACNC: 12.4 SEC — SIGNIFICANT CHANGE UP (ref 9.8–13.1)
RBC # BLD: 4.79 M/UL — SIGNIFICANT CHANGE UP (ref 4.2–5.8)
RBC # FLD: 12.2 % — SIGNIFICANT CHANGE UP (ref 10.3–14.5)
SODIUM SERPL-SCNC: 138 MMOL/L — SIGNIFICANT CHANGE UP (ref 135–145)
TRIGL SERPL-MCNC: 75 MG/DL — SIGNIFICANT CHANGE UP (ref 10–149)
WBC # BLD: 4.12 K/UL — SIGNIFICANT CHANGE UP (ref 3.8–10.5)
WBC # FLD AUTO: 4.12 K/UL — SIGNIFICANT CHANGE UP (ref 3.8–10.5)

## 2019-02-15 PROCEDURE — 99222 1ST HOSP IP/OBS MODERATE 55: CPT | Mod: GC

## 2019-02-15 PROCEDURE — 99223 1ST HOSP IP/OBS HIGH 75: CPT

## 2019-02-15 PROCEDURE — 70450 CT HEAD/BRAIN W/O DYE: CPT | Mod: 26

## 2019-02-15 PROCEDURE — 70551 MRI BRAIN STEM W/O DYE: CPT | Mod: 26

## 2019-02-15 PROCEDURE — 99233 SBSQ HOSP IP/OBS HIGH 50: CPT | Mod: GC

## 2019-02-15 PROCEDURE — 70547 MR ANGIOGRAPHY NECK W/O DYE: CPT | Mod: 26

## 2019-02-15 RX ORDER — LEVETIRACETAM 250 MG/1
1000 TABLET, FILM COATED ORAL
Qty: 0 | Refills: 0 | Status: DISCONTINUED | OUTPATIENT
Start: 2019-02-15 | End: 2019-02-18

## 2019-02-15 RX ORDER — PANTOPRAZOLE SODIUM 20 MG/1
40 TABLET, DELAYED RELEASE ORAL
Qty: 0 | Refills: 0 | Status: DISCONTINUED | OUTPATIENT
Start: 2019-02-15 | End: 2019-03-15

## 2019-02-15 RX ORDER — HALOPERIDOL DECANOATE 100 MG/ML
10 INJECTION INTRAMUSCULAR
Qty: 0 | Refills: 0 | Status: DISCONTINUED | OUTPATIENT
Start: 2019-02-15 | End: 2019-02-17

## 2019-02-15 RX ORDER — SENNA PLUS 8.6 MG/1
2 TABLET ORAL AT BEDTIME
Qty: 0 | Refills: 0 | Status: DISCONTINUED | OUTPATIENT
Start: 2019-02-15 | End: 2019-03-15

## 2019-02-15 RX ORDER — ENOXAPARIN SODIUM 100 MG/ML
40 INJECTION SUBCUTANEOUS DAILY
Qty: 0 | Refills: 0 | Status: DISCONTINUED | OUTPATIENT
Start: 2019-02-15 | End: 2019-02-17

## 2019-02-15 RX ORDER — BENZTROPINE MESYLATE 1 MG
1 TABLET ORAL
Qty: 0 | Refills: 0 | Status: DISCONTINUED | OUTPATIENT
Start: 2019-02-15 | End: 2019-03-04

## 2019-02-15 RX ORDER — CARBAMAZEPINE 200 MG
100 TABLET ORAL
Qty: 0 | Refills: 0 | Status: DISCONTINUED | OUTPATIENT
Start: 2019-02-15 | End: 2019-03-15

## 2019-02-15 RX ORDER — POLYETHYLENE GLYCOL 3350 17 G/17G
17 POWDER, FOR SOLUTION ORAL DAILY
Qty: 0 | Refills: 0 | Status: DISCONTINUED | OUTPATIENT
Start: 2019-02-15 | End: 2019-03-15

## 2019-02-15 RX ORDER — QUETIAPINE FUMARATE 200 MG/1
200 TABLET, FILM COATED ORAL
Qty: 0 | Refills: 0 | Status: DISCONTINUED | OUTPATIENT
Start: 2019-02-15 | End: 2019-02-17

## 2019-02-15 RX ORDER — ASPIRIN/CALCIUM CARB/MAGNESIUM 324 MG
81 TABLET ORAL DAILY
Qty: 0 | Refills: 0 | Status: DISCONTINUED | OUTPATIENT
Start: 2019-02-15 | End: 2019-02-26

## 2019-02-15 RX ORDER — MEMANTINE HYDROCHLORIDE 10 MG/1
5 TABLET ORAL AT BEDTIME
Qty: 0 | Refills: 0 | Status: DISCONTINUED | OUTPATIENT
Start: 2019-02-15 | End: 2019-03-15

## 2019-02-15 RX ORDER — DOCUSATE SODIUM 100 MG
200 CAPSULE ORAL DAILY
Qty: 0 | Refills: 0 | Status: DISCONTINUED | OUTPATIENT
Start: 2019-02-15 | End: 2019-02-22

## 2019-02-15 RX ADMIN — LEVETIRACETAM 400 MILLIGRAM(S): 250 TABLET, FILM COATED ORAL at 05:13

## 2019-02-15 RX ADMIN — HALOPERIDOL DECANOATE 10 MILLIGRAM(S): 100 INJECTION INTRAMUSCULAR at 17:00

## 2019-02-15 RX ADMIN — Medication 1 MILLIGRAM(S): at 06:03

## 2019-02-15 RX ADMIN — Medication 100 MILLIGRAM(S): at 17:01

## 2019-02-15 RX ADMIN — LEVETIRACETAM 1000 MILLIGRAM(S): 250 TABLET, FILM COATED ORAL at 17:00

## 2019-02-15 RX ADMIN — QUETIAPINE FUMARATE 200 MILLIGRAM(S): 200 TABLET, FILM COATED ORAL at 17:01

## 2019-02-15 RX ADMIN — QUETIAPINE FUMARATE 200 MILLIGRAM(S): 200 TABLET, FILM COATED ORAL at 06:03

## 2019-02-15 RX ADMIN — PANTOPRAZOLE SODIUM 40 MILLIGRAM(S): 20 TABLET, DELAYED RELEASE ORAL at 06:04

## 2019-02-15 RX ADMIN — HALOPERIDOL DECANOATE 10 MILLIGRAM(S): 100 INJECTION INTRAMUSCULAR at 06:03

## 2019-02-15 RX ADMIN — Medication 100 MILLIGRAM(S): at 06:03

## 2019-02-15 RX ADMIN — Medication 1 MILLIGRAM(S): at 17:01

## 2019-02-15 NOTE — CONSULT NOTE ADULT - ASSESSMENT
1. PT- bed mobility,transfers, gait and balance training  2. OT- ADL'S  3. CVA-continue w/u s/p TPA  4. Patient would benefit from subacute rehab, given his functional status prior to stroke, he is likely to need a longer course of rehabilitation than acute rehab will allow. Will follow.

## 2019-02-15 NOTE — CONSULT NOTE ADULT - SUBJECTIVE AND OBJECTIVE BOX
59M from [facility] h/o schizophrenia, recurrent DVT s/p IVC filter, seizure disorder, h/o severe C4-5 stenosis c/b cord compression s/p discectomy and fusion adm 2/14 for dysarthria concerning for acute CVA s/p tPA.    The patient had a code stroke in the ED and was given full dose tPA.  He was transferred to the MICU for q1h neuro checks.  The patient's RUE/RLE weakness improved, but his dysarthria did not.  His mental status remained at baseline.  He passed a speech and swallow (bedside) and was restarted on his psych medications.        REVIEW OF SYSTEMS: No chest pain, shortness of breath, nausea, vomiting or diarhea.      PAST MEDICAL & SURGICAL HISTORY  Schizophrenia  Seizure disorder  Seizure  Paranoid schizophrenia  DVT (deep venous thrombosis)  History of cervical spinal surgery  No significant past surgical history  No significant past surgical history      SOCIAL HISTORY  Smoking - Denied, EtOH - Denied, Drugs - Denied    FUNCTIONAL HISTORY:   Lives at [facility]. Ambulates with walker. I bed->chair     CURRENT FUNCTIONAL STATUS:      FAMILY HISTORY   No pertinent family history in first degree relatives  No pertinent family history in first degree relatives      RECENT LABS/IMAGING  CBC Full  -  ( 15 Feb 2019 05:00 )  WBC Count : 4.12 K/uL  Hemoglobin : 15.5 g/dL  Hematocrit : 46.1 %  Platelet Count - Automated : 178 K/uL  Mean Cell Volume : 96.2 fL  Mean Cell Hemoglobin : 32.4 pg  Mean Cell Hemoglobin Concentration : 33.6 %  Auto Neutrophil # : 2.20 K/uL  Auto Lymphocyte # : 1.26 K/uL  Auto Monocyte # : 0.49 K/uL  Auto Eosinophil # : 0.15 K/uL  Auto Basophil # : 0.01 K/uL  Auto Neutrophil % : 53.5 %  Auto Lymphocyte % : 30.6 %  Auto Monocyte % : 11.9 %  Auto Eosinophil % : 3.6 %  Auto Basophil % : 0.2 %    02-15    138  |  98  |  13  ----------------------------<  92  4.2   |  26  |  1.03    Ca    9.8      15 Feb 2019 02:00  Phos  3.2     02-15  Mg     2.0     02-15    TPro  8.3  /  Alb  4.8  /  TBili  0.4  /  DBili  x   /  AST  18  /  ALT  28  /  AlkPhos  142<H>  02-15        VITALS  T(C): 36.6 (02-15-19 @ 12:00), Max: 37.1 (02-14-19 @ 23:00)  HR: 70 (02-15-19 @ 14:00) (61 - 97)  BP: 128/73 (02-15-19 @ 14:00) (106/68 - 146/72)  RR: 13 (02-15-19 @ 14:00) (12 - 21)  SpO2: 99% (02-15-19 @ 14:00) (95% - 100%)  Wt(kg): --    ALLERGIES  No Known Allergies      MEDICATIONS   atorvastatin 80 milliGRAM(s) Oral at bedtime  benztropine 1 milliGRAM(s) Oral two times a day  bisacodyl 5 milliGRAM(s) Oral daily PRN  carBAMazepine Chewable 100 milliGRAM(s) Chew two times a day  docusate sodium 200 milliGRAM(s) Oral daily  haloperidol     Tablet 10 milliGRAM(s) Oral two times a day  levETIRAcetam 1000 milliGRAM(s) Oral two times a day  memantine 5 milliGRAM(s) Oral at bedtime  pantoprazole    Tablet 40 milliGRAM(s) Oral before breakfast  polyethylene glycol 3350 17 Gram(s) Oral daily PRN  QUEtiapine 200 milliGRAM(s) Oral two times a day  senna 2 Tablet(s) Oral at bedtime      ----------------------------------------------------------------------------------------  PHYSICAL EXAM  Constitutional - NAD, Comfortable  HEENT - NCAT, EOMI  Neck - Supple, No limited ROM  Chest - CTA bilaterally, No wheeze, No rhonchi, No crackles  Cardiovascular - RRR, S1S2, No murmurs  Abdomen - BS+, Soft, NTND  Extremities - No C/C/E, No calf tenderness   Neurologic Exam -                    Cognitive - Awake, Alert, AAO to self, place, date, year, situation     Communication - Fluent, No dysarthria, no aphasia     Cranial Nerves - CN 2-12 intact     Motor - No focal deficits                       Sensory - Intact to LT     Reflexes - DTR Intact, No primitive reflexive     Balance - WNL Static  Psychiatric - Mood stable, Affect WNL 59M from [facility] h/o schizophrenia, recurrent DVT s/p IVC filter, seizure disorder, h/o severe C4-5 stenosis c/b cord compression s/p discectomy and fusion adm 2/14 for dysarthria concerning for acute CVA s/p tPA.    The patient had a code stroke in the ED and was given full dose tPA.  He was transferred to the MICU for q1h neuro checks.  The patient's RUE/RLE weakness improved, but his dysarthria did not.  His mental status remained at baseline.  He passed a speech and swallow (bedside) and was restarted on his psych medications.        REVIEW OF SYSTEMS: No chest pain, shortness of breath, nausea, vomiting or diarhea.      PAST MEDICAL & SURGICAL HISTORY  Schizophrenia  Seizure disorder  Seizure  Paranoid schizophrenia  DVT (deep venous thrombosis)  History of cervical spinal surgery  No significant past surgical history  No significant past surgical history      SOCIAL HISTORY  Smoking - Denied, EtOH - Denied, Drugs - Denied    FUNCTIONAL HISTORY:   Lives at [facility]. states that at [facility] he was using a WC to get around. HE can transfer from bed> chair      CURRENT FUNCTIONAL STATUS: mod a       FAMILY HISTORY   No pertinent family history in first degree relatives  No pertinent family history in first degree relatives      RECENT LABS/IMAGING  CBC Full  -  ( 15 Feb 2019 05:00 )  WBC Count : 4.12 K/uL  Hemoglobin : 15.5 g/dL  Hematocrit : 46.1 %  Platelet Count - Automated : 178 K/uL  Mean Cell Volume : 96.2 fL  Mean Cell Hemoglobin : 32.4 pg  Mean Cell Hemoglobin Concentration : 33.6 %  Auto Neutrophil # : 2.20 K/uL  Auto Lymphocyte # : 1.26 K/uL  Auto Monocyte # : 0.49 K/uL  Auto Eosinophil # : 0.15 K/uL  Auto Basophil # : 0.01 K/uL  Auto Neutrophil % : 53.5 %  Auto Lymphocyte % : 30.6 %  Auto Monocyte % : 11.9 %  Auto Eosinophil % : 3.6 %  Auto Basophil % : 0.2 %    02-15    138  |  98  |  13  ----------------------------<  92  4.2   |  26  |  1.03    Ca    9.8      15 Feb 2019 02:00  Phos  3.2     02-15  Mg     2.0     02-15    TPro  8.3  /  Alb  4.8  /  TBili  0.4  /  DBili  x   /  AST  18  /  ALT  28  /  AlkPhos  142<H>  02-15        VITALS  T(C): 36.6 (02-15-19 @ 12:00), Max: 37.1 (02-14-19 @ 23:00)  HR: 70 (02-15-19 @ 14:00) (61 - 97)  BP: 128/73 (02-15-19 @ 14:00) (106/68 - 146/72)  RR: 13 (02-15-19 @ 14:00) (12 - 21)  SpO2: 99% (02-15-19 @ 14:00) (95% - 100%)  Wt(kg): --    ALLERGIES  No Known Allergies      MEDICATIONS   atorvastatin 80 milliGRAM(s) Oral at bedtime  benztropine 1 milliGRAM(s) Oral two times a day  bisacodyl 5 milliGRAM(s) Oral daily PRN  carBAMazepine Chewable 100 milliGRAM(s) Chew two times a day  docusate sodium 200 milliGRAM(s) Oral daily  haloperidol     Tablet 10 milliGRAM(s) Oral two times a day  levETIRAcetam 1000 milliGRAM(s) Oral two times a day  memantine 5 milliGRAM(s) Oral at bedtime  pantoprazole    Tablet 40 milliGRAM(s) Oral before breakfast  polyethylene glycol 3350 17 Gram(s) Oral daily PRN  QUEtiapine 200 milliGRAM(s) Oral two times a day  senna 2 Tablet(s) Oral at bedtime      ----------------------------------------------------------------------------------------  PHYSICAL EXAM  Constitutional - NAD, Comfortable  HEENT - NCAT, EOMI  Neck - Supple, No limited ROM  Chest - CTA bilaterally, No wheeze, No rhonchi, No crackles  Cardiovascular - RRR, S1S2, No murmurs  Abdomen - BS+, Soft, NTND  Extremities - No C/C/E, No calf tenderness   Neurologic Exam -                    Cognitive - Awake, Alert, AAO to self, place, date, year, situation     Communication + dysarthria, no aphasia     Cranial Nerves - CN 2-12 intact     Motor - 4/5 throughout     Sensory - Intact to LT     Reflexes - DTR Intact, No primitive reflexive     Balance poor balance   Psychiatric - Mood stable, Affect WNL

## 2019-02-15 NOTE — SPEECH LANGUAGE PATHOLOGY EVALUATION - SLP PERTINENT HISTORY OF CURRENT PROBLEM
59M from [facility] h/o schizophrenia, recurrent DVT s/p IVC filter, seizure disorder, h/o severe C4-5 stenosis c/b cord compression s/p discectomy and fusion adm 2/14 for dysarthria concerning for acute CVA s/p tPA.

## 2019-02-15 NOTE — PROGRESS NOTE ADULT - ASSESSMENT
59M w/ schizophrenia and h/o CVA w/ recent cervical cord compression s/p discectomy presents with sudden onset aphasia, inability to ambulate and RUE weakness which started at noon, now s/p tPA, admitted to MICU for neurochecks and intensive monitoring    # Neurology - pt presented w/ sudden onset aphasia, inability to ambulate and RUE weakness; pt was within window and received tPA @ 1:35 PM  - q1 neurochecks  - f/u neuro recs   - repeat CTH in 24 hours - already ordered for 1:35 PM  - will get TTE w/ Bubble to r/o PFO  - will get duplex US of LE to r/o DVT  - will get PT, OT, and PMR c/s for post-stroke evaluation   - will start Atorvastatin 80 as soon as pt can swallow; can start ASA and DVT PPx if repeat CTH shows no bleed   - will get A1c, lipid profile, and TSH     # Cardiovascular  - no active issues  - will get TTE w/ Bubble to r/o PFO  - will get duplex US of LE to r/o DVT    # Pulmonary   -no active issues  -monitor O2 sats    # GI  -no active issues  -diet dysphagia 2    # Renal/Electrolytes/Metabolic  -no active issues  -monitor electrolytes and urine output    # Endocrine  -no active issues    # ID  -no active issues    # Heme  -no active issues  -trend CBC    # Prophylaxis  - currently holding DVT ppx in setting of recent tPA administration; will resume prophylaxis once repeat CTH is negative for bleed    #Code status  - full code 59M w/ schizophrenia and h/o CVA w/ recent cervical cord compression s/p discectomy presents with sudden onset aphasia, inability to ambulate and RUE weakness which started at noon, now s/p tPA, admitted to MICU for neurochecks and intensive monitoring    # Neurology   pt presented w/ sudden onset aphasia, inability to ambulate and RUE weakness; pt was within window and received tPA @ 1:35 PM  -neurologically deficits improving, but still has mild dysarthria   -q4 neurochecks  -repeat CTH at 2pm  -will get TTE w/ Bubble to r/o PFO  -will get duplex US of LE to r/o DVT  -PT/OT consulted   -c/w atorvastatin 80   -start ASA and heparin if negative CTH  -c/w home meds benztropine, carbamazepine, haloperidol, levetiracetam, memantine, quetiapine     # Cardiovascular  no active issues  -will get TTE w/ Bubble to r/o PFO    # Pulmonary   no active issues  -monitor O2 sats    # GI  no active issues  -c/w PPI home med  -diet dysphagia 2    # Renal/Electrolytes/Metabolic  no active issues  -monitor electrolytes    # Endocrine  no active issues    # ID  no active issues    # Heme  no active issues  -trend CBC    # Prophylaxis  -currently holding DVT ppx in setting of recent tPA administration; will resume prophylaxis once repeat CTH is negative for bleed    #Code status  - full code

## 2019-02-15 NOTE — PROGRESS NOTE ADULT - SUBJECTIVE AND OBJECTIVE BOX
Chief Complaint: Code stroke    Interval Events: Patient passed speech and swallow evaluations. Medications switched to oral.     REVIEW OF SYSTEMS:     MEDICATIONS  (STANDING):  atorvastatin 80 milliGRAM(s) Oral at bedtime  benztropine 1 milliGRAM(s) Oral two times a day  carBAMazepine Chewable 100 milliGRAM(s) Chew two times a day  haloperidol     Tablet 10 milliGRAM(s) Oral two times a day  levETIRAcetam 1000 milliGRAM(s) Oral two times a day  memantine 5 milliGRAM(s) Oral at bedtime  pantoprazole    Tablet 40 milliGRAM(s) Oral before breakfast  QUEtiapine 200 milliGRAM(s) Oral two times a day    MEDICATIONS  (PRN):      Allergies    No Known Allergies    Intolerances    ICU Vital Signs Last 24 Hrs  T(C): 36.7 (15 Feb 2019 04:00), Max: 37.1 (14 Feb 2019 23:00)  T(F): 98.1 (15 Feb 2019 04:00), Max: 98.7 (14 Feb 2019 23:00)  HR: 68 (15 Feb 2019 07:00) (61 - 96)  BP: 126/66 (15 Feb 2019 07:00) (125/70 - 169/84)  BP(mean): 80 (15 Feb 2019 07:00) (80 - 96)  ABP: --  ABP(mean): --  RR: 12 (15 Feb 2019 07:00) (12 - 19)  SpO2: 100% (15 Feb 2019 07:00) (97% - 100%)      I&O's Summary    14 Feb 2019 07:01  -  15 Feb 2019 07:00  --------------------------------------------------------  IN: 0 mL / OUT: 800 mL / NET: -800 mL      Physical Exam:  General: Well-appearing, NAD  HEENT: PERRL, EOMI, normal sclera and conjunctiva, normal oropharynx  Neck: Supple, no JVD, thyroid without masses or enlargement  Chest/Lungs: CTA bilaterally, no wheezing, rales, rhonchi or rub  Heart: RRR, normal S1, S2, no murmurs or gallops  Abdomen: Soft, ND, NTTP, normoactive bowel sounds  Extremities: 2+ peripheral pulses b/l, no edema, clubbing or cyanosis  Skin: Warm, well-perfused, no rashes or lesions  Neurological: A&Ox3, moves all extremities, no focal deficits    LABS:                         15.5   4.12  )-----------( 178      ( 15 Feb 2019 05:00 )             46.1     02-15    138  |  98  |  13  ----------------------------<  92  4.2   |  26  |  1.03    Ca    9.8      15 Feb 2019 02:00  Phos  3.2     02-15  Mg     2.0     02-15    TPro  8.3  /  Alb  4.8  /  TBili  0.4  /  DBili  x   /  AST  18  /  ALT  28  /  AlkPhos  142<H>  02-15    LIVER FUNCTIONS - ( 15 Feb 2019 02:00 )  Alb: 4.8 g/dL / Pro: 8.3 g/dL / ALK PHOS: 142 u/L / ALT: 28 u/L / AST: 18 u/L / GGT: x           PT/INR - ( 15 Feb 2019 05:00 )   PT: 12.4 SEC;   INR: 1.11        PTT - ( 15 Feb 2019 05:00 )  PTT:26.4 SEC Chief Complaint: Code stroke    Interval Events: Patient passed speech and swallow evaluations. Medications switched to oral. Denies chest pain, SOB, abdominal pain, changes in vision.     REVIEW OF SYSTEMS:   negative unless noted in HPI     MEDICATIONS  (STANDING):  atorvastatin 80 milliGRAM(s) Oral at bedtime  benztropine 1 milliGRAM(s) Oral two times a day  carBAMazepine Chewable 100 milliGRAM(s) Chew two times a day  haloperidol     Tablet 10 milliGRAM(s) Oral two times a day  levETIRAcetam 1000 milliGRAM(s) Oral two times a day  memantine 5 milliGRAM(s) Oral at bedtime  pantoprazole    Tablet 40 milliGRAM(s) Oral before breakfast  QUEtiapine 200 milliGRAM(s) Oral two times a day    MEDICATIONS  (PRN):    Allergies    No Known Allergies    Intolerances    ICU Vital Signs Last 24 Hrs  T(C): 36.7 (15 Feb 2019 04:00), Max: 37.1 (14 Feb 2019 23:00)  T(F): 98.1 (15 Feb 2019 04:00), Max: 98.7 (14 Feb 2019 23:00)  HR: 68 (15 Feb 2019 07:00) (61 - 96)  BP: 126/66 (15 Feb 2019 07:00) (125/70 - 169/84)  BP(mean): 80 (15 Feb 2019 07:00) (80 - 96)  ABP: --  ABP(mean): --  RR: 12 (15 Feb 2019 07:00) (12 - 19)  SpO2: 100% (15 Feb 2019 07:00) (97% - 100%)      I&O's Summary    14 Feb 2019 07:01  -  15 Feb 2019 07:00  --------------------------------------------------------  IN: 0 mL / OUT: 800 mL / NET: -800 mL      Physical Exam:  General: Well-appearing, NAD  HEENT: EOMI, normal sclera and conjunctiva, normal oropharynx  Neck: Supple, no JVD  Chest/Lungs: CTA bilaterally, no wheezing, rales, rhonchi or rub  Heart: RRR, normal S1, S2, no murmurs or gallops  Abdomen: Soft, ND, NTTP, normoactive bowel sounds  Extremities: 2+ peripheral pulses b/l, no edema, clubbing or cyanosis  Skin: Warm, well-perfused, no rashes or lesions  Neurological: A&Ox3, CN II-12 intact expect left pupil sluggish to light, 5/5 UE strength b/l, 4/5 RLE strength, 5/5 LLE strength, sensory intact, no pronator drift    LABS:                         15.5   4.12  )-----------( 178      ( 15 Feb 2019 05:00 )             46.1     02-15    138  |  98  |  13  ----------------------------<  92  4.2   |  26  |  1.03    Ca    9.8      15 Feb 2019 02:00  Phos  3.2     02-15  Mg     2.0     02-15    TPro  8.3  /  Alb  4.8  /  TBili  0.4  /  DBili  x   /  AST  18  /  ALT  28  /  AlkPhos  142<H>  02-15    LIVER FUNCTIONS - ( 15 Feb 2019 02:00 )  Alb: 4.8 g/dL / Pro: 8.3 g/dL / ALK PHOS: 142 u/L / ALT: 28 u/L / AST: 18 u/L / GGT: x           PT/INR - ( 15 Feb 2019 05:00 )   PT: 12.4 SEC;   INR: 1.11        PTT - ( 15 Feb 2019 05:00 )  PTT:26.4 SEC Chief Complaint: Code stroke    Interval Events: Patient passed speech and swallow evaluations. Medications switched to oral. Denies chest pain, SOB, abdominal pain, changes in vision.     REVIEW OF SYSTEMS:   negative unless noted in HPI     MEDICATIONS  (STANDING):  atorvastatin 80 milliGRAM(s) Oral at bedtime  benztropine 1 milliGRAM(s) Oral two times a day  carBAMazepine Chewable 100 milliGRAM(s) Chew two times a day  haloperidol     Tablet 10 milliGRAM(s) Oral two times a day  levETIRAcetam 1000 milliGRAM(s) Oral two times a day  memantine 5 milliGRAM(s) Oral at bedtime  pantoprazole    Tablet 40 milliGRAM(s) Oral before breakfast  QUEtiapine 200 milliGRAM(s) Oral two times a day    MEDICATIONS  (PRN):    Allergies    No Known Allergies    Intolerances    ICU Vital Signs Last 24 Hrs  T(C): 36.7 (15 Feb 2019 04:00), Max: 37.1 (14 Feb 2019 23:00)  T(F): 98.1 (15 Feb 2019 04:00), Max: 98.7 (14 Feb 2019 23:00)  HR: 68 (15 Feb 2019 07:00) (61 - 96)  BP: 126/66 (15 Feb 2019 07:00) (125/70 - 169/84)  BP(mean): 80 (15 Feb 2019 07:00) (80 - 96)  ABP: --  ABP(mean): --  RR: 12 (15 Feb 2019 07:00) (12 - 19)  SpO2: 100% (15 Feb 2019 07:00) (97% - 100%)      I&O's Summary    14 Feb 2019 07:01  -  15 Feb 2019 07:00  --------------------------------------------------------  IN: 0 mL / OUT: 800 mL / NET: -800 mL      Physical Exam:  General: Well-appearing, NAD  HEENT: EOMI, normal sclera and conjunctiva, normal oropharynx  Neck: Supple, no JVD  Chest/Lungs: CTA bilaterally, no wheezing, rales, rhonchi or rub  Heart: RRR, normal S1, S2, no murmurs or gallops  Abdomen: Soft, ND, NTTP, normoactive bowel sounds  Extremities: 2+ peripheral pulses b/l, no edema, clubbing or cyanosis  Skin: Warm, well-perfused, no rashes or lesions  Neurological: A&Ox3, mild dysarthria, CN II-XII intact except left pupil sluggish to light, 5/5 UE strength b/l, 4/5 RLE strength, 5/5 LLE strength, sensory intact, no pronator drift    LABS:                         15.5   4.12  )-----------( 178      ( 15 Feb 2019 05:00 )             46.1     02-15    138  |  98  |  13  ----------------------------<  92  4.2   |  26  |  1.03    Ca    9.8      15 Feb 2019 02:00  Phos  3.2     02-15  Mg     2.0     02-15    TPro  8.3  /  Alb  4.8  /  TBili  0.4  /  DBili  x   /  AST  18  /  ALT  28  /  AlkPhos  142<H>  02-15    LIVER FUNCTIONS - ( 15 Feb 2019 02:00 )  Alb: 4.8 g/dL / Pro: 8.3 g/dL / ALK PHOS: 142 u/L / ALT: 28 u/L / AST: 18 u/L / GGT: x           PT/INR - ( 15 Feb 2019 05:00 )   PT: 12.4 SEC;   INR: 1.11        PTT - ( 15 Feb 2019 05:00 )  PTT:26.4 SEC

## 2019-02-15 NOTE — SPEECH LANGUAGE PATHOLOGY EVALUATION - COMMENTS
Patient seen for speech and language evaluation.  Patient with limited cooperative and noted with closing of eyes during expressive language tasks.  Patient reported difficulty with expressive language and slurred speech and reports that he has difficulty creating complete sentences. 1. MD to reconsult this department if changes in medical status should change.

## 2019-02-15 NOTE — CHART NOTE - NSCHARTNOTEFT_GEN_A_CORE
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    The patient had a code stroke in the ED and was given full dose tPA.  He was transferred to the MICU for q1h neuro checks.  The patient's RUE/RLE weakness improved, but his dysarthria did not.  His mental status remained at baseline.  He passed a speech and swallow (bedside) and was restarted on his psych medications.  CTH with CVA but no hemorrhagic transformation.      ASSESSMENT & PLAN:     59M from [facility] h/o schizophrenia, recurrent DVT s/p IVC filter, seizure disorder, h/o severe C4-5 stenosis c/b cord compression s/p discectomy and fusion adm 2/14 for dysarthria concerning for acute CVA s/p tPA.    #Neuro  Acute CVA  - S/p tPA 1330.  - Repeat CTH with evolving L MCA stroke without hemorrhagic transformation.  - Aspirin started.  - Continue atorvastatin 80 - increased from pt's home dose of 10mg qHS  - Neuro on board.  - MRI per Neuro.  - Pending TTE with bubble study.  - Monitor on tele.  - PT/OT.    Seizure Disorder  - Continue carbamazepine, levetiracetam.    C4-5 Stenosis  - Wheelchair / can take a few steps with assistance at baseline.    #CV  Recurrent DVT  - S/p IVC filter.  - Restarted enoxaparin 40mg q day, CTH no hemorrhage.    #Heme  Anemia  - Mild, chronic, stable.  - Continue to monitor.    #Psych  Schizophrenia  - Continue home meds - quetiapine 200mg BID, haloperidol 10mg BID.    #FEN  - Advanced to regular diet after speech and swallow.    #DVT PPX  - Enoxaparin - pt takes this at home.    #Code Status  - Full code.    For Followup:  [ ] MRI  [ ] TTE  [ ] f/u Neuro recs

## 2019-02-15 NOTE — CHART NOTE - NSCHARTNOTEFT_GEN_A_CORE
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    The patient had a code stroke in the ED and was given full dose tPA.  He was transferred to the MICU    ASSESSMENT & PLAN:     59M from [facility] h/o schizophrenia, recurrent DVT s/p IVC filter, seizure disorder, h/o severe C4-5 stenosis c/b cord compression s/p discectomy and fusion adm 2/14 for dysarthria concerning for acute CVA s/p tPA.        For Followup:          Vital Signs Last 24 Hrs  T(C): 36.7 (15 Feb 2019 08:00), Max: 37.1 (14 Feb 2019 23:00)  T(F): 98.1 (15 Feb 2019 08:00), Max: 98.7 (14 Feb 2019 23:00)  HR: 97 (15 Feb 2019 08:00) (61 - 97)  BP: 146/72 (15 Feb 2019 08:00) (125/70 - 169/84)  BP(mean): 87 (15 Feb 2019 08:00) (80 - 96)  RR: 21 (15 Feb 2019 08:00) (12 - 21)  SpO2: 95% (15 Feb 2019 08:00) (95% - 100%)  I&O's Summary    14 Feb 2019 07:01  -  15 Feb 2019 07:00  --------------------------------------------------------  IN: 0 mL / OUT: 800 mL / NET: -800 mL    15 Feb 2019 07:01  -  15 Feb 2019 08:26  --------------------------------------------------------  IN: 240 mL / OUT: 300 mL / NET: -60 mL        MEDICATIONS  (STANDING):  atorvastatin 80 milliGRAM(s) Oral at bedtime  benztropine 1 milliGRAM(s) Oral two times a day  carBAMazepine Chewable 100 milliGRAM(s) Chew two times a day  haloperidol     Tablet 10 milliGRAM(s) Oral two times a day  levETIRAcetam 1000 milliGRAM(s) Oral two times a day  memantine 5 milliGRAM(s) Oral at bedtime  pantoprazole    Tablet 40 milliGRAM(s) Oral before breakfast  QUEtiapine 200 milliGRAM(s) Oral two times a day    MEDICATIONS  (PRN):        LABS                                            15.5                  Neurophils% (auto):   53.5   (02-15 @ 05:00):    4.12 )-----------(178          Lymphocytes% (auto):  30.6                                          46.1                   Eosinphils% (auto):   3.6      Manual%: Neutrophils x    ; Lymphocytes x    ; Eosinophils x    ; Bands%: x    ; Blasts x                                    138    |  98     |  13                  Calcium: 9.8   / iCa: x      (02-15 @ 02:00)    ----------------------------<  92        Magnesium: 2.0                              4.2     |  26     |  1.03             Phosphorous: 3.2      TPro  8.3    /  Alb  4.8    /  TBili  0.4    /  DBili  x      /  AST  18     /  ALT  28     /  AlkPhos  142    15 Feb 2019 02:00    ( 02-15 @ 05:00 )   PT: 12.4 SEC;   INR: 1.11   aPTT: 26.4 SEC MICU Transfer Note    Transfer from: MICU    Transfer to: (  ) Medicine    ( x ) Telemetry     (   ) RCU        (    ) Palliative         (   ) Stroke Unit          (   ) __________________    Accepting physician:      MICU COURSE:    59M from [facility] h/o schizophrenia, recurrent DVT s/p IVC filter, seizure disorder, h/o severe C4-5 stenosis c/b cord compression s/p discectomy and fusion adm 2/14 for dysarthria concerning for acute CVA s/p tPA.    The patient had a code stroke in the ED and was given full dose tPA.  He was transferred to the MICU for q1h neuro checks.  The patient's RUE/RLE weakness improved, but his dysarthria did not.  His mental status remained at baseline.  He passed a speech and swallow (bedside) and was restarted on his psych medications.      ASSESSMENT & PLAN:     59M from [facility] h/o schizophrenia, recurrent DVT s/p IVC filter, seizure disorder, h/o severe C4-5 stenosis c/b cord compression s/p discectomy and fusion adm 2/14 for dysarthria concerning for acute CVA s/p tPA.    #Neuro  Acute CVA  - S/p tPA 1330.  - Repeat CTH pending.  - Start aspirin if CTH negative.  - Continue atorvastatin 80 - increased from pt's home dose of 10mg qHS  - Neuro on board.  - MRI per Neuro.  - Pending TTE with bubble study.  - Monitor on tele.  - PT/OT.  - Pending formal speech and swallow.    C4-5 Stenosis  - Wheelchair / can take a few steps with assistance at baseline.    #CV  Recurrent DVT  - S/p IVC filter.  - Continue enoxaparin 40mg q day if CTH negative.    #Resp  - No acute issues.    #GI  - No acute issues.    #Renal  - No acute issues.    #ID  - No acute issues.    #Endo  - No acute issues.    #Heme  Anemia  - Mild, chronic, stable.  - Continue to monitor.    #FEN  - Soft diet, advance as tolerated.    #DVT PPX  - Holding for now pending CTH results.    #Code Status  - Full code.    For Followup:  [ ] start aspirin if CTH negative, per Neuro  [ ] restart enoxaparin if CTH negative, per Neuro          Vital Signs Last 24 Hrs  T(C): 36.7 (15 Feb 2019 08:00), Max: 37.1 (14 Feb 2019 23:00)  T(F): 98.1 (15 Feb 2019 08:00), Max: 98.7 (14 Feb 2019 23:00)  HR: 97 (15 Feb 2019 08:00) (61 - 97)  BP: 146/72 (15 Feb 2019 08:00) (125/70 - 169/84)  BP(mean): 87 (15 Feb 2019 08:00) (80 - 96)  RR: 21 (15 Feb 2019 08:00) (12 - 21)  SpO2: 95% (15 Feb 2019 08:00) (95% - 100%)  I&O's Summary    14 Feb 2019 07:01  -  15 Feb 2019 07:00  --------------------------------------------------------  IN: 0 mL / OUT: 800 mL / NET: -800 mL    15 Feb 2019 07:01  -  15 Feb 2019 08:26  --------------------------------------------------------  IN: 240 mL / OUT: 300 mL / NET: -60 mL        MEDICATIONS  (STANDING):  atorvastatin 80 milliGRAM(s) Oral at bedtime  benztropine 1 milliGRAM(s) Oral two times a day  carBAMazepine Chewable 100 milliGRAM(s) Chew two times a day  haloperidol     Tablet 10 milliGRAM(s) Oral two times a day  levETIRAcetam 1000 milliGRAM(s) Oral two times a day  memantine 5 milliGRAM(s) Oral at bedtime  pantoprazole    Tablet 40 milliGRAM(s) Oral before breakfast  QUEtiapine 200 milliGRAM(s) Oral two times a day    MEDICATIONS  (PRN):        LABS                                            15.5                  Neurophils% (auto):   53.5   (02-15 @ 05:00):    4.12 )-----------(178          Lymphocytes% (auto):  30.6                                          46.1                   Eosinphils% (auto):   3.6      Manual%: Neutrophils x    ; Lymphocytes x    ; Eosinophils x    ; Bands%: x    ; Blasts x                                    138    |  98     |  13                  Calcium: 9.8   / iCa: x      (02-15 @ 02:00)    ----------------------------<  92        Magnesium: 2.0                              4.2     |  26     |  1.03             Phosphorous: 3.2      TPro  8.3    /  Alb  4.8    /  TBili  0.4    /  DBili  x      /  AST  18     /  ALT  28     /  AlkPhos  142    15 Feb 2019 02:00    ( 02-15 @ 05:00 )   PT: 12.4 SEC;   INR: 1.11   aPTT: 26.4 SEC MICU Transfer Note    Transfer from: MICU    Transfer to: (  ) Medicine    ( x ) Telemetry     (   ) RCU        (    ) Palliative         (   ) Stroke Unit          (   ) __________________    Accepting physician:      MICU COURSE:    59M from [facility] h/o schizophrenia, recurrent DVT s/p IVC filter, seizure disorder, h/o severe C4-5 stenosis c/b cord compression s/p discectomy and fusion adm 2/14 for dysarthria concerning for acute CVA s/p tPA.    The patient had a code stroke in the ED and was given full dose tPA.  He was transferred to the MICU for q1h neuro checks.  The patient's RUE/RLE weakness improved, but his dysarthria did not.  His mental status remained at baseline.  He passed a speech and swallow (bedside) and was restarted on his psych medications.      ASSESSMENT & PLAN:     59M from [facility] h/o schizophrenia, recurrent DVT s/p IVC filter, seizure disorder, h/o severe C4-5 stenosis c/b cord compression s/p discectomy and fusion adm 2/14 for dysarthria concerning for acute CVA s/p tPA.    #Neuro  Acute CVA  - S/p tPA 1330.  - Repeat CTH pending.  - Start aspirin if CTH negative.  - Continue atorvastatin 80 - increased from pt's home dose of 10mg qHS  - Neuro on board.  - MRI per Neuro.  - Pending TTE with bubble study.  - Monitor on tele.  - PT/OT.  - Pending formal speech and swallow.    C4-5 Stenosis  - Wheelchair / can take a few steps with assistance at baseline.    #CV  Recurrent DVT  - S/p IVC filter.  - Continue enoxaparin 40mg q day if CTH negative.    #Resp  - No acute issues.    #GI  - No acute issues.    #Renal  - No acute issues.    #ID  - No acute issues.    #Endo  - No acute issues.    #Heme  Anemia  - Mild, chronic, stable.  - Continue to monitor.    #FEN  - Soft diet, advance as tolerated.    #DVT PPX  - Holding for now pending CTH results.    #Code Status  - Full code.    For Followup:  [ ] start aspirin if CTH negative, per Neuro  [ ] restart enoxaparin if CTH negative, per Neuro  [ ] MRI  [ ] TTE  [ ] f/u Neuro recs        Vital Signs Last 24 Hrs  T(C): 36.7 (15 Feb 2019 08:00), Max: 37.1 (14 Feb 2019 23:00)  T(F): 98.1 (15 Feb 2019 08:00), Max: 98.7 (14 Feb 2019 23:00)  HR: 97 (15 Feb 2019 08:00) (61 - 97)  BP: 146/72 (15 Feb 2019 08:00) (125/70 - 169/84)  BP(mean): 87 (15 Feb 2019 08:00) (80 - 96)  RR: 21 (15 Feb 2019 08:00) (12 - 21)  SpO2: 95% (15 Feb 2019 08:00) (95% - 100%)  I&O's Summary    14 Feb 2019 07:01  -  15 Feb 2019 07:00  --------------------------------------------------------  IN: 0 mL / OUT: 800 mL / NET: -800 mL    15 Feb 2019 07:01  -  15 Feb 2019 08:26  --------------------------------------------------------  IN: 240 mL / OUT: 300 mL / NET: -60 mL        MEDICATIONS  (STANDING):  atorvastatin 80 milliGRAM(s) Oral at bedtime  benztropine 1 milliGRAM(s) Oral two times a day  carBAMazepine Chewable 100 milliGRAM(s) Chew two times a day  haloperidol     Tablet 10 milliGRAM(s) Oral two times a day  levETIRAcetam 1000 milliGRAM(s) Oral two times a day  memantine 5 milliGRAM(s) Oral at bedtime  pantoprazole    Tablet 40 milliGRAM(s) Oral before breakfast  QUEtiapine 200 milliGRAM(s) Oral two times a day    MEDICATIONS  (PRN):        LABS                                            15.5                  Neurophils% (auto):   53.5   (02-15 @ 05:00):    4.12 )-----------(178          Lymphocytes% (auto):  30.6                                          46.1                   Eosinphils% (auto):   3.6      Manual%: Neutrophils x    ; Lymphocytes x    ; Eosinophils x    ; Bands%: x    ; Blasts x                                    138    |  98     |  13                  Calcium: 9.8   / iCa: x      (02-15 @ 02:00)    ----------------------------<  92        Magnesium: 2.0                              4.2     |  26     |  1.03             Phosphorous: 3.2      TPro  8.3    /  Alb  4.8    /  TBili  0.4    /  DBili  x      /  AST  18     /  ALT  28     /  AlkPhos  142    15 Feb 2019 02:00    ( 02-15 @ 05:00 )   PT: 12.4 SEC;   INR: 1.11   aPTT: 26.4 SEC MICU Transfer Note    Transfer from: MICU    Transfer to: (  ) Medicine    ( x ) Telemetry     (   ) RCU        (    ) Palliative         (   ) Stroke Unit          (   ) __________________    Accepting physician:      MICU COURSE:    59M from [facility] h/o schizophrenia, recurrent DVT s/p IVC filter, seizure disorder, h/o severe C4-5 stenosis c/b cord compression s/p discectomy and fusion adm 2/14 for dysarthria concerning for acute CVA s/p tPA.    The patient had a code stroke in the ED and was given full dose tPA.  He was transferred to the MICU for q1h neuro checks.  The patient's RUE/RLE weakness improved, but his dysarthria did not.  His mental status remained at baseline.  He passed a speech and swallow (bedside) and was restarted on his psych medications.      ASSESSMENT & PLAN:     59M from [facility] h/o schizophrenia, recurrent DVT s/p IVC filter, seizure disorder, h/o severe C4-5 stenosis c/b cord compression s/p discectomy and fusion adm 2/14 for dysarthria concerning for acute CVA s/p tPA.    #Neuro  Acute CVA  - S/p tPA 1330.  - Repeat CTH pending.  - Start aspirin if CTH negative.  - Continue atorvastatin 80 - increased from pt's home dose of 10mg qHS  - Neuro on board.  - MRI per Neuro.  - Pending TTE with bubble study.  - Monitor on tele.  - PT/OT.  - Pending formal speech and swallow.    Seizure Disorder  - Continue carbamazepine, levetiracetam.    C4-5 Stenosis  - Wheelchair / can take a few steps with assistance at baseline.    #CV  Recurrent DVT  - S/p IVC filter.  - Continue enoxaparin 40mg q day if CTH negative.    #Resp  - No acute issues.    #GI  - No acute issues.    #Renal  - No acute issues.    #ID  - No acute issues.    #Endo  - No acute issues.    #Heme  Anemia  - Mild, chronic, stable.  - Continue to monitor.    #Psych  Schizophrenia  - Continue home meds - quetiapine 200mg BID, haloperidol 10mg BID.    #FEN  - Soft diet, advance as tolerated.    #DVT PPX  - Holding for now pending CTH results.    #Code Status  - Full code.    For Followup:  [ ] start aspirin if CTH negative, per Neuro  [ ] restart enoxaparin if CTH negative, per Neuro  [ ] MRI  [ ] TTE  [ ] f/u Neuro recs        Vital Signs Last 24 Hrs  T(C): 36.7 (15 Feb 2019 08:00), Max: 37.1 (14 Feb 2019 23:00)  T(F): 98.1 (15 Feb 2019 08:00), Max: 98.7 (14 Feb 2019 23:00)  HR: 97 (15 Feb 2019 08:00) (61 - 97)  BP: 146/72 (15 Feb 2019 08:00) (125/70 - 169/84)  BP(mean): 87 (15 Feb 2019 08:00) (80 - 96)  RR: 21 (15 Feb 2019 08:00) (12 - 21)  SpO2: 95% (15 Feb 2019 08:00) (95% - 100%)  I&O's Summary    14 Feb 2019 07:01  -  15 Feb 2019 07:00  --------------------------------------------------------  IN: 0 mL / OUT: 800 mL / NET: -800 mL    15 Feb 2019 07:01  -  15 Feb 2019 08:26  --------------------------------------------------------  IN: 240 mL / OUT: 300 mL / NET: -60 mL        MEDICATIONS  (STANDING):  atorvastatin 80 milliGRAM(s) Oral at bedtime  benztropine 1 milliGRAM(s) Oral two times a day  carBAMazepine Chewable 100 milliGRAM(s) Chew two times a day  haloperidol     Tablet 10 milliGRAM(s) Oral two times a day  levETIRAcetam 1000 milliGRAM(s) Oral two times a day  memantine 5 milliGRAM(s) Oral at bedtime  pantoprazole    Tablet 40 milliGRAM(s) Oral before breakfast  QUEtiapine 200 milliGRAM(s) Oral two times a day    MEDICATIONS  (PRN):        LABS                                            15.5                  Neurophils% (auto):   53.5   (02-15 @ 05:00):    4.12 )-----------(178          Lymphocytes% (auto):  30.6                                          46.1                   Eosinphils% (auto):   3.6      Manual%: Neutrophils x    ; Lymphocytes x    ; Eosinophils x    ; Bands%: x    ; Blasts x                                    138    |  98     |  13                  Calcium: 9.8   / iCa: x      (02-15 @ 02:00)    ----------------------------<  92        Magnesium: 2.0                              4.2     |  26     |  1.03             Phosphorous: 3.2      TPro  8.3    /  Alb  4.8    /  TBili  0.4    /  DBili  x      /  AST  18     /  ALT  28     /  AlkPhos  142    15 Feb 2019 02:00    ( 02-15 @ 05:00 )   PT: 12.4 SEC;   INR: 1.11   aPTT: 26.4 SEC MICU Transfer Note    Transfer from: MICU    Transfer to: (  ) Medicine    ( x ) Telemetry     (   ) RCU        (    ) Palliative         (   ) Stroke Unit          (   ) __________________    Accepting physician: Mitul Britton      MICU COURSE:    59M from [facility] h/o schizophrenia, recurrent DVT s/p IVC filter, seizure disorder, h/o severe C4-5 stenosis c/b cord compression s/p discectomy and fusion adm 2/14 for dysarthria concerning for acute CVA s/p tPA.    The patient had a code stroke in the ED and was given full dose tPA.  He was transferred to the MICU for q1h neuro checks.  The patient's RUE/RLE weakness improved, but his dysarthria did not.  His mental status remained at baseline.  He passed a speech and swallow (bedside) and was restarted on his psych medications.  CTH with CVA but no hemorrhagic transformation.      ASSESSMENT & PLAN:     59M from [facility] h/o schizophrenia, recurrent DVT s/p IVC filter, seizure disorder, h/o severe C4-5 stenosis c/b cord compression s/p discectomy and fusion adm 2/14 for dysarthria concerning for acute CVA s/p tPA.    #Neuro  Acute CVA  - S/p tPA 1330.  - Repeat CTH with evolving L MCA stroke without hemorrhagic transformation.  - Aspirin started.  - Continue atorvastatin 80 - increased from pt's home dose of 10mg qHS  - Neuro on board.  - MRI per Neuro.  - Pending TTE with bubble study.  - Monitor on tele.  - PT/OT.    Seizure Disorder  - Continue carbamazepine, levetiracetam.    C4-5 Stenosis  - Wheelchair / can take a few steps with assistance at baseline.    #CV  Recurrent DVT  - S/p IVC filter.  - Restarted enoxaparin 40mg q day, CTH no hemorrhage.    #Resp  - No acute issues.    #GI  - No acute issues.    #Renal  - No acute issues.    #ID  - No acute issues.    #Endo  - No acute issues.    #Heme  Anemia  - Mild, chronic, stable.  - Continue to monitor.    #Psych  Schizophrenia  - Continue home meds - quetiapine 200mg BID, haloperidol 10mg BID.    #FEN  - Advanced to regular diet after speech and swallow.    #DVT PPX  - Enoxaparin - pt takes this at home.    #Code Status  - Full code.    For Followup:  [ ] MRI  [ ] TTE  [ ] f/u Neuro recs        Vital Signs Last 24 Hrs  T(C): 36.7 (15 Feb 2019 08:00), Max: 37.1 (14 Feb 2019 23:00)  T(F): 98.1 (15 Feb 2019 08:00), Max: 98.7 (14 Feb 2019 23:00)  HR: 97 (15 Feb 2019 08:00) (61 - 97)  BP: 146/72 (15 Feb 2019 08:00) (125/70 - 169/84)  BP(mean): 87 (15 Feb 2019 08:00) (80 - 96)  RR: 21 (15 Feb 2019 08:00) (12 - 21)  SpO2: 95% (15 Feb 2019 08:00) (95% - 100%)  I&O's Summary    14 Feb 2019 07:01  -  15 Feb 2019 07:00  --------------------------------------------------------  IN: 0 mL / OUT: 800 mL / NET: -800 mL    15 Feb 2019 07:01  -  15 Feb 2019 08:26  --------------------------------------------------------  IN: 240 mL / OUT: 300 mL / NET: -60 mL        MEDICATIONS  (STANDING):  atorvastatin 80 milliGRAM(s) Oral at bedtime  benztropine 1 milliGRAM(s) Oral two times a day  carBAMazepine Chewable 100 milliGRAM(s) Chew two times a day  haloperidol     Tablet 10 milliGRAM(s) Oral two times a day  levETIRAcetam 1000 milliGRAM(s) Oral two times a day  memantine 5 milliGRAM(s) Oral at bedtime  pantoprazole    Tablet 40 milliGRAM(s) Oral before breakfast  QUEtiapine 200 milliGRAM(s) Oral two times a day    MEDICATIONS  (PRN):        LABS                                            15.5                  Neurophils% (auto):   53.5   (02-15 @ 05:00):    4.12 )-----------(178          Lymphocytes% (auto):  30.6                                          46.1                   Eosinphils% (auto):   3.6      Manual%: Neutrophils x    ; Lymphocytes x    ; Eosinophils x    ; Bands%: x    ; Blasts x                                    138    |  98     |  13                  Calcium: 9.8   / iCa: x      (02-15 @ 02:00)    ----------------------------<  92        Magnesium: 2.0                              4.2     |  26     |  1.03             Phosphorous: 3.2      TPro  8.3    /  Alb  4.8    /  TBili  0.4    /  DBili  x      /  AST  18     /  ALT  28     /  AlkPhos  142    15 Feb 2019 02:00    ( 02-15 @ 05:00 )   PT: 12.4 SEC;   INR: 1.11   aPTT: 26.4 SEC

## 2019-02-16 DIAGNOSIS — I82.629 ACUTE EMBOLISM AND THROMBOSIS OF DEEP VEINS OF UNSPECIFIED UPPER EXTREMITY: ICD-10-CM

## 2019-02-16 DIAGNOSIS — R56.9 UNSPECIFIED CONVULSIONS: ICD-10-CM

## 2019-02-16 DIAGNOSIS — F20.9 SCHIZOPHRENIA, UNSPECIFIED: ICD-10-CM

## 2019-02-16 DIAGNOSIS — I63.9 CEREBRAL INFARCTION, UNSPECIFIED: ICD-10-CM

## 2019-02-16 LAB
ALBUMIN SERPL ELPH-MCNC: 4.4 G/DL — SIGNIFICANT CHANGE UP (ref 3.3–5)
ALP SERPL-CCNC: 132 U/L — HIGH (ref 40–120)
ALT FLD-CCNC: 27 U/L — SIGNIFICANT CHANGE UP (ref 4–41)
ANION GAP SERPL CALC-SCNC: 16 MMO/L — HIGH (ref 7–14)
APTT BLD: 24.6 SEC — LOW (ref 27.5–36.3)
AST SERPL-CCNC: 20 U/L — SIGNIFICANT CHANGE UP (ref 4–40)
BASOPHILS # BLD AUTO: 0.01 K/UL — SIGNIFICANT CHANGE UP (ref 0–0.2)
BASOPHILS NFR BLD AUTO: 0.2 % — SIGNIFICANT CHANGE UP (ref 0–2)
BILIRUB SERPL-MCNC: 0.3 MG/DL — SIGNIFICANT CHANGE UP (ref 0.2–1.2)
BUN SERPL-MCNC: 15 MG/DL — SIGNIFICANT CHANGE UP (ref 7–23)
CALCIUM SERPL-MCNC: 9.6 MG/DL — SIGNIFICANT CHANGE UP (ref 8.4–10.5)
CHLORIDE SERPL-SCNC: 100 MMOL/L — SIGNIFICANT CHANGE UP (ref 98–107)
CO2 SERPL-SCNC: 23 MMOL/L — SIGNIFICANT CHANGE UP (ref 22–31)
CREAT SERPL-MCNC: 0.97 MG/DL — SIGNIFICANT CHANGE UP (ref 0.5–1.3)
EOSINOPHIL # BLD AUTO: 0.18 K/UL — SIGNIFICANT CHANGE UP (ref 0–0.5)
EOSINOPHIL NFR BLD AUTO: 4.1 % — SIGNIFICANT CHANGE UP (ref 0–6)
GLUCOSE BLDC GLUCOMTR-MCNC: 114 MG/DL — HIGH (ref 70–99)
GLUCOSE SERPL-MCNC: 99 MG/DL — SIGNIFICANT CHANGE UP (ref 70–99)
HCT VFR BLD CALC: 48 % — SIGNIFICANT CHANGE UP (ref 39–50)
HGB BLD-MCNC: 15.7 G/DL — SIGNIFICANT CHANGE UP (ref 13–17)
IMM GRANULOCYTES NFR BLD AUTO: 0.2 % — SIGNIFICANT CHANGE UP (ref 0–1.5)
INR BLD: 1.14 — SIGNIFICANT CHANGE UP (ref 0.88–1.17)
LYMPHOCYTES # BLD AUTO: 1.46 K/UL — SIGNIFICANT CHANGE UP (ref 1–3.3)
LYMPHOCYTES # BLD AUTO: 33.1 % — SIGNIFICANT CHANGE UP (ref 13–44)
MAGNESIUM SERPL-MCNC: 2 MG/DL — SIGNIFICANT CHANGE UP (ref 1.6–2.6)
MANUAL SMEAR VERIFICATION: SIGNIFICANT CHANGE UP
MCHC RBC-ENTMCNC: 32.1 PG — SIGNIFICANT CHANGE UP (ref 27–34)
MCHC RBC-ENTMCNC: 32.7 % — SIGNIFICANT CHANGE UP (ref 32–36)
MCV RBC AUTO: 98.2 FL — SIGNIFICANT CHANGE UP (ref 80–100)
MONOCYTES # BLD AUTO: 0.54 K/UL — SIGNIFICANT CHANGE UP (ref 0–0.9)
MONOCYTES NFR BLD AUTO: 12.2 % — SIGNIFICANT CHANGE UP (ref 2–14)
NEUTROPHILS # BLD AUTO: 2.21 K/UL — SIGNIFICANT CHANGE UP (ref 1.8–7.4)
NEUTROPHILS NFR BLD AUTO: 50.2 % — SIGNIFICANT CHANGE UP (ref 43–77)
NRBC # FLD: 0 K/UL — LOW (ref 25–125)
PHOSPHATE SERPL-MCNC: 3.2 MG/DL — SIGNIFICANT CHANGE UP (ref 2.5–4.5)
PLATELET # BLD AUTO: SIGNIFICANT CHANGE UP K/UL (ref 150–400)
PLATELET CLUMP BLD QL SMEAR: SIGNIFICANT CHANGE UP
PMV BLD: 11.1 FL — SIGNIFICANT CHANGE UP (ref 7–13)
POTASSIUM SERPL-MCNC: 4.2 MMOL/L — SIGNIFICANT CHANGE UP (ref 3.5–5.3)
POTASSIUM SERPL-SCNC: 4.2 MMOL/L — SIGNIFICANT CHANGE UP (ref 3.5–5.3)
PROT SERPL-MCNC: 7.6 G/DL — SIGNIFICANT CHANGE UP (ref 6–8.3)
PROTHROM AB SERPL-ACNC: 13.1 SEC — SIGNIFICANT CHANGE UP (ref 9.8–13.1)
RBC # BLD: 4.89 M/UL — SIGNIFICANT CHANGE UP (ref 4.2–5.8)
RBC # FLD: 12.4 % — SIGNIFICANT CHANGE UP (ref 10.3–14.5)
SODIUM SERPL-SCNC: 139 MMOL/L — SIGNIFICANT CHANGE UP (ref 135–145)
WBC # BLD: 4.41 K/UL — SIGNIFICANT CHANGE UP (ref 3.8–10.5)
WBC # FLD AUTO: 4.41 K/UL — SIGNIFICANT CHANGE UP (ref 3.8–10.5)

## 2019-02-16 PROCEDURE — 70450 CT HEAD/BRAIN W/O DYE: CPT | Mod: 26

## 2019-02-16 PROCEDURE — 93970 EXTREMITY STUDY: CPT | Mod: 26

## 2019-02-16 PROCEDURE — 99233 SBSQ HOSP IP/OBS HIGH 50: CPT

## 2019-02-16 RX ORDER — HALOPERIDOL DECANOATE 100 MG/ML
1 INJECTION INTRAMUSCULAR ONCE
Qty: 0 | Refills: 0 | Status: COMPLETED | OUTPATIENT
Start: 2019-02-16 | End: 2019-02-16

## 2019-02-16 RX ORDER — LEVETIRACETAM 250 MG/1
1000 TABLET, FILM COATED ORAL ONCE
Qty: 0 | Refills: 0 | Status: COMPLETED | OUTPATIENT
Start: 2019-02-16 | End: 2019-02-17

## 2019-02-16 RX ORDER — HALOPERIDOL DECANOATE 100 MG/ML
1 INJECTION INTRAMUSCULAR ONCE
Qty: 0 | Refills: 0 | Status: DISCONTINUED | OUTPATIENT
Start: 2019-02-16 | End: 2019-02-16

## 2019-02-16 RX ADMIN — Medication 1 MILLIGRAM(S): at 06:39

## 2019-02-16 RX ADMIN — HALOPERIDOL DECANOATE 1 MILLIGRAM(S): 100 INJECTION INTRAMUSCULAR at 21:54

## 2019-02-16 RX ADMIN — Medication 100 MILLIGRAM(S): at 06:38

## 2019-02-16 RX ADMIN — LEVETIRACETAM 1000 MILLIGRAM(S): 250 TABLET, FILM COATED ORAL at 06:39

## 2019-02-16 RX ADMIN — MEMANTINE HYDROCHLORIDE 5 MILLIGRAM(S): 10 TABLET ORAL at 00:02

## 2019-02-16 RX ADMIN — ENOXAPARIN SODIUM 40 MILLIGRAM(S): 100 INJECTION SUBCUTANEOUS at 18:21

## 2019-02-16 RX ADMIN — HALOPERIDOL DECANOATE 10 MILLIGRAM(S): 100 INJECTION INTRAMUSCULAR at 06:39

## 2019-02-16 RX ADMIN — ATORVASTATIN CALCIUM 80 MILLIGRAM(S): 80 TABLET, FILM COATED ORAL at 00:02

## 2019-02-16 RX ADMIN — SENNA PLUS 2 TABLET(S): 8.6 TABLET ORAL at 00:02

## 2019-02-16 RX ADMIN — QUETIAPINE FUMARATE 200 MILLIGRAM(S): 200 TABLET, FILM COATED ORAL at 06:38

## 2019-02-16 RX ADMIN — PANTOPRAZOLE SODIUM 40 MILLIGRAM(S): 20 TABLET, DELAYED RELEASE ORAL at 06:39

## 2019-02-16 RX ADMIN — HALOPERIDOL DECANOATE 1 MILLIGRAM(S): 100 INJECTION INTRAMUSCULAR at 20:51

## 2019-02-16 NOTE — PROVIDER CONTACT NOTE (CHANGE IN STATUS NOTIFICATION) - ASSESSMENT
Awake, unable to determine orientation. Patient not speaking. Not following all commands. Appears to have no right arm movement. Code stroke called.  Upon return Patient appears to be in catatonic state, withdrawn and not following commands.

## 2019-02-16 NOTE — PROVIDER CONTACT NOTE (OTHER) - SITUATION
pt lethargic but arouses to repeated stimulation and encouragement. this has been pt baseline all day but not upon admission. pt refusing stroke neuro and general assessment and evening medications

## 2019-02-16 NOTE — PROGRESS NOTE ADULT - SUBJECTIVE AND OBJECTIVE BOX
Neurology Follow up note    Patient is a 59y old  Male who presents with a chief complaint of code stroke (15 Feb 2019 15:07)    Subjective:Interval History - Acute event, code stroke called on 2/16/19 at 12:12 PM, LKN 6:00 AM 2/16/19. Patient got tPa and symptomatically has been improving since 2/14/19    Objective:   Vital Signs Last 24 Hrs  T(C): 36.3 (16 Feb 2019 06:32), Max: 37 (15 Feb 2019 16:00)  T(F): 97.3 (16 Feb 2019 06:32), Max: 98.6 (15 Feb 2019 16:00)  HR: 85 (16 Feb 2019 06:32) (70 - 88)  BP: 130/81 (16 Feb 2019 06:32) (113/76 - 132/76)  BP(mean): 84 (15 Feb 2019 18:00) (82 - 87)  RR: 16 (16 Feb 2019 06:32) (12 - 22)  SpO2: 99% (16 Feb 2019 06:32) (98% - 100%)    General Exam:   General appearance: No acute distress                 Cardiovascular: Pedal dorsalis pulses intact bilaterally    Neurological Exam:  Mental Status: Arouses to painful stimuli, severely dysarthric not following commands.   Cranial Nerves:  R eye sluggishly reactive, L eye reactive 2 mm.  No APD. No gross facial asymmetry.    Motor:   Tone: normal.                  Strength: LUE AG, RUE falls to bed immediately. LLE - not cooperating but is moving it antigravity on his own, RLE not moving AG.              Dysmeria: unable to assess  No truncal ataxia.    Tremor: No resting, postural or action tremor.  No myoclonus  Toes mute  Gait: deferred    Other:  02-16    139  |  100  |  15  ----------------------------<  99  4.2   |  23  |  0.97    Ca    9.6      16 Feb 2019 06:30  Phos  3.2     02-16  Mg     2.0     02-16    TPro  7.6  /  Alb  4.4  /  TBili  0.3  /  DBili  x   /  AST  20  /  ALT  27  /  AlkPhos  132<H>  02-16    LIVER FUNCTIONS - ( 16 Feb 2019 06:30 )  Alb: 4.4 g/dL / Pro: 7.6 g/dL / ALK PHOS: 132 u/L / ALT: 27 u/L / AST: 20 u/L / GGT: x                      15.7   4.41  )-----------( --       ( 16 Feb 2019 06:30 )             48.0     MEDICATIONS  (STANDING):  aspirin enteric coated 81 milliGRAM(s) Oral daily  atorvastatin 80 milliGRAM(s) Oral at bedtime  benztropine 1 milliGRAM(s) Oral two times a day  carBAMazepine Chewable 100 milliGRAM(s) Chew two times a day  docusate sodium 200 milliGRAM(s) Oral daily  enoxaparin Injectable 40 milliGRAM(s) SubCutaneous daily  haloperidol     Tablet 10 milliGRAM(s) Oral two times a day  levETIRAcetam 1000 milliGRAM(s) Oral two times a day  memantine 5 milliGRAM(s) Oral at bedtime  pantoprazole    Tablet 40 milliGRAM(s) Oral before breakfast  QUEtiapine 200 milliGRAM(s) Oral two times a day  senna 2 Tablet(s) Oral at bedtime    MEDICATIONS  (PRN):  bisacodyl 5 milliGRAM(s) Oral daily PRN Constipation  polyethylene glycol 3350 17 Gram(s) Oral daily PRN Constipation

## 2019-02-16 NOTE — STROKE CODE NOTE - NIH STROKE SCALE: 1A. LEVEL OF CONSCIOUSNESS, QM
(0) Alert; keenly responsive
(2) Not alert; requires repeated stimulation to attend, or is obtunded and requires strong or painful stimulation to make movements (not stereotyped)

## 2019-02-16 NOTE — STROKE CODE NOTE - NIH STROKE SCALE: 6B. MOTOR LEG, RIGHT, QM
(3) No effort against gravity; leg falls to bed immediately
(0) No drift; leg holds 30 degree position for full 5 secs

## 2019-02-16 NOTE — STROKE CODE NOTE - NIH STROKE SCALE: 10. DYSARTHRIA, QM
(2) Severe dysarthria; patients speech is so slurred as to be unintelligible in the absence of or out of proportion to any dysphasia, or is mute/anarthric
(2) Severe dysarthria; patients speech is so slurred as to be unintelligible in the absence of or out of proportion to any dysphasia, or is mute/anarthric

## 2019-02-16 NOTE — CHART NOTE - NSCHARTNOTEFT_GEN_A_CORE
Called to see patient presented with sluggish in response  to calling his name.    Vital Signs Last 24 Hrs  T(C): 36.3 (16 Feb 2019 06:32), Max: 37 (15 Feb 2019 16:00)  T(F): 97.3 (16 Feb 2019 06:32), Max: 98.6 (15 Feb 2019 16:00)  HR: 85 (16 Feb 2019 06:32) (70 - 88)  BP: 130/81 (16 Feb 2019 06:32) (113/76 - 132/76)  BP(mean): 84 (15 Feb 2019 18:00) (82 - 87)  RR: 16 (16 Feb 2019 06:32) (12 - 22)  SpO2: 99% (16 Feb 2019 06:32) (98% - 100%)  On tele sinus rhythm  PE:  PHYSICAL EXAM:    GENERAL: NAD, well-groomed, well-developed  HEAD:  Atraumatic, Normocephalic  EYES: EOMI, PERRLA, conjunctiva and sclera clear  ENMT: No tonsillar erythema, exudates, or enlargement; Moist mucous membranes, Good dentition, No lesions  NECK: Supple, No JVD, Normal thyroid  NERVOUS SYSTEM:  awake, turn sluggish to stimulants,  R Pupil reactive sluggish to light, unable to raise his R arm, unable to respond to other commands, DTR- poor  CHEST/LUNG: Clear to percussion bilaterally; No rales, rhonchi, wheezing, or rubs  HEART: Regular rate and rhythm; No murmurs, rubs, or gallops  ABDOMEN: Soft, Nontender, Nondistended; Bowel sounds present                        15.7   4.41  )-----------( --       ( 16 Feb 2019 06:30 )             48.0     16 Feb 2019 06:30    139    |  100    |  15     ----------------------------<  99     4.2     |  23     |  0.97     Ca    9.6        16 Feb 2019 06:30  Phos  3.2       16 Feb 2019 06:30  Mg     2.0       16 Feb 2019 06:30    TPro  7.6    /  Alb  4.4    /  TBili  0.3    /  DBili  x      /  AST  20     /  ALT  27     /  AlkPhos  132    16 Feb 2019 06:30    LIVER FUNCTIONS - ( 16 Feb 2019 06:30 )  Alb: 4.4 g/dL / Pro: 7.6 g/dL / ALK PHOS: 132 u/L / ALT: 27 u/L / AST: 20 u/L / GGT: x           PT/INR - ( 16 Feb 2019 06:30 )   PT: 13.1 SEC;   INR: 1.14          PTT - ( 16 Feb 2019 06:30 )  PTT:24.6 SEC  CAPILLARY BLOOD GLUCOSE  114 (16 Feb 2019 12:21)      POCT Blood Glucose.: 114 mg/dL (16 Feb 2019 12:18)  A/P- high suspicsion of CVa    Code stroke called.  Head CT ordered stat.  neuro by bedside.      Valerie FERNANDEZ

## 2019-02-16 NOTE — PROGRESS NOTE ADULT - PROBLEM SELECTOR PLAN 1
Acute CVA  - S/p tPA   -s/p code stroke today   repeat CT pending   previous CT show    evolving L MCA stroke without hemorrhagic transformation.  - Aspirin /statin/ MRI   - Pending TTE with bubble study.  - Monitor on tele.  - PT/OT.

## 2019-02-16 NOTE — PROGRESS NOTE ADULT - SUBJECTIVE AND OBJECTIVE BOX
59M from [facility] h/o schizophrenia, recurrent DVT s/p IVC filter, seizure disorder, h/o severe C4-5 stenosis c/b cord compression s/p discectomy and fusion adm 2/14 for dysarthria concerning for acute CVA s/p tPA.    code stroke was called as patient was confuse and not moving right upper extremity , stat CT is still pending     MEDICATIONS  (STANDING):  aspirin enteric coated 81 milliGRAM(s) Oral daily  atorvastatin 80 milliGRAM(s) Oral at bedtime  benztropine 1 milliGRAM(s) Oral two times a day  carBAMazepine Chewable 100 milliGRAM(s) Chew two times a day  docusate sodium 200 milliGRAM(s) Oral daily  enoxaparin Injectable 40 milliGRAM(s) SubCutaneous daily  haloperidol     Tablet 10 milliGRAM(s) Oral two times a day  levETIRAcetam 1000 milliGRAM(s) Oral two times a day  memantine 5 milliGRAM(s) Oral at bedtime  pantoprazole    Tablet 40 milliGRAM(s) Oral before breakfast  QUEtiapine 200 milliGRAM(s) Oral two times a day  senna 2 Tablet(s) Oral at bedtime    MEDICATIONS  (PRN):  bisacodyl 5 milliGRAM(s) Oral daily PRN Constipation  polyethylene glycol 3350 17 Gram(s) Oral daily PRN Constipation      Vital Signs Last 24 Hrs  T(C): 36.3 (16 Feb 2019 06:32), Max: 37 (15 Feb 2019 16:00)  T(F): 97.3 (16 Feb 2019 06:32), Max: 98.6 (15 Feb 2019 16:00)  HR: 85 (16 Feb 2019 06:32) (70 - 88)  BP: 130/81 (16 Feb 2019 06:32) (113/76 - 132/76)  BP(mean): 84 (15 Feb 2019 18:00) (82 - 87)  RR: 16 (16 Feb 2019 06:32) (12 - 22)  SpO2: 99% (16 Feb 2019 06:32) (98% - 100%)    General: confuse  Neck: Supple, no JVD  Chest/Lungs: CTA bilaterally, no wheezing, rales, rhonchi or rub  Heart: RRR, normal S1, S2, no murmurs or gallops  Abdomen: Soft, ND, NTTP, normoactive bowel sounds  Extremities: 2+ peripheral pulses b/l, no edema, clubbing or cyanosis  Skin: Warm, well-perfused, no rashes or lesions  Neurological:  confuse . 0/5 on right upper arm                           15.7   4.41  )-----------( --       ( 16 Feb 2019 06:30 )             48.0       02-16    139  |  100  |  15  ----------------------------<  99  4.2   |  23  |  0.97    Ca    9.6      16 Feb 2019 06:30  Phos  3.2     02-16  Mg     2.0     02-16    TPro  7.6  /  Alb  4.4  /  TBili  0.3  /  DBili  x   /  AST  20  /  ALT  27  /  AlkPhos  132<H>  02-16

## 2019-02-16 NOTE — PROGRESS NOTE ADULT - ASSESSMENT
59 year old RH AA male from [facility] with PMHx of schizophrenia, DVT (? unknown laterality, on lovenox 40 subq daily), seizure disorder, s/p surgical intervention in June 2018 c4/c5 facet joint, s/p discectomy and cord compression in 5/2018 who presents to ED for acute onset of severely slurred speech, RUE weakness and R facial droop, LKN at 12:00 pm on 2/14/19, presented to ED at 12:56 PM. At baseline he is in a wheelchair due to previous surgeries but is able to get up and walk a few steps but needs assistance. Per aide, patient was at his baseline and had no complaints, he trying to stand up but fell onto the aide instead because he wasn't able to stand, was slurring speech and unable to move his right arm. When evaluated, his was able to move his RUE AG, but had drift, and RLE was AG with no drift, but he was still severely dysarthric and selectively following commands, also had ?slight nasolabial fold flattening on R. tPa was discussed with his caregiver and the patient, after explaining risks and benefits the patient and caregiver both agreed for tPa, received on 2/14, was in MICU and now transferred to floor. Code stroke called on 2/16 for decreases responsiveness and not moving his RUE, RLE, not verbalizing.     Plan  Imaging  [x] Repeat CT head in 24 hours - negative  [] code stroke CT - pending  [] MRI brain without contrast, MRA head and neck: pending    Studies  [] TTE with bubble study for possible valvular heart disease - pending  [x] telemetry    Meds  [x] ASA 81- resumed after initial negative CTh  [x] DVT ppx lovenox - resumed after initial negative CTh  [x] lipitor 80 mg PO QHS  [x] resume rest of home medications    Labs  [x] HbA1c 5.5  [x] LDL: 80    Other  Diet: regular  PT: dispo TBD

## 2019-02-16 NOTE — PROGRESS NOTE ADULT - ASSESSMENT
59M from [facility] h/o schizophrenia, recurrent DVT s/p IVC filter, seizure disorder, h/o severe C4-5 stenosis c/b cord compression s/p discectomy and fusion adm 2/14 for dysarthria concerning for acute CVA s/p tPA.

## 2019-02-16 NOTE — STROKE CODE NOTE - NIH STROKE SCALE: 5B. MOTOR ARM, RIGHT, QM
(3) No effort against gravity; limb falls
(1) Drift; limb holds 90 (or 45) degrees, but drifts down before full 10 secs; does not hit bed or other support

## 2019-02-16 NOTE — PROVIDER CONTACT NOTE (CHANGE IN STATUS NOTIFICATION) - RECOMMENDATIONS
Repeat CT scan completed. Patient appears to be the same. Evening medications delayed to 8pm as per JIM Padilla. Continue telemetry monitoring.

## 2019-02-16 NOTE — PROVIDER CONTACT NOTE (OTHER) - ASSESSMENT
pt had no neuro deficits upon admission, would cooperate with encouragement. unable to fully assess pt at this time.

## 2019-02-17 LAB
ANION GAP SERPL CALC-SCNC: 16 MMO/L — HIGH (ref 7–14)
BUN SERPL-MCNC: 16 MG/DL — SIGNIFICANT CHANGE UP (ref 7–23)
CALCIUM SERPL-MCNC: 9.7 MG/DL — SIGNIFICANT CHANGE UP (ref 8.4–10.5)
CHLORIDE SERPL-SCNC: 101 MMOL/L — SIGNIFICANT CHANGE UP (ref 98–107)
CO2 SERPL-SCNC: 18 MMOL/L — LOW (ref 22–31)
CREAT SERPL-MCNC: 1.07 MG/DL — SIGNIFICANT CHANGE UP (ref 0.5–1.3)
GLUCOSE SERPL-MCNC: 90 MG/DL — SIGNIFICANT CHANGE UP (ref 70–99)
HCT VFR BLD CALC: 47.8 % — SIGNIFICANT CHANGE UP (ref 39–50)
HGB BLD-MCNC: 15.2 G/DL — SIGNIFICANT CHANGE UP (ref 13–17)
MAGNESIUM SERPL-MCNC: 1.9 MG/DL — SIGNIFICANT CHANGE UP (ref 1.6–2.6)
MCHC RBC-ENTMCNC: 31.8 % — LOW (ref 32–36)
MCHC RBC-ENTMCNC: 32.6 PG — SIGNIFICANT CHANGE UP (ref 27–34)
MCV RBC AUTO: 102.6 FL — HIGH (ref 80–100)
NRBC # FLD: 0 K/UL — LOW (ref 25–125)
PLATELET # BLD AUTO: 27 K/UL — LOW (ref 150–400)
PMV BLD: 10.3 FL — SIGNIFICANT CHANGE UP (ref 7–13)
POTASSIUM SERPL-MCNC: 4.6 MMOL/L — SIGNIFICANT CHANGE UP (ref 3.5–5.3)
POTASSIUM SERPL-SCNC: 4.6 MMOL/L — SIGNIFICANT CHANGE UP (ref 3.5–5.3)
RBC # BLD: 4.66 M/UL — SIGNIFICANT CHANGE UP (ref 4.2–5.8)
RBC # FLD: 12.5 % — SIGNIFICANT CHANGE UP (ref 10.3–14.5)
SODIUM SERPL-SCNC: 135 MMOL/L — SIGNIFICANT CHANGE UP (ref 135–145)
WBC # BLD: 4.37 K/UL — SIGNIFICANT CHANGE UP (ref 3.8–10.5)
WBC # FLD AUTO: 4.37 K/UL — SIGNIFICANT CHANGE UP (ref 3.8–10.5)

## 2019-02-17 PROCEDURE — 99233 SBSQ HOSP IP/OBS HIGH 50: CPT

## 2019-02-17 PROCEDURE — 74018 RADEX ABDOMEN 1 VIEW: CPT | Mod: 26

## 2019-02-17 PROCEDURE — 99223 1ST HOSP IP/OBS HIGH 75: CPT | Mod: GC

## 2019-02-17 RX ORDER — ENOXAPARIN SODIUM 100 MG/ML
90 INJECTION SUBCUTANEOUS
Qty: 0 | Refills: 0 | Status: DISCONTINUED | OUTPATIENT
Start: 2019-02-17 | End: 2019-03-15

## 2019-02-17 RX ORDER — LEVETIRACETAM 250 MG/1
1000 TABLET, FILM COATED ORAL ONCE
Qty: 0 | Refills: 0 | Status: COMPLETED | OUTPATIENT
Start: 2019-02-17 | End: 2019-02-17

## 2019-02-17 RX ADMIN — ENOXAPARIN SODIUM 90 MILLIGRAM(S): 100 INJECTION SUBCUTANEOUS at 18:27

## 2019-02-17 RX ADMIN — LEVETIRACETAM 400 MILLIGRAM(S): 250 TABLET, FILM COATED ORAL at 00:08

## 2019-02-17 RX ADMIN — Medication 2 MILLIGRAM(S): at 20:11

## 2019-02-17 NOTE — PROGRESS NOTE ADULT - ASSESSMENT
59M from [facility] h/o schizophrenia, recurrent DVT s/p IVC filter, seizure disorder, h/o severe C4-5 stenosis c/b cord compression s/p discectomy and fusion adm 2/14 for dysarthria concerning for acute CVA s/p tPA.

## 2019-02-17 NOTE — CONSULT NOTE ADULT - SUBJECTIVE AND OBJECTIVE BOX
Vascular Surgery Consult Note  Pager [phone]    HPI:  59-year-old man with schizophrenia from [facility], chronic RLE weakness, history of DVTs with IVC filter, on Xarelto presented with slurred speech, found to have an acute MCA stroke s/p tPA. Vascular surgery consulted for clarification of vascular history given his history of DVTs. Per telemetry PA, patient was combative and could not be examined. Patient cannot give history. Per chart review, patient had DVTs diagnosed in 2013, was started on coumadin, then switched to Xarelto in 2016. He had a filter placed sometime in 2017 (presumably for falls) according to imaging, but procedure does not appear to have been performed at [facility].       PAST MEDICAL & SURGICAL HISTORY:  Schizophrenia  Seizure disorder  Seizure  Paranoid schizophrenia  DVT (deep venous thrombosis)  History of cervical spinal surgery      ALLERGIES:  NKA      HOME MEDICATIONS:  acetaminophen 650 mg oral tablet: 1 tab(s) orally 3 times a day, As Needed (14 Feb 2019 15:44)  atorvastatin 10 mg oral tablet: 1 tab(s) orally once a day (14 Feb 2019 15:44)  benztropine 1 mg oral tablet: 1 tab(s) orally 2 times a day (14 Feb 2019 15:44)  bisacodyl 5 mg oral delayed release tablet: 1 tab(s) orally once a day, As Needed (14 Feb 2019 15:44)  carBAMazepine 100 mg oral tablet, chewable: 1 tab(s) orally 2 times a day (14 Feb 2019 15:44)  docusate sodium 50 mg oral capsule: 2 cap(s) orally once a day (14 Feb 2019 15:44)  enoxaparin 40 mg/0.4 mL injectable solution:  (14 Feb 2019 15:44)  haloperidol 10 mg oral tablet: 1 tab(s) orally 2 times a day (14 Feb 2019 15:44)  levETIRAcetam 1000 mg oral tablet: 1 tab(s) orally 2 times a day (14 Feb 2019 15:44)  memantine 5 mg oral tablet: 1 tab(s) orally once a day (at bedtime) (14 Feb 2019 15:44)  Multiple Vitamins oral tablet: 1 tab(s) orally once a day (14 Feb 2019 15:44)  omeprazole 40 mg oral delayed release capsule: 1 cap(s) orally once a day (14 Feb 2019 15:44)  polyethylene glycol 3350 oral powder for reconstitution: 17 gram(s) orally once a day, As Needed (14 Feb 2019 15:44)  QUEtiapine 200 mg oral tablet: 1 tab(s) orally 2 times a day (14 Feb 2019 15:44)  senna oral tablet: 2 tab(s) orally once a day (14 Feb 2019 15:44)    MEDICATIONS  (STANDING):  aspirin enteric coated 81 milliGRAM(s) Oral daily  atorvastatin 80 milliGRAM(s) Oral at bedtime  benztropine 1 milliGRAM(s) Oral two times a day  carBAMazepine Chewable 100 milliGRAM(s) Chew two times a day  docusate sodium 200 milliGRAM(s) Oral daily  enoxaparin Injectable 90 milliGRAM(s) SubCutaneous two times a day  haloperidol     Tablet 10 milliGRAM(s) Oral two times a day  levETIRAcetam 1000 milliGRAM(s) Oral two times a day  memantine 5 milliGRAM(s) Oral at bedtime  pantoprazole    Tablet 40 milliGRAM(s) Oral before breakfast  QUEtiapine 200 milliGRAM(s) Oral two times a day  senna 2 Tablet(s) Oral at bedtime      SOCIAL HISTORY:  Unable to obtain      FAMILY HISTORY:  Unable to obtain  ___________________________________________  REVIEW OF SYSTEMS:  Unable to obtain  ___________________________________________  PHYSICAL EXAM:  Vital Signs Last 24 Hrs  T(C): 36.8 (17 Feb 2019 09:34), Max: 36.8 (17 Feb 2019 09:34)  T(F): 98.3 (17 Feb 2019 09:34), Max: 98.3 (17 Feb 2019 09:34)  HR: 77 (17 Feb 2019 09:34) (77 - 88)  BP: 136/79 (17 Feb 2019 09:34) (119/68 - 136/79)  BP(mean): --  RR: 18 (17 Feb 2019 09:34) (17 - 18)  SpO2: 100% (17 Feb 2019 09:34) (96% - 100%)CAPILLARY BLOOD GLUCOSE  114 (16 Feb 2019 12:21)      POCT Blood Glucose.: 114 mg/dL (16 Feb 2019 12:18)      General: NAD, lethargic but arousable.  Neuro: Motor and sensory grossly intact with no focal deficits.  HEENT: Anicteric sclerae.  Respiratory: Unlabored breathing.   CVS: Regular rate and rhythm.  Abdomen: Soft, non-distended, non-tender.   Extremities: LE warm bilaterally w/ good capillary refill. Left 1+ DP pulse. Right 0+ DP pulse. No phlegmasia. No swelling.    ____________________________________________  LABS:  CBC Full  -  ( 17 Feb 2019 06:50 )  WBC Count : 4.37 K/uL  Hemoglobin : 15.2 g/dL  Hematocrit : 47.8 %  Platelet Count - Automated : 27 K/uL  Mean Cell Volume : 102.6 fL  Mean Cell Hemoglobin : 32.6 pg  Mean Cell Hemoglobin Concentration : 31.8 %    02-17    135  |  101  |  16  ----------------------------<  90  4.6   |  18<L>  |  1.07    Ca    9.7      17 Feb 2019 06:50  Phos  3.2     02-16  Mg     1.9     02-17    TPro  7.6  /  Alb  4.4  /  TBili  0.3  /  DBili  x   /  AST  20  /  ALT  27  /  AlkPhos  132<H>  02-16    LIVER FUNCTIONS - ( 16 Feb 2019 06:30 )  Alb: 4.4 g/dL / Pro: 7.6 g/dL / ALK PHOS: 132 u/L / ALT: 27 u/L / AST: 20 u/L / GGT: x           PT/INR - ( 16 Feb 2019 06:30 )   PT: 13.1 SEC;   INR: 1.14          PTT - ( 16 Feb 2019 06:30 )  PTT:24.6 SEC    ____________________________________________  RADIOLOGY:  VA Duplex Ext Veins Lower Comp, Bilat. (02.16.19 @ 13:56)   Age-indeterminate, non-occlusive thromboses noted in the  right common femoral, femoral, and popliteal veins.  Age-indeterminate, non-occlusive thrombosis noted in the  left popliteal vein.

## 2019-02-17 NOTE — PROGRESS NOTE ADULT - SUBJECTIVE AND OBJECTIVE BOX
59M from [facility] h/o schizophrenia, recurrent DVT s/p IVC filter, seizure disorder, h/o severe C4-5 stenosis c/b cord compression s/p discectomy and fusion adm 2/14 for dysarthria concerning for acute CVA s/p tPA.      patient seen and examine at bed side, today he moving all extremities , however non verbal and does not answer any questions, he follow all commands,.  as per nurse he didn't eat any food and seem depress   extremities rigid     MEDICATIONS  (STANDING):  aspirin enteric coated 81 milliGRAM(s) Oral daily  atorvastatin 80 milliGRAM(s) Oral at bedtime  benztropine 1 milliGRAM(s) Oral two times a day  carBAMazepine Chewable 100 milliGRAM(s) Chew two times a day  docusate sodium 200 milliGRAM(s) Oral daily  enoxaparin Injectable 90 milliGRAM(s) SubCutaneous two times a day  haloperidol     Tablet 10 milliGRAM(s) Oral two times a day  levETIRAcetam 1000 milliGRAM(s) Oral two times a day  memantine 5 milliGRAM(s) Oral at bedtime  pantoprazole    Tablet 40 milliGRAM(s) Oral before breakfast  QUEtiapine 200 milliGRAM(s) Oral two times a day  senna 2 Tablet(s) Oral at bedtime    MEDICATIONS  (PRN):  bisacodyl 5 milliGRAM(s) Oral daily PRN Constipation  polyethylene glycol 3350 17 Gram(s) Oral daily PRN Constipation      Vital Signs Last 24 Hrs  T(C): 37.2 (17 Feb 2019 14:46), Max: 37.2 (17 Feb 2019 14:46)  T(F): 99 (17 Feb 2019 14:46), Max: 99 (17 Feb 2019 14:46)  HR: 82 (17 Feb 2019 14:46) (77 - 88)  BP: 127/81 (17 Feb 2019 14:46) (119/68 - 136/79)  BP(mean): --  RR: 18 (17 Feb 2019 14:46) (17 - 18)  SpO2: 100% (17 Feb 2019 14:46) (96% - 100%)    General: confuse  Neck: Supple, no JVD  Chest/Lungs: CTA bilaterally, no wheezing, rales, rhonchi or rub  Heart: RRR, normal S1, S2, no murmurs or gallops  Abdomen: Soft, ND, NTTP, normoactive bowel sounds  Extremities: 2+ peripheral pulses b/l, no edema, clubbing or cyanosis  Skin: Warm, well-perfused, no rashes or lesions  Neurological:  confuse . 0/5 on right upper arm                                        15.2   4.37  )-----------( 27       ( 17 Feb 2019 06:50 )             47.8       02-17    135  |  101  |  16  ----------------------------<  90  4.6   |  18<L>  |  1.07    Ca    9.7      17 Feb 2019 06:50  Phos  3.2     02-16  Mg     1.9     02-17    TPro  7.6  /  Alb  4.4  /  TBili  0.3  /  DBili  x   /  AST  20  /  ALT  27  /  AlkPhos  132<H>  02-16 59M from [facility] h/o schizophrenia, recurrent DVT s/p IVC filter, seizure disorder, h/o severe C4-5 stenosis c/b cord compression s/p discectomy and fusion adm 2/14 for dysarthria concerning for acute CVA s/p tPA.      patient seen and examine at bed side, today he moving all extremities , however non verbal and does not answer any questions, he follow all commands,.  as per nurse he didn't eat any food and seem depress   extremities rigid     MEDICATIONS  (STANDING):  aspirin enteric coated 81 milliGRAM(s) Oral daily  atorvastatin 80 milliGRAM(s) Oral at bedtime  benztropine 1 milliGRAM(s) Oral two times a day  carBAMazepine Chewable 100 milliGRAM(s) Chew two times a day  docusate sodium 200 milliGRAM(s) Oral daily  enoxaparin Injectable 90 milliGRAM(s) SubCutaneous two times a day  haloperidol     Tablet 10 milliGRAM(s) Oral two times a day  levETIRAcetam 1000 milliGRAM(s) Oral two times a day  memantine 5 milliGRAM(s) Oral at bedtime  pantoprazole    Tablet 40 milliGRAM(s) Oral before breakfast  QUEtiapine 200 milliGRAM(s) Oral two times a day  senna 2 Tablet(s) Oral at bedtime    MEDICATIONS  (PRN):  bisacodyl 5 milliGRAM(s) Oral daily PRN Constipation  polyethylene glycol 3350 17 Gram(s) Oral daily PRN Constipation      Vital Signs Last 24 Hrs  T(C): 37.2 (17 Feb 2019 14:46), Max: 37.2 (17 Feb 2019 14:46)  T(F): 99 (17 Feb 2019 14:46), Max: 99 (17 Feb 2019 14:46)  HR: 82 (17 Feb 2019 14:46) (77 - 88)  BP: 127/81 (17 Feb 2019 14:46) (119/68 - 136/79)  BP(mean): --  RR: 18 (17 Feb 2019 14:46) (17 - 18)  SpO2: 100% (17 Feb 2019 14:46) (96% - 100%)    General: confuse  Neck: Supple, no JVD  Chest/Lungs: CTA bilaterally, no wheezing, rales, rhonchi or rub  Heart: RRR, normal S1, S2, no murmurs or gallops  Abdomen: Soft, ND, NTTP, normoactive bowel sounds  Extremities: 2+ peripheral pulses b/l, no edema, clubbing or cyanosis  Skin: Warm, well-perfused, no rashes or lesions  Neurological:  confuse . 0/5 on right upper arm                                        15.2   4.37  )-----------( 27       ( 17 Feb 2019 06:50 )             47.8       02-17    135  |  101  |  16  ----------------------------<  90  4.6   |  18<L>  |  1.07    Ca    9.7      17 Feb 2019 06:50  Phos  3.2     02-16  Mg     1.9     02-17    TPro  7.6  /  Alb  4.4  /  TBili  0.3  /  DBili  x   /  AST  20  /  ALT  27  /  AlkPhos  132<H>  02-16      CT head     IMPRESSION:    Limited exam secondary to motion artifact.     Small scattered hypodensities involving the left frontotemporal lobe and   insula compatible with acute infarcts in the MCA territory. No evidence   for hemorrhagic transformation.  doppler  Summary/Impressions:  Technically difficult and limited study.  Age-indeterminate, non-occlusive thromboses noted in the  right common femoral, femoral, and popliteal veins.  Age-indeterminate, non-occlusive thrombosis noted in the  left popliteal vein.

## 2019-02-17 NOTE — PROGRESS NOTE ADULT - PROBLEM SELECTOR PLAN 1
Acute CVA  - S/p tPA   - repeat ct reviewed  previous CT show    evolving L MCA stroke without hemorrhagic transformation.  - Aspirin /statin/ MRI   - Pending TTE with bubble study.  - Monitor on tele.  - PT/OT.

## 2019-02-17 NOTE — BEHAVIORAL HEALTH ASSESSMENT NOTE - AXIS III
CVA, seizure disorder, recurrent DVT s/p IVC filter, h/o severe C4-5 stenosis c/b cord compression s/p discectomy and fusion

## 2019-02-17 NOTE — PROGRESS NOTE ADULT - PROBLEM SELECTOR PLAN 2
Continue home meds - quetiapine 200mg BID, haloperidol 10mg BID.  psych eval as he didn't eat any food and seem depress

## 2019-02-17 NOTE — CHART NOTE - NSCHARTNOTEFT_GEN_A_CORE
LE Duplex: Age-indeterminate, non-occlusive thromboses noted in the right common femoral, femoral, and popliteal veins. Age-indeterminate, non-occlusive thrombosis noted in the left popliteal vein.    Patient with history of recurrent DVT as per chart review s/p IVC filter. On Lovenox 40mg daily. Unclear as to when patient was diagnosed with DVT. Chart review indicates h/o upper extremity DVT. Attempted to discuss history with patient at bedside however patient not making eye contact or communicating with provider. Patient is awake but not responding to commands. Refusing psychiatric medications. Suspect catatonia. Psychiatry consult called. Patient without emergency contacts or family numbers in chart. Called [facility] to obtain medical history- awaiting call back from physician. Will obtain vascular consult. Will switch Lovenox to treatment dose.     To be discussed with Dr. Diallo.     Thesylvia Ríos PA-C  [id number]

## 2019-02-17 NOTE — BEHAVIORAL HEALTH ASSESSMENT NOTE - SUMMARY
Pt is 59 year-old M with PPHx Schizophrenia, currently domiciled at [facility] (unclear if inpatient or residence), unknown number of psychiatric hospitalizations, PMH of CVA, seizure disorder, recurrent DVT s/p IVC filter, h/o severe C4-5 stenosis c/b cord compression s/p discectomy and fusion, who presented from [facility] for acute onset of severely slurred speech, RUE weakness and R facial droop, now s/p tPA; CT head showing evolving L MCA infarct.  Overnight and this AM pt noted to be refusing to eat and take medications.  Psychiatry called to assess for catatonia.  Pt minimally cooperative on exam; has some symptoms of catatonia including mutism, withdrawal, remaining in fixed position (posturing vs stupor) and rigidity.    1.)  Safety - routine checks; is not acute risk to self or others at this time.  2.)  Schizophrenia; r/o catatonia - unclear if pt change in AMS due to catatonia versus worsening stroke, however can try trial of Ativan 2mg IV (or IM) x1; if pt more responsive can consider starting standing Ativan 1mg BID PO.  Would hold antipsychotics in interim.    3.)  Given pt with some rigidity on exam and compliant with antipsychotics until yesterday, can consider checking CPK to r/o NMS (low suspicion given normal vitals and CBC/CMP wnl).  4.)  Obtain collateral from [facility] to determine mental status baseline (appears pt largely nonverbal throughout current hospitalization and has h/o CVA).  5.)  PRNs:  Ativan 2mg PO/IV/IM q6h PRN agitation; would hold antipsychotics today; psych to reassess.

## 2019-02-17 NOTE — CHART NOTE - NSCHARTNOTEFT_GEN_A_CORE
Psych Consult Noted  Patient was given Ativan 2 mg IVP per recommendations to rule out catatonia.   Patient became more alert; opens eyes spontaneously; arouses to voice or touch; oriented x 4.  Follows commands, speech spontaneous, restless but aphasic.     As Pt became more responsive, standing Ativan 1mg BID PO ordered.  Ativan 2mg PO/IV/IM q6h PRN agitation, & antipsychotics on hold for now per Psych recommendations.  Will continue to monitor   Follow up with Psych in AM.

## 2019-02-17 NOTE — PROVIDER CONTACT NOTE (OTHER) - SITUATION
pt continues to be lethargic. pt continues to refuse full cooperation with neuro assessment. pupil assessment has continued during shift and change noted with possible enlargement and no longer equal.

## 2019-02-17 NOTE — PROGRESS NOTE ADULT - PROBLEM SELECTOR PLAN 3
Recurrent DVT  - S/p IVC filter.  - repeat doppler show clots   continue full dose lovenox   vascular consult may be re consider filter

## 2019-02-17 NOTE — BEHAVIORAL HEALTH ASSESSMENT NOTE - DESCRIPTION
CVA, recurrent DVT s/p IVC filter, seizure disorder, h/o severe C4-5 stenosis c/b cord compression s/p discectomy and fusion

## 2019-02-17 NOTE — CONSULT NOTE ADULT - ASSESSMENT
Assessment/Plan: 59y Male with age-indeterminate non-occlusive bilateral venous thromboses, with IVC filter.   Vascular surgery consulted for clarification of history of DVTs, IVC filter, anticoagulation as patient is unable to provide history.    - Obtain abdominal Xray to confirm placement of IVC filter  - Anticoagulation per hematology  - No vascular surgery intervention indicated     Discussed with Vascular Fellow    Please page with further questions  Pager [phone]

## 2019-02-17 NOTE — BEHAVIORAL HEALTH ASSESSMENT NOTE - HPI (INCLUDE ILLNESS QUALITY, SEVERITY, DURATION, TIMING, CONTEXT, MODIFYING FACTORS, ASSOCIATED SIGNS AND SYMPTOMS)
Pt is 59 year-old M with PPHx Schizophrenia, currently domiciled at [facility] (unclear if inpatient or residence), unknown number of psychiatric hospitalizations, PMH of CVA, seizure disorder, who presented from [facility] for acute onset of severely slurred speech, RUE weakness and R facial droop, now s/p tPA; CT head showing evolving L MCA infarct.  Overnight and this AM pt noted to be refusing to eat and take medications.  Psychiatry called to assess for catatonia.      Pt seen and evaluated.  Pt minimally cooperative to interview.  Responds to name however nonverbal to all questions asked of him.  Is noted to be able to follow commands when RN present however otherwise remains in fixed position lying on right side and gripped bedrail with right hand.  After pt moved to lie on back, returns to prior position.  R arm with some rigidity and waxy flexiblity with repositioning.  Unable to assess L arm.  Pt nonverbal, refusing to eat, with minimal to no eye contact, immobile (however per chart review pt is wheelchair bound), no echolalia or echopraxia noted.  No mannerisms or stereotypies.  No grimacing.  Pt had been compliant with medications until last night (received last dose of Haldol and Seroquel yesterday in AM). 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  Overnight and this AM pt noted to be refusing to eat and take medications.  Psychiatry called to assess for catatonia.      Pt seen and evaluated.  Pt minimally cooperative to interview.  Responds to name however nonverbal to all questions asked of him.  Is noted to be able to follow commands when RN present however otherwise remains in fixed position lying on right side and gripped bedrail with right hand.  After pt moved to lie on back, returns to prior position.  R arm with some rigidity and waxy flexiblity with repositioning.  Unable to assess L arm.  Pt nonverbal, refusing to eat, with minimal to no eye contact, immobile (however per chart review pt is wheelchair bound), no echolalia or echopraxia noted.  No negativism.  No mannerisms or stereotypies.  No grimacing.  Pt had been compliant with medications until last night (received last dose of Haldol and Seroquel yesterday in AM).

## 2019-02-17 NOTE — BEHAVIORAL HEALTH ASSESSMENT NOTE - NSBHCHARTREVIEWLAB_PSY_A_CORE FT
15.2   4.37  )-----------( 27       ( 17 Feb 2019 06:50 )             47.8       02-17    135  |  101  |  16  ----------------------------<  90  4.6   |  18<L>  |  1.07    Ca    9.7      17 Feb 2019 06:50  Phos  3.2     02-16  Mg     1.9     02-17    TPro  7.6  /  Alb  4.4  /  TBili  0.3  /  DBili  x   /  AST  20  /  ALT  27  /  AlkPhos  132<H>  02-16

## 2019-02-17 NOTE — BEHAVIORAL HEALTH ASSESSMENT NOTE - NSBHCHARTREVIEWVS_PSY_A_CORE FT
Vital Signs Last 24 Hrs  T(C): 37.2 (17 Feb 2019 14:46), Max: 37.2 (17 Feb 2019 14:46)  T(F): 99 (17 Feb 2019 14:46), Max: 99 (17 Feb 2019 14:46)  HR: 76 (17 Feb 2019 17:30) (76 - 88)  BP: 110/70 (17 Feb 2019 17:30) (110/70 - 136/79)  BP(mean): --  RR: 17 (17 Feb 2019 17:30) (17 - 18)  SpO2: 97% (17 Feb 2019 17:30) (96% - 100%)

## 2019-02-17 NOTE — CHART NOTE - NSCHARTNOTEFT_GEN_A_CORE
AXR ordered to confirm placement of IVF filter. Spoke with hematology- as DVT are age-indeterminate and non-occlusive, would hold off of full dose AC. Pending results of AXR, will resume ppx dose of Lovenox. AXR ordered to confirm placement of IVF filter. Spoke with hematology- as DVT are age-indeterminate and non-occlusive, would hold off of full dose AC. Spoke with Dr. Gil who reviewed LE doppler study. Dr. Gil recommends full dose AC if no contraindication. Patient with h/o subdural hematoma and falls secondary to seizure episodes. Discussed with attending. Will keep full dose AC and order fall precautions. To further discuss risk v. benefit in the morning.    Theologia JOSEFINA Ríos  [id number]
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
## 2019-02-18 DIAGNOSIS — I82.403 ACUTE EMBOLISM AND THROMBOSIS OF UNSPECIFIED DEEP VEINS OF LOWER EXTREMITY, BILATERAL: ICD-10-CM

## 2019-02-18 LAB
ANION GAP SERPL CALC-SCNC: 13 MMO/L — SIGNIFICANT CHANGE UP (ref 7–14)
BUN SERPL-MCNC: 23 MG/DL — SIGNIFICANT CHANGE UP (ref 7–23)
CALCIUM SERPL-MCNC: 10 MG/DL — SIGNIFICANT CHANGE UP (ref 8.4–10.5)
CHLORIDE SERPL-SCNC: 103 MMOL/L — SIGNIFICANT CHANGE UP (ref 98–107)
CK SERPL-CCNC: 120 U/L — SIGNIFICANT CHANGE UP (ref 30–200)
CO2 SERPL-SCNC: 25 MMOL/L — SIGNIFICANT CHANGE UP (ref 22–31)
CREAT SERPL-MCNC: 1.24 MG/DL — SIGNIFICANT CHANGE UP (ref 0.5–1.3)
GLUCOSE BLDC GLUCOMTR-MCNC: 122 MG/DL — HIGH (ref 70–99)
GLUCOSE SERPL-MCNC: 102 MG/DL — HIGH (ref 70–99)
HCT VFR BLD CALC: 45.6 % — SIGNIFICANT CHANGE UP (ref 39–50)
HGB BLD-MCNC: 15.4 G/DL — SIGNIFICANT CHANGE UP (ref 13–17)
MAGNESIUM SERPL-MCNC: 2 MG/DL — SIGNIFICANT CHANGE UP (ref 1.6–2.6)
MCHC RBC-ENTMCNC: 32.6 PG — SIGNIFICANT CHANGE UP (ref 27–34)
MCHC RBC-ENTMCNC: 33.8 % — SIGNIFICANT CHANGE UP (ref 32–36)
MCV RBC AUTO: 96.6 FL — SIGNIFICANT CHANGE UP (ref 80–100)
NRBC # FLD: 0 K/UL — LOW (ref 25–125)
PLATELET # BLD AUTO: 179 K/UL — SIGNIFICANT CHANGE UP (ref 150–400)
PMV BLD: 9.5 FL — SIGNIFICANT CHANGE UP (ref 7–13)
POTASSIUM SERPL-MCNC: 4.2 MMOL/L — SIGNIFICANT CHANGE UP (ref 3.5–5.3)
POTASSIUM SERPL-SCNC: 4.2 MMOL/L — SIGNIFICANT CHANGE UP (ref 3.5–5.3)
RBC # BLD: 4.72 M/UL — SIGNIFICANT CHANGE UP (ref 4.2–5.8)
RBC # FLD: 12.4 % — SIGNIFICANT CHANGE UP (ref 10.3–14.5)
SODIUM SERPL-SCNC: 141 MMOL/L — SIGNIFICANT CHANGE UP (ref 135–145)
WBC # BLD: 5.22 K/UL — SIGNIFICANT CHANGE UP (ref 3.8–10.5)
WBC # FLD AUTO: 5.22 K/UL — SIGNIFICANT CHANGE UP (ref 3.8–10.5)

## 2019-02-18 PROCEDURE — 99222 1ST HOSP IP/OBS MODERATE 55: CPT | Mod: GC

## 2019-02-18 PROCEDURE — 99233 SBSQ HOSP IP/OBS HIGH 50: CPT

## 2019-02-18 PROCEDURE — 99232 SBSQ HOSP IP/OBS MODERATE 35: CPT

## 2019-02-18 RX ORDER — LEVETIRACETAM 250 MG/1
1000 TABLET, FILM COATED ORAL EVERY 12 HOURS
Qty: 0 | Refills: 0 | Status: DISCONTINUED | OUTPATIENT
Start: 2019-02-18 | End: 2019-02-22

## 2019-02-18 RX ADMIN — LEVETIRACETAM 400 MILLIGRAM(S): 250 TABLET, FILM COATED ORAL at 00:31

## 2019-02-18 RX ADMIN — ENOXAPARIN SODIUM 90 MILLIGRAM(S): 100 INJECTION SUBCUTANEOUS at 06:55

## 2019-02-18 RX ADMIN — LEVETIRACETAM 400 MILLIGRAM(S): 250 TABLET, FILM COATED ORAL at 17:12

## 2019-02-18 RX ADMIN — ENOXAPARIN SODIUM 90 MILLIGRAM(S): 100 INJECTION SUBCUTANEOUS at 17:14

## 2019-02-18 RX ADMIN — Medication 0.5 MILLIGRAM(S): at 13:49

## 2019-02-18 NOTE — PROGRESS NOTE ADULT - SUBJECTIVE AND OBJECTIVE BOX
Vascular Surgery Progress Note      SUBJECTIVE: Patient seen and examined on morning rounds. No acute events overnight.      OBJECTIVE:     ** Vital signs / I&O's **    Vital Signs Last 24 Hrs  T(C): 36.8 (18 Feb 2019 05:45), Max: 37.2 (17 Feb 2019 14:46)  T(F): 98.2 (18 Feb 2019 05:45), Max: 99 (17 Feb 2019 14:46)  HR: 81 (18 Feb 2019 05:45) (72 - 82)  BP: 125/66 (18 Feb 2019 05:45) (110/70 - 136/79)  BP(mean): --  RR: 16 (18 Feb 2019 05:45) (16 - 18)  SpO2: 100% (18 Feb 2019 05:45) (97% - 100%)        ** Physical Exam **  General: NAD  Neuro: Motor and sensory grossly intact with no focal deficits.  Respiratory: Unlabored breathing.   Abdomen: Soft, non-distended, non-tender.   Extremities: LE warm bilaterally w/ good capillary refill. Left 1+ DP pulse. Right 0+ DP pulse. No phlegmasia. No swelling.    ** Labs **                          15.4   5.22  )-----------( 179      ( 18 Feb 2019 06:05 )             45.6     18 Feb 2019 06:05    141    |  103    |  23     ----------------------------<  102    4.2     |  25     |  1.24     Ca    10.0       18 Feb 2019 06:05  Mg     2.0       18 Feb 2019 06:05      CAPILLARY BLOOD GLUCOSE      CARDIAC MARKERS ( 18 Feb 2019 06:05 )  x     / x     / 120 u/L / x     / x                MEDICATIONS  (STANDING):  aspirin enteric coated 81 milliGRAM(s) Oral daily  atorvastatin 80 milliGRAM(s) Oral at bedtime  benztropine 1 milliGRAM(s) Oral two times a day  carBAMazepine Chewable 100 milliGRAM(s) Chew two times a day  docusate sodium 200 milliGRAM(s) Oral daily  enoxaparin Injectable 90 milliGRAM(s) SubCutaneous two times a day  levETIRAcetam 1000 milliGRAM(s) Oral two times a day  LORazepam     Tablet 1 milliGRAM(s) Oral two times a day  memantine 5 milliGRAM(s) Oral at bedtime  pantoprazole    Tablet 40 milliGRAM(s) Oral before breakfast  senna 2 Tablet(s) Oral at bedtime    MEDICATIONS  (PRN):  bisacodyl 5 milliGRAM(s) Oral daily PRN Constipation  LORazepam   Injectable 2 milliGRAM(s) IV Push every 6 hours PRN Agitation  polyethylene glycol 3350 17 Gram(s) Oral daily PRN Constipation Vascular Surgery Progress Note      SUBJECTIVE: Patient seen and examined on morning rounds. No acute events overnight.      OBJECTIVE:     ** Vital signs / I&O's **    Vital Signs Last 24 Hrs  T(C): 36.8 (18 Feb 2019 05:45), Max: 37.2 (17 Feb 2019 14:46)  T(F): 98.2 (18 Feb 2019 05:45), Max: 99 (17 Feb 2019 14:46)  HR: 81 (18 Feb 2019 05:45) (72 - 82)  BP: 125/66 (18 Feb 2019 05:45) (110/70 - 136/79)  BP(mean): --  RR: 16 (18 Feb 2019 05:45) (16 - 18)  SpO2: 100% (18 Feb 2019 05:45) (97% - 100%)        ** Physical Exam **  General: NAD  Neuro: Motor and sensory grossly intact with no focal deficits.  Respiratory: Unlabored breathing.   Abdomen: Soft, non-distended, non-tender.   Extremities: LE warm bilaterally w/ good capillary refill. Left 1+ DP pulse. Right 0+ DP pulse. No phlegmasia. No swelling.    ** Labs **                          15.4   5.22  )-----------( 179      ( 18 Feb 2019 06:05 )             45.6     18 Feb 2019 06:05    141    |  103    |  23     ----------------------------<  102    4.2     |  25     |  1.24     Ca    10.0       18 Feb 2019 06:05  Mg     2.0       18 Feb 2019 06:05      CAPILLARY BLOOD GLUCOSE      CARDIAC MARKERS ( 18 Feb 2019 06:05 )  x     / x     / 120 u/L / x     / x          MEDICATIONS  (STANDING):  aspirin enteric coated 81 milliGRAM(s) Oral daily  atorvastatin 80 milliGRAM(s) Oral at bedtime  benztropine 1 milliGRAM(s) Oral two times a day  carBAMazepine Chewable 100 milliGRAM(s) Chew two times a day  docusate sodium 200 milliGRAM(s) Oral daily  enoxaparin Injectable 90 milliGRAM(s) SubCutaneous two times a day  levETIRAcetam 1000 milliGRAM(s) Oral two times a day  LORazepam     Tablet 1 milliGRAM(s) Oral two times a day  memantine 5 milliGRAM(s) Oral at bedtime  pantoprazole    Tablet 40 milliGRAM(s) Oral before breakfast  senna 2 Tablet(s) Oral at bedtime    MEDICATIONS  (PRN):  bisacodyl 5 milliGRAM(s) Oral daily PRN Constipation  LORazepam   Injectable 2 milliGRAM(s) IV Push every 6 hours PRN Agitation  polyethylene glycol 3350 17 Gram(s) Oral daily PRN Constipation      AXR reviewed

## 2019-02-18 NOTE — PROGRESS NOTE ADULT - ASSESSMENT
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

## 2019-02-18 NOTE — PROGRESS NOTE ADULT - ASSESSMENT
59y Male with age-indeterminate non-occlusive bilateral venous thromboses, with IVC filter. Vascular surgery consulted for clarification of history of DVTs, IVC filter, anticoagulation as patient unable to provide history.    - Abdominal Xray done to confirm placement of IVC filter - will f/u official read  - Recommend full anticoagulation  - No vascular surgery intervention indicated     Please page with further questions  Pager [phone] 59y Male with age-indeterminate non-occlusive bilateral venous thromboses, with IVC filter. Vascular surgery consulted for clarification of history of DVTs, IVC filter, anticoagulation as patient unable to provide history.    - Abdominal Xray done - IVC filter in place  - Recommend full anticoagulation  - No vascular surgery intervention indicated   - Will sign off, please reconsult as needed    Please page with further questions  Pager [phone] 59y Male with age-indeterminate non-occlusive bilateral venous thromboses, with IVC filter. Vascular surgery consulted for clarification of history of DVTs, IVC filter, anticoagulation as patient unable to provide history.    - Abdominal Xray done - IVC filter in place  d/w w med service PA  the following since pt has a hx of frequent falls then anticoag rx is contraindicated and since he has a IVC filter in place then recommend  baby asa if cleared   reconsult prn

## 2019-02-18 NOTE — PROGRESS NOTE ADULT - PROBLEM SELECTOR PLAN 1
Acute CVA  - S/p tPA   - Aspirin /statin  - Pending TTE with bubble study.  - Monitor on tele.  - PT/OT.  -Given history of multiple DVT and new arterial thrombosis would consult heme for thrombophilia w/u. will need collateral information from [facility]

## 2019-02-18 NOTE — CONSULT NOTE ADULT - SUBJECTIVE AND OBJECTIVE BOX
Pt unable to provide any history, information obtained from chart review.     HPI: 58 yo M with Schizophrenia (lives at [facility]), hx DVT s/p IVC filter, seizure d/o, severe C4-5 stenosis c/b cord compression s/p discectomy and fusion p/w acute onset slurred speech, RUE weakness, and R facial droop s/p tPA and found to have L MCA territory infarct. Hematology consulted for hx of DVT and hypercoagulable work up.      Per [facility] aid, patient was sitting at lunch and was unable to transfer himself from chair to wheelchair due to weakness and staff noted slurring of his speech.  Pt is in wheelchair at baseline and receives PT for chronic RLE weakness, but staff noticed his right side weakness was significantly worse than usual and had additional weakness of RUE. In the ED, the weakness mildly improved over the hour but the patient's speech difficulty continued.     Pt has hx of DVT in 2013 and was on coumadin, then was switched to Xarelto in 2016. Had IVC filter placed in 2017 ? 2/2 falls had high risk of AC. No other history available in terms of circumstances during which clots developed, if has recurrent DVTs or just one episode of DVT, if had DVT while on AC or off of AC, unclear if has any family hx of clots either.     PAST MEDICAL & SURGICAL HISTORY:  Schizophrenia  Seizure disorder  Seizure  Paranoid schizophrenia  DVT (deep venous thrombosis)  History of cervical spinal surgery    Allergies    No Known Allergies    Intolerances    MEDICATIONS  (STANDING):  aspirin enteric coated 81 milliGRAM(s) Oral daily  atorvastatin 80 milliGRAM(s) Oral at bedtime  benztropine 1 milliGRAM(s) Oral two times a day  carBAMazepine Chewable 100 milliGRAM(s) Chew two times a day  docusate sodium 200 milliGRAM(s) Oral daily  enoxaparin Injectable 90 milliGRAM(s) SubCutaneous two times a day  levETIRAcetam  IVPB 1000 milliGRAM(s) IV Intermittent every 12 hours  memantine 5 milliGRAM(s) Oral at bedtime  pantoprazole    Tablet 40 milliGRAM(s) Oral before breakfast  senna 2 Tablet(s) Oral at bedtime    MEDICATIONS  (PRN):  bisacodyl 5 milliGRAM(s) Oral daily PRN Constipation  LORazepam   Injectable 2 milliGRAM(s) IV Push every 6 hours PRN Agitation  polyethylene glycol 3350 17 Gram(s) Oral daily PRN Constipation      FAMILY HISTORY:  No pertinent family history in first degree relatives  No pertinent family history in first degree relatives      SOCIAL HISTORY: No EtOH, no tobacco    REVIEW OF SYSTEMS: see HPI  All other review of systems is negative unless indicated above    VITALS:   T(F): 98.4 (02-18-19 @ 13:15), Max: 98.8 (02-17-19 @ 20:10)  HR: 77 (02-18-19 @ 13:15)  BP: 125/70 (02-18-19 @ 13:15)  RR: 19 (02-18-19 @ 13:15)  SpO2: 96% (02-18-19 @ 13:15)  Wt(kg): --      PHYSICAL EXAM:  GENERAL: resting in bed comfortably  EYES: EOMI  NECK: supple  RESP: CTAB  HEART: RRR, S1/S2  ABDOMEN: +BS, soft, NT, ND  EXTREMITIES: no LE edema  NEUROLOGIC: pt not answering any questions, not following commands, opens eyes intermittently.     LABS:                         15.4   5.22  )-----------( 179      ( 18 Feb 2019 06:05 )             45.6     02-18    141  |  103  |  23  ----------------------------<  102<H>  4.2   |  25  |  1.24    Ca    10.0      18 Feb 2019 06:05  Mg     2.0     02-18      Magnesium, Serum: 2.0 mg/dL (02-18 @ 06:05)  IMAGING:

## 2019-02-18 NOTE — CHART NOTE - NSCHARTNOTEFT_GEN_A_CORE
Informed by RN that patient was found lying awake on the floor with his legs under his neighbor's bed.   Patient assessed at beside. Awake with PERRL. Brief episode of uncoordinated movement on left upper limb. Does not answer questions or follow commands. Ativan 0.5mg IV given for suspected catatonia v. worsening stroke.  Tele reviewed- NSR without events. . VSS.     Patient on full dose AC. Urgent CTH ordered. Patient on carbamazepine and keppra for seizure history however refusing PO meds. Keppra switched to IV. Neuro made aware and will review imaging. Heme consult to be called for hypercoagulable workup. Patient placed on 1:1 observation for safety. If improved mental status with Ativan, consider standing dose. Discussed with Dr. Pierce. Will continue to monitor closely.     Thesylvia Ríos PA-C  [id number]

## 2019-02-18 NOTE — PROGRESS NOTE BEHAVIORAL HEALTH - SUMMARY
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  Overnight and this AM pt noted to be refusing to eat and take medications.  Psychiatry called to assess for catatonia.  Pt minimally cooperative on exam; has some symptoms of catatonia including mutism, withdrawal, remaining in fixed position (posturing vs stupor) and rigidity.    1.)  Safety - routine checks; is not acute risk to self or others at this time.  2.)  Schizophrenia; r/o catatonia - unclear if pt change in AMS due to catatonia versus worsening stroke, per nursing report patient improved with one time dose of Ativan yesterday but has been refusing PO meds today. Can give trial of Ativan 2mg IV (or IM) x1; if pt more responsive can consider starting standing Ativan 1mg BID PO.  Would hold antipsychotics in interim.    3.)   today, not likely NMS  4.)  Obtain collateral from [facility] to determine mental status baseline (appears pt largely nonverbal throughout current hospitalization and has h/o CVA).  5.)  PRNs:  Ativan 2mg PO/IV/IM q6h PRN agitation; would hold antipsychotics today; psych to reassess. 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  Overnight and this AM pt noted to be refusing to eat and take medications.  Psychiatry called to assess for catatonia.  Pt minimally cooperative on exam; has some symptoms of catatonia including mutism, withdrawal, remaining in fixed position (posturing vs stupor) and rigidity.    1.)  Safety - routine checks; is not acute risk to self or others at this time.  2.)  Schizophrenia; r/o catatonia - unclear if pt change in AMS due to catatonia versus worsening stroke, per nursing report patient improved with one time dose of Ativan yesterday but has been refusing PO meds today. Can give trial of Ativan 2mg IV (or IM) x1; if pt more responsive can consider starting standing Ativan 1mg BID PO.  Would hold antipsychotics in interim.    3.)  VSS, CBC/BMP wnl,  today - not likely NMS  4.)  Obtain collateral from [facility] to determine mental status baseline (appears pt largely nonverbal throughout current hospitalization and has h/o CVA).  5.)  PRNs:  Ativan 2mg PO/IV/IM q6h PRN agitation; would hold antipsychotics today; psych to reassess. 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  Overnight and this AM pt noted to be refusing to eat and take medications.  Psychiatry called to assess for catatonia.  Pt minimally cooperative on exam; has some symptoms of catatonia including mutism, withdrawal, remaining in fixed position (posturing vs stupor) and rigidity.    1.)  Safety - routine checks; is not acute risk to self or others at this time.  2.)  Schizophrenia; r/o catatonia - unclear if pt change in AMS due to catatonia versus worsening stroke, per nursing report patient improved with one time dose of Ativan yesterday but has been refusing PO meds today. Can give trial of Ativan 2mg IV (or IM) x1, observe and document any change of responsiveness; if pt more responsive can consider starting standing Ativan 1mg BID PO.  Would hold antipsychotics in interim.    3.)  VSS, CBC/BMP wnl,  today - not likely NMS  4.)  Obtain collateral from [place] to determine mental status baseline (appears pt largely nonverbal throughout current hospitalization and has h/o CVA).  5.)  PRNs:  Ativan 2mg PO/IV/IM q6h PRN agitation; would hold antipsychotics today; psych to reassess.

## 2019-02-18 NOTE — CONSULT NOTE ADULT - ASSESSMENT
60 yo M with Schizophrenia (lives at [facility]), hx DVT s/p IVC filter, seizure d/o, severe C4-5 stenosis c/b cord compression s/p discectomy and fusion p/w acute onset slurred speech, RUE weakness, and R facial droop s/p tPA and found to have L MCA territory infarct. Hematology consulted for hx of DVT and hypercoagulable work up.     1. History of DVT: s/p IVC filter  - Hematology consulted for hypercoagulable work up given hx of DVT and now with a stroke  - Pt has hx of DVT in 2013 and was on coumadin, then was switched to Xarelto in 2016. Had IVC filter placed in 2017 ? 2/2 falls and high risk of AC. No other history available in terms of circumstances during which clots developed (provoked or unprovoked), if has recurrent DVTs or just one episode of DVT, if had DVT while on AC or off of AC, unclear if has any family hx of clots either.   - LE Dopplers here show age-indeterminate non-occlusive thromboses in R common femoral, femoral, and popliteal veins as well as L popliteal vein.   - Pt is non-ambulatory at baseline which places him at an increased risk of clots as well.   - Given limited history will recommend sending hypercoagulable work up: APLS panel (DRVVT, anti-cardiolipin Ab, beta-2 glycoprotein), factor V leiden, and Prothrombin gene mutation.   - obtain more collateral information from [facility] and outpatient providers if possible  - c/w AC  - cardiac work up for CVA    d/w primary team    Channing Vega, PGY-5  Hematology-Oncology Fellow  Pager: [phone] ([facility]), [phone] ([facility])  (After 5 pm or on weekends please page the on-call fellow)

## 2019-02-18 NOTE — PROGRESS NOTE BEHAVIORAL HEALTH - NSBHCONSULTMEDS_PSY_A_CORE FT
Can give trial of Ativan 2mg IV (or IM) x1; if pt more responsive can consider starting standing Ativan 1mg BID PO.  Would hold antipsychotics in interim.

## 2019-02-18 NOTE — CONSULT NOTE ADULT - ATTENDING COMMENTS
Mr. Bell is a 59 year old RH AA male from [facility] with PMHx of schizophrenia, DVT (? unknown laterality, on lovenox 40 subq daily), seizure disorder, s/p surgical intervention in June 2018 c4/c5 facet joint, s/p discectomy and cord compression in 5/2018 who presents to ED for acute onset of severely slurred speech, RUE weakness and R facial droop, LKN at 12:00 pm on 2/14/19,his symptoms are significantly improved and however still has dysarthria.  Impression: Right hemispheric ischemic stroke, suspect deep right MCA territory  Plan:   - Risks, benefits and adverse reactions associated with IV tPA including hemorrhagic stroke were discussed with patient.  - Cont with IV tPA precautions including BP goal<185/110 mmHg before the bolus  - No antiplatelets or anticoagulants at this time, Aspirin to be considered 24 hours after the IV tPA and repeat brain imaging  - DVT prophylaxis with s/c heparin to be considered in 24 hours from IV tPA after brain imaging  - Consider Atorvastatin 80 mg after establishing enteral access or passing swallow eval  - Allow permissive HTN up to 180/105 mmHg   - Cont with IV hydration  - TTE with bubble study and telemetry to look for the cardiac source of embolism  - LDL and HbA1C   - Swallow eval  - PT/OT/Speech eval once medically stable
Pt with h/o DVT and now with stroke, no further history available. Hypercoag w/u as above.   Now has age indeterminant DVTs in the setting of an IVC filter. Consider AC if deemed safe (Pt has not been on AC 2/2 frequent falls??)
I performed a history and physical exam of the patient and discussed  the findings and plan with the house officer. I reviewed the resident note and agree with the findings and plan

## 2019-02-18 NOTE — PROGRESS NOTE ADULT - SUBJECTIVE AND OBJECTIVE BOX
Patient is a 59y old  Male who presents with a chief complaint of code stroke (18 Feb 2019 07:56)        SUBJECTIVE / OVERNIGHT EVENTS: Pt found on floor today possible unwitnessed fall.       MEDICATIONS  (STANDING):  aspirin enteric coated 81 milliGRAM(s) Oral daily  atorvastatin 80 milliGRAM(s) Oral at bedtime  benztropine 1 milliGRAM(s) Oral two times a day  carBAMazepine Chewable 100 milliGRAM(s) Chew two times a day  docusate sodium 200 milliGRAM(s) Oral daily  enoxaparin Injectable 90 milliGRAM(s) SubCutaneous two times a day  levETIRAcetam  IVPB 1000 milliGRAM(s) IV Intermittent every 12 hours  memantine 5 milliGRAM(s) Oral at bedtime  pantoprazole    Tablet 40 milliGRAM(s) Oral before breakfast  senna 2 Tablet(s) Oral at bedtime    MEDICATIONS  (PRN):  bisacodyl 5 milliGRAM(s) Oral daily PRN Constipation  LORazepam   Injectable 2 milliGRAM(s) IV Push every 6 hours PRN Agitation  polyethylene glycol 3350 17 Gram(s) Oral daily PRN Constipation        CAPILLARY BLOOD GLUCOSE      POCT Blood Glucose.: 122 mg/dL (18 Feb 2019 13:31)    I&O's Summary      PHYSICAL EXAM  GENERAL: NAD, well-developed  HEAD:  Atraumatic, Normocephalic  EYES: EOMI, PERRLA, conjunctiva and sclera clear  NECK: Supple, No JVD  CHEST/LUNG: Clear to auscultation bilaterally; No wheeze  HEART: Regular rate and rhythm; No murmurs, rubs, or gallops  ABDOMEN: Soft, Nontender, Nondistended; Bowel sounds present  EXTREMITIES:  2+ Peripheral Pulses, No clubbing, cyanosis, or edema  PSYCH: non participatory with exam  SKIN: No rashes or lesions    LABS:                        15.4   5.22  )-----------( 179      ( 18 Feb 2019 06:05 )             45.6     02-18    141  |  103  |  23  ----------------------------<  102<H>  4.2   |  25  |  1.24    Ca    10.0      18 Feb 2019 06:05  Mg     2.0     02-18        CARDIAC MARKERS ( 18 Feb 2019 06:05 )  x     / x     / 120 u/L / x     / x              RADIOLOGY & ADDITIONAL TESTS:    Imaging Personally Reviewed:  Consultant(s) Notes Reviewed:    Care Discussed with Consultants/Other Providers:

## 2019-02-18 NOTE — PROGRESS NOTE ADULT - PROBLEM SELECTOR PLAN 3
Holding psych meds as per psych given patient may be experiencing   catatonia   -Will try ativan PO and monitor for symptom improvement

## 2019-02-18 NOTE — PROGRESS NOTE BEHAVIORAL HEALTH - NSBHFUPINTERVALHXFT_PSY_A_CORE
Patient is calm in bed, moves left arm and hand, no movement on right side. He responds to sound and will make grunts when asked questions but unclear what these grunts mean. Per RN Apple after patient was given one time dose of Ativan last night he appeared more alert, but still non-verbal. She reports he has been accepting oral fluids but refusing oral medication today. She says when offered orange juice he took the cup and drank it but when offered PO meds clenched his mouth shut. She says he has not been agitated. Patient is calm in bed, moves left arm and hand, no movement on right side. He responds to sound and will focus on speaker when asked questions but only makes indecipherable grunts in response. Per RN Apple after patient received one time dose of Ativan last night he appeared more alert, still non-verbal. She reports he has been accepting oral fluids but refusing oral medication today. She says when offered orange juice he took the cup and drank it but when offered PO meds clenched his mouth shut. She says he has not been agitated. Patient is calm in bed, moves left arm and hand, no movement on right side. He responds to sound and will focus on speaker when asked questions but only makes indecipherable grunts in response. Left eye closed. Per RN Apple after patient received one time dose of Ativan last night he appeared more alert, still non-verbal. She reports he has been accepting oral fluids but refusing oral medication today. She says when offered orange juice he took the cup and drank it but when offered PO meds clenched his mouth shut. She says he has not been agitated.

## 2019-02-18 NOTE — PROVIDER CONTACT NOTE (FALL NOTIFICATION) - ASSESSMENT
patient observed on floor of patient room, skin laceration to right shin observed, no s/s pain/head trauma

## 2019-02-19 LAB
ANION GAP SERPL CALC-SCNC: 15 MMO/L — HIGH (ref 7–14)
BUN SERPL-MCNC: 25 MG/DL — HIGH (ref 7–23)
CALCIUM SERPL-MCNC: 9.8 MG/DL — SIGNIFICANT CHANGE UP (ref 8.4–10.5)
CHLORIDE SERPL-SCNC: 103 MMOL/L — SIGNIFICANT CHANGE UP (ref 98–107)
CO2 SERPL-SCNC: 24 MMOL/L — SIGNIFICANT CHANGE UP (ref 22–31)
CREAT SERPL-MCNC: 1.16 MG/DL — SIGNIFICANT CHANGE UP (ref 0.5–1.3)
DRVVT SCREEN TO CONFIRM RATIO: 0.94 — SIGNIFICANT CHANGE UP (ref 0–1.2)
GLUCOSE SERPL-MCNC: 100 MG/DL — HIGH (ref 70–99)
HCT VFR BLD CALC: 45 % — SIGNIFICANT CHANGE UP (ref 39–50)
HGB BLD-MCNC: 15.2 G/DL — SIGNIFICANT CHANGE UP (ref 13–17)
MAGNESIUM SERPL-MCNC: 2.1 MG/DL — SIGNIFICANT CHANGE UP (ref 1.6–2.6)
MCHC RBC-ENTMCNC: 32.3 PG — SIGNIFICANT CHANGE UP (ref 27–34)
MCHC RBC-ENTMCNC: 33.8 % — SIGNIFICANT CHANGE UP (ref 32–36)
MCV RBC AUTO: 95.5 FL — SIGNIFICANT CHANGE UP (ref 80–100)
NORMALIZED SCT PPP-RTO: 0.79 — LOW (ref 0.88–1.27)
NRBC # FLD: 0 K/UL — LOW (ref 25–125)
PLATELET # BLD AUTO: 182 K/UL — SIGNIFICANT CHANGE UP (ref 150–400)
PMV BLD: 10.2 FL — SIGNIFICANT CHANGE UP (ref 7–13)
POTASSIUM SERPL-MCNC: 4.1 MMOL/L — SIGNIFICANT CHANGE UP (ref 3.5–5.3)
POTASSIUM SERPL-SCNC: 4.1 MMOL/L — SIGNIFICANT CHANGE UP (ref 3.5–5.3)
RBC # BLD: 4.71 M/UL — SIGNIFICANT CHANGE UP (ref 4.2–5.8)
RBC # FLD: 12.3 % — SIGNIFICANT CHANGE UP (ref 10.3–14.5)
SODIUM SERPL-SCNC: 142 MMOL/L — SIGNIFICANT CHANGE UP (ref 135–145)
WBC # BLD: 6.59 K/UL — SIGNIFICANT CHANGE UP (ref 3.8–10.5)
WBC # FLD AUTO: 6.59 K/UL — SIGNIFICANT CHANGE UP (ref 3.8–10.5)

## 2019-02-19 PROCEDURE — 70450 CT HEAD/BRAIN W/O DYE: CPT | Mod: 26

## 2019-02-19 PROCEDURE — 99232 SBSQ HOSP IP/OBS MODERATE 35: CPT

## 2019-02-19 PROCEDURE — 99233 SBSQ HOSP IP/OBS HIGH 50: CPT

## 2019-02-19 RX ADMIN — ENOXAPARIN SODIUM 90 MILLIGRAM(S): 100 INJECTION SUBCUTANEOUS at 17:22

## 2019-02-19 RX ADMIN — LEVETIRACETAM 400 MILLIGRAM(S): 250 TABLET, FILM COATED ORAL at 17:28

## 2019-02-19 RX ADMIN — ENOXAPARIN SODIUM 90 MILLIGRAM(S): 100 INJECTION SUBCUTANEOUS at 05:56

## 2019-02-19 RX ADMIN — MEMANTINE HYDROCHLORIDE 5 MILLIGRAM(S): 10 TABLET ORAL at 21:42

## 2019-02-19 RX ADMIN — LEVETIRACETAM 400 MILLIGRAM(S): 250 TABLET, FILM COATED ORAL at 05:56

## 2019-02-19 NOTE — PROGRESS NOTE ADULT - ASSESSMENT
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

## 2019-02-19 NOTE — PROGRESS NOTE BEHAVIORAL HEALTH - NSBHFUPINTERVALHXFT_PSY_A_CORE
Mr Arabella seen for follow-up of delirium/agitation/possible catatonia s/p CVA. Silent on exam (baseline), seen playing with bedsheet disorganizedly, having soiled self. Possible catalepsy appreciated with positioning of UE, and possible automatic obedience, but beyond that no clear case for catatonia (mari when patient is delirious from advancing CVA). Behaviorally appropriate though confused. Mr Arabella seen for follow-up of delirium/agitation/possible catatonia s/p CVA. Silent on exam (baseline), seen playing with bedsheet disorganizedly, having soiled self. Possible catalepsy appreciated with positioning of UE, and possible automatic obedience, withdrawal, mutism. Unclear catatonia vs AMS given advancing CVA. Behaviorally appropriate though confused.

## 2019-02-19 NOTE — PROGRESS NOTE BEHAVIORAL HEALTH - SUMMARY
Pt is 59 year-old M with PPHx Schizophrenia, currently domiciled at [facility], unknown number of psychiatric hospitalizations, PMH of CVA, seizure disorder, recurrent DVT s/p IVC filter, h/o severe C4-5 stenosis c/b cord compression s/p discectomy and fusion, who presented from [facility] for acute onset of severely slurred speech, RUE weakness and R facial droop, now s/p tPA; CT head showing evolving L MCA infarct.  Overnight and this AM pt noted to be refusing to eat and take medications.  Psychiatry called to assess for catatonia.  Pt minimally cooperative on exam; has some symptoms of catatonia (mutism, withdrawal, catalepsy) but may be symptoms of delirium after CVA. No clear change after Ativan challenge. Will begin re-starting home medications and keep Ativan PRN for now.    Not a candidate for inpatient psychiatric admission at this time.    5.) Pt is 59 year-old M with PPHx Schizophrenia, currently domiciled at [place], unknown number of psychiatric hospitalizations, PMH of CVA, seizure disorder, recurrent DVT s/p IVC filter, h/o severe C4-5 stenosis c/b cord compression s/p discectomy and fusion, who presented from [place] for acute onset of severely slurred speech, RUE weakness and R facial droop, now s/p tPA; CT head showing evolving L MCA infarct.  Overnight and this AM pt noted to be refusing to eat and take medications.  Psychiatry called to assess for catatonia.  Pt minimally cooperative on exam; has some symptoms of catatonia (mutism, withdrawal, catalepsy) but may be symptoms of delirium after CVA. Responded well to Ativan challenge, will continue with Ativan 1mg BID and keep Ativan PRNs for now. Hold off on antipsychotics until catatonia is clarified.    Not a candidate for inpatient psychiatric admission at this time. Pt is 59 year-old M with PPHx Schizophrenia, currently domiciled at [facility], unknown number of psychiatric hospitalizations, PMH of CVA, seizure disorder, recurrent DVT s/p IVC filter, h/o severe C4-5 stenosis c/b cord compression s/p discectomy and fusion, who presented from [place] for acute onset of severely slurred speech, RUE weakness and R facial droop, now s/p tPA; CT head showing evolving L MCA infarct.  Overnight and this AM pt noted to be refusing to eat and take medications.  Psychiatry called to assess for catatonia.  Pt minimally cooperative on exam; has some symptoms of catatonia (mutism, withdrawal, catalepsy) but may be symptoms of delirium after CVA. Responded well to Ativan challenge, will continue with Ativan 1mg BID and keep Ativan PRNs for now. Hold off on antipsychotics until catatonia is clarified.

## 2019-02-19 NOTE — PROGRESS NOTE BEHAVIORAL HEALTH - NSBHCONSULTMEDS_PSY_A_CORE FT
- Restart Seroquel 200mg BID  - Hold Haldol 10mg BID  - C/w Keppra, Tegretol, Namenda - Try Ativan 1mg IV BID for catatonia, will continue to assess (if not convincingly catatonic will begin resuming home regimen)  - Hold Haldol 10mg BID, Seroquel 200mg BID  - C/w Keppra, Tegretol, Namenda - Try Ativan 1mg IV BID for possible catatonia, will continue to assess (if not convincingly catatonic will begin resuming home regimen)  - Hold Haldol 10mg BID, Seroquel 200mg BID

## 2019-02-19 NOTE — PROGRESS NOTE ADULT - PROBLEM SELECTOR PLAN 2
Pt found on the floor. No clear signs of trauma  - CT head neg for bleed.  -Will place patient on 1:1 for patient safety.

## 2019-02-19 NOTE — CHART NOTE - NSCHARTNOTEFT_GEN_A_CORE
Regarding patient being found on floor, ?due to seizure, recommend  1) carbamazepine level to ensure therapeutic dose  2) TTE + FIONA, in addition to loop if FIONA/TTE are negative    Discussed with stroke neurology attending Dr Libman Dana Klavansky  [facility] neurology resident Regarding patient being found on floor, ?due to seizure, recommend  1) carbamazepine level to ensure therapeutic dose    Regarding further stroke workup:  1) TTE + FIONA, in addition to loop if FIONA/TTE are negative    Discussed with stroke neurology attending Dr Libman Dana Klavansky  [facility] neurology resident Regarding patient being found on floor, ?due to seizure, recommend  1) carbamazepine level to ensure therapeutic dose    Regarding further stroke workup:  1) TTE to screen for PFO  Note that the patient has had recurrent DVTs, and therefore may benefit from lifelong anticoagulation. If this is the case, then no role for further cardiac w/u as this will not .     Discussed with stroke neurology attending Dr Libman Dana Klavansky  [facility] neurology resident    Stroke attending. Agree with above

## 2019-02-19 NOTE — PROGRESS NOTE ADULT - PROBLEM SELECTOR PLAN 1
Acute CVA with brain edema  - S/p tPA   - Aspirin /statin  - Pending TTE with bubble study.  - Monitor on tele.  - PT/OT.  - high clinical suspicion for embolic CVA - on A/C but high concern for bleed from frequent falls.

## 2019-02-19 NOTE — PROGRESS NOTE ADULT - PROBLEM SELECTOR PLAN 3
Holding psych meds as per psych given patient may be experiencing   catatonia   -Will try ativan PO and monitor for symptom improvement  - if trend continues, may need NG tube for med dosing. complicated by catatonia   - no specific improvement with ativan  - will await psych follow up to see if higher dose of ativan may be beneficial.  - if trend continues, may need NG tube for med dosing.

## 2019-02-20 LAB
ANION GAP SERPL CALC-SCNC: 14 MMO/L — SIGNIFICANT CHANGE UP (ref 7–14)
BUN SERPL-MCNC: 23 MG/DL — SIGNIFICANT CHANGE UP (ref 7–23)
CALCIUM SERPL-MCNC: 10.1 MG/DL — SIGNIFICANT CHANGE UP (ref 8.4–10.5)
CARBAMAZEPINE SERPL-MCNC: < 2 UG/ML — LOW (ref 4–12)
CHLORIDE SERPL-SCNC: 107 MMOL/L — SIGNIFICANT CHANGE UP (ref 98–107)
CO2 SERPL-SCNC: 24 MMOL/L — SIGNIFICANT CHANGE UP (ref 22–31)
CREAT SERPL-MCNC: 1.08 MG/DL — SIGNIFICANT CHANGE UP (ref 0.5–1.3)
GLUCOSE SERPL-MCNC: 106 MG/DL — HIGH (ref 70–99)
HCT VFR BLD CALC: 47.5 % — SIGNIFICANT CHANGE UP (ref 39–50)
HGB BLD-MCNC: 15.6 G/DL — SIGNIFICANT CHANGE UP (ref 13–17)
MCHC RBC-ENTMCNC: 32.2 PG — SIGNIFICANT CHANGE UP (ref 27–34)
MCHC RBC-ENTMCNC: 32.8 % — SIGNIFICANT CHANGE UP (ref 32–36)
MCV RBC AUTO: 97.9 FL — SIGNIFICANT CHANGE UP (ref 80–100)
NRBC # FLD: 0 K/UL — LOW (ref 25–125)
PLATELET # BLD AUTO: 206 K/UL — SIGNIFICANT CHANGE UP (ref 150–400)
PMV BLD: 9.8 FL — SIGNIFICANT CHANGE UP (ref 7–13)
POTASSIUM SERPL-MCNC: 4.1 MMOL/L — SIGNIFICANT CHANGE UP (ref 3.5–5.3)
POTASSIUM SERPL-SCNC: 4.1 MMOL/L — SIGNIFICANT CHANGE UP (ref 3.5–5.3)
RBC # BLD: 4.85 M/UL — SIGNIFICANT CHANGE UP (ref 4.2–5.8)
RBC # FLD: 12.2 % — SIGNIFICANT CHANGE UP (ref 10.3–14.5)
SODIUM SERPL-SCNC: 145 MMOL/L — SIGNIFICANT CHANGE UP (ref 135–145)
WBC # BLD: 6.44 K/UL — SIGNIFICANT CHANGE UP (ref 3.8–10.5)
WBC # FLD AUTO: 6.44 K/UL — SIGNIFICANT CHANGE UP (ref 3.8–10.5)

## 2019-02-20 PROCEDURE — 99233 SBSQ HOSP IP/OBS HIGH 50: CPT

## 2019-02-20 PROCEDURE — 93306 TTE W/DOPPLER COMPLETE: CPT | Mod: 26

## 2019-02-20 PROCEDURE — 99232 SBSQ HOSP IP/OBS MODERATE 35: CPT

## 2019-02-20 RX ADMIN — Medication 100 MILLIGRAM(S): at 06:26

## 2019-02-20 RX ADMIN — Medication 1 MILLIGRAM(S): at 21:34

## 2019-02-20 RX ADMIN — ENOXAPARIN SODIUM 90 MILLIGRAM(S): 100 INJECTION SUBCUTANEOUS at 17:27

## 2019-02-20 RX ADMIN — MEMANTINE HYDROCHLORIDE 5 MILLIGRAM(S): 10 TABLET ORAL at 21:34

## 2019-02-20 RX ADMIN — LEVETIRACETAM 400 MILLIGRAM(S): 250 TABLET, FILM COATED ORAL at 21:34

## 2019-02-20 RX ADMIN — Medication 100 MILLIGRAM(S): at 17:29

## 2019-02-20 RX ADMIN — ENOXAPARIN SODIUM 90 MILLIGRAM(S): 100 INJECTION SUBCUTANEOUS at 06:21

## 2019-02-20 RX ADMIN — ATORVASTATIN CALCIUM 80 MILLIGRAM(S): 80 TABLET, FILM COATED ORAL at 21:34

## 2019-02-20 RX ADMIN — SENNA PLUS 2 TABLET(S): 8.6 TABLET ORAL at 21:34

## 2019-02-20 RX ADMIN — Medication 1 MILLIGRAM(S): at 17:29

## 2019-02-20 RX ADMIN — Medication 1 MILLIGRAM(S): at 06:26

## 2019-02-20 RX ADMIN — LEVETIRACETAM 400 MILLIGRAM(S): 250 TABLET, FILM COATED ORAL at 06:21

## 2019-02-20 NOTE — PROGRESS NOTE ADULT - SUBJECTIVE AND OBJECTIVE BOX
CC: F/U for CVA    SUBJECTIVE / OVERNIGHT EVENTS:  Slightly more interactive.  Not participating in the history taking and physical exam.  No overnight issues with F/C, V, SOB, Cough, diarrhea.    MEDICATIONS  (STANDING):  aspirin enteric coated 81 milliGRAM(s) Oral daily  atorvastatin 80 milliGRAM(s) Oral at bedtime  benztropine 1 milliGRAM(s) Oral two times a day  carBAMazepine Chewable 100 milliGRAM(s) Chew two times a day  docusate sodium 200 milliGRAM(s) Oral daily  enoxaparin Injectable 90 milliGRAM(s) SubCutaneous two times a day  levETIRAcetam  IVPB 1000 milliGRAM(s) IV Intermittent every 12 hours  LORazepam   Injectable 1 milliGRAM(s) IV Push every 12 hours  memantine 5 milliGRAM(s) Oral at bedtime  pantoprazole    Tablet 40 milliGRAM(s) Oral before breakfast  senna 2 Tablet(s) Oral at bedtime    MEDICATIONS  (PRN):  bisacodyl 5 milliGRAM(s) Oral daily PRN Constipation  polyethylene glycol 3350 17 Gram(s) Oral daily PRN Constipation      Vital Signs Last 24 Hrs  T(C): 36.6 (20 Feb 2019 14:23), Max: 36.6 (20 Feb 2019 06:19)  T(F): 97.8 (20 Feb 2019 14:23), Max: 97.8 (20 Feb 2019 06:19)  HR: 70 (20 Feb 2019 14:23) (69 - 80)  BP: 117/80 (20 Feb 2019 14:23) (117/80 - 126/65)  BP(mean): --  RR: 16 (20 Feb 2019 14:23) (16 - 18)  SpO2: 99% (20 Feb 2019 14:23) (99% - 100%)  CAPILLARY BLOOD GLUCOSE        I&O's Summary      PHYSICAL EXAM:  GENERAL: NAD, well-developed  HEAD:  Atraumatic, Normocephalic  EYES: EOMI, PERRLA, conjunctiva and sclera clear  NECK: Supple, No JVD  CHEST/LUNG: Clear to auscultation bilaterally; No wheeze  HEART: Regular rate and rhythm; No murmurs, rubs, or gallops  ABDOMEN: Soft, Nontender, Nondistended; Bowel sounds present  EXTREMITIES:  2+ Peripheral Pulses, No clubbing, cyanosis, or edema  PSYCH: non participatory with exam  SKIN: No rashes or lesions    LABS:                        15.6   6.44  )-----------( 206      ( 20 Feb 2019 06:15 )             47.5     02-20    145  |  107  |  23  ----------------------------<  106<H>  4.1   |  24  |  1.08    Ca    10.1      20 Feb 2019 06:15  Mg     2.1     02-19                RADIOLOGY & ADDITIONAL TESTS:    Imaging Personally Reviewed:    Care Discussed with Consultants/Other Providers:

## 2019-02-20 NOTE — PROGRESS NOTE ADULT - PROBLEM SELECTOR PLAN 1
Acute CVA with brain edema  - S/p tPA   - Aspirin /statin  - Pending TTE with bubble study.  - Monitor on tele.  - PT/OT.  - high clinical suspicion for embolic CVA - on A/C - will need to transition to PO formulation.

## 2019-02-20 NOTE — PROGRESS NOTE ADULT - PROBLEM SELECTOR PLAN 3
Recurrent DVT  - S/p IVC filter.  vascular consult appreciated  - On Full A/C at the time, but patient is high fall risk.

## 2019-02-20 NOTE — PROGRESS NOTE ADULT - ASSESSMENT
59M from [facility] h/o schizophrenia, recurrent DVT s/p IVC filter, seizure disorder, h/o severe C4-5 stenosis c/b cord compression s/p discectomy and fusion adm 2/14 for dysarthria concerning for acute CVA s/p tPA complicated by brain edema, acute encephalopathy suspicious of catatonia, dysarthria, falls

## 2019-02-20 NOTE — PROGRESS NOTE BEHAVIORAL HEALTH - NSBHCONSULTMEDS_PSY_A_CORE FT
- Try Ativan 1mg IV TID for possible catatonia, seem to show some response (improved movement and catalepsy since 2/19, though still non-verbal)  - Hold Haldol 10mg BID, Seroquel 200mg BID

## 2019-02-20 NOTE — PROGRESS NOTE BEHAVIORAL HEALTH - SUMMARY
Pt is 59 year-old M with PPHx Schizophrenia, currently domiciled at [facility], unknown number of psychiatric hospitalizations, PMH of CVA, seizure disorder, recurrent DVT s/p IVC filter, h/o severe C4-5 stenosis c/b cord compression s/p discectomy and fusion, who presented from [facility] for acute onset of severely slurred speech, RUE weakness and R facial droop, now s/p tPA; CT head showing evolving L MCA infarct.  Overnight and this AM pt noted to be refusing to eat and take medications.  Psychiatry called to assess for catatonia.  Pt minimally cooperative on exam; has some symptoms of catatonia (mutism, withdrawal, catalepsy) but may be symptoms of delirium after CVA. Responded well to Ativan challenge, increase to Ativan 1mg TID and keep Ativan PRNs for now. Hold off on antipsychotics until catatonia is clarified. Pt is 59 year-old M with PPHx Schizophrenia, currently domiciled at [place], unknown number of psychiatric hospitalizations, PMH of CVA, seizure disorder, recurrent DVT s/p IVC filter, h/o severe C4-5 stenosis c/b cord compression s/p discectomy and fusion, who presented from [place] for acute onset of severely slurred speech, RUE weakness and R facial droop, now s/p tPA; CT head showing evolving L MCA infarct.  Overnight and this AM pt noted to be refusing to eat and take medications.  Psychiatry called to assess for catatonia.  Pt minimally cooperative on exam; has some symptoms of catatonia (mutism, withdrawal, catalepsy) but may be symptoms of delirium after CVA. Responded well to Ativan challenge, increase to Ativan 1mg TID IV and keep Ativan PRNs for now. Hold off on antipsychotics until catatonia is clarified.

## 2019-02-20 NOTE — PROGRESS NOTE BEHAVIORAL HEALTH - NSBHFUPINTERVALHXFT_PSY_A_CORE
Mr Arabella seen for AMS/catatonia after stroke. Non-verbal today, though apparently capable of being AAOx3 when well. No acute events ON, received Ativan IV, has been refusing meds and refusing to open his eyes around nursing. No rigidity/waxy flexibility appreciated on exam, no posturing, no catalepsy. No obvious signs of negativism when examined, but behavior around nursing could be negativistic.

## 2019-02-21 LAB
ANION GAP SERPL CALC-SCNC: 11 MMO/L — SIGNIFICANT CHANGE UP (ref 7–14)
BUN SERPL-MCNC: 24 MG/DL — HIGH (ref 7–23)
CALCIUM SERPL-MCNC: 9.8 MG/DL — SIGNIFICANT CHANGE UP (ref 8.4–10.5)
CARDIOLIPIN IGM SER-MCNC: 2.12 MPL — SIGNIFICANT CHANGE UP (ref 0–11)
CARDIOLIPIN IGM SER-MCNC: 36.7 GPL — HIGH (ref 0–23)
CHLORIDE SERPL-SCNC: 108 MMOL/L — HIGH (ref 98–107)
CO2 SERPL-SCNC: 25 MMOL/L — SIGNIFICANT CHANGE UP (ref 22–31)
CREAT SERPL-MCNC: 1.12 MG/DL — SIGNIFICANT CHANGE UP (ref 0.5–1.3)
GLUCOSE SERPL-MCNC: 106 MG/DL — HIGH (ref 70–99)
HCT VFR BLD CALC: 47.4 % — SIGNIFICANT CHANGE UP (ref 39–50)
HGB BLD-MCNC: 15.4 G/DL — SIGNIFICANT CHANGE UP (ref 13–17)
MCHC RBC-ENTMCNC: 32.1 PG — SIGNIFICANT CHANGE UP (ref 27–34)
MCHC RBC-ENTMCNC: 32.5 % — SIGNIFICANT CHANGE UP (ref 32–36)
MCV RBC AUTO: 98.8 FL — SIGNIFICANT CHANGE UP (ref 80–100)
NRBC # FLD: 0 K/UL — LOW (ref 25–125)
PLATELET # BLD AUTO: 178 K/UL — SIGNIFICANT CHANGE UP (ref 150–400)
PMV BLD: 9.8 FL — SIGNIFICANT CHANGE UP (ref 7–13)
POTASSIUM SERPL-MCNC: 4.3 MMOL/L — SIGNIFICANT CHANGE UP (ref 3.5–5.3)
POTASSIUM SERPL-SCNC: 4.3 MMOL/L — SIGNIFICANT CHANGE UP (ref 3.5–5.3)
RBC # BLD: 4.8 M/UL — SIGNIFICANT CHANGE UP (ref 4.2–5.8)
RBC # FLD: 12.1 % — SIGNIFICANT CHANGE UP (ref 10.3–14.5)
SODIUM SERPL-SCNC: 144 MMOL/L — SIGNIFICANT CHANGE UP (ref 135–145)
WBC # BLD: 5.1 K/UL — SIGNIFICANT CHANGE UP (ref 3.8–10.5)
WBC # FLD AUTO: 5.1 K/UL — SIGNIFICANT CHANGE UP (ref 3.8–10.5)

## 2019-02-21 PROCEDURE — 99232 SBSQ HOSP IP/OBS MODERATE 35: CPT

## 2019-02-21 PROCEDURE — 99233 SBSQ HOSP IP/OBS HIGH 50: CPT

## 2019-02-21 RX ADMIN — Medication 1 MILLIGRAM(S): at 05:48

## 2019-02-21 RX ADMIN — LEVETIRACETAM 400 MILLIGRAM(S): 250 TABLET, FILM COATED ORAL at 23:39

## 2019-02-21 RX ADMIN — Medication 1 MILLIGRAM(S): at 17:58

## 2019-02-21 RX ADMIN — ENOXAPARIN SODIUM 90 MILLIGRAM(S): 100 INJECTION SUBCUTANEOUS at 05:48

## 2019-02-21 RX ADMIN — Medication 100 MILLIGRAM(S): at 17:59

## 2019-02-21 RX ADMIN — LEVETIRACETAM 400 MILLIGRAM(S): 250 TABLET, FILM COATED ORAL at 11:52

## 2019-02-21 RX ADMIN — ENOXAPARIN SODIUM 90 MILLIGRAM(S): 100 INJECTION SUBCUTANEOUS at 17:59

## 2019-02-21 RX ADMIN — PANTOPRAZOLE SODIUM 40 MILLIGRAM(S): 20 TABLET, DELAYED RELEASE ORAL at 05:48

## 2019-02-21 RX ADMIN — Medication 81 MILLIGRAM(S): at 11:57

## 2019-02-21 RX ADMIN — Medication 1 MILLIGRAM(S): at 11:51

## 2019-02-21 RX ADMIN — Medication 1 MILLIGRAM(S): at 17:59

## 2019-02-21 RX ADMIN — Medication 100 MILLIGRAM(S): at 05:47

## 2019-02-21 RX ADMIN — Medication 1 MILLIGRAM(S): at 05:49

## 2019-02-21 NOTE — PROGRESS NOTE ADULT - ASSESSMENT
59M from [facility] h/o schizophrenia, recurrent DVT s/p IVC filter, seizure disorder, h/o severe C4-5 stenosis c/b cord compression s/p discectomy and fusion adm 2/14 for dysarthria concerning for acute CVA s/p tPA complicated by brain edema, acute encephalopathy suspicious of catatonia, dysarthria, falls

## 2019-02-21 NOTE — PROGRESS NOTE ADULT - PROBLEM SELECTOR PLAN 2
complicated by catatonia   - continue to titrate up Ativan IV for response  - will inquire about monitoring on 6N for more comprehensive psychiatric care

## 2019-02-21 NOTE — PROGRESS NOTE ADULT - PROBLEM SELECTOR PLAN 1
Acute CVA with brain edema  - S/p tPA   - On Lovenox BID - once PO intake is more consistent, can change to DOACs.  - NO need for further tele monitoring.  - anticipate difficult disposition.

## 2019-02-21 NOTE — PROGRESS NOTE ADULT - SUBJECTIVE AND OBJECTIVE BOX
Neurology Follow up note    Patient is a 59y old  Male who presents with a chief complaint of code stroke (20 Feb 2019 15:12)    Subjective:Interval History - No events overnight    Objective:   Vital Signs Last 24 Hrs  T(C): 36.5 (21 Feb 2019 12:25), Max: 36.7 (21 Feb 2019 05:45)  T(F): 97.7 (21 Feb 2019 12:25), Max: 98.1 (21 Feb 2019 05:45)  HR: 79 (21 Feb 2019 12:25) (62 - 100)  BP: 125/70 (21 Feb 2019 12:25) (117/80 - 146/73)  BP(mean): --  RR: 18 (21 Feb 2019 12:25) (16 - 18)  SpO2: 99% (21 Feb 2019 12:25) (99% - 100%)    General Exam:   General appearance: No acute distress, opens eyes intermittently to voice                   Neurological Exam:  Mental Status/CN: No verbal output, no BTT bilaterally, eyes deviated slightly to R, unknown if facial asymmetry (not following commands)    Motor:   Tone: normal.                  Strength: able to lift L arm AG, no movement from R arm.   Pronator drift: unable to assess              Dysmeria: unable to assess  Tremor: No resting, postural or action tremor.  No myoclonus.  Sensation:  minimal withdrawal from pain on L leg,, withdraws R leg to pain (pinch) briskly  Gait: normal and stable.      Other:  02-21    144  |  108<H>  |  24<H>  ----------------------------<  106<H>  4.3   |  25  |  1.12    Ca    9.8      21 Feb 2019 05:40               15.4   5.10  )-----------( 178      ( 21 Feb 2019 05:40 )             47.4     MEDICATIONS  (STANDING):  aspirin enteric coated 81 milliGRAM(s) Oral daily  atorvastatin 80 milliGRAM(s) Oral at bedtime  benztropine 1 milliGRAM(s) Oral two times a day  carBAMazepine Chewable 100 milliGRAM(s) Chew two times a day  docusate sodium 200 milliGRAM(s) Oral daily  enoxaparin Injectable 90 milliGRAM(s) SubCutaneous two times a day  levETIRAcetam  IVPB 1000 milliGRAM(s) IV Intermittent every 12 hours  LORazepam   Injectable 1 milliGRAM(s) IV Push every 6 hours  memantine 5 milliGRAM(s) Oral at bedtime  pantoprazole    Tablet 40 milliGRAM(s) Oral before breakfast  senna 2 Tablet(s) Oral at bedtime    MEDICATIONS  (PRN):  bisacodyl 5 milliGRAM(s) Oral daily PRN Constipation  polyethylene glycol 3350 17 Gram(s) Oral daily PRN Constipation Neurology Follow up note    Patient is a 59y old  Male who presents with a chief complaint of code stroke (20 Feb 2019 15:12)    Subjective:Interval History - No events overnight    Objective:   Vital Signs Last 24 Hrs  T(C): 36.5 (21 Feb 2019 12:25), Max: 36.7 (21 Feb 2019 05:45)  T(F): 97.7 (21 Feb 2019 12:25), Max: 98.1 (21 Feb 2019 05:45)  HR: 79 (21 Feb 2019 12:25) (62 - 100)  BP: 125/70 (21 Feb 2019 12:25) (117/80 - 146/73)  BP(mean): --  RR: 18 (21 Feb 2019 12:25) (16 - 18)  SpO2: 99% (21 Feb 2019 12:25) (99% - 100%)    General Exam:   General appearance: No acute distress, opens eyes intermittently to voice                   Neurological Exam:  Mental Status/CN: No verbal output, not attentive to examiner; no BTT bilaterally, eyes deviated slightly to R, unknown if facial asymmetry (not following commands)    Motor:   Tone: normal.                  Strength: able to lift L arm AG, no movement from R arm. Legs-minimal or no spontaneous movement  Pronator drift: unable to assess              Dysmeria: unable to assess  Tremor: No resting, postural or action tremor.  No myoclonus.  Sensation:  minimal withdrawal to noxious stimulus on L leg,, withdraws R leg to pain (pinch) briskly  Gait: normal and stable.      Other:  02-21    144  |  108<H>  |  24<H>  ----------------------------<  106<H>  4.3   |  25  |  1.12    Ca    9.8      21 Feb 2019 05:40               15.4   5.10  )-----------( 178      ( 21 Feb 2019 05:40 )             47.4     MEDICATIONS  (STANDING):  aspirin enteric coated 81 milliGRAM(s) Oral daily  atorvastatin 80 milliGRAM(s) Oral at bedtime  benztropine 1 milliGRAM(s) Oral two times a day  carBAMazepine Chewable 100 milliGRAM(s) Chew two times a day  docusate sodium 200 milliGRAM(s) Oral daily  enoxaparin Injectable 90 milliGRAM(s) SubCutaneous two times a day  levETIRAcetam  IVPB 1000 milliGRAM(s) IV Intermittent every 12 hours  LORazepam   Injectable 1 milliGRAM(s) IV Push every 6 hours  memantine 5 milliGRAM(s) Oral at bedtime  pantoprazole    Tablet 40 milliGRAM(s) Oral before breakfast  senna 2 Tablet(s) Oral at bedtime    MEDICATIONS  (PRN):  bisacodyl 5 milliGRAM(s) Oral daily PRN Constipation  polyethylene glycol 3350 17 Gram(s) Oral daily PRN Constipation

## 2019-02-21 NOTE — PROGRESS NOTE BEHAVIORAL HEALTH - NS ED BHA MSE GENERAL APPEARANCE
"Subjective:      Rocío Langley is a 51 y.o. female who presents with Sinus Problem            Sinus Problem  This is a new problem. The current episode started in the past 7 days. The problem is unchanged. There has been no fever. She is experiencing no pain. Associated symptoms include congestion, headaches, sinus pressure and a sore throat. Pertinent negatives include no chills, coughing, ear pain or shortness of breath. Treatments tried: OTC cough and cold. The treatment provided moderate relief.   Allergies, medications and history reviewed by me today      Review of Systems   Constitutional: Negative for fever and chills.   HENT: Positive for congestion, sinus pressure and sore throat. Negative for ear pain.    Respiratory: Negative for cough, shortness of breath and wheezing.    Gastrointestinal: Negative for nausea and vomiting.   Musculoskeletal: Negative for myalgias.   Neurological: Positive for headaches.          Objective:     /72 mmHg  Pulse 67  Temp(Src) 36.9 °C (98.4 °F)  Ht 1.676 m (5' 6\")  Wt 83.462 kg (184 lb)  BMI 29.71 kg/m2  SpO2 97%     Physical Exam   Constitutional: She is oriented to person, place, and time. She appears well-developed and well-nourished. No distress.   HENT:   Head: Normocephalic and atraumatic.   Right Ear: External ear and ear canal normal. Tympanic membrane is not injected and not perforated. No middle ear effusion.   Left Ear: External ear and ear canal normal. Tympanic membrane is not injected and not perforated.  No middle ear effusion.   Nose: Mucosal edema present.   Mouth/Throat: No oropharyngeal exudate or posterior oropharyngeal erythema.   Eyes: Conjunctivae are normal. Right eye exhibits no discharge. Left eye exhibits no discharge.   Neck: Normal range of motion. Neck supple.   Cardiovascular: Normal rate, regular rhythm and normal heart sounds.    No murmur heard.  Pulmonary/Chest: Effort normal and breath sounds normal. No respiratory " distress.   Musculoskeletal: Normal range of motion.   Normal movement of all 4 extremities.   Lymphadenopathy:     She has no cervical adenopathy.        Right: No supraclavicular adenopathy present.        Left: No supraclavicular adenopathy present.   Neurological: She is alert and oriented to person, place, and time. Gait normal.   Skin: Skin is warm and dry.   Psychiatric: She has a normal mood and affect. Her behavior is normal. Thought content normal.   Nursing note and vitals reviewed.              Assessment/Plan:     1. Viral URI       Viral illness at this time with no indication for antibiotics. Reviewed with patient expected course of illness and also reviewed OTC medications that may be used for symptom relief. Follow up 7-10 days if not improving.     Well developed

## 2019-02-21 NOTE — PROGRESS NOTE BEHAVIORAL HEALTH - SUMMARY
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  Overnight and this AM pt noted to be refusing to eat and take medications.  Psychiatry called to assess for catatonia.  Pt minimally cooperative on exam; has some symptoms of catatonia (mutism, withdrawal, catalepsy) but may be symptoms of delirium after CVA. Responded well to Ativan challenge, increase to Ativan 1mg QID IV and keep Ativan PRNs for now. Hold off on antipsychotics until catatonia is clarified.

## 2019-02-21 NOTE — PROGRESS NOTE ADULT - SUBJECTIVE AND OBJECTIVE BOX
CC: F/U for CVA and catatonia    SUBJECTIVE / OVERNIGHT EVENTS:  Patient more alert today.  Not participating in history taking but more interactive.  No overnight issues with F/C, vomiting, SOB, Cough, diarrhea.    MEDICATIONS  (STANDING):  aspirin enteric coated 81 milliGRAM(s) Oral daily  atorvastatin 80 milliGRAM(s) Oral at bedtime  benztropine 1 milliGRAM(s) Oral two times a day  carBAMazepine Chewable 100 milliGRAM(s) Chew two times a day  docusate sodium 200 milliGRAM(s) Oral daily  enoxaparin Injectable 90 milliGRAM(s) SubCutaneous two times a day  levETIRAcetam  IVPB 1000 milliGRAM(s) IV Intermittent every 12 hours  LORazepam   Injectable 1 milliGRAM(s) IV Push every 6 hour  memantine 5 milliGRAM(s) Oral at bedtime  pantoprazole    Tablet 40 milliGRAM(s) Oral before breakfast  senna 2 Tablet(s) Oral at bedtime    MEDICATIONS  (PRN):  bisacodyl 5 milliGRAM(s) Oral daily PRN Constipation  polyethylene glycol 3350 17 Gram(s) Oral daily PRN Constipation      Vital Signs Last 24 Hrs  T(C): 36.5 (21 Feb 2019 12:25), Max: 36.7 (21 Feb 2019 05:45)  T(F): 97.7 (21 Feb 2019 12:25), Max: 98.1 (21 Feb 2019 05:45)  HR: 79 (21 Feb 2019 12:25) (62 - 100)  BP: 125/70 (21 Feb 2019 12:25) (125/70 - 146/73)  BP(mean): --  RR: 18 (21 Feb 2019 12:25) (16 - 18)  SpO2: 99% (21 Feb 2019 12:25) (99% - 100%)  CAPILLARY BLOOD GLUCOSE        I&O's Summary    20 Feb 2019 07:01  -  21 Feb 2019 07:00  --------------------------------------------------------  IN: 220 mL / OUT: 0 mL / NET: 220 mL        PHYSICAL EXAM:  GENERAL: NAD, well-developed  HEAD:  Atraumatic, Normocephalic  EYES: EOMI, PERRLA, conjunctiva and sclera clear  NECK: Supple, No JVD  CHEST/LUNG: Clear to auscultation bilaterally; No wheeze  HEART: Regular rate and rhythm; No murmurs, rubs, or gallops  ABDOMEN: Soft, Nontender, Nondistended; Bowel sounds present  EXTREMITIES:  2+ Peripheral Pulses, No clubbing, cyanosis, or edema  PSYCH: non participatory with exam  SKIN: No rashes or lesions    LABS:                        15.4   5.10  )-----------( 178      ( 21 Feb 2019 05:40 )             47.4     02-21    144  |  108<H>  |  24<H>  ----------------------------<  106<H>  4.3   |  25  |  1.12    Ca    9.8      21 Feb 2019 05:40                RADIOLOGY & ADDITIONAL TESTS:  < from: Transthoracic Echocardiogram (02.20.19 @ 12:40) >  CONCLUSIONS:  1. Normal trileaflet aortic valve.  2. Normal left ventricular internaldimensions and wall  thicknesses.  3. Normal left ventricular systolic function. No segmental  wall motion abnormalities.  4. Normal right ventricular size and function.  Unable to replace IV for bubble study.  ------------------------------------------------------------------------  Confirmed on  2/20/2019 - 14:21:38 by TASHA Jo  ------------------------------------------------------------------------    < end of copied text >    Imaging Personally Reviewed:    Care Discussed with Consultants/Other Providers:

## 2019-02-21 NOTE — PROGRESS NOTE ADULT - ASSESSMENT
59 year old RH AA male from [facility] with PMHx of schizophrenia, DVT (? unknown laterality, on lovenox 40 subq daily), seizure disorder, s/p surgical intervention in June 2018 c4/c5 facet joint, s/p discectomy and cord compression in 5/2018 who presents to ED for acute onset of severely slurred speech, RUE weakness and R facial droop, LKN at 12:00 pm on 2/14/19, presented to ED at 12:56 PM. At baseline he is in a wheelchair due to previous surgeries but is able to get up and walk a few steps but needs assistance. Per aide, patient was at his baseline and had no complaints, he trying to stand up but fell onto the aide instead because he wasn't able to stand, was slurring speech and unable to move his right arm. When evaluated, his was able to move his RUE AG, but had drift, and RLE was AG with no drift, but he was still severely dysarthric and selectively following commands, also had ?slight nasolabial fold flattening on R. tPa was discussed with his caregiver and the patient, after explaining risks and benefits the patient and caregiver both agreed for tPa, received on 2/14, was in MICU and now transferred to floor. Code stroke called on 2/16 for decreases responsiveness and not moving his RUE, RLE, not verbalizing, nothing new was found on repeat CThead    Etiology: ESUS  [] neurovascular workup is complete, patient was found to have recurrent DVTs, is on anticoagulation with lovenox 90 BID  [] No further workup as inpatient from neurovascular perspective  [] continue current medications  [] May be discharged back to [facility] 59 year old RH AA male from [facility] with PMHx of schizophrenia, DVT (? unknown laterality, on lovenox 40 subq daily), seizure disorder, s/p surgical intervention in June 2018 c4/c5 facet joint, s/p discectomy and cord compression in 5/2018 who presents to ED for acute onset of severely slurred speech, RUE weakness and R facial droop, LKN at 12:00 pm on 2/14/19, presented to ED at 12:56 PM. At baseline he is in a wheelchair due to previous surgeries but is able to get up and walk a few steps but needs assistance. Per aide, patient was at his baseline and had no complaints, he was trying to stand up but fell onto the aide instead because he wasn't able to stand, was slurring speech and unable to move his right arm. When evaluated, his was able to move his RUE AG, but had drift, and RLE was AG with no drift, but he was still severely dysarthric and selectively following commands, also had ?slight nasolabial fold flattening on R. tPa was discussed with his caregiver and the patient, after explaining risks and benefits the patient and caregiver both agreed for tPa, received on 2/14, was in MICU and now transferred to floor. Code stroke called on 2/16 for decreased responsiveness and not moving his RUE, RLE, not verbalizing, nothing new was found on repeat CT head    Etiology: ESUS  [] neurovascular workup is complete, patient was found to have recurrent DVTs, is on anticoagulation with lovenox 90 BID, possibly lifelong for recurrent DVT, so any cardiac source of embolism, if found, would not   [] No further workup as inpatient from neurovascular perspective  [] continue current medications  [] May be discharged back to [facility]

## 2019-02-21 NOTE — PROGRESS NOTE BEHAVIORAL HEALTH - NSBHCONSULTMEDS_PSY_A_CORE FT
- Try Ativan 1mg IV QID for possible catatonia, seem to show some response (improved movement and catalepsy since 2/19, though still non-verbal, not sedated)  - Hold Haldol 10mg BID, Seroquel 200mg BID

## 2019-02-21 NOTE — PROGRESS NOTE BEHAVIORAL HEALTH - NSBHFUPINTERVALHXFT_PSY_A_CORE
Mr Arabella seen for AMS/catatonia after stroke. Non-verbal today. No acute events ON, received Ativan IV, has eyes open but barely engages with interviewer. No rigidity/waxy flexibility appreciated on exam, no posturing, no catalepsy, but consistently returns to same positions when moved.

## 2019-02-21 NOTE — SWALLOW BEDSIDE ASSESSMENT ADULT - SWALLOW EVAL: DIAGNOSIS
1. The patient demonstrated a mild oral dysphagia for puree, honey thick, and nectar thick liquid textures marked by delayed bolus collection, transfer, and posterior transport. 2. The patient demonstrated a moderate oral dysphagia for soft solid textures marked by absent mastication resulting in immediate posterior transport of the bolus in the oral cavity. 3. The patient demonstrated a mild pharyngeal dysphagia for puree, soft solid, and honey thick liquid textures marked by delayed pharyngeal swallow trigger with reduced hyolaryngeal elevation upon digital palpation w/o evidence of airway penetration. 4. The patient demonstrated a severe pharyngeal dysphagia for nectar thick liquid textures marked by a delayed pharyngeal swallow trigger with reduced hyolaryngeal elevation upon digital palpation resulting in reflexive coughing suggestive of airway penetration.

## 2019-02-22 DIAGNOSIS — E87.0 HYPEROSMOLALITY AND HYPERNATREMIA: ICD-10-CM

## 2019-02-22 LAB
ANION GAP SERPL CALC-SCNC: 11 MMO/L — SIGNIFICANT CHANGE UP (ref 7–14)
BUN SERPL-MCNC: 23 MG/DL — SIGNIFICANT CHANGE UP (ref 7–23)
CALCIUM SERPL-MCNC: 9.8 MG/DL — SIGNIFICANT CHANGE UP (ref 8.4–10.5)
CHLORIDE SERPL-SCNC: 111 MMOL/L — HIGH (ref 98–107)
CO2 SERPL-SCNC: 26 MMOL/L — SIGNIFICANT CHANGE UP (ref 22–31)
CREAT SERPL-MCNC: 1.08 MG/DL — SIGNIFICANT CHANGE UP (ref 0.5–1.3)
GLUCOSE SERPL-MCNC: 110 MG/DL — HIGH (ref 70–99)
HCT VFR BLD CALC: 48 % — SIGNIFICANT CHANGE UP (ref 39–50)
HGB BLD-MCNC: 15.9 G/DL — SIGNIFICANT CHANGE UP (ref 13–17)
MCHC RBC-ENTMCNC: 32.6 PG — SIGNIFICANT CHANGE UP (ref 27–34)
MCHC RBC-ENTMCNC: 33.1 % — SIGNIFICANT CHANGE UP (ref 32–36)
MCV RBC AUTO: 98.4 FL — SIGNIFICANT CHANGE UP (ref 80–100)
NRBC # FLD: 0 K/UL — LOW (ref 25–125)
PLATELET # BLD AUTO: 200 K/UL — SIGNIFICANT CHANGE UP (ref 150–400)
PMV BLD: 9.5 FL — SIGNIFICANT CHANGE UP (ref 7–13)
POTASSIUM SERPL-MCNC: 4.1 MMOL/L — SIGNIFICANT CHANGE UP (ref 3.5–5.3)
POTASSIUM SERPL-SCNC: 4.1 MMOL/L — SIGNIFICANT CHANGE UP (ref 3.5–5.3)
RBC # BLD: 4.88 M/UL — SIGNIFICANT CHANGE UP (ref 4.2–5.8)
RBC # FLD: 12.2 % — SIGNIFICANT CHANGE UP (ref 10.3–14.5)
SODIUM SERPL-SCNC: 148 MMOL/L — HIGH (ref 135–145)
WBC # BLD: 5.83 K/UL — SIGNIFICANT CHANGE UP (ref 3.8–10.5)
WBC # FLD AUTO: 5.83 K/UL — SIGNIFICANT CHANGE UP (ref 3.8–10.5)

## 2019-02-22 PROCEDURE — 99232 SBSQ HOSP IP/OBS MODERATE 35: CPT

## 2019-02-22 PROCEDURE — 99233 SBSQ HOSP IP/OBS HIGH 50: CPT

## 2019-02-22 RX ORDER — DOCUSATE SODIUM 100 MG
200 CAPSULE ORAL DAILY
Qty: 0 | Refills: 0 | Status: DISCONTINUED | OUTPATIENT
Start: 2019-02-22 | End: 2019-03-15

## 2019-02-22 RX ORDER — LEVETIRACETAM 250 MG/1
1000 TABLET, FILM COATED ORAL
Qty: 0 | Refills: 0 | Status: DISCONTINUED | OUTPATIENT
Start: 2019-02-22 | End: 2019-03-15

## 2019-02-22 RX ADMIN — ENOXAPARIN SODIUM 90 MILLIGRAM(S): 100 INJECTION SUBCUTANEOUS at 06:51

## 2019-02-22 RX ADMIN — Medication 1 MILLIGRAM(S): at 06:51

## 2019-02-22 RX ADMIN — Medication 1 MILLIGRAM(S): at 02:09

## 2019-02-22 RX ADMIN — ENOXAPARIN SODIUM 90 MILLIGRAM(S): 100 INJECTION SUBCUTANEOUS at 17:39

## 2019-02-22 RX ADMIN — Medication 1 MILLIGRAM(S): at 09:03

## 2019-02-22 RX ADMIN — MEMANTINE HYDROCHLORIDE 5 MILLIGRAM(S): 10 TABLET ORAL at 22:12

## 2019-02-22 RX ADMIN — Medication 81 MILLIGRAM(S): at 13:21

## 2019-02-22 RX ADMIN — LEVETIRACETAM 400 MILLIGRAM(S): 250 TABLET, FILM COATED ORAL at 11:22

## 2019-02-22 RX ADMIN — Medication 1 MILLIGRAM(S): at 15:57

## 2019-02-22 RX ADMIN — Medication 100 MILLIGRAM(S): at 06:51

## 2019-02-22 RX ADMIN — Medication 100 MILLIGRAM(S): at 17:39

## 2019-02-22 RX ADMIN — ATORVASTATIN CALCIUM 80 MILLIGRAM(S): 80 TABLET, FILM COATED ORAL at 22:12

## 2019-02-22 RX ADMIN — Medication 200 MILLIGRAM(S): at 17:39

## 2019-02-22 RX ADMIN — LEVETIRACETAM 1000 MILLIGRAM(S): 250 TABLET, FILM COATED ORAL at 23:52

## 2019-02-22 RX ADMIN — Medication 1 MILLIGRAM(S): at 23:52

## 2019-02-22 RX ADMIN — PANTOPRAZOLE SODIUM 40 MILLIGRAM(S): 20 TABLET, DELAYED RELEASE ORAL at 06:51

## 2019-02-22 RX ADMIN — SENNA PLUS 2 TABLET(S): 8.6 TABLET ORAL at 22:12

## 2019-02-22 RX ADMIN — Medication 1 MILLIGRAM(S): at 17:39

## 2019-02-22 NOTE — PROGRESS NOTE ADULT - ASSESSMENT
59M from [facility] h/o schizophrenia, recurrent DVT s/p IVC filter, seizure disorder, h/o severe C4-5 stenosis c/b cord compression s/p discectomy and fusion adm 2/14 for dysarthria concerning for acute CVA s/p tPA complicated by brain edema, acute encephalopathy suspicious of catatonia, dysarthria, hypernatremia

## 2019-02-22 NOTE — PROGRESS NOTE BEHAVIORAL HEALTH - SUMMARY
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  Overnight and this AM pt noted to be refusing to eat and take medications.  Psychiatry called to assess for catatonia.     Assessment uncertain (catatonia vs AMS after stroke), but presents with mutism, posturing, catalepsy, negativism, ~tonic changes and automatic obedience that support catatonia. Has also responded well to Ativan challenge. Increase to Ativan 1mg q4 IV and keep Ativan PRNs for now. Hold off on antipsychotics until catatonia is clarified. 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  Overnight and this AM pt noted to be refusing to eat and take medications.  Psychiatry called to assess for catatonia.     Assessment uncertain (catatonia vs AMS after stroke), but presents with mutism, posturing, catalepsy, negativism, ~tonic changes and automatic obedience that support catatonia. Has also responded well to Ativan challenge. Increase to Ativan 1mg q4 hrs IV and keep Ativan PRNs for now. Hold off on antipsychotics until catatonia is clarified.

## 2019-02-22 NOTE — PROGRESS NOTE ADULT - PROBLEM SELECTOR PLAN 1
Acute CVA with brain edema  - S/p tPA   - On Lovenox BID - once PO intake is more consistent, can change to DOACs.  - NO need for further tele monitoring.  - anticipate difficult disposition due to psychiatric issues.

## 2019-02-22 NOTE — PROGRESS NOTE BEHAVIORAL HEALTH - NSBHCONSULTMEDS_PSY_A_CORE FT
- Try Ativan 1mg IV q4 for catatonia, seem to show response (improved movement and catalepsy since 2/19, though still non-verbal, not sedated)  - Hold Haldol 10mg BID, Seroquel 200mg BID - Try Ativan 1mg IV q4hrs for catatonia, seem to show response (improved movement and catalepsy since 2/19, though still non-verbal, not sedated)  - Hold Haldol 10mg BID, Seroquel 200mg BID

## 2019-02-22 NOTE — PROGRESS NOTE BEHAVIORAL HEALTH - NSBHFUPINTERVALHXFT_PSY_A_CORE
Mr Bell seen for AMS/catatonia after stroke. Non-verbal today but moaning. No acute events ON, received Ativan IV QID. Possible geigenhalten appreciated in L UE, with mitgehein and automatic obedience (shaking hands). Mr Bell allowed examiner to pose arm above bed and maintained pose as well. Slightly more related on exam (occasional eye contact).

## 2019-02-22 NOTE — PROGRESS NOTE ADULT - SUBJECTIVE AND OBJECTIVE BOX
CC: F/U for catatonia    SUBJECTIVE / OVERNIGHT EVENTS:  Mild improvement with ativan tid.    No overnight issues with F/C, vomiting, SOB, Cough, diarrhea.    MEDICATIONS  (STANDING):  aspirin enteric coated 81 milliGRAM(s) Oral daily  atorvastatin 80 milliGRAM(s) Oral at bedtime  benztropine 1 milliGRAM(s) Oral two times a day  carBAMazepine Chewable 100 milliGRAM(s) Chew two times a day  docusate sodium 200 milliGRAM(s) Oral daily  enoxaparin Injectable 90 milliGRAM(s) SubCutaneous two times a day  levETIRAcetam  IVPB 1000 milliGRAM(s) IV Intermittent every 12 hours  LORazepam   Injectable 1 milliGRAM(s) IV Push every 6 hours  memantine 5 milliGRAM(s) Oral at bedtime  pantoprazole    Tablet 40 milliGRAM(s) Oral before breakfast  senna 2 Tablet(s) Oral at bedtime    MEDICATIONS  (PRN):  bisacodyl 5 milliGRAM(s) Oral daily PRN Constipation  polyethylene glycol 3350 17 Gram(s) Oral daily PRN Constipation      Vital Signs Last 24 Hrs  T(C): 37.1 (22 Feb 2019 06:47), Max: 37.1 (22 Feb 2019 06:47)  T(F): 98.7 (22 Feb 2019 06:47), Max: 98.7 (22 Feb 2019 06:47)  HR: 71 (22 Feb 2019 06:47) (70 - 79)  BP: 122/64 (22 Feb 2019 06:47) (120/62 - 125/70)  BP(mean): --  RR: 18 (22 Feb 2019 06:47) (18 - 18)  SpO2: 99% (22 Feb 2019 06:47) (99% - 99%)  CAPILLARY BLOOD GLUCOSE        I&O's Summary    21 Feb 2019 07:01  -  22 Feb 2019 07:00  --------------------------------------------------------  IN: 200 mL / OUT: 0 mL / NET: 200 mL        PHYSICAL EXAM:  GENERAL: NAD, well-developed  HEAD:  Atraumatic, Normocephalic  EYES: EOMI, PERRLA, conjunctiva and sclera clear  NECK: Supple, No JVD  CHEST/LUNG: Clear to auscultation bilaterally; No wheeze  HEART: Regular rate and rhythm; No murmurs, rubs, or gallops  ABDOMEN: Soft, Nontender, Nondistended; Bowel sounds present  EXTREMITIES:  2+ Peripheral Pulses, No clubbing, cyanosis, or edema  PSYCH: non participatory with exam but occasionally acknowledges my presence.   SKIN: No rashes or lesions    LABS:                        15.9   5.83  )-----------( 200      ( 22 Feb 2019 08:10 )             48.0     02-22    148<H>  |  111<H>  |  23  ----------------------------<  110<H>  4.1   |  26  |  1.08    Ca    9.8      22 Feb 2019 08:10                RADIOLOGY & ADDITIONAL TESTS:    Imaging Personally Reviewed:    Care Discussed with Consultants/Other Providers:

## 2019-02-22 NOTE — PROGRESS NOTE BEHAVIORAL HEALTH - CASE SUMMARY
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  Overnight and this AM pt noted to be refusing to eat and take medications.  Psychiatry called to assess for catatonia.  Pt minimally cooperative on exam; has some symptoms of catatonia (mutism, withdrawal, catalepsy) but may be symptoms of delirium after CVA. Responded well to Ativan challenge, increase to Ativan 1mg q4hrs IV and keep Ativan PRNs for now. Hold off on antipsychotics until catatonia is clarified.
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  Overnight and this AM pt noted to be refusing to eat and take medications.  Psychiatry called to assess for catatonia.  Pt minimally cooperative on exam; has some symptoms of catatonia (mutism, withdrawal, catalepsy) but may be symptoms of delirium after CVA. Responded well to Ativan challenge, increase to Ativan 1mg QID IV and keep Ativan PRNs for now. Hold off on antipsychotics until catatonia is clarified.
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  Overnight and this AM pt noted to be refusing to eat and take medications.  Psychiatry called to assess for catatonia.  Pt minimally cooperative on exam; has some symptoms of catatonia (mutism, withdrawal, catalepsy) but may be symptoms of delirium after CVA. Responded well to Ativan challenge, increase to Ativan 1mg TID IV and keep Ativan PRNs for now. I agree with holding antipsychotics until catatonia is resolved.
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 Pt. seen and evaluated, Alert but non verbal, has some symptoms of catatonia (mutism, withdrawal, ?catalepsy) but may be symptoms of delirium after CVA. As per chart, pt. responded well to Ativan challenge, will continue with Ativan 1mg BID and keep Ativan PRNs for now. Hold off on antipsychotics until further assessment. Collateral needed form [place] to establish baseline. Will follow
See above

## 2019-02-23 LAB
ANION GAP SERPL CALC-SCNC: 13 MMO/L — SIGNIFICANT CHANGE UP (ref 7–14)
BUN SERPL-MCNC: 25 MG/DL — HIGH (ref 7–23)
CALCIUM SERPL-MCNC: 9.7 MG/DL — SIGNIFICANT CHANGE UP (ref 8.4–10.5)
CHLORIDE SERPL-SCNC: 112 MMOL/L — HIGH (ref 98–107)
CO2 SERPL-SCNC: 23 MMOL/L — SIGNIFICANT CHANGE UP (ref 22–31)
CREAT SERPL-MCNC: 1.1 MG/DL — SIGNIFICANT CHANGE UP (ref 0.5–1.3)
GLUCOSE SERPL-MCNC: 107 MG/DL — HIGH (ref 70–99)
HCT VFR BLD CALC: 45.1 % — SIGNIFICANT CHANGE UP (ref 39–50)
HGB BLD-MCNC: 14.7 G/DL — SIGNIFICANT CHANGE UP (ref 13–17)
MAGNESIUM SERPL-MCNC: 2.2 MG/DL — SIGNIFICANT CHANGE UP (ref 1.6–2.6)
MCHC RBC-ENTMCNC: 32.3 PG — SIGNIFICANT CHANGE UP (ref 27–34)
MCHC RBC-ENTMCNC: 32.6 % — SIGNIFICANT CHANGE UP (ref 32–36)
MCV RBC AUTO: 99.1 FL — SIGNIFICANT CHANGE UP (ref 80–100)
NRBC # FLD: 0 K/UL — LOW (ref 25–125)
PLATELET # BLD AUTO: 200 K/UL — SIGNIFICANT CHANGE UP (ref 150–400)
PMV BLD: 10.4 FL — SIGNIFICANT CHANGE UP (ref 7–13)
POTASSIUM SERPL-MCNC: 3.7 MMOL/L — SIGNIFICANT CHANGE UP (ref 3.5–5.3)
POTASSIUM SERPL-SCNC: 3.7 MMOL/L — SIGNIFICANT CHANGE UP (ref 3.5–5.3)
RBC # BLD: 4.55 M/UL — SIGNIFICANT CHANGE UP (ref 4.2–5.8)
RBC # FLD: 12.3 % — SIGNIFICANT CHANGE UP (ref 10.3–14.5)
SODIUM SERPL-SCNC: 148 MMOL/L — HIGH (ref 135–145)
WBC # BLD: 5.25 K/UL — SIGNIFICANT CHANGE UP (ref 3.8–10.5)
WBC # FLD AUTO: 5.25 K/UL — SIGNIFICANT CHANGE UP (ref 3.8–10.5)

## 2019-02-23 PROCEDURE — 99233 SBSQ HOSP IP/OBS HIGH 50: CPT

## 2019-02-23 RX ORDER — SODIUM CHLORIDE 9 MG/ML
1000 INJECTION, SOLUTION INTRAVENOUS
Qty: 0 | Refills: 0 | Status: DISCONTINUED | OUTPATIENT
Start: 2019-02-23 | End: 2019-03-06

## 2019-02-23 RX ADMIN — Medication 1 MILLIGRAM(S): at 11:55

## 2019-02-23 RX ADMIN — SENNA PLUS 2 TABLET(S): 8.6 TABLET ORAL at 22:07

## 2019-02-23 RX ADMIN — ENOXAPARIN SODIUM 90 MILLIGRAM(S): 100 INJECTION SUBCUTANEOUS at 06:16

## 2019-02-23 RX ADMIN — Medication 100 MILLIGRAM(S): at 06:16

## 2019-02-23 RX ADMIN — Medication 1 MILLIGRAM(S): at 02:17

## 2019-02-23 RX ADMIN — Medication 1 MILLIGRAM(S): at 06:16

## 2019-02-23 RX ADMIN — MEMANTINE HYDROCHLORIDE 5 MILLIGRAM(S): 10 TABLET ORAL at 22:07

## 2019-02-23 RX ADMIN — ENOXAPARIN SODIUM 90 MILLIGRAM(S): 100 INJECTION SUBCUTANEOUS at 18:07

## 2019-02-23 RX ADMIN — Medication 1 MILLIGRAM(S): at 18:07

## 2019-02-23 RX ADMIN — ATORVASTATIN CALCIUM 80 MILLIGRAM(S): 80 TABLET, FILM COATED ORAL at 22:07

## 2019-02-23 RX ADMIN — Medication 100 MILLIGRAM(S): at 18:07

## 2019-02-23 RX ADMIN — PANTOPRAZOLE SODIUM 40 MILLIGRAM(S): 20 TABLET, DELAYED RELEASE ORAL at 11:58

## 2019-02-23 RX ADMIN — LEVETIRACETAM 1000 MILLIGRAM(S): 250 TABLET, FILM COATED ORAL at 18:07

## 2019-02-23 RX ADMIN — Medication 81 MILLIGRAM(S): at 11:59

## 2019-02-23 RX ADMIN — LEVETIRACETAM 1000 MILLIGRAM(S): 250 TABLET, FILM COATED ORAL at 06:16

## 2019-02-23 RX ADMIN — Medication 1 MILLIGRAM(S): at 22:07

## 2019-02-23 NOTE — PROGRESS NOTE ADULT - ASSESSMENT
59M from 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

## 2019-02-23 NOTE — PROGRESS NOTE ADULT - SUBJECTIVE AND OBJECTIVE BOX
Vito Mooney Jr, MD  page [phone]  Patient is a 59y old  Male who presents with a chief complaint of code stroke (22 Feb 2019 12:14)      SUBJECTIVE / OVERNIGHT EVENTS: No events    MEDICATIONS  (STANDING):  aspirin enteric coated 81 milliGRAM(s) Oral daily  atorvastatin 80 milliGRAM(s) Oral at bedtime  benztropine 1 milliGRAM(s) Oral two times a day  carBAMazepine Chewable 100 milliGRAM(s) Chew two times a day  docusate sodium Liquid 200 milliGRAM(s) Oral daily  enoxaparin Injectable 90 milliGRAM(s) SubCutaneous two times a day  levETIRAcetam 1000 milliGRAM(s) Oral two times a day  LORazepam     Tablet 1 milliGRAM(s) Oral every 4 hours  memantine 5 milliGRAM(s) Oral at bedtime  pantoprazole    Tablet 40 milliGRAM(s) Oral before breakfast  senna 2 Tablet(s) Oral at bedtime    MEDICATIONS  (PRN):  bisacodyl 5 milliGRAM(s) Oral daily PRN Constipation  polyethylene glycol 3350 17 Gram(s) Oral daily PRN Constipation      Vital Signs Last 24 Hrs  T(C): 37.1 (23 Feb 2019 06:14), Max: 37.1 (23 Feb 2019 06:14)  T(F): 98.7 (23 Feb 2019 06:14), Max: 98.7 (23 Feb 2019 06:14)  HR: 88 (23 Feb 2019 06:14) (73 - 88)  BP: 112/70 (23 Feb 2019 06:14) (112/70 - 122/84)  BP(mean): --  RR: 18 (23 Feb 2019 06:14) (18 - 18)  SpO2: 99% (23 Feb 2019 06:14) (96% - 100%)  CAPILLARY BLOOD GLUCOSE        I&O's Summary    22 Feb 2019 07:01  -  23 Feb 2019 07:00  --------------------------------------------------------  IN: 360 mL / OUT: 0 mL / NET: 360 mL        PHYSICAL EXAM:  GENERAL: NAD, well-developed  HEAD:  Atraumatic, Normocephalic  EYES: EOMI, conjunctiva and sclera clear  NECK: Supple, No JVD  CHEST/LUNG: Clear to auscultation bilaterally; No wheeze  HEART: Regular rate and rhythm; No murmurs, rubs, or gallops  ABDOMEN: Soft, Nontender, Nondistended; Bowel sounds present  EXTREMITIES:  2+ Peripheral Pulses, No clubbing, cyanosis, or edema  NEUROLOGY: non-focal  SKIN: No rashes or lesions    LABS:                        14.7   5.25  )-----------( 200      ( 23 Feb 2019 05:05 )             45.1     02-23    148<H>  |  112<H>  |  25<H>  ----------------------------<  107<H>  3.7   |  23  |  1.10    Ca    9.7      23 Feb 2019 05:00  Mg     2.2     02-23        Care Discussed with Consultants/Other Providers:

## 2019-02-23 NOTE — PROVIDER CONTACT NOTE (OTHER) - SITUATION
VP=[id number] , IVF ordered . unable to get IV acces pt restless and unable to restrain for IV access with 4 staff members

## 2019-02-24 LAB
ANION GAP SERPL CALC-SCNC: 13 MMO/L — SIGNIFICANT CHANGE UP (ref 7–14)
BUN SERPL-MCNC: 21 MG/DL — SIGNIFICANT CHANGE UP (ref 7–23)
CALCIUM SERPL-MCNC: 10 MG/DL — SIGNIFICANT CHANGE UP (ref 8.4–10.5)
CHLORIDE SERPL-SCNC: 109 MMOL/L — HIGH (ref 98–107)
CO2 SERPL-SCNC: 26 MMOL/L — SIGNIFICANT CHANGE UP (ref 22–31)
CREAT SERPL-MCNC: 1.08 MG/DL — SIGNIFICANT CHANGE UP (ref 0.5–1.3)
GLUCOSE SERPL-MCNC: 105 MG/DL — HIGH (ref 70–99)
HCT VFR BLD CALC: 46.6 % — SIGNIFICANT CHANGE UP (ref 39–50)
HGB BLD-MCNC: 15.3 G/DL — SIGNIFICANT CHANGE UP (ref 13–17)
MAGNESIUM SERPL-MCNC: 2.2 MG/DL — SIGNIFICANT CHANGE UP (ref 1.6–2.6)
MCHC RBC-ENTMCNC: 32.8 % — SIGNIFICANT CHANGE UP (ref 32–36)
MCHC RBC-ENTMCNC: 32.8 PG — SIGNIFICANT CHANGE UP (ref 27–34)
MCV RBC AUTO: 99.8 FL — SIGNIFICANT CHANGE UP (ref 80–100)
NRBC # FLD: 0 K/UL — LOW (ref 25–125)
PLATELET # BLD AUTO: 207 K/UL — SIGNIFICANT CHANGE UP (ref 150–400)
PMV BLD: 10 FL — SIGNIFICANT CHANGE UP (ref 7–13)
POTASSIUM SERPL-MCNC: 3.9 MMOL/L — SIGNIFICANT CHANGE UP (ref 3.5–5.3)
POTASSIUM SERPL-SCNC: 3.9 MMOL/L — SIGNIFICANT CHANGE UP (ref 3.5–5.3)
RBC # BLD: 4.67 M/UL — SIGNIFICANT CHANGE UP (ref 4.2–5.8)
RBC # FLD: 12.1 % — SIGNIFICANT CHANGE UP (ref 10.3–14.5)
SODIUM SERPL-SCNC: 148 MMOL/L — HIGH (ref 135–145)
WBC # BLD: 5.6 K/UL — SIGNIFICANT CHANGE UP (ref 3.8–10.5)
WBC # FLD AUTO: 5.6 K/UL — SIGNIFICANT CHANGE UP (ref 3.8–10.5)

## 2019-02-24 PROCEDURE — 99233 SBSQ HOSP IP/OBS HIGH 50: CPT

## 2019-02-24 RX ADMIN — Medication 1 MILLIGRAM(S): at 14:34

## 2019-02-24 RX ADMIN — Medication 100 MILLIGRAM(S): at 06:32

## 2019-02-24 RX ADMIN — Medication 1 MILLIGRAM(S): at 02:29

## 2019-02-24 RX ADMIN — Medication 1 MILLIGRAM(S): at 06:32

## 2019-02-24 RX ADMIN — ENOXAPARIN SODIUM 90 MILLIGRAM(S): 100 INJECTION SUBCUTANEOUS at 18:49

## 2019-02-24 RX ADMIN — MEMANTINE HYDROCHLORIDE 5 MILLIGRAM(S): 10 TABLET ORAL at 21:06

## 2019-02-24 RX ADMIN — Medication 1 MILLIGRAM(S): at 18:49

## 2019-02-24 RX ADMIN — LEVETIRACETAM 1000 MILLIGRAM(S): 250 TABLET, FILM COATED ORAL at 06:31

## 2019-02-24 RX ADMIN — Medication 200 MILLIGRAM(S): at 11:02

## 2019-02-24 RX ADMIN — PANTOPRAZOLE SODIUM 40 MILLIGRAM(S): 20 TABLET, DELAYED RELEASE ORAL at 06:31

## 2019-02-24 RX ADMIN — Medication 1 MILLIGRAM(S): at 06:31

## 2019-02-24 RX ADMIN — Medication 100 MILLIGRAM(S): at 18:49

## 2019-02-24 RX ADMIN — Medication 1 MILLIGRAM(S): at 21:04

## 2019-02-24 RX ADMIN — Medication 1 MILLIGRAM(S): at 11:01

## 2019-02-24 RX ADMIN — Medication 81 MILLIGRAM(S): at 11:02

## 2019-02-24 RX ADMIN — ENOXAPARIN SODIUM 90 MILLIGRAM(S): 100 INJECTION SUBCUTANEOUS at 06:32

## 2019-02-24 RX ADMIN — LEVETIRACETAM 1000 MILLIGRAM(S): 250 TABLET, FILM COATED ORAL at 18:49

## 2019-02-24 NOTE — PROGRESS NOTE ADULT - SUBJECTIVE AND OBJECTIVE BOX
Vito Mooney Jr, MD  page [id number]  Patient is a 59y old  Male who presents with a chief complaint of code stroke (23 Feb 2019 12:04)      SUBJECTIVE / OVERNIGHT EVENTS: no new c/o    MEDICATIONS  (STANDING):  aspirin enteric coated 81 milliGRAM(s) Oral daily  atorvastatin 80 milliGRAM(s) Oral at bedtime  benztropine 1 milliGRAM(s) Oral two times a day  carBAMazepine Chewable 100 milliGRAM(s) Chew two times a day  docusate sodium Liquid 200 milliGRAM(s) Oral daily  enoxaparin Injectable 90 milliGRAM(s) SubCutaneous two times a day  levETIRAcetam 1000 milliGRAM(s) Oral two times a day  LORazepam     Tablet 1 milliGRAM(s) Oral every 4 hours  memantine 5 milliGRAM(s) Oral at bedtime  pantoprazole    Tablet 40 milliGRAM(s) Oral before breakfast  senna 2 Tablet(s) Oral at bedtime  sodium chloride 0.45%. 1000 milliLiter(s) (75 mL/Hr) IV Continuous <Continuous>    MEDICATIONS  (PRN):  bisacodyl 5 milliGRAM(s) Oral daily PRN Constipation  polyethylene glycol 3350 17 Gram(s) Oral daily PRN Constipation      Vital Signs Last 24 Hrs  T(C): 36.9 (24 Feb 2019 12:03), Max: 36.9 (24 Feb 2019 12:03)  T(F): 98.4 (24 Feb 2019 12:03), Max: 98.4 (24 Feb 2019 12:03)  HR: 79 (24 Feb 2019 12:03) (65 - 79)  BP: 116/71 (24 Feb 2019 12:03) (114/70 - 129/79)  BP(mean): --  RR: 17 (24 Feb 2019 12:03) (17 - 18)  SpO2: 99% (24 Feb 2019 12:03) (98% - 100%)  CAPILLARY BLOOD GLUCOSE        I&O's Summary      PHYSICAL EXAM:  GENERAL: NAD, well-developed  HEAD:  Atraumatic, Normocephalic  EYES: EOMI, conjunctiva and sclera clear  NECK: Supple, No JVD  CHEST/LUNG: Clear to auscultation bilaterally; No wheeze  HEART: Regular rate and rhythm; No murmurs, rubs, or gallops  ABDOMEN: Soft, Nontender, Nondistended; Bowel sounds present  EXTREMITIES:  2+ Peripheral Pulses, No clubbing, cyanosis, or edema  NEUROLOGY: non-focal  SKIN: No rashes or lesions    LABS:                        15.3   5.60  )-----------( 207      ( 24 Feb 2019 07:55 )             46.6     02-24    148<H>  |  109<H>  |  21  ----------------------------<  105<H>  3.9   |  26  |  1.08    Ca    10.0      24 Feb 2019 07:55  Mg     2.2     02-24

## 2019-02-25 LAB — B2 GLYCOPROT1 AB SER QL: NEGATIVE — SIGNIFICANT CHANGE UP

## 2019-02-25 PROCEDURE — 99233 SBSQ HOSP IP/OBS HIGH 50: CPT

## 2019-02-25 RX ADMIN — Medication 1 MILLIGRAM(S): at 06:28

## 2019-02-25 RX ADMIN — Medication 100 MILLIGRAM(S): at 06:28

## 2019-02-25 RX ADMIN — ENOXAPARIN SODIUM 90 MILLIGRAM(S): 100 INJECTION SUBCUTANEOUS at 17:58

## 2019-02-25 RX ADMIN — SENNA PLUS 2 TABLET(S): 8.6 TABLET ORAL at 21:51

## 2019-02-25 RX ADMIN — Medication 1 MILLIGRAM(S): at 06:29

## 2019-02-25 RX ADMIN — Medication 100 MILLIGRAM(S): at 17:57

## 2019-02-25 RX ADMIN — Medication 1 MILLIGRAM(S): at 17:57

## 2019-02-25 RX ADMIN — ENOXAPARIN SODIUM 90 MILLIGRAM(S): 100 INJECTION SUBCUTANEOUS at 06:29

## 2019-02-25 RX ADMIN — ATORVASTATIN CALCIUM 80 MILLIGRAM(S): 80 TABLET, FILM COATED ORAL at 21:47

## 2019-02-25 RX ADMIN — PANTOPRAZOLE SODIUM 40 MILLIGRAM(S): 20 TABLET, DELAYED RELEASE ORAL at 06:28

## 2019-02-25 RX ADMIN — Medication 1 MILLIGRAM(S): at 01:30

## 2019-02-25 RX ADMIN — MEMANTINE HYDROCHLORIDE 5 MILLIGRAM(S): 10 TABLET ORAL at 21:51

## 2019-02-25 RX ADMIN — ATORVASTATIN CALCIUM 80 MILLIGRAM(S): 80 TABLET, FILM COATED ORAL at 01:10

## 2019-02-25 RX ADMIN — Medication 1 MILLIGRAM(S): at 10:56

## 2019-02-25 RX ADMIN — LEVETIRACETAM 1000 MILLIGRAM(S): 250 TABLET, FILM COATED ORAL at 17:57

## 2019-02-25 RX ADMIN — LEVETIRACETAM 1000 MILLIGRAM(S): 250 TABLET, FILM COATED ORAL at 06:28

## 2019-02-25 RX ADMIN — SENNA PLUS 2 TABLET(S): 8.6 TABLET ORAL at 01:10

## 2019-02-25 RX ADMIN — Medication 1 MILLIGRAM(S): at 15:38

## 2019-02-25 RX ADMIN — Medication 1 MILLIGRAM(S): at 21:51

## 2019-02-25 RX ADMIN — Medication 81 MILLIGRAM(S): at 15:38

## 2019-02-25 NOTE — PROGRESS NOTE ADULT - SUBJECTIVE AND OBJECTIVE BOX
Patient is a 59y old  Male who presents with a chief complaint of code stroke (24 Feb 2019 13:13)      SUBJECTIVE / OVERNIGHT EVENTS: unable to get IV fluids due to difficult access. as per RN eats when being fed. review of systems unobtainable as pt non participating     MEDICATIONS  (STANDING):  aspirin enteric coated 81 milliGRAM(s) Oral daily  atorvastatin 80 milliGRAM(s) Oral at bedtime  benztropine 1 milliGRAM(s) Oral two times a day  carBAMazepine Chewable 100 milliGRAM(s) Chew two times a day  docusate sodium Liquid 200 milliGRAM(s) Oral daily  enoxaparin Injectable 90 milliGRAM(s) SubCutaneous two times a day  levETIRAcetam 1000 milliGRAM(s) Oral two times a day  LORazepam     Tablet 1 milliGRAM(s) Oral every 4 hours  memantine 5 milliGRAM(s) Oral at bedtime  pantoprazole    Tablet 40 milliGRAM(s) Oral before breakfast  senna 2 Tablet(s) Oral at bedtime  sodium chloride 0.45%. 1000 milliLiter(s) (75 mL/Hr) IV Continuous <Continuous>    MEDICATIONS  (PRN):  bisacodyl 5 milliGRAM(s) Oral daily PRN Constipation  polyethylene glycol 3350 17 Gram(s) Oral daily PRN Constipation        CAPILLARY BLOOD GLUCOSE        I&O's Summary    24 Feb 2019 07:01  -  25 Feb 2019 07:00  --------------------------------------------------------  IN: 118 mL / OUT: 3 mL / NET: 115 mL        T(C): 36.9 (02-25-19 @ 06:03), Max: 36.9 (02-24-19 @ 12:03)  HR: 86 (02-25-19 @ 06:03) (73 - 86)  BP: 118/60 (02-25-19 @ 06:03) (116/71 - 122/80)  RR: 18 (02-25-19 @ 06:03) (17 - 18)  SpO2: 100% (02-25-19 @ 06:03) (99% - 100%)    PHYSICAL EXAM:  GENERAL: NAD, well-developed  HEAD:  Atraumatic, Normocephalic  EYES: EOMI, conjunctiva and sclera clear  NECK: Supple, No JVD  CHEST/LUNG: Clear to auscultation bilaterally; No wheeze  HEART: Regular rate and rhythm; No murmurs, rubs, or gallops  ABDOMEN: Soft, Nontender, Nondistended; Bowel sounds present  EXTREMITIES:  2+ Peripheral Pulses, No clubbing, cyanosis, or edema  NEUROLOGY: not participating with exam staring at ceiling.      LABS:                        15.3   5.60  )-----------( 207      ( 24 Feb 2019 07:55 )             46.6     02-24    148<H>  |  109<H>  |  21  ----------------------------<  105<H>  3.9   |  26  |  1.08    Ca    10.0      24 Feb 2019 07:55  Mg     2.2     02-24                  RADIOLOGY & ADDITIONAL TESTS:    Imaging Personally Reviewed:    Consultant(s) Notes Reviewed:      Care Discussed with Consultants/Other Providers:

## 2019-02-25 NOTE — PROGRESS NOTE ADULT - PROBLEM SELECTOR PLAN 2
complicated by catatonia   - continue to titrate up Ativan IV for response per psych recs  -psych following

## 2019-02-25 NOTE — PROGRESS NOTE ADULT - ASSESSMENT
59M from [facility] h/o schizophrenia, recurrent DVT s/p IVC filter, seizure disorder, h/o severe C4-5 stenosis c/b cord compression s/p discectomy and fusion (wheel chair bound) adm 2/14 for dysarthria concerning for acute CVA s/p tPA complicated by brain edema, acute encephalopathy suspicious of catatonia, dysarthria, hypernatremia.

## 2019-02-25 NOTE — PROGRESS NOTE ADULT - PROBLEM SELECTOR PLAN 1
Acute CVA LT MCA with brain edema possibly embolic  - S/p tPA   - NO need for further tele monitoring.  - anticipate difficult disposition due to psychiatric issues.

## 2019-02-25 NOTE — PROGRESS NOTE ADULT - PROBLEM SELECTOR PLAN 3
Monitor Na level , likely due to poor PO intake.  case d/w charge RN will attempt IV with US and encourage PO eats when being fed. increase free water intake.

## 2019-02-26 DIAGNOSIS — F01.50 VASCULAR DEMENTIA WITHOUT BEHAVIORAL DISTURBANCE: ICD-10-CM

## 2019-02-26 LAB
ANION GAP SERPL CALC-SCNC: 15 MMO/L — HIGH (ref 7–14)
BUN SERPL-MCNC: 22 MG/DL — SIGNIFICANT CHANGE UP (ref 7–23)
CALCIUM SERPL-MCNC: 10.4 MG/DL — SIGNIFICANT CHANGE UP (ref 8.4–10.5)
CHLORIDE SERPL-SCNC: 108 MMOL/L — HIGH (ref 98–107)
CO2 SERPL-SCNC: 26 MMOL/L — SIGNIFICANT CHANGE UP (ref 22–31)
CREAT SERPL-MCNC: 1.04 MG/DL — SIGNIFICANT CHANGE UP (ref 0.5–1.3)
GLUCOSE SERPL-MCNC: 100 MG/DL — HIGH (ref 70–99)
HCT VFR BLD CALC: 50.1 % — HIGH (ref 39–50)
HGB BLD-MCNC: 16.8 G/DL — SIGNIFICANT CHANGE UP (ref 13–17)
MAGNESIUM SERPL-MCNC: 2.3 MG/DL — SIGNIFICANT CHANGE UP (ref 1.6–2.6)
MCHC RBC-ENTMCNC: 32.1 PG — SIGNIFICANT CHANGE UP (ref 27–34)
MCHC RBC-ENTMCNC: 33.5 % — SIGNIFICANT CHANGE UP (ref 32–36)
MCV RBC AUTO: 95.8 FL — SIGNIFICANT CHANGE UP (ref 80–100)
NRBC # FLD: 0 K/UL — LOW (ref 25–125)
PLATELET # BLD AUTO: 236 K/UL — SIGNIFICANT CHANGE UP (ref 150–400)
PMV BLD: 9.8 FL — SIGNIFICANT CHANGE UP (ref 7–13)
POTASSIUM SERPL-MCNC: 3.9 MMOL/L — SIGNIFICANT CHANGE UP (ref 3.5–5.3)
POTASSIUM SERPL-SCNC: 3.9 MMOL/L — SIGNIFICANT CHANGE UP (ref 3.5–5.3)
RBC # BLD: 5.23 M/UL — SIGNIFICANT CHANGE UP (ref 4.2–5.8)
RBC # FLD: 12.2 % — SIGNIFICANT CHANGE UP (ref 10.3–14.5)
SODIUM SERPL-SCNC: 149 MMOL/L — HIGH (ref 135–145)
WBC # BLD: 5.52 K/UL — SIGNIFICANT CHANGE UP (ref 3.8–10.5)
WBC # FLD AUTO: 5.52 K/UL — SIGNIFICANT CHANGE UP (ref 3.8–10.5)

## 2019-02-26 PROCEDURE — 99232 SBSQ HOSP IP/OBS MODERATE 35: CPT | Mod: GC

## 2019-02-26 PROCEDURE — 99233 SBSQ HOSP IP/OBS HIGH 50: CPT

## 2019-02-26 PROCEDURE — 99232 SBSQ HOSP IP/OBS MODERATE 35: CPT

## 2019-02-26 RX ORDER — ASPIRIN/CALCIUM CARB/MAGNESIUM 324 MG
81 TABLET ORAL DAILY
Qty: 0 | Refills: 0 | Status: DISCONTINUED | OUTPATIENT
Start: 2019-02-26 | End: 2019-03-15

## 2019-02-26 RX ADMIN — Medication 1 MILLIGRAM(S): at 22:27

## 2019-02-26 RX ADMIN — Medication 81 MILLIGRAM(S): at 18:37

## 2019-02-26 RX ADMIN — Medication 1 MILLIGRAM(S): at 06:30

## 2019-02-26 RX ADMIN — SODIUM CHLORIDE 75 MILLILITER(S): 9 INJECTION, SOLUTION INTRAVENOUS at 19:26

## 2019-02-26 RX ADMIN — ENOXAPARIN SODIUM 90 MILLIGRAM(S): 100 INJECTION SUBCUTANEOUS at 18:39

## 2019-02-26 RX ADMIN — Medication 1 MILLIGRAM(S): at 10:31

## 2019-02-26 RX ADMIN — SODIUM CHLORIDE 75 MILLILITER(S): 9 INJECTION, SOLUTION INTRAVENOUS at 22:27

## 2019-02-26 RX ADMIN — Medication 100 MILLIGRAM(S): at 18:36

## 2019-02-26 RX ADMIN — MEMANTINE HYDROCHLORIDE 5 MILLIGRAM(S): 10 TABLET ORAL at 22:27

## 2019-02-26 RX ADMIN — Medication 1 MILLIGRAM(S): at 02:47

## 2019-02-26 RX ADMIN — Medication 100 MILLIGRAM(S): at 06:30

## 2019-02-26 RX ADMIN — Medication 1 MILLIGRAM(S): at 01:42

## 2019-02-26 RX ADMIN — ATORVASTATIN CALCIUM 80 MILLIGRAM(S): 80 TABLET, FILM COATED ORAL at 22:27

## 2019-02-26 RX ADMIN — LEVETIRACETAM 1000 MILLIGRAM(S): 250 TABLET, FILM COATED ORAL at 06:30

## 2019-02-26 RX ADMIN — SODIUM CHLORIDE 75 MILLILITER(S): 9 INJECTION, SOLUTION INTRAVENOUS at 06:32

## 2019-02-26 RX ADMIN — Medication 1 MILLIGRAM(S): at 18:35

## 2019-02-26 RX ADMIN — Medication 1 MILLIGRAM(S): at 18:36

## 2019-02-26 RX ADMIN — ENOXAPARIN SODIUM 90 MILLIGRAM(S): 100 INJECTION SUBCUTANEOUS at 06:30

## 2019-02-26 RX ADMIN — LEVETIRACETAM 1000 MILLIGRAM(S): 250 TABLET, FILM COATED ORAL at 18:35

## 2019-02-26 RX ADMIN — PANTOPRAZOLE SODIUM 40 MILLIGRAM(S): 20 TABLET, DELAYED RELEASE ORAL at 06:30

## 2019-02-26 RX ADMIN — SENNA PLUS 2 TABLET(S): 8.6 TABLET ORAL at 22:27

## 2019-02-26 NOTE — CHART NOTE - NSCHARTNOTEFT_GEN_A_CORE
Notified by RN pt agitated attempting to climb out of bed, was not redirectable by nursing staff pt followed by Psych for Schizophrenia on Ativan Notified by RN pt agitated attempting to climb out of bed, was not redirectable by nursing staff.  Patient  followed by Psych for Schizophrenia on Ativan 1 mg PO q 4 hrs and rec Ativan 1 mg PO/IM/IV q 6 hrs for agitation.   pt has no IV access for past 2 days - Ativan 1 mg IM x 1 dose ordered for agitation   pt stable will continue to monitor

## 2019-02-26 NOTE — PROGRESS NOTE BEHAVIORAL HEALTH - NSBHCONSULTMEDS_PSY_A_CORE FT
Continue Ativan 1mg four times daily  Consider restarting Seroquel 25mg at night if pf needs medication for agitation at night. Continue Ativan 1mg q 4 hrs  Consider restarting Seroquel 25mg at night if pf needs medication for agitation at night.

## 2019-02-26 NOTE — PROGRESS NOTE BEHAVIORAL HEALTH - NSBHFUPINTERVALHXFT_PSY_A_CORE
Pt is calm and cooperative, though reportedly not eating well today when staff attempted to feed him.

## 2019-02-26 NOTE — DIETITIAN INITIAL EVALUATION ADULT. - OTHER INFO
Initial Dietitian Evaluation 2/2 to extended length of stay. 59M from [facility] h/o schizophrenia, recurrent DVT s/p IVC filter, seizure disorder, h/o severe C4-5 stenosis c/b cord compression s/p discectomy and fusion (wheel chair bound) adm 2/14 for dysarthria concerning for acute CVA s/p tPA complicated by brain edema, acute encephalopathy suspicious of catatonia, dysarthria, hypernatremia. Per RN, Pt is mostly sleeping and has no interest in eating, may eat one meal per day and continues with hypernatremia. Nursing initiated an intake record as of today as Pt maybe with plans for PEG. Noted with missing teeth, continues on Dysphagia diet. Noted previous admit Registered Dietitian note dated 5/18---weight 170#. Current weight is 197#.

## 2019-02-26 NOTE — DIETITIAN INITIAL EVALUATION ADULT. - NS AS NUTRI INTERV MEALS SNACK
1. Recommend to continue with current diet as tolerated.  2. Follow up with intake record.  3. Consider alternate plan of Nutrition as Pt with severe malnutrition/poor po intakes.

## 2019-02-26 NOTE — PROGRESS NOTE BEHAVIORAL HEALTH - SUMMARY
Pt is 59 year-old M with PPHx Schizophrenia, currently domiciled at [place], unknown number of psychiatric hospitalizations, PMH of CVA, seizure disorder, recurrent DVT s/p IVC filter, h/o severe C4-5 stenosis c/b cord compression s/p discectomy and fusion, who presented from [place] for acute onset of severely slurred speech, RUE weakness and R facial droop, now s/p tPA; CT head showing evolving L MCA infarct. Psychiatry called to assess for catatonia.     Assessment uncertain (catatonia vs AMS after stroke), but presents with mutism, posturing, catalepsy, negativism, ~tonic changes and automatic obedience that support catatonia. He did respond well to an Ativan challenge. May continue Ativan 1mg four times daily and keep Ativan PRNs for now.  Would not recommend restarting antipsychotics since pt's behavior has been well controlled and he remains dystonic. If pt needs medication overnight, consider restarting Seroquel 25mg po qhs. Pt is 59 year-old M with PPHx Schizophrenia, currently domiciled at [place], unknown number of psychiatric hospitalizations, PMH of CVA, seizure disorder, recurrent DVT s/p IVC filter, h/o severe C4-5 stenosis c/b cord compression s/p discectomy and fusion, who presented from [place] for acute onset of severely slurred speech, RUE weakness and R facial droop, now s/p tPA; CT head showing evolving L MCA infarct. Psychiatry called to assess for catatonia.     Assessment uncertain (catatonia vs AMS after stroke), but presents with mutism, posturing, catalepsy, negativism, ~tonic changes and automatic obedience that support catatonia. He did respond well to an Ativan challenge. May continue Ativan 1mg q 4 hours and keep Ativan PRNs for now.  Would not recommend restarting antipsychotics since pt's behavior has been well controlled and he remains dystonic. If pt needs medication overnight, consider restarting Seroquel 25mg po qhs.

## 2019-02-26 NOTE — PROGRESS NOTE ADULT - ASSESSMENT
59M from [facility] h/o schizophrenia, recurrent DVT s/p IVC filter, seizure disorder, h/o severe C4-5 stenosis c/b cord compression s/p discectomy and fusion (wheel chair bound) adm 2/14 for dysarthria concerning for acute CVA s/p tPA complicated by brain edema, acute encephalopathy suspicious of catatonia, dysarthria, hypernatremia.

## 2019-02-26 NOTE — PROGRESS NOTE ADULT - SUBJECTIVE AND OBJECTIVE BOX
59M from [facility] h/o schizophrenia, recurrent DVT s/p IVC filter, seizure disorder, h/o severe C4-5 stenosis c/b cord compression s/p discectomy and fusion adm 2/14 for dysarthria concerning for acute CVA s/p tPA.    The patient had a code stroke in the ED and was given full dose tPA.  He was transferred to the MICU for q1h neuro checks.  The patient's RUE/RLE weakness improved, but his dysarthria did not.  His mental status remained at baseline.  He passed a speech and swallow (bedside) and was restarted on his psych medications.    Interval: Code stroke called on 2/16 for decreased responsiveness and not moving his RUE, RLE, not verbalizing, nothing new was found on repeat CT head  Started on seizure meds. + agitation.   Does participate with PT  +recurrent DVTs, is on anticoagulation with lovenox 90 BID        REVIEW OF SYSTEMS: No chest pain, shortness of breath, nausea, vomiting or diarhea.      fucntion: min/mod a     Vital Signs Last 24 Hrs  T(C): 36.5 (26 Feb 2019 05:50), Max: 37.1 (25 Feb 2019 15:24)  T(F): 97.7 (26 Feb 2019 05:50), Max: 98.7 (25 Feb 2019 15:24)  HR: 80 (26 Feb 2019 05:50) (69 - 86)  BP: 125/73 (26 Feb 2019 05:50) (114/65 - 128/73)  BP(mean): --  RR: 17 (26 Feb 2019 05:50) (17 - 18)  SpO2: 100% (26 Feb 2019 05:50) (95% - 100%)  ----------------------------------------------------------------------------------------  PHYSICAL EXAM  Constitutional - NAD, Comfortable  HEENT - NCAT, EOMI  Neck - Supple, No limited ROM  Chest - CTA bilaterally, No wheeze, No rhonchi, No crackles  Cardiovascular - RRR, S1S2, No murmurs  Abdomen - BS+, Soft, NTND  Extremities - No C/C/E, No calf tenderness   Neurologic Exam -                    Cognitive -poor verbal outotput      Communication + dysarthria     Cranial Nerves - CN 2-12 intact     Motor - 4/5 throughout         Balance poor balance   Psychiatric - Mood stable, Affect WNL 59M from [facility] h/o schizophrenia, recurrent DVT s/p IVC filter, seizure disorder, h/o severe C4-5 stenosis c/b cord compression s/p discectomy and fusion adm 2/14 for dysarthria concerning for acute CVA s/p tPA.    The patient had a code stroke in the ED and was given full dose tPA.  He was transferred to the MICU for q1h neuro checks.  The patient's RUE/RLE weakness improved, but his dysarthria did not.  His mental status remained at baseline.  He passed a speech and swallow (bedside) and was restarted on his psych medications.    Interval: Code stroke called on 2/16 for decreased responsiveness and not moving his RUE, RLE, not verbalizing, nothing new was found on repeat CT head  Started on seizure meds. + agitation.   Does participate with PT  +recurrent DVTs, is on anticoagulation with lovenox 90 BID        REVIEW OF SYSTEMS: No chest pain, shortness of breath, nausea, vomiting or diarhea.      fucntion: min/mod a     Vital Signs Last 24 Hrs  T(C): 36.5 (26 Feb 2019 05:50), Max: 37.1 (25 Feb 2019 15:24)  T(F): 97.7 (26 Feb 2019 05:50), Max: 98.7 (25 Feb 2019 15:24)  HR: 80 (26 Feb 2019 05:50) (69 - 86)  BP: 125/73 (26 Feb 2019 05:50) (114/65 - 128/73)  BP(mean): --  RR: 17 (26 Feb 2019 05:50) (17 - 18)  SpO2: 100% (26 Feb 2019 05:50) (95% - 100%)  ----------------------------------------------------------------------------------------  PHYSICAL EXAM  Constitutional - NAD, Comfortable  HEENT - NCAT, EOMI  Neck - Supple, No limited ROM  Chest - CTA bilaterally, No wheeze, No rhonchi, No crackles  Cardiovascular - RRR, S1S2, No murmurs  Abdomen - BS+, Soft, NTND  Extremities - No C/C/E, No calf tenderness   Neurologic Exam -                    Cognitive -poor verbal outotput      Communication + dysarthria     Cranial Nerves - CN 2-12 intact     Motor - moves left side uncooperative with exam          Balance poor balance   Psychiatric - Mood stable, Affect WNL

## 2019-02-26 NOTE — CHART NOTE - NSCHARTNOTEFT_GEN_A_CORE
NUTRITION SERVICES     Upon Nutritional Assessment by the Registered Dietitian your patient was determined to meet criteria/ has evidence of the following diagnosis/diagnoses:  [ ] Mild Protein Calorie Malnutrition   [ ] Moderate Protein Calorie Malnutrition   [X ] Severe Protein Calorie Malnutrition   [ ] Unspecified Protein Calorie Malnutrition   [ ] Underweight / BMI <19  [ ] Morbid Obesity / BMI >40    Findings as based on:  •  Comprehensive nutritional assessment and consultation    Please refer to Initial Dietitian Evaluation via documents section of [facility] for further recommendations.    Ghada Estrada RD,CD/N,CDE

## 2019-02-26 NOTE — PROGRESS NOTE ADULT - SUBJECTIVE AND OBJECTIVE BOX
Patient is a 59y old  Male who presents with a chief complaint of code stroke (25 Feb 2019 09:54)      SUBJECTIVE / OVERNIGHT EVENTS: On was agitated was given ativan IM. IV in place.   review of systems unobtainable    MEDICATIONS  (STANDING):  aspirin enteric coated 81 milliGRAM(s) Oral daily  atorvastatin 80 milliGRAM(s) Oral at bedtime  benztropine 1 milliGRAM(s) Oral two times a day  carBAMazepine Chewable 100 milliGRAM(s) Chew two times a day  docusate sodium Liquid 200 milliGRAM(s) Oral daily  enoxaparin Injectable 90 milliGRAM(s) SubCutaneous two times a day  levETIRAcetam 1000 milliGRAM(s) Oral two times a day  LORazepam     Tablet 1 milliGRAM(s) Oral every 4 hours  memantine 5 milliGRAM(s) Oral at bedtime  pantoprazole    Tablet 40 milliGRAM(s) Oral before breakfast  senna 2 Tablet(s) Oral at bedtime  sodium chloride 0.45%. 1000 milliLiter(s) (75 mL/Hr) IV Continuous <Continuous>    MEDICATIONS  (PRN):  bisacodyl 5 milliGRAM(s) Oral daily PRN Constipation  polyethylene glycol 3350 17 Gram(s) Oral daily PRN Constipation        CAPILLARY BLOOD GLUCOSE        I&O's Summary    25 Feb 2019 07:01  -  26 Feb 2019 07:00  --------------------------------------------------------  IN: 25 mL / OUT: 0 mL / NET: 25 mL        T(C): 36.5 (02-26-19 @ 05:50), Max: 37.1 (02-25-19 @ 15:24)  HR: 80 (02-26-19 @ 05:50) (69 - 86)  BP: 125/73 (02-26-19 @ 05:50) (114/65 - 128/73)  RR: 17 (02-26-19 @ 05:50) (17 - 18)  SpO2: 100% (02-26-19 @ 05:50) (95% - 100%)    PHYSICAL EXAM:  GENERAL: NAD,  HEAD:  Atraumatic, Normocephalic  EYES: EOMI, conjunctiva and sclera clear  NECK: Supple,   CHEST/LUNG: Clear to auscultation bilaterally;  HEART: s1/s2  ABDOMEN: Soft, Nontender, Nondistended; Bowel sounds present  EXTREMITIES:  2+ Peripheral Pulses, No clubbing, cyanosis, or edema  NEUROLOGY: moving LT UE spontaneously seems to be preoccupied with RT UE uncooperative with rest. non verbal     LABS:                        16.8   5.52  )-----------( 236      ( 26 Feb 2019 05:20 )             50.1     02-26    149<H>  |  108<H>  |  22  ----------------------------<  100<H>  3.9   |  26  |  1.04    Ca    10.4      26 Feb 2019 05:20  Mg     2.3     02-26                  RADIOLOGY & ADDITIONAL TESTS:    Imaging Personally Reviewed:    Consultant(s) Notes Reviewed:      Care Discussed with Consultants/Other Providers:

## 2019-02-26 NOTE — PROGRESS NOTE ADULT - PROBLEM SELECTOR PLAN 1
Monitor Na level , likely due to poor PO intake.  IV inserted started on IVF 1/2 NS @ 75 ml/hr.   -start calorie count if able to maintain adequate PO intake

## 2019-02-26 NOTE — DIETITIAN INITIAL EVALUATION ADULT. - PHYSICAL APPEARANCE
underweight/Subcutaneous FAT LOSS:  [ ] Orbital fat pads region, [ ]Buccal fat region, [ ]Triceps region,  [ ]Ribs region  MUSCLE WASTING: [SEVERE]Temples region, [SEVERE]Clavicle region, [ ]Shoulder region, [ ]Scapula region, [SEVERE] Interosseous region,  [ ]thigh region, [ ]Calf region.

## 2019-02-26 NOTE — PROGRESS NOTE ADULT - PROBLEM SELECTOR PLAN 2
Acute CVA LT MCA with brain edema possibly embolic RT weakness  - S/p tPA   - NO need for further tele monitoring.  - anticipate difficult disposition due to psychiatric issues.

## 2019-02-27 LAB
HCT VFR BLD CALC: 47.8 % — SIGNIFICANT CHANGE UP (ref 39–50)
HGB BLD-MCNC: 15.7 G/DL — SIGNIFICANT CHANGE UP (ref 13–17)
MCHC RBC-ENTMCNC: 32.4 PG — SIGNIFICANT CHANGE UP (ref 27–34)
MCHC RBC-ENTMCNC: 32.8 % — SIGNIFICANT CHANGE UP (ref 32–36)
MCV RBC AUTO: 98.6 FL — SIGNIFICANT CHANGE UP (ref 80–100)
NRBC # FLD: 0 K/UL — LOW (ref 25–125)
PLATELET # BLD AUTO: 203 K/UL — SIGNIFICANT CHANGE UP (ref 150–400)
PMV BLD: 10.4 FL — SIGNIFICANT CHANGE UP (ref 7–13)
RBC # BLD: 4.85 M/UL — SIGNIFICANT CHANGE UP (ref 4.2–5.8)
RBC # FLD: 12.4 % — SIGNIFICANT CHANGE UP (ref 10.3–14.5)
WBC # BLD: 6.62 K/UL — SIGNIFICANT CHANGE UP (ref 3.8–10.5)
WBC # FLD AUTO: 6.62 K/UL — SIGNIFICANT CHANGE UP (ref 3.8–10.5)

## 2019-02-27 PROCEDURE — 99233 SBSQ HOSP IP/OBS HIGH 50: CPT

## 2019-02-27 PROCEDURE — 99232 SBSQ HOSP IP/OBS MODERATE 35: CPT | Mod: GC

## 2019-02-27 RX ADMIN — Medication 1 MILLIGRAM(S): at 21:25

## 2019-02-27 RX ADMIN — Medication 1 MILLIGRAM(S): at 05:56

## 2019-02-27 RX ADMIN — MEMANTINE HYDROCHLORIDE 5 MILLIGRAM(S): 10 TABLET ORAL at 21:25

## 2019-02-27 RX ADMIN — LEVETIRACETAM 1000 MILLIGRAM(S): 250 TABLET, FILM COATED ORAL at 18:17

## 2019-02-27 RX ADMIN — SENNA PLUS 2 TABLET(S): 8.6 TABLET ORAL at 21:25

## 2019-02-27 RX ADMIN — Medication 1 MILLIGRAM(S): at 18:17

## 2019-02-27 RX ADMIN — LEVETIRACETAM 1000 MILLIGRAM(S): 250 TABLET, FILM COATED ORAL at 05:55

## 2019-02-27 RX ADMIN — Medication 1 MILLIGRAM(S): at 02:01

## 2019-02-27 RX ADMIN — ATORVASTATIN CALCIUM 80 MILLIGRAM(S): 80 TABLET, FILM COATED ORAL at 21:25

## 2019-02-27 RX ADMIN — SODIUM CHLORIDE 75 MILLILITER(S): 9 INJECTION, SOLUTION INTRAVENOUS at 21:24

## 2019-02-27 RX ADMIN — ENOXAPARIN SODIUM 90 MILLIGRAM(S): 100 INJECTION SUBCUTANEOUS at 18:17

## 2019-02-27 RX ADMIN — Medication 81 MILLIGRAM(S): at 14:08

## 2019-02-27 RX ADMIN — PANTOPRAZOLE SODIUM 40 MILLIGRAM(S): 20 TABLET, DELAYED RELEASE ORAL at 05:55

## 2019-02-27 RX ADMIN — Medication 100 MILLIGRAM(S): at 18:17

## 2019-02-27 RX ADMIN — Medication 100 MILLIGRAM(S): at 05:56

## 2019-02-27 RX ADMIN — Medication 1 MILLIGRAM(S): at 05:55

## 2019-02-27 RX ADMIN — Medication 1 MILLIGRAM(S): at 10:45

## 2019-02-27 RX ADMIN — SODIUM CHLORIDE 75 MILLILITER(S): 9 INJECTION, SOLUTION INTRAVENOUS at 10:47

## 2019-02-27 RX ADMIN — Medication 1 MILLIGRAM(S): at 14:08

## 2019-02-27 RX ADMIN — ENOXAPARIN SODIUM 90 MILLIGRAM(S): 100 INJECTION SUBCUTANEOUS at 05:55

## 2019-02-27 NOTE — PROGRESS NOTE ADULT - ASSESSMENT
59M from [facility] h/o schizophrenia, recurrent DVT s/p IVC filter, seizure disorder, h/o severe C4-5 stenosis c/b cord compression s/p discectomy and fusion (wheel chair bound) adm 2/14 for dysarthria concerning for acute CVA s/p tPA complicated by brain edema, acute encephalopathy suspicious of catatonia, dysarthria, hypernatremia.

## 2019-02-27 NOTE — PROGRESS NOTE ADULT - ASSESSMENT
CVA, feautures of catatonia   1. PT- bed mobility,transfers, gait and balance training  2. OT- ADL'S  3. CVA-continue w/u s/p TPA  4. Patient would benefit from subacute rehab, given his functional status prior to stroke, he is likely to need a longer course of rehabilitation than acute rehab will allow. Will follow.

## 2019-02-27 NOTE — PROGRESS NOTE ADULT - PROBLEM SELECTOR PLAN 1
Monitor Na level , likely due to poor PO intake.  IV inserted started on IVF 1/2 NS @ 75 ml/hr.   -start calorie count if able to assess if able to maintain adequate PO intake with correction of electrolytes.  -f/u AM BMP Monitor Na level , likely due to poor PO intake.  IV inserted started on IVF 1/2 NS @ 75 ml/hr.   -start calorie count on going  -f/u AM BMP

## 2019-02-27 NOTE — PROGRESS NOTE ADULT - SUBJECTIVE AND OBJECTIVE BOX
59M from [facility] h/o schizophrenia, recurrent DVT s/p IVC filter, seizure disorder, h/o severe C4-5 stenosis c/b cord compression s/p discectomy and fusion adm 2/14 for dysarthria concerning for acute CVA s/p tPA.    The patient had a code stroke in the ED and was given full dose tPA.  He was transferred to the MICU for q1h neuro checks.  The patient's RUE/RLE weakness improved, but his dysarthria did not.  His mental status remained at baseline.  He passed a speech and swallow (bedside) and was restarted on his psych medications.    Interval: Code stroke called on 2/16 for decreased responsiveness and not moving his RUE, RLE, not verbalizing, nothing new was found on repeat CT head  Started on seizure meds. + agitation.   Does participate with PT  +recurrent DVTs, is on anticoagulation with lovenox 90 BID    Remains lethargic         REVIEW OF SYSTEMS: unable to obtain     function: min/mod a     Vital Signs Last 24 Hrs  T(C): 36.9 (27 Feb 2019 13:47), Max: 36.9 (27 Feb 2019 13:47)  T(F): 98.4 (27 Feb 2019 13:47), Max: 98.4 (27 Feb 2019 13:47)  HR: 89 (27 Feb 2019 13:47) (79 - 91)  BP: 116/77 (27 Feb 2019 13:47) (116/77 - 124/73)  BP(mean): --  RR: 16 (27 Feb 2019 13:47) (16 - 18)  SpO2: 99% (27 Feb 2019 13:47) (98% - 100%)  ----------------------------------------------------------------------------------------  PHYSICAL EXAM  Constitutional - NAD, Comfortable  Neurologic Exam -                    Cognitive -poor verbal outotput      Communication + dysarthria       Motor - moves left side uncooperative with exam          Balance poor sitting  balance   Psychiatric -flat

## 2019-02-27 NOTE — PROGRESS NOTE ADULT - SUBJECTIVE AND OBJECTIVE BOX
Patient is a 59y old  Male who presents with a chief complaint of code stroke (26 Feb 2019 14:59)      SUBJECTIVE / OVERNIGHT EVENTS:    MEDICATIONS  (STANDING):  aspirin  chewable 81 milliGRAM(s) Oral daily  atorvastatin 80 milliGRAM(s) Oral at bedtime  benztropine 1 milliGRAM(s) Oral two times a day  carBAMazepine Chewable 100 milliGRAM(s) Chew two times a day  docusate sodium Liquid 200 milliGRAM(s) Oral daily  enoxaparin Injectable 90 milliGRAM(s) SubCutaneous two times a day  levETIRAcetam 1000 milliGRAM(s) Oral two times a day  LORazepam     Tablet 1 milliGRAM(s) Oral every 4 hours  memantine 5 milliGRAM(s) Oral at bedtime  pantoprazole    Tablet 40 milliGRAM(s) Oral before breakfast  senna 2 Tablet(s) Oral at bedtime  sodium chloride 0.45%. 1000 milliLiter(s) (75 mL/Hr) IV Continuous <Continuous>    MEDICATIONS  (PRN):  bisacodyl 5 milliGRAM(s) Oral daily PRN Constipation  polyethylene glycol 3350 17 Gram(s) Oral daily PRN Constipation        CAPILLARY BLOOD GLUCOSE        I&O's Summary      T(C): 36.7 (02-27-19 @ 05:53), Max: 36.7 (02-27-19 @ 05:53)  HR: 91 (02-27-19 @ 05:53) (79 - 91)  BP: 118/76 (02-27-19 @ 05:53) (118/76 - 124/73)  RR: 18 (02-27-19 @ 05:53) (18 - 18)  SpO2: 100% (02-27-19 @ 05:53) (98% - 100%)    PHYSICAL EXAM:  GENERAL: NAD, well-developed  HEAD:  Atraumatic, Normocephalic  EYES: EOMI, PERRLA, conjunctiva and sclera clear  NECK: Supple, No JVD  CHEST/LUNG: Clear to auscultation bilaterally; No wheeze  HEART: Regular rate and rhythm; No murmurs, rubs, or gallops  ABDOMEN: Soft, Nontender, Nondistended; Bowel sounds present  EXTREMITIES:  2+ Peripheral Pulses, No clubbing, cyanosis, or edema  PSYCH: AAOx3  NEUROLOGY: non-focal  SKIN: No rashes or lesions    LABS:                        16.8   5.52  )-----------( 236      ( 26 Feb 2019 05:20 )             50.1     02-26    149<H>  |  108<H>  |  22  ----------------------------<  100<H>  3.9   |  26  |  1.04    Ca    10.4      26 Feb 2019 05:20  Mg     2.3     02-26                  RADIOLOGY & ADDITIONAL TESTS:    Imaging Personally Reviewed:    Consultant(s) Notes Reviewed:      Care Discussed with Consultants/Other Providers: Patient is a 59y old  Male who presents with a chief complaint of code stroke (26 Feb 2019 14:59)      SUBJECTIVE / OVERNIGHT EVENTS: Pt in NAD ate dinner last night and breakfast this AM.      MEDICATIONS  (STANDING):  aspirin  chewable 81 milliGRAM(s) Oral daily  atorvastatin 80 milliGRAM(s) Oral at bedtime  benztropine 1 milliGRAM(s) Oral two times a day  carBAMazepine Chewable 100 milliGRAM(s) Chew two times a day  docusate sodium Liquid 200 milliGRAM(s) Oral daily  enoxaparin Injectable 90 milliGRAM(s) SubCutaneous two times a day  levETIRAcetam 1000 milliGRAM(s) Oral two times a day  LORazepam     Tablet 1 milliGRAM(s) Oral every 4 hours  memantine 5 milliGRAM(s) Oral at bedtime  pantoprazole    Tablet 40 milliGRAM(s) Oral before breakfast  senna 2 Tablet(s) Oral at bedtime  sodium chloride 0.45%. 1000 milliLiter(s) (75 mL/Hr) IV Continuous <Continuous>    MEDICATIONS  (PRN):  bisacodyl 5 milliGRAM(s) Oral daily PRN Constipation  polyethylene glycol 3350 17 Gram(s) Oral daily PRN Constipation        CAPILLARY BLOOD GLUCOSE        I&O's Summary      T(C): 36.7 (02-27-19 @ 05:53), Max: 36.7 (02-27-19 @ 05:53)  HR: 91 (02-27-19 @ 05:53) (79 - 91)  BP: 118/76 (02-27-19 @ 05:53) (118/76 - 124/73)  RR: 18 (02-27-19 @ 05:53) (18 - 18)  SpO2: 100% (02-27-19 @ 05:53) (98% - 100%)    PHYSICAL EXAM:  GENERAL: NAD,  HEAD:  Atraumatic, Normocephalic  EYES: EOMI, conjunctiva and sclera clear  NECK: Supple,   CHEST/LUNG: Clear to auscultation bilaterally;  HEART: s1/s2  ABDOMEN: Soft, Nontender, Nondistended; Bowel sounds present  EXTREMITIES:  2+ Peripheral Pulses, No clubbing, cyanosis, or edema  NEUROLOGY: awakens uncooperative with exam today    LABS:                        16.8   5.52  )-----------( 236      ( 26 Feb 2019 05:20 )             50.1     02-26    149<H>  |  108<H>  |  22  ----------------------------<  100<H>  3.9   |  26  |  1.04    Ca    10.4      26 Feb 2019 05:20  Mg     2.3     02-26                  RADIOLOGY & ADDITIONAL TESTS:    Imaging Personally Reviewed:    Consultant(s) Notes Reviewed:      Care Discussed with Consultants/Other Providers:

## 2019-02-28 LAB
ANION GAP SERPL CALC-SCNC: 14 MMO/L — SIGNIFICANT CHANGE UP (ref 7–14)
BUN SERPL-MCNC: 17 MG/DL — SIGNIFICANT CHANGE UP (ref 7–23)
CALCIUM SERPL-MCNC: 9.4 MG/DL — SIGNIFICANT CHANGE UP (ref 8.4–10.5)
CHLORIDE SERPL-SCNC: 109 MMOL/L — HIGH (ref 98–107)
CO2 SERPL-SCNC: 23 MMOL/L — SIGNIFICANT CHANGE UP (ref 22–31)
CREAT SERPL-MCNC: 0.96 MG/DL — SIGNIFICANT CHANGE UP (ref 0.5–1.3)
GLUCOSE SERPL-MCNC: 95 MG/DL — SIGNIFICANT CHANGE UP (ref 70–99)
HCT VFR BLD CALC: 47 % — SIGNIFICANT CHANGE UP (ref 39–50)
HGB BLD-MCNC: 15.4 G/DL — SIGNIFICANT CHANGE UP (ref 13–17)
MCHC RBC-ENTMCNC: 32.6 PG — SIGNIFICANT CHANGE UP (ref 27–34)
MCHC RBC-ENTMCNC: 32.8 % — SIGNIFICANT CHANGE UP (ref 32–36)
MCV RBC AUTO: 99.4 FL — SIGNIFICANT CHANGE UP (ref 80–100)
NRBC # FLD: 0 K/UL — LOW (ref 25–125)
PLATELET # BLD AUTO: 187 K/UL — SIGNIFICANT CHANGE UP (ref 150–400)
PMV BLD: 10.2 FL — SIGNIFICANT CHANGE UP (ref 7–13)
POTASSIUM SERPL-MCNC: 3.6 MMOL/L — SIGNIFICANT CHANGE UP (ref 3.5–5.3)
POTASSIUM SERPL-SCNC: 3.6 MMOL/L — SIGNIFICANT CHANGE UP (ref 3.5–5.3)
RBC # BLD: 4.73 M/UL — SIGNIFICANT CHANGE UP (ref 4.2–5.8)
RBC # FLD: 12 % — SIGNIFICANT CHANGE UP (ref 10.3–14.5)
SODIUM SERPL-SCNC: 146 MMOL/L — HIGH (ref 135–145)
WBC # BLD: 4.93 K/UL — SIGNIFICANT CHANGE UP (ref 3.8–10.5)
WBC # FLD AUTO: 4.93 K/UL — SIGNIFICANT CHANGE UP (ref 3.8–10.5)

## 2019-02-28 PROCEDURE — 99232 SBSQ HOSP IP/OBS MODERATE 35: CPT

## 2019-02-28 RX ADMIN — ENOXAPARIN SODIUM 90 MILLIGRAM(S): 100 INJECTION SUBCUTANEOUS at 05:52

## 2019-02-28 RX ADMIN — Medication 1 MILLIGRAM(S): at 13:03

## 2019-02-28 RX ADMIN — Medication 1 MILLIGRAM(S): at 05:52

## 2019-02-28 RX ADMIN — Medication 1 MILLIGRAM(S): at 01:39

## 2019-02-28 RX ADMIN — ENOXAPARIN SODIUM 90 MILLIGRAM(S): 100 INJECTION SUBCUTANEOUS at 17:54

## 2019-02-28 RX ADMIN — Medication 1 MILLIGRAM(S): at 17:54

## 2019-02-28 RX ADMIN — Medication 1 MILLIGRAM(S): at 17:53

## 2019-02-28 RX ADMIN — Medication 100 MILLIGRAM(S): at 05:52

## 2019-02-28 RX ADMIN — LEVETIRACETAM 1000 MILLIGRAM(S): 250 TABLET, FILM COATED ORAL at 17:54

## 2019-02-28 RX ADMIN — Medication 1 MILLIGRAM(S): at 21:47

## 2019-02-28 RX ADMIN — Medication 100 MILLIGRAM(S): at 17:54

## 2019-02-28 RX ADMIN — Medication 81 MILLIGRAM(S): at 13:03

## 2019-02-28 RX ADMIN — SODIUM CHLORIDE 75 MILLILITER(S): 9 INJECTION, SOLUTION INTRAVENOUS at 10:11

## 2019-02-28 RX ADMIN — ATORVASTATIN CALCIUM 80 MILLIGRAM(S): 80 TABLET, FILM COATED ORAL at 21:49

## 2019-02-28 RX ADMIN — LEVETIRACETAM 1000 MILLIGRAM(S): 250 TABLET, FILM COATED ORAL at 05:52

## 2019-02-28 RX ADMIN — Medication 1 MILLIGRAM(S): at 10:11

## 2019-02-28 RX ADMIN — MEMANTINE HYDROCHLORIDE 5 MILLIGRAM(S): 10 TABLET ORAL at 21:47

## 2019-02-28 RX ADMIN — PANTOPRAZOLE SODIUM 40 MILLIGRAM(S): 20 TABLET, DELAYED RELEASE ORAL at 05:52

## 2019-02-28 RX ADMIN — SENNA PLUS 2 TABLET(S): 8.6 TABLET ORAL at 21:48

## 2019-02-28 NOTE — PROGRESS NOTE ADULT - ASSESSMENT
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

## 2019-02-28 NOTE — PROGRESS NOTE ADULT - SUBJECTIVE AND OBJECTIVE BOX
Patient is a 59y old  Male who presents with a chief complaint of code stroke (27 Feb 2019 16:41)      SUBJECTIVE / OVERNIGHT EVENTS: Pt non cooperative today did not eat breakfast.    MEDICATIONS  (STANDING):  aspirin  chewable 81 milliGRAM(s) Oral daily  atorvastatin 80 milliGRAM(s) Oral at bedtime  benztropine 1 milliGRAM(s) Oral two times a day  carBAMazepine Chewable 100 milliGRAM(s) Chew two times a day  docusate sodium Liquid 200 milliGRAM(s) Oral daily  enoxaparin Injectable 90 milliGRAM(s) SubCutaneous two times a day  levETIRAcetam 1000 milliGRAM(s) Oral two times a day  LORazepam     Tablet 1 milliGRAM(s) Oral every 4 hours  memantine 5 milliGRAM(s) Oral at bedtime  pantoprazole    Tablet 40 milliGRAM(s) Oral before breakfast  senna 2 Tablet(s) Oral at bedtime  sodium chloride 0.45%. 1000 milliLiter(s) (75 mL/Hr) IV Continuous <Continuous>    MEDICATIONS  (PRN):  bisacodyl 5 milliGRAM(s) Oral daily PRN Constipation  polyethylene glycol 3350 17 Gram(s) Oral daily PRN Constipation        CAPILLARY BLOOD GLUCOSE        I&O's Summary    27 Feb 2019 07:01  -  28 Feb 2019 07:00  --------------------------------------------------------  IN: 793 mL / OUT: 0 mL / NET: 793 mL        T(C): 36.9 (02-28-19 @ 05:47), Max: 37.1 (02-27-19 @ 21:29)  HR: 70 (02-28-19 @ 05:47) (60 - 89)  BP: 139/81 (02-28-19 @ 05:47) (116/77 - 139/81)  RR: 16 (02-28-19 @ 05:47) (15 - 16)  SpO2: 99% (02-28-19 @ 05:47) (99% - 100%)    PHYSICAL EXAM:  GENERAL: NAD,  HEAD:  Atraumatic, Normocephalic  EYES: EOMI, conjunctiva and sclera clear  NECK: Supple,   CHEST/LUNG: Clear to auscultation bilaterally;  HEART: s1/s2  ABDOMEN: Soft, Nontender, Nondistended; Bowel sounds present  EXTREMITIES:  2+ Peripheral Pulses, No clubbing, cyanosis, or edema  NEUROLOGY: stiff uncooperative       LABS:                        15.4   4.93  )-----------( 187      ( 28 Feb 2019 06:36 )             47.0     02-28    146<H>  |  109<H>  |  17  ----------------------------<  95  3.6   |  23  |  0.96    Ca    9.4      28 Feb 2019 06:32                  RADIOLOGY & ADDITIONAL TESTS:    Imaging Personally Reviewed:    Consultant(s) Notes Reviewed:      Care Discussed with Consultants/Other Providers:

## 2019-02-28 NOTE — PROGRESS NOTE ADULT - PROBLEM SELECTOR PLAN 1
Monitor Na level , likely due to poor PO intake.  IV inserted started on IVF 1/2 NS @ 75 ml/hr.   -start calorie count on going day #3   -Na improved with -146

## 2019-03-01 ENCOUNTER — OUTPATIENT (OUTPATIENT)
Dept: OUTPATIENT SERVICES | Facility: HOSPITAL | Age: 59
LOS: 1 days | End: 2019-03-01
Payer: MEDICARE

## 2019-03-01 DIAGNOSIS — Z98.890 OTHER SPECIFIED POSTPROCEDURAL STATES: Chronic | ICD-10-CM

## 2019-03-01 DIAGNOSIS — F06.1 CATATONIC DISORDER DUE TO KNOWN PHYSIOLOGICAL CONDITION: ICD-10-CM

## 2019-03-01 LAB
ANION GAP SERPL CALC-SCNC: 16 MMO/L — HIGH (ref 7–14)
BUN SERPL-MCNC: 16 MG/DL — SIGNIFICANT CHANGE UP (ref 7–23)
CALCIUM SERPL-MCNC: 9.7 MG/DL — SIGNIFICANT CHANGE UP (ref 8.4–10.5)
CHLORIDE SERPL-SCNC: 108 MMOL/L — HIGH (ref 98–107)
CO2 SERPL-SCNC: 19 MMOL/L — LOW (ref 22–31)
CREAT SERPL-MCNC: 0.92 MG/DL — SIGNIFICANT CHANGE UP (ref 0.5–1.3)
GLUCOSE SERPL-MCNC: 87 MG/DL — SIGNIFICANT CHANGE UP (ref 70–99)
HCT VFR BLD CALC: 42.5 % — SIGNIFICANT CHANGE UP (ref 39–50)
HGB BLD-MCNC: 14.5 G/DL — SIGNIFICANT CHANGE UP (ref 13–17)
MCHC RBC-ENTMCNC: 32.2 PG — SIGNIFICANT CHANGE UP (ref 27–34)
MCHC RBC-ENTMCNC: 34.1 % — SIGNIFICANT CHANGE UP (ref 32–36)
MCV RBC AUTO: 94.4 FL — SIGNIFICANT CHANGE UP (ref 80–100)
NRBC # FLD: 0 K/UL — LOW (ref 25–125)
PLATELET # BLD AUTO: 206 K/UL — SIGNIFICANT CHANGE UP (ref 150–400)
PMV BLD: 11 FL — SIGNIFICANT CHANGE UP (ref 7–13)
POTASSIUM SERPL-MCNC: 3.9 MMOL/L — SIGNIFICANT CHANGE UP (ref 3.5–5.3)
POTASSIUM SERPL-SCNC: 3.9 MMOL/L — SIGNIFICANT CHANGE UP (ref 3.5–5.3)
RBC # BLD: 4.5 M/UL — SIGNIFICANT CHANGE UP (ref 4.2–5.8)
RBC # FLD: 11.9 % — SIGNIFICANT CHANGE UP (ref 10.3–14.5)
SODIUM SERPL-SCNC: 143 MMOL/L — SIGNIFICANT CHANGE UP (ref 135–145)
WBC # BLD: 4.49 K/UL — SIGNIFICANT CHANGE UP (ref 3.8–10.5)
WBC # FLD AUTO: 4.49 K/UL — SIGNIFICANT CHANGE UP (ref 3.8–10.5)

## 2019-03-01 PROCEDURE — 99233 SBSQ HOSP IP/OBS HIGH 50: CPT

## 2019-03-01 PROCEDURE — G9001: CPT

## 2019-03-01 PROCEDURE — 99232 SBSQ HOSP IP/OBS MODERATE 35: CPT

## 2019-03-01 RX ADMIN — Medication 1 MILLIGRAM(S): at 11:17

## 2019-03-01 RX ADMIN — Medication 100 MILLIGRAM(S): at 18:36

## 2019-03-01 RX ADMIN — SENNA PLUS 2 TABLET(S): 8.6 TABLET ORAL at 21:54

## 2019-03-01 RX ADMIN — ENOXAPARIN SODIUM 90 MILLIGRAM(S): 100 INJECTION SUBCUTANEOUS at 06:18

## 2019-03-01 RX ADMIN — Medication 1 MILLIGRAM(S): at 14:39

## 2019-03-01 RX ADMIN — MEMANTINE HYDROCHLORIDE 5 MILLIGRAM(S): 10 TABLET ORAL at 21:54

## 2019-03-01 RX ADMIN — LEVETIRACETAM 1000 MILLIGRAM(S): 250 TABLET, FILM COATED ORAL at 06:17

## 2019-03-01 RX ADMIN — ATORVASTATIN CALCIUM 80 MILLIGRAM(S): 80 TABLET, FILM COATED ORAL at 21:54

## 2019-03-01 RX ADMIN — Medication 1 MILLIGRAM(S): at 18:36

## 2019-03-01 RX ADMIN — LEVETIRACETAM 1000 MILLIGRAM(S): 250 TABLET, FILM COATED ORAL at 18:36

## 2019-03-01 RX ADMIN — SODIUM CHLORIDE 75 MILLILITER(S): 9 INJECTION, SOLUTION INTRAVENOUS at 11:18

## 2019-03-01 RX ADMIN — Medication 1 MILLIGRAM(S): at 06:17

## 2019-03-01 RX ADMIN — ENOXAPARIN SODIUM 90 MILLIGRAM(S): 100 INJECTION SUBCUTANEOUS at 18:36

## 2019-03-01 RX ADMIN — SODIUM CHLORIDE 75 MILLILITER(S): 9 INJECTION, SOLUTION INTRAVENOUS at 00:25

## 2019-03-01 RX ADMIN — Medication 1 MILLIGRAM(S): at 06:18

## 2019-03-01 RX ADMIN — PANTOPRAZOLE SODIUM 40 MILLIGRAM(S): 20 TABLET, DELAYED RELEASE ORAL at 06:17

## 2019-03-01 RX ADMIN — Medication 81 MILLIGRAM(S): at 11:18

## 2019-03-01 RX ADMIN — SODIUM CHLORIDE 75 MILLILITER(S): 9 INJECTION, SOLUTION INTRAVENOUS at 06:19

## 2019-03-01 RX ADMIN — Medication 100 MILLIGRAM(S): at 06:17

## 2019-03-01 NOTE — PROGRESS NOTE ADULT - SUBJECTIVE AND OBJECTIVE BOX
Patient is a 59y old  Male who presents with a chief complaint of code stroke         SUBJECTIVE / OVERNIGHT EVENTS: not coop with exam; in bed with sheet over head; attempted to move the sheet away from pt's face and he pulled the sheet back; per PCA pt has been this way the last few days; limited PO      MEDICATIONS  (STANDING):  aspirin  chewable 81 milliGRAM(s) Oral daily  atorvastatin 80 milliGRAM(s) Oral at bedtime  benztropine 1 milliGRAM(s) Oral two times a day  carBAMazepine Chewable 100 milliGRAM(s) Chew two times a day  docusate sodium Liquid 200 milliGRAM(s) Oral daily  enoxaparin Injectable 90 milliGRAM(s) SubCutaneous two times a day  levETIRAcetam 1000 milliGRAM(s) Oral two times a day  LORazepam     Tablet 1 milliGRAM(s) Oral every 4 hours  memantine 5 milliGRAM(s) Oral at bedtime  pantoprazole    Tablet 40 milliGRAM(s) Oral before breakfast  senna 2 Tablet(s) Oral at bedtime  sodium chloride 0.45%. 1000 milliLiter(s) (75 mL/Hr) IV Continuous <Continuous>    MEDICATIONS  (PRN):  bisacodyl 5 milliGRAM(s) Oral daily PRN Constipation  polyethylene glycol 3350 17 Gram(s) Oral daily PRN Constipation      Vital Signs Last 24 Hrs  T(F): 97.4 (01 Mar 2019 05:19), Max: 97.9 (28 Feb 2019 21:30)  HR: 66 (01 Mar 2019 05:19) (63 - 66)  BP: 122/77 (01 Mar 2019 05:19) (122/77 - 135/84)  RR: 18 (01 Mar 2019 05:19) (18 - 18)  SpO2: 100% (01 Mar 2019 05:19) (100% - 100%)            PHYSICAL EXAM  GENERAL: NAD, well-developed  CHEST/LUNG: no resp distress  PSYCH: not coop with exam; withdrawn, unclear if responding to internal stim      LABS:                        14.5   4.49  )-----------( 206      ( 01 Mar 2019 07:20 )             42.5     03-01    143  |  108<H>  |  16  ----------------------------<  87  3.9   |  19<L>  |  0.92    Ca    9.7      01 Mar 2019 07:20              Care Discussed with Consultants/Other Providers: Dr Harris

## 2019-03-01 NOTE — PROGRESS NOTE ADULT - ASSESSMENT
59M from [facility] h/o schizophrenia, recurrent DVT s/p IVC filter, seizure disorder, h/o severe C4-5 stenosis c/b cord compression s/p discectomy and fusion (wheel chair bound) adm 2/14 for dysarthria concerning for acute CVA s/p tPA complicated by brain edema, acute encephalopathy poss catatonia, dysarthria, hypernatremia.

## 2019-03-01 NOTE — PROGRESS NOTE BEHAVIORAL HEALTH - NSBHFUPINTERVALHXFT_PSY_A_CORE
patient is laying in bed with the sheet over his head, but not stiff minimally verbal but did get up and eat,

## 2019-03-01 NOTE — PROGRESS NOTE ADULT - PROBLEM SELECTOR PLAN 2
Acute CVA LT MCA with brain edema possibly embolic RT weakness, s/p tPA  anticipate difficult disposition due to psychiatric issues.

## 2019-03-02 PROCEDURE — 99232 SBSQ HOSP IP/OBS MODERATE 35: CPT

## 2019-03-02 RX ADMIN — Medication 1 MILLIGRAM(S): at 05:03

## 2019-03-02 RX ADMIN — MEMANTINE HYDROCHLORIDE 5 MILLIGRAM(S): 10 TABLET ORAL at 21:21

## 2019-03-02 RX ADMIN — Medication 2 MILLIGRAM(S): at 08:16

## 2019-03-02 RX ADMIN — Medication 1 MILLIGRAM(S): at 18:48

## 2019-03-02 RX ADMIN — LEVETIRACETAM 1000 MILLIGRAM(S): 250 TABLET, FILM COATED ORAL at 05:04

## 2019-03-02 RX ADMIN — Medication 100 MILLIGRAM(S): at 05:03

## 2019-03-02 RX ADMIN — ATORVASTATIN CALCIUM 80 MILLIGRAM(S): 80 TABLET, FILM COATED ORAL at 21:21

## 2019-03-02 RX ADMIN — ENOXAPARIN SODIUM 90 MILLIGRAM(S): 100 INJECTION SUBCUTANEOUS at 05:04

## 2019-03-02 RX ADMIN — Medication 100 MILLIGRAM(S): at 18:48

## 2019-03-02 RX ADMIN — Medication 2 MILLIGRAM(S): at 11:46

## 2019-03-02 RX ADMIN — PANTOPRAZOLE SODIUM 40 MILLIGRAM(S): 20 TABLET, DELAYED RELEASE ORAL at 05:03

## 2019-03-02 RX ADMIN — LEVETIRACETAM 1000 MILLIGRAM(S): 250 TABLET, FILM COATED ORAL at 18:50

## 2019-03-02 RX ADMIN — SENNA PLUS 2 TABLET(S): 8.6 TABLET ORAL at 21:21

## 2019-03-02 RX ADMIN — Medication 81 MILLIGRAM(S): at 11:45

## 2019-03-02 RX ADMIN — ENOXAPARIN SODIUM 90 MILLIGRAM(S): 100 INJECTION SUBCUTANEOUS at 18:48

## 2019-03-02 RX ADMIN — Medication 2 MILLIGRAM(S): at 16:18

## 2019-03-02 RX ADMIN — Medication 1 TABLET(S): at 18:51

## 2019-03-02 RX ADMIN — SODIUM CHLORIDE 75 MILLILITER(S): 9 INJECTION, SOLUTION INTRAVENOUS at 05:02

## 2019-03-02 NOTE — CHART NOTE - NSCHARTNOTEFT_GEN_A_CORE
Patient seen for Nutrition consult for Nutrition Services- Assessment + Calorie Count.     Per chart: Patient from [facility] h/o schizophrenia, recurrent DVT s/p IVC filter, seizure disorder, h/o severe C4-5 stenosis c/b cord compression s/p discectomy and fusion (wheel chair bound) adm 2/14 for dysarthria concerning for acute CVA s/p tPA complicated by brain edema, acute encephalopathy poss catatonia, dysarthria, hypernatremia.    Calorie Count placed in chart: 2/27- About 789 kcals + 33 g of protein ---> 2/28- About 639 kcals + 12 g of protein ---> 3/1- About 678 kcal + 44 g of protein. Patient consuming less than 50% of estimated nutrient needs. Discussed with RN and PCA- patient with poor PO intake during breakfast, however eats a little better during lunch and dinner time.     Source: Patient [ ]    Family [ ]     other [ ]    Current Diet :   Reported:  [ ] nausea  [ ] vomiting [ ] diarrhea [ ] constipation  [ ]chewing problems [ ] swallowing issues  [ ] other:   PO intake:  < 50% [ ] 50-75% [ ]   % [ ]  other :  Enteral /Parenteral Nutrition:   Current Weight:     __________________ Pertinent Medications__________________   MEDICATIONS  (STANDING):  aspirin  chewable 81 milliGRAM(s) Oral daily  atorvastatin 80 milliGRAM(s) Oral at bedtime  benztropine 1 milliGRAM(s) Oral two times a day  carBAMazepine Chewable 100 milliGRAM(s) Chew two times a day  docusate sodium Liquid 200 milliGRAM(s) Oral daily  enoxaparin Injectable 90 milliGRAM(s) SubCutaneous two times a day  levETIRAcetam 1000 milliGRAM(s) Oral two times a day  LORazepam     Tablet 2 milliGRAM(s) Oral <User Schedule>  memantine 5 milliGRAM(s) Oral at bedtime  pantoprazole    Tablet 40 milliGRAM(s) Oral before breakfast  senna 2 Tablet(s) Oral at bedtime  sodium chloride 0.45%. 1000 milliLiter(s) (75 mL/Hr) IV Continuous <Continuous>    MEDICATIONS  (PRN):  bisacodyl 5 milliGRAM(s) Oral daily PRN Constipation  polyethylene glycol 3350 17 Gram(s) Oral daily PRN Constipation      __________________ Pertinent Labs__________________   03-01 Na143 mmol/L Glu 87 mg/dL K+ 3.9 mmol/L Cr  0.92 mg/dL BUN 16 mg/dL 02-15 [id number] 5.5 % 02-15 Chol 141 mg/dL LDL 80 mg/dL HDL 55 mg/dL Trig 75 mg/dL        Skin:     Estimated Needs:   [ ] no change since previous assessment  [ ] recalculated:       Previous Nutrition Diagnosis:     [ ] Inadequate Energy Intake [ ]Inadequate Oral Intake [ ] Excessive Energy Intake   [ ] Underweight [ ] Increased Nutrient Needs [ ] Overweight/Obesity   [ ] Altered GI Function [ ] Unintended Weight Loss [ ] Food & Nutrition Related Knowledge Deficit [ ] Malnutrition     Nutrition Diagnosis is [ ] ongoing  [ ] resolved [ ] not applicable     New Nutrition Diagnosis: [ ] not applicable    [ ] Inadequate Protein Energy Intake   [ ]Inadequate Oral Intake   [ ] Excessive Energy Intake   [ ] Underweight   [ ] Increased Nutrient Needs   [ ] Overweight/Obesity   [ ] Altered GI Function   [ ] Unintended Weight Loss   [ ] Food & Nutrition Related Knowledge Deficit  [ ] Limited Adherence to nutrition related recommendations   [ ] Malnutrition    [ ] other:     Related to:   As evidenced by:     Nutrition Recommendations: Patient seen for Nutrition consult for Nutrition Services- Assessment + Calorie Count.     Per chart: Patient from [facility] h/o schizophrenia, recurrent DVT s/p IVC filter, seizure disorder, h/o severe C4-5 stenosis c/b cord compression s/p discectomy and fusion (wheel chair bound) adm 2/14 for dysarthria concerning for acute CVA s/p tPA complicated by brain edema, acute encephalopathy poss catatonia, dysarthria, hypernatremia.    Calorie Count placed in chart: 2/27- About 789 kcals + 33 g of protein ---> 2/28- About 639 kcals + 12 g of protein ---> 3/1- About 678 kcal + 44 g of protein. Patient consuming less than 50% of estimated nutrient needs. Discussed with RN and PCA- patient with poor PO intake during breakfast, however eats a little better during lunch and dinner time.     Source: Patient [ ]    Family [ ]     other [x]: EMR, RN and PCA     Current Diet : Dysphagia 1 Pureed - Honey consistency Fluid     Reported:  Per RN- no GI distress at this time. No allergies or intolerances note in chart.    PO intake:  < 50% [x] 50-75% [ ]   % [ ]  other :    Weight trend: 3/2--> 193.1 pounds, 2/27- 188 pounds, 2/26- 197.9 pounds, 2/21- 196.4 pounds.   Patient with inconsistent weight trend - ?  bed scale accuracy. Patient noted with no edema      __________________ Pertinent Medications__________________   MEDICATIONS  (STANDING):  aspirin  chewable 81 milliGRAM(s) Oral daily  atorvastatin 80 milliGRAM(s) Oral at bedtime  benztropine 1 milliGRAM(s) Oral two times a day  carBAMazepine Chewable 100 milliGRAM(s) Chew two times a day  docusate sodium Liquid 200 milliGRAM(s) Oral daily  enoxaparin Injectable 90 milliGRAM(s) SubCutaneous two times a day  levETIRAcetam 1000 milliGRAM(s) Oral two times a day  LORazepam     Tablet 2 milliGRAM(s) Oral <User Schedule>  memantine 5 milliGRAM(s) Oral at bedtime  pantoprazole    Tablet 40 milliGRAM(s) Oral before breakfast  senna 2 Tablet(s) Oral at bedtime  sodium chloride 0.45%. 1000 milliLiter(s) (75 mL/Hr) IV Continuous <Continuous>    MEDICATIONS  (PRN):  bisacodyl 5 milliGRAM(s) Oral daily PRN Constipation  polyethylene glycol 3350 17 Gram(s) Oral daily PRN Constipation      __________________ Pertinent Labs__________________   03-01 Na143 mmol/L Glu 87 mg/dL K+ 3.9 mmol/L Cr  0.92 mg/dL BUN 16 mg/dL 02-15 [id number] 5.5 % 02-15 Chol 141 mg/dL LDL 80 mg/dL HDL 55 mg/dL Trig 75 mg/dL        Skin: intact, no pressure injuries noted     Estimated Needs:   [x] no change since previous assessment  [ ] recalculated:       Previous Nutrition Diagnosis: [x] Malnutrition - severe    Nutrition Diagnosis is [x] ongoing  [ ] resolved [ ] not applicable     New Nutrition Diagnosis: [x] not applicable      Nutrition Recommendations:  1. Continue with current diet order- Dysphagia 1 Pureed- Honey Consistency   2. Recommend Ensure Enlive 240mls 1x daily (350 kcal, 20g protein) -( thickened to proper consistency ). + Ensure Pudding 3x daily (510 kcals, 12g protein) to help with caloric and protein intake  4. Recommend Multivitamin for micronutrient coverage.   3. Please Encourage po intake, assist with meals and menu selections, provide alternatives PRN.   4. Monitor weights, labs, BM's, skin integrity, p.o. intake.   5. RD to remain available for further nutritional interventions as indicated.-Елена Ribeiro, RDN, CDN

## 2019-03-02 NOTE — PROGRESS NOTE ADULT - PROBLEM SELECTOR PROBLEM 5
Hypernatremia
Seizure
Hypernatremia
Hypernatremia
Seizure

## 2019-03-02 NOTE — PROGRESS NOTE ADULT - ASSESSMENT
59M from [facility] h/o schizophrenia, recurrent DVT s/p IVC filter, seizure disorder, h/o severe C4-5 stenosis c/b cord compression s/p discectomy and fusion (wheel chair bound) adm 2/14 for dysarthria concerning for acute CVA s/p tPA complicated by brain edema, acute encephalopathy poss catatonia, dysarthria, hypernatremia.

## 2019-03-02 NOTE — PROGRESS NOTE ADULT - SUBJECTIVE AND OBJECTIVE BOX
Patient is a 59y old  Male who presents with a chief complaint of code stroke         SUBJECTIVE / OVERNIGHT EVENTS: eats at times per staff; pt currently in bed with sheet over head, will not participate with interview or exam      MEDICATIONS  (STANDING):  aspirin  chewable 81 milliGRAM(s) Oral daily  atorvastatin 80 milliGRAM(s) Oral at bedtime  benztropine 1 milliGRAM(s) Oral two times a day  carBAMazepine Chewable 100 milliGRAM(s) Chew two times a day  docusate sodium Liquid 200 milliGRAM(s) Oral daily  enoxaparin Injectable 90 milliGRAM(s) SubCutaneous two times a day  levETIRAcetam 1000 milliGRAM(s) Oral two times a day  LORazepam     Tablet 2 milliGRAM(s) Oral <User Schedule>  memantine 5 milliGRAM(s) Oral at bedtime  multivitamin 1 Tablet(s) Oral daily  pantoprazole    Tablet 40 milliGRAM(s) Oral before breakfast  senna 2 Tablet(s) Oral at bedtime  sodium chloride 0.45%. 1000 milliLiter(s) (75 mL/Hr) IV Continuous <Continuous>    MEDICATIONS  (PRN):  bisacodyl 5 milliGRAM(s) Oral daily PRN Constipation  polyethylene glycol 3350 17 Gram(s) Oral daily PRN Constipation      Vital Signs Last 24 Hrs  T(F): 98 (02 Mar 2019 04:55), Max: 98 (02 Mar 2019 04:55)  HR: 60 (02 Mar 2019 04:55) (60 - 81)  BP: 138/50 (02 Mar 2019 04:55) (113/61 - 138/50)  RR: 16 (02 Mar 2019 04:55) (15 - 18)  SpO2: 99% (02 Mar 2019 04:55) (97% - 100%)          PHYSICAL EXAM  GENERAL: NAD, well-developed  EYES: EOMI, PERRLA, conjunctiva and sclera clear  CHEST/LUNG: limited to post exam as this is position of pt b/l AE  EXTREMITIES:  No clubbing, cyanosis, or edema  PSYCH: not engaging      LABS:               Care Discussed with Consultants/Other Providers: Dr Harris

## 2019-03-02 NOTE — PROGRESS NOTE ADULT - PROBLEM SELECTOR PLAN 5
Continue carbamazepine, levetiracetam - give via IV if PO not possible due to catatonia.
Continue carbamazepine, levetiracetam - give via IV if PO not possible due to encephalopathy.
Continue carbamazepine, levetiracetam.
Monitor Na level , likely due to poor PO intake.  IV 1/2NS
Monitor Na level , likely due to poor PO intake.  IV 1/2NS
Monitor Na level in AM.  No clinical indication of hypovolemia but will continue to monitor.

## 2019-03-03 PROCEDURE — 99232 SBSQ HOSP IP/OBS MODERATE 35: CPT

## 2019-03-03 RX ADMIN — Medication 2 MILLIGRAM(S): at 12:23

## 2019-03-03 RX ADMIN — LEVETIRACETAM 1000 MILLIGRAM(S): 250 TABLET, FILM COATED ORAL at 17:47

## 2019-03-03 RX ADMIN — Medication 1 MILLIGRAM(S): at 17:46

## 2019-03-03 RX ADMIN — Medication 200 MILLIGRAM(S): at 12:24

## 2019-03-03 RX ADMIN — PANTOPRAZOLE SODIUM 40 MILLIGRAM(S): 20 TABLET, DELAYED RELEASE ORAL at 05:40

## 2019-03-03 RX ADMIN — Medication 1 MILLIGRAM(S): at 05:40

## 2019-03-03 RX ADMIN — Medication 2 MILLIGRAM(S): at 09:02

## 2019-03-03 RX ADMIN — Medication 1 TABLET(S): at 12:23

## 2019-03-03 RX ADMIN — SENNA PLUS 2 TABLET(S): 8.6 TABLET ORAL at 21:36

## 2019-03-03 RX ADMIN — ENOXAPARIN SODIUM 90 MILLIGRAM(S): 100 INJECTION SUBCUTANEOUS at 17:47

## 2019-03-03 RX ADMIN — MEMANTINE HYDROCHLORIDE 5 MILLIGRAM(S): 10 TABLET ORAL at 21:37

## 2019-03-03 RX ADMIN — LEVETIRACETAM 1000 MILLIGRAM(S): 250 TABLET, FILM COATED ORAL at 05:40

## 2019-03-03 RX ADMIN — Medication 100 MILLIGRAM(S): at 17:46

## 2019-03-03 RX ADMIN — Medication 100 MILLIGRAM(S): at 05:40

## 2019-03-03 RX ADMIN — Medication 2 MILLIGRAM(S): at 15:05

## 2019-03-03 RX ADMIN — Medication 81 MILLIGRAM(S): at 12:23

## 2019-03-03 RX ADMIN — ATORVASTATIN CALCIUM 80 MILLIGRAM(S): 80 TABLET, FILM COATED ORAL at 21:36

## 2019-03-03 RX ADMIN — ENOXAPARIN SODIUM 90 MILLIGRAM(S): 100 INJECTION SUBCUTANEOUS at 05:40

## 2019-03-03 NOTE — PROGRESS NOTE ADULT - SUBJECTIVE AND OBJECTIVE BOX
Patient is a 59y old  Male who presents with a chief complaint of code stroke       SUBJECTIVE / OVERNIGHT EVENTS: today allowed me to open sheet from his face; he looked at me and smiled and mumbled something and then covered his head again      MEDICATIONS  (STANDING):  aspirin  chewable 81 milliGRAM(s) Oral daily  atorvastatin 80 milliGRAM(s) Oral at bedtime  benztropine 1 milliGRAM(s) Oral two times a day  carBAMazepine Chewable 100 milliGRAM(s) Chew two times a day  docusate sodium Liquid 200 milliGRAM(s) Oral daily  enoxaparin Injectable 90 milliGRAM(s) SubCutaneous two times a day  levETIRAcetam 1000 milliGRAM(s) Oral two times a day  LORazepam     Tablet 2 milliGRAM(s) Oral <User Schedule>  memantine 5 milliGRAM(s) Oral at bedtime  multivitamin 1 Tablet(s) Oral daily  pantoprazole    Tablet 40 milliGRAM(s) Oral before breakfast  senna 2 Tablet(s) Oral at bedtime  sodium chloride 0.45%. 1000 milliLiter(s) (75 mL/Hr) IV Continuous <Continuous>    MEDICATIONS  (PRN):  bisacodyl 5 milliGRAM(s) Oral daily PRN Constipation  polyethylene glycol 3350 17 Gram(s) Oral daily PRN Constipation      Vital Signs Last 24 Hrs  T(F): 99 (03 Mar 2019 05:38), Max: 99 (03 Mar 2019 05:38)  HR: 63 (03 Mar 2019 05:38) (63 - 68)  BP: 141/53 (03 Mar 2019 05:38) (107/73 - 141/53)  RR: 18 (03 Mar 2019 05:38) (18 - 18)  SpO2: 97% (03 Mar 2019 05:38) (97% - 99%)            PHYSICAL EXAM  GENERAL: NAD, well-developed  EYES: EOMI, PERRLA, conjunctiva and sclera clear  CHEST/LUNG: grossly clear b/l from post exam  EXTREMITIES: No clubbing, cyanosis, or edema    LABS:

## 2019-03-03 NOTE — PROGRESS NOTE ADULT - ASSESSMENT
59M from [facility] h/o schizophrenia, recurrent DVT s/p IVC filter, seizure disorder, h/o severe C4-5 stenosis c/b cord compression s/p discectomy and fusion (wheel chair bound) adm 2/14 for dysarthria concerning for acute CVA s/p tPA complicated by brain edema, acute encephalopathy poss catatonia, dysarthria, hypernatremia.

## 2019-03-03 NOTE — PROGRESS NOTE ADULT - PROBLEM SELECTOR PLAN 3
currently on lovenox  questionable hx of falls  IVC filter found in place already  would change to baby ASA as rec by vasc; additionally pt with inconsistent PO intake so pills may pose an issue as well currently on lovenox  questionable hx of falls  IVC filter found in place already  would change to baby ASA as rec by vasc; additionally pt with inconsistent PO intake so pills may pose an issue as well; will try to clarify if pt is bedbound vs falls risk

## 2019-03-04 LAB
ANION GAP SERPL CALC-SCNC: 16 MMO/L — HIGH (ref 7–14)
BUN SERPL-MCNC: 10 MG/DL — SIGNIFICANT CHANGE UP (ref 7–23)
CALCIUM SERPL-MCNC: 9.4 MG/DL — SIGNIFICANT CHANGE UP (ref 8.4–10.5)
CHLORIDE SERPL-SCNC: 102 MMOL/L — SIGNIFICANT CHANGE UP (ref 98–107)
CO2 SERPL-SCNC: 22 MMOL/L — SIGNIFICANT CHANGE UP (ref 22–31)
CREAT SERPL-MCNC: 0.89 MG/DL — SIGNIFICANT CHANGE UP (ref 0.5–1.3)
GLUCOSE SERPL-MCNC: 80 MG/DL — SIGNIFICANT CHANGE UP (ref 70–99)
HCT VFR BLD CALC: 42.2 % — SIGNIFICANT CHANGE UP (ref 39–50)
HGB BLD-MCNC: 14.3 G/DL — SIGNIFICANT CHANGE UP (ref 13–17)
MAGNESIUM SERPL-MCNC: 1.6 MG/DL — SIGNIFICANT CHANGE UP (ref 1.6–2.6)
MCHC RBC-ENTMCNC: 31.9 PG — SIGNIFICANT CHANGE UP (ref 27–34)
MCHC RBC-ENTMCNC: 33.9 % — SIGNIFICANT CHANGE UP (ref 32–36)
MCV RBC AUTO: 94.2 FL — SIGNIFICANT CHANGE UP (ref 80–100)
NRBC # FLD: 0.02 K/UL — LOW (ref 25–125)
PLATELET # BLD AUTO: 222 K/UL — SIGNIFICANT CHANGE UP (ref 150–400)
PMV BLD: 11 FL — SIGNIFICANT CHANGE UP (ref 7–13)
POTASSIUM SERPL-MCNC: 4.4 MMOL/L — SIGNIFICANT CHANGE UP (ref 3.5–5.3)
POTASSIUM SERPL-SCNC: 4.4 MMOL/L — SIGNIFICANT CHANGE UP (ref 3.5–5.3)
RBC # BLD: 4.48 M/UL — SIGNIFICANT CHANGE UP (ref 4.2–5.8)
RBC # FLD: 11.9 % — SIGNIFICANT CHANGE UP (ref 10.3–14.5)
SODIUM SERPL-SCNC: 140 MMOL/L — SIGNIFICANT CHANGE UP (ref 135–145)
WBC # BLD: 4.7 K/UL — SIGNIFICANT CHANGE UP (ref 3.8–10.5)
WBC # FLD AUTO: 4.7 K/UL — SIGNIFICANT CHANGE UP (ref 3.8–10.5)

## 2019-03-04 PROCEDURE — 99232 SBSQ HOSP IP/OBS MODERATE 35: CPT

## 2019-03-04 PROCEDURE — 99233 SBSQ HOSP IP/OBS HIGH 50: CPT

## 2019-03-04 RX ADMIN — SODIUM CHLORIDE 75 MILLILITER(S): 9 INJECTION, SOLUTION INTRAVENOUS at 05:51

## 2019-03-04 RX ADMIN — Medication 81 MILLIGRAM(S): at 12:27

## 2019-03-04 RX ADMIN — LEVETIRACETAM 1000 MILLIGRAM(S): 250 TABLET, FILM COATED ORAL at 17:45

## 2019-03-04 RX ADMIN — Medication 200 MILLIGRAM(S): at 12:26

## 2019-03-04 RX ADMIN — Medication 2 MILLIGRAM(S): at 08:58

## 2019-03-04 RX ADMIN — ENOXAPARIN SODIUM 90 MILLIGRAM(S): 100 INJECTION SUBCUTANEOUS at 17:46

## 2019-03-04 RX ADMIN — Medication 1 MILLIGRAM(S): at 05:46

## 2019-03-04 RX ADMIN — MEMANTINE HYDROCHLORIDE 5 MILLIGRAM(S): 10 TABLET ORAL at 23:01

## 2019-03-04 RX ADMIN — Medication 2 MILLIGRAM(S): at 17:41

## 2019-03-04 RX ADMIN — PANTOPRAZOLE SODIUM 40 MILLIGRAM(S): 20 TABLET, DELAYED RELEASE ORAL at 05:46

## 2019-03-04 RX ADMIN — ATORVASTATIN CALCIUM 80 MILLIGRAM(S): 80 TABLET, FILM COATED ORAL at 23:01

## 2019-03-04 RX ADMIN — Medication 100 MILLIGRAM(S): at 05:46

## 2019-03-04 RX ADMIN — Medication 1 TABLET(S): at 12:26

## 2019-03-04 RX ADMIN — Medication 100 MILLIGRAM(S): at 17:44

## 2019-03-04 RX ADMIN — Medication 2 MILLIGRAM(S): at 12:24

## 2019-03-04 RX ADMIN — ENOXAPARIN SODIUM 90 MILLIGRAM(S): 100 INJECTION SUBCUTANEOUS at 05:46

## 2019-03-04 RX ADMIN — Medication 2 MILLIGRAM(S): at 03:36

## 2019-03-04 RX ADMIN — LEVETIRACETAM 1000 MILLIGRAM(S): 250 TABLET, FILM COATED ORAL at 05:46

## 2019-03-04 NOTE — PROGRESS NOTE BEHAVIORAL HEALTH - NSBHCONSULTWRKUPYES_PSY_A_CORE
imaging/would consider rescanning the head to see for any subsequent bleeds / conversion / worsening etc

## 2019-03-04 NOTE — PROGRESS NOTE BEHAVIORAL HEALTH - NSBHFUPINTERVALHXFT_PSY_A_CORE
Same clinical presentation - lying in bed covered with a sheet, not engaging but calm, cooperative. Chart and labs reviewed. Patient is no longer on an antipsychotic thus does not need the Cogentin 1mg PO bid as it is used for EPS only. It can cause constipation, dry mouth so it was discontinued as it is not clinically indicated at this time. Unclear if this is catatonia, residual sequela of the MCA infarct or a combination thereof. Will slightly decrease Ativan to see if sxs improve/worsen.

## 2019-03-04 NOTE — CHART NOTE - NSCHARTNOTEFT_GEN_A_CORE
RN informed me that patient is pulling away while MARLIN is trying to place IV. Ativan 2mg stat dose ordered.

## 2019-03-04 NOTE — PROGRESS NOTE ADULT - PROBLEM SELECTOR PLAN 3
currently on lovenox  questionable hx of falls  IVC filter found in place already  would change to baby ASA as rec by vasc; additionally pt with inconsistent PO intake so pills may pose an issue as well; will try to clarify if pt is bedbound vs falls risk currently on lovenox  questionable hx of falls  IVC filter found in place already  on baby aspirin as recommended previously (see prior documentation by vascular sx)   PA spoke to patient's PMD - requesting inpatient hypercoaguable workup - ordered

## 2019-03-04 NOTE — PROGRESS NOTE ADULT - SUBJECTIVE AND OBJECTIVE BOX
CC: Patient is a 59y old  Male who presents with a chief complaint of code stroke (03 Mar 2019 14:51)    SUBJECTIVE / OVERNIGHT EVENTS: refusing to participate in interview, keeps sheet over his body not interacting with author. Unable to obtain ROS. Discussed patient with bedside RN, patient's mental status largely unchanged from baseline - does not interact much with staff members and keeps himself (including his head) covered.     MEDICATIONS  (STANDING):  aspirin  chewable 81 milliGRAM(s) Oral daily  atorvastatin 80 milliGRAM(s) Oral at bedtime  carBAMazepine Chewable 100 milliGRAM(s) Chew two times a day  docusate sodium Liquid 200 milliGRAM(s) Oral daily  enoxaparin Injectable 90 milliGRAM(s) SubCutaneous two times a day  levETIRAcetam 1000 milliGRAM(s) Oral two times a day  LORazepam     Tablet 2 milliGRAM(s) Oral <User Schedule>  LORazepam     Tablet 1 milliGRAM(s) Oral daily  memantine 5 milliGRAM(s) Oral at bedtime  multivitamin 1 Tablet(s) Oral daily  pantoprazole    Tablet 40 milliGRAM(s) Oral before breakfast  senna 2 Tablet(s) Oral at bedtime  sodium chloride 0.45%. 1000 milliLiter(s) (75 mL/Hr) IV Continuous <Continuous>    MEDICATIONS  (PRN):  bisacodyl 5 milliGRAM(s) Oral daily PRN Constipation  polyethylene glycol 3350 17 Gram(s) Oral daily PRN Constipation      Vital Signs Last 24 Hrs  T(C): 36.8 (04 Mar 2019 14:54), Max: 36.9 (03 Mar 2019 20:52)  T(F): 98.2 (04 Mar 2019 14:54), Max: 98.4 (03 Mar 2019 20:52)  HR: 64 (04 Mar 2019 14:54) (64 - 77)  BP: 11/54 (04 Mar 2019 14:54) (11/54 - 115/68)  BP(mean): --  RR: 18 (04 Mar 2019 14:54) (18 - 18)  SpO2: 97% (04 Mar 2019 14:54) (97% - 100%)  CAPILLARY BLOOD GLUCOSE      PHYSICAL EXAM: Limited exam 2/2 patient refusing to fully cooperate     GENERAL: NAD, covers himself with bedsheet lying in fetal position, refuses to remove bedsheet for full physical exam   HEAD:  Atraumatic, Normocephalic  CHEST/LUNG: Clear to auscultation bilaterally on posterior lung fields   EXTREMITIES: No clubbing, cyanosis, or edema    LABS:                        14.3   4.70  )-----------( 222      ( 04 Mar 2019 05:00 )             42.2     03-04    140  |  102  |  10  ----------------------------<  80  4.4   |  22  |  0.89    Ca    9.4      04 Mar 2019 05:00  Mg     1.6     03-04        Consultant(s) Notes Reviewed:  Psych

## 2019-03-04 NOTE — CHART NOTE - NSCHARTNOTESELECT_GEN_ALL_CORE
Event Note
Nutrition Services/Follow up
Nutrition Services/MALNUTRITION ALERT
Transfer Note
Transfer Note/MAR Accept Note to Medicine

## 2019-03-04 NOTE — PROGRESS NOTE ADULT - PROBLEM SELECTOR PLAN 2
complicated by catatonia   ativan timing changed per psych - reduced to assess if patient would improve behaviorally complicated by catatonia   ativan timing changed per psych - reduced to assess if patient would improve behaviorally - now taking 1 mg 730 am - and 2 mg at 11 am & 3 pm

## 2019-03-04 NOTE — PROGRESS NOTE BEHAVIORAL HEALTH - NSBHCONSULTMEDS_PSY_A_CORE FT
reduced Ativan 2mg PO TID to 1mg/2/2mg - will see if sxs improve/worsen/not change as exact clinical picture /diagnosis is convoluted

## 2019-03-04 NOTE — PROGRESS NOTE ADULT - ASSESSMENT
59M from [facility] h/o schizophrenia, recurrent DVT s/p IVC filter, seizure disorder, h/o severe C4-5 stenosis c/b cord compression s/p discectomy and fusion (wheel chair bound) adm 2/14 for dysarthria concerning for acute CVA s/p tPA complicated by brain edema, acute encephalopathy poss catatonia, dysarthria, hypernatremia.

## 2019-03-05 PROCEDURE — 99232 SBSQ HOSP IP/OBS MODERATE 35: CPT

## 2019-03-05 PROCEDURE — 99233 SBSQ HOSP IP/OBS HIGH 50: CPT

## 2019-03-05 RX ORDER — HALOPERIDOL DECANOATE 100 MG/ML
5 INJECTION INTRAMUSCULAR
Qty: 0 | Refills: 0 | Status: COMPLETED | OUTPATIENT
Start: 2019-03-05 | End: 2019-03-05

## 2019-03-05 RX ADMIN — LEVETIRACETAM 1000 MILLIGRAM(S): 250 TABLET, FILM COATED ORAL at 17:20

## 2019-03-05 RX ADMIN — ENOXAPARIN SODIUM 90 MILLIGRAM(S): 100 INJECTION SUBCUTANEOUS at 05:50

## 2019-03-05 RX ADMIN — ENOXAPARIN SODIUM 90 MILLIGRAM(S): 100 INJECTION SUBCUTANEOUS at 17:20

## 2019-03-05 RX ADMIN — PANTOPRAZOLE SODIUM 40 MILLIGRAM(S): 20 TABLET, DELAYED RELEASE ORAL at 05:50

## 2019-03-05 RX ADMIN — Medication 1 TABLET(S): at 17:20

## 2019-03-05 RX ADMIN — Medication 1 MILLIGRAM(S): at 07:17

## 2019-03-05 RX ADMIN — Medication 100 MILLIGRAM(S): at 05:50

## 2019-03-05 RX ADMIN — ATORVASTATIN CALCIUM 80 MILLIGRAM(S): 80 TABLET, FILM COATED ORAL at 23:55

## 2019-03-05 RX ADMIN — HALOPERIDOL DECANOATE 5 MILLIGRAM(S): 100 INJECTION INTRAMUSCULAR at 23:54

## 2019-03-05 RX ADMIN — MEMANTINE HYDROCHLORIDE 5 MILLIGRAM(S): 10 TABLET ORAL at 23:56

## 2019-03-05 RX ADMIN — Medication 100 MILLIGRAM(S): at 17:20

## 2019-03-05 RX ADMIN — Medication 81 MILLIGRAM(S): at 17:20

## 2019-03-05 RX ADMIN — LEVETIRACETAM 1000 MILLIGRAM(S): 250 TABLET, FILM COATED ORAL at 05:50

## 2019-03-05 NOTE — PROVIDER CONTACT NOTE (OTHER) - ACTION/TREATMENT ORDERED:
JIM Conrad notified and aware DIVINA Martines Memet aware
Continue to attempt to give meds PO after stat dose of IVP Haldol. If necessary other medications may be ordered IVP until pt cooperates.
Further evaluation with Am medications to determine plan/course of action
provider made aware. PO intake encouraged. pt has been complaint w. drinking water. will endorse to day team to obtain IV access.

## 2019-03-05 NOTE — PROVIDER CONTACT NOTE (OTHER) - ASSESSMENT
pt is ordered for 0.45% NS for treatment of hypernatremia. Na levels have been stable. all other medications are PO which pt is compliant with.

## 2019-03-05 NOTE — PROGRESS NOTE BEHAVIORAL HEALTH - SUMMARY
60 yo male with chronic schizophrenia, + dementia x 2 years, used wheelchair with baseline of basic communication skills as per [facility] psychiatrist, Patient has NO HISTORY of catatonia. Patient's presentation at this time is consistent with large L MCA CVA's effects juxtaposed on a Patient who was already impaired physically and in cognition. Start tapering down Ativan as this is very unlikely to be actual catatonia, resume haldol 2mg liquid PO bid for now and taper back up as tolerated.

## 2019-03-05 NOTE — PROGRESS NOTE ADULT - ASSESSMENT
59M from [facility] h/o schizophrenia, recurrent DVT s/p IVC filter, seizure disorder, h/o severe C4-5 stenosis c/b cord compression s/p discectomy and fusion (wheel chair bound) adm 2/14 for dysarthria concerning for acute CVA s/p tPA complicated by brain edema, acute encephalopathy poss catatonia, dysarthria, hypernatremia.

## 2019-03-05 NOTE — PROVIDER CONTACT NOTE (OTHER) - SITUATION
unable to obtain IV access on pt. RN x2, ANM and clinical impact RN tried to get an IV but was unable too as well.

## 2019-03-05 NOTE — PROGRESS NOTE BEHAVIORAL HEALTH - NSBHCONSULTMEDS_PSY_A_CORE FT
start tapering down/off Ativan - reduce to 1mg PO tid then bid then qd then off (can reduce by 1mg daily); restart haldol 2mg LIQUID PO bid (his standing psych medication at [place])

## 2019-03-05 NOTE — PROGRESS NOTE BEHAVIORAL HEALTH - NSBHFUPINTERVALHXFT_PSY_A_CORE
PSYCKES information as it is still unclear where patient resides, etc: Inpatient - [facility] (Source: [facility] Data) [facility] admitted 7/24/2015; inpatient - [facility] 6/4/2018 6/14/2018; inpatient at [facility] 5/10/2018 to  6/4/2018 for Spinal Stenosis, Cervical Region - Fusion Cerv Jt W Nonaut Sub, Ant Appr. Previous medications include haldol 10mg PO, Cogentin 1mg po qd; Seroquel 200mg tab, depakote 250mg tab (frequency unknown if qd, bid etc). NOTE**** was already on memantine 5mg as of 8/20/15    Writer called [facility] today at [phone] and spoke to medical records. Patient is still a [facility] inpatient on [address] phone [phone]. Spoke to Unit Nurse who stated that inpatient psychiatrist is Dr Gardner [phone] and took a message asking to call back Writer's cellphone. PSYCKES information as it is still unclear where patient resides, etc: Inpatient - [facility] (Source: [facility] Data) [facility] admitted 7/24/2015; inpatient - [facility] 6/4/2018 6/14/2018; inpatient at [facility] 5/10/2018 to  6/4/2018 for Spinal Stenosis, Cervical Region - Fusion Cerv Jt W Nonaut Sub, Ant Appr. Previous medications include haldol 10mg PO, Cogentin 1mg po qd; Seroquel 200mg tab, depakote 250mg tab (frequency unknown if qd, bid etc). NOTE**** was already on memantine 5mg as of 8/20/15    Writer called [facility] today at [phone] and spoke to medical records. [facility] [address] [facility] phone [phone]. Spoke to Unit Nurse who stated that inpatient psychiatrist is Dr Gardner [phone]. Case discussed with Dr Davis who said that Pt was already wheelchair bound; has chronic schizophrenia, concrete, not talkative but was able to verbalize needs and speak in short sentences; he had diagnosis of dementia already x 2 years prior. Hx of clozapine trial but not on it at this time. Patient at this time is on a voluntary legal status (9.13) at [facility]. Patient more awake today but still unable to engage in meangful conversation or even nonverbally communicate. Responds in limited manner to his name being called. As per CO staff, he is taking very small sips and not eating any food.    PSYCKES information as it is still unclear where patient resides, etc: Inpatient - [facility] (Source: [facility] Data) [facility] admitted 7/24/2015; inpatient - [facility] 6/4/2018 6/14/2018; inpatient at [facility] 5/10/2018 to  6/4/2018 for Spinal Stenosis, Cervical Region - Fusion Cerv Jt W Nonaut Sub, Ant Appr. Previous medications include haldol 10mg PO, Cogentin 1mg po qd; Seroquel 200mg tab, depakote 250mg tab (frequency unknown if qd, bid etc). NOTE**** was already on memantine 5mg as of 8/20/15    Writer called [facility] today at [phone] and spoke to medical records. [facility] [address] [facility] [phone]. Spoke to Unit Nurse who stated that inpatient psychiatrist is Dr Gardner [phone]. Case discussed with Dr Davis who said that Pt was already wheelchair bound; has chronic schizophrenia, concrete, not talkative but was able to verbalize needs and speak in short sentences; he had diagnosis of dementia already x 2 years prior. Hx of clozapine trial but not on it at this time. Patient at this time is on a voluntary legal status (9.13) at [facility].

## 2019-03-05 NOTE — PROGRESS NOTE ADULT - PROBLEM SELECTOR PLAN 3
currently on lovenox  questionable hx of falls  IVC filter found in place already  on baby aspirin as recommended previously (see prior documentation by vascular sx)   hypercoag w/u ordered, may need to be repeated as outpt once of AC for this acute event as result may not be reliable with current acute DVT and AC in place  if info of pt climbing out of bed, long term AC may not be ideal; will cont to monitor for this

## 2019-03-05 NOTE — PROGRESS NOTE BEHAVIORAL HEALTH - NSBHCONSULTRECOMMENDOTHER_PSY_A_CORE FT
would consider attempting a Speech and Swallow evaluation as Patient is still just taking small sips of water and has not eaten any food as per staff

## 2019-03-05 NOTE — PROGRESS NOTE ADULT - SUBJECTIVE AND OBJECTIVE BOX
Patient is a 59y old  Male who presents with a chief complaint of code stroke         SUBJECTIVE / OVERNIGHT EVENTS: pt cont to remain not interactive; per CO staff in room, pt tried to move himself out of the bed and almost go to the floor; has not eaten today      MEDICATIONS  (STANDING):  aspirin  chewable 81 milliGRAM(s) Oral daily  atorvastatin 80 milliGRAM(s) Oral at bedtime  carBAMazepine Chewable 100 milliGRAM(s) Chew two times a day  docusate sodium Liquid 200 milliGRAM(s) Oral daily  enoxaparin Injectable 90 milliGRAM(s) SubCutaneous two times a day  levETIRAcetam 1000 milliGRAM(s) Oral two times a day  LORazepam     Tablet 2 milliGRAM(s) Oral <User Schedule>  LORazepam     Tablet 1 milliGRAM(s) Oral <User Schedule>  memantine 5 milliGRAM(s) Oral at bedtime  multivitamin 1 Tablet(s) Oral daily  pantoprazole    Tablet 40 milliGRAM(s) Oral before breakfast  senna 2 Tablet(s) Oral at bedtime  sodium chloride 0.45%. 1000 milliLiter(s) (75 mL/Hr) IV Continuous <Continuous>    MEDICATIONS  (PRN):  bisacodyl 5 milliGRAM(s) Oral daily PRN Constipation  polyethylene glycol 3350 17 Gram(s) Oral daily PRN Constipation      Vital Signs Last 24 Hrs  T(F): 98.7 (05 Mar 2019 05:49), Max: 98.7 (05 Mar 2019 05:49)  HR: 73 (05 Mar 2019 05:49) (64 - 73)  BP: 148/66 (05 Mar 2019 05:49) (11/54 - 148/66)  RR: 18 (05 Mar 2019 05:49) (16 - 18)  SpO2: 100% (05 Mar 2019 05:49) (97% - 100%)            PHYSICAL EXAM  GENERAL: NAD, well-developed  CHEST/LUNG: groslly clear from post exam  EXTREMITIES:  No clubbing, cyanosis, or edema  PSYCH: not engaging      LABS:                        14.3   4.70  )-----------( 222      ( 04 Mar 2019 05:00 )             42.2     03-04    140  |  102  |  10  ----------------------------<  80  4.4   |  22  |  0.89    Ca    9.4      04 Mar 2019 05:00  Mg     1.6     03-04

## 2019-03-06 PROCEDURE — 99233 SBSQ HOSP IP/OBS HIGH 50: CPT

## 2019-03-06 PROCEDURE — 99232 SBSQ HOSP IP/OBS MODERATE 35: CPT | Mod: GC

## 2019-03-06 RX ORDER — HALOPERIDOL DECANOATE 100 MG/ML
2 INJECTION INTRAMUSCULAR
Qty: 0 | Refills: 0 | Status: DISCONTINUED | OUTPATIENT
Start: 2019-03-06 | End: 2019-03-15

## 2019-03-06 RX ADMIN — ENOXAPARIN SODIUM 90 MILLIGRAM(S): 100 INJECTION SUBCUTANEOUS at 18:41

## 2019-03-06 RX ADMIN — Medication 81 MILLIGRAM(S): at 13:54

## 2019-03-06 RX ADMIN — ATORVASTATIN CALCIUM 80 MILLIGRAM(S): 80 TABLET, FILM COATED ORAL at 21:05

## 2019-03-06 RX ADMIN — Medication 1 MILLIGRAM(S): at 06:18

## 2019-03-06 RX ADMIN — LEVETIRACETAM 1000 MILLIGRAM(S): 250 TABLET, FILM COATED ORAL at 06:16

## 2019-03-06 RX ADMIN — PANTOPRAZOLE SODIUM 40 MILLIGRAM(S): 20 TABLET, DELAYED RELEASE ORAL at 06:16

## 2019-03-06 RX ADMIN — Medication 200 MILLIGRAM(S): at 13:59

## 2019-03-06 RX ADMIN — ENOXAPARIN SODIUM 90 MILLIGRAM(S): 100 INJECTION SUBCUTANEOUS at 06:17

## 2019-03-06 RX ADMIN — HALOPERIDOL DECANOATE 2 MILLIGRAM(S): 100 INJECTION INTRAMUSCULAR at 19:59

## 2019-03-06 RX ADMIN — LEVETIRACETAM 1000 MILLIGRAM(S): 250 TABLET, FILM COATED ORAL at 18:42

## 2019-03-06 RX ADMIN — Medication 1 MILLIGRAM(S): at 18:46

## 2019-03-06 RX ADMIN — Medication 100 MILLIGRAM(S): at 18:41

## 2019-03-06 RX ADMIN — MEMANTINE HYDROCHLORIDE 5 MILLIGRAM(S): 10 TABLET ORAL at 21:05

## 2019-03-06 RX ADMIN — SENNA PLUS 2 TABLET(S): 8.6 TABLET ORAL at 21:05

## 2019-03-06 RX ADMIN — Medication 1 TABLET(S): at 13:55

## 2019-03-06 RX ADMIN — Medication 100 MILLIGRAM(S): at 06:17

## 2019-03-06 NOTE — PROGRESS NOTE ADULT - PROBLEM SELECTOR PLAN 3
currently on lovenox  questionable hx of falls  IVC filter found in place already   hypercoag w/u ordered, may need to be repeated as outpt once of AC for this acute event as result may not be reliable with current acute DVT and AC in place  pt climbing out of bed again today, almost to the floor; will need to determine if full AC is approp

## 2019-03-06 NOTE — SWALLOW BEDSIDE ASSESSMENT ADULT - SWALLOW EVAL: RECOMMENDED FEEDING/EATING TECHNIQUES
alternate food with liquid/hard swallow w/ each bite or sip/oral hygiene/allow for swallow between intakes/maintain upright posture during/after eating for 30 mins/no straws/check mouth frequently for oral residue/pocketing/crush medication (when feasible)/position upright (90 degrees)/small sips/bites
oral hygiene/check mouth frequently for oral residue/pocketing/crush medication (when feasible)/maintain upright posture during/after eating for 30 mins/no straws/allow for swallow between intakes/alternate food with liquid/hard swallow w/ each bite or sip/position upright (90 degrees)/small sips/bites

## 2019-03-06 NOTE — SWALLOW BEDSIDE ASSESSMENT ADULT - SWALLOW EVAL: DIAGNOSIS
1. The patient demonstrated a mild oral dysphagia for puree and nectar thick liquid textures marked by mildly reduced bolus collection, transfer, and posterior transport.  Mild anterior loss noted on right side. 2. The patient demonstrated a mild pharyngeal dysphagia for puree marked by delayed pharyngeal swallow trigger and no overt s/s of penetration.  3. The patient demonstrated a mild pharyngeal stage dysphagia for nectar thickened liquids marked by prompt swallow trigger and overt s/s of penetration/aspiration marked by wet vocal quality and throat clearing post swallows.  4. Further trials were not able to be trialed during today’s evaluation as after the puree and nectar thickened liquids trials the patient put the sheet over his head and refused all further trials. 1. The patient demonstrated a mild oral dysphagia for puree and nectar thick liquid textures marked by mildly reduced bolus collection, transfer, and posterior transport.  Mild anterior loss noted on right side. 2. The patient demonstrated a mild pharyngeal dysphagia for puree marked by delayed pharyngeal swallow trigger and no overt s/s of penetration.  3. The patient demonstrated a mild-moderate pharyngeal stage dysphagia for nectar thickened liquids marked by prompt swallow trigger and overt s/s of penetration/aspiration marked by wet vocal quality and throat clearing post swallows.  4. Further trials were not able to be trialed during today’s evaluation as after the puree and nectar thickened liquids trials the patient put the sheet over his head and refused all further trials.

## 2019-03-06 NOTE — PROGRESS NOTE BEHAVIORAL HEALTH - OTHER
right UE bent and appeared fixed consistent with L MCA stroke Nonverbal unable to assess, in bed Pt nonverbal Unable to assess, Pt nonverbal

## 2019-03-06 NOTE — PROGRESS NOTE ADULT - ASSESSMENT
59M from [facility] h/o schizophrenia, recurrent DVT s/p IVC filter, seizure disorder, h/o severe C4-5 stenosis c/b cord compression s/p discectomy and fusion (wheel chair bound) adm 2/14 for dysarthria concerning for acute CVA s/p tPA complicated by brain edema, acute encephalopathy poss catatonia, dysarthria, hypernatremia.

## 2019-03-06 NOTE — PROGRESS NOTE BEHAVIORAL HEALTH - NSBHFUPINTERVALHXFT_PSY_A_CORE
Pt more alert today than previously, currently undergoing ativan taper, received 4mg total on 3/5/19 and did not require any PRNs.  Pt still nonverbal, however is interacting with staff, observed eating with assistance. Pt more alert today than previously, currently undergoing ativan taper, received 4mg total on 3/5/19 and did not require any PRNs.  Pt still nonverbal, however is interacting with staff, observed eating with assistance as per CO staff (ate some Ensure pudding today). More responsive to his name today and was more alert.

## 2019-03-06 NOTE — PROGRESS NOTE BEHAVIORAL HEALTH - SUMMARY
60 yo male with chronic schizophrenia, + dementia x 2 years, used wheelchair with baseline of basic communication skills as per [place] psychiatrist, Patient has NO HISTORY of catatonia. Patient's presentation at this time is consistent with large L MCA CVA's effects juxtaposed on a Patient who was already impaired physically and in cognition. Start tapering down Ativan as this is very unlikely to be actual catatonia, resume haldol 2mg liquid PO bid for now and taper back up as tolerated.

## 2019-03-06 NOTE — PROGRESS NOTE ADULT - SUBJECTIVE AND OBJECTIVE BOX
59M from [facility] h/o schizophrenia, recurrent DVT s/p IVC filter, seizure disorder, h/o severe C4-5 stenosis c/b cord compression s/p discectomy and fusion adm 2/14 for dysarthria concerning for acute CVA s/p tPA.    The patient had a code stroke in the ED and was given full dose tPA.  He was transferred to the MICU for q1h neuro checks.  The patient's RUE/RLE weakness improved, but his dysarthria did not.  His mental status remained at baseline.  He passed a speech and swallow (bedside) and was restarted on his psych medications.    Interval: Code stroke called on 2/16 for decreased responsiveness and not moving his RUE, RLE, not verbalizing, nothing new was found on repeat CT head    recurrent DVTs, is on anticoagulation with lovenox 90 BID  catatonia symptoms are better->participating with PT/OT      REVIEW OF SYSTEMS: No chest pain, shortness of breath, nausea, vomiting or diarhea.      function: max a   MEDICATIONS  (STANDING):  aspirin  chewable 81 milliGRAM(s) Oral daily  atorvastatin 80 milliGRAM(s) Oral at bedtime  carBAMazepine Chewable 100 milliGRAM(s) Chew two times a day  docusate sodium Liquid 200 milliGRAM(s) Oral daily  enoxaparin Injectable 90 milliGRAM(s) SubCutaneous two times a day  haloperidol    Concentrate 2 milliGRAM(s) Oral two times a day  levETIRAcetam 1000 milliGRAM(s) Oral two times a day  LORazepam     Tablet 1 milliGRAM(s) Oral <User Schedule>  memantine 5 milliGRAM(s) Oral at bedtime  multivitamin 1 Tablet(s) Oral daily  pantoprazole    Tablet 40 milliGRAM(s) Oral before breakfast  senna 2 Tablet(s) Oral at bedtime    MEDICATIONS  (PRN):  bisacodyl 5 milliGRAM(s) Oral daily PRN Constipation  polyethylene glycol 3350 17 Gram(s) Oral daily PRN Constipation      Vital Signs Last 24 Hrs  T(C): 36.7 (06 Mar 2019 15:40), Max: 36.8 (06 Mar 2019 06:18)  T(F): 98.1 (06 Mar 2019 15:40), Max: 98.2 (06 Mar 2019 06:18)  HR: 105 (06 Mar 2019 15:40) (60 - 105)  BP: 134/77 (06 Mar 2019 15:40) (134/77 - 148/78)  BP(mean): --  RR: 18 (06 Mar 2019 15:40) (16 - 18)  SpO2: 98% (06 Mar 2019 15:40) (98% - 100%)  ----------------------------------------------------------------------------------------  PHYSICAL EXAM  Constitutional - NAD, Comfortable  HEENT - NCAT, EOMI  Neck - Supple, No limited ROM  Chest - CTA bilaterally, No wheeze, No rhonchi, No crackles  Cardiovascular - RRR, S1S2, No murmurs  Abdomen - BS+, Soft, NTND  Extremities - No C/C/E, No calf tenderness   Neurologic Exam -                    Cognitive -poor verbal outotput      Communication + dysarthria     Cranial Nerves - CN 2-12 intact     Motor - moves left side uncooperative with exam          Balance poor balance   Psychiatric - Mood stable, Affect WNL

## 2019-03-06 NOTE — PROGRESS NOTE ADULT - PROBLEM SELECTOR PLAN 2
complicated by catatonia   improving with ativan taper  haldol restarted (previous standing med from [place])

## 2019-03-06 NOTE — SWALLOW BEDSIDE ASSESSMENT ADULT - SWALLOW EVAL: RECOMMENDED DIET
Initiate regular consistency and thin liquids
Dysphagia 1 with honey thick liquids, as tolerated
Dysphagia 1 with honey thick liquids, as tolerated

## 2019-03-06 NOTE — SWALLOW BEDSIDE ASSESSMENT ADULT - ASR SWALLOW LABIAL MOBILITY
Unable to obtain a formal assessment of oral-motor function given patient's difficulty following low-level verbal and visual directives.
Unable to obtain a formal assessment of oral-motor function given patient's difficulty following low-level verbal and visual directives.
within functional limits

## 2019-03-06 NOTE — SWALLOW BEDSIDE ASSESSMENT ADULT - SWALLOW EVAL: FEEDING ASSISTANCE
dependent/Patient requires full 1:1 assistance during all meals.
no assistance needed
Patient requires full 1:1 assistance during all meals./dependent

## 2019-03-06 NOTE — SWALLOW BEDSIDE ASSESSMENT ADULT - ADDITIONAL RECOMMENDATIONS
This department to continue to follow during this admission as schedule permits.
1. MD to reconsult this department if changes in medical status should change.
This department to continue to follow during this admission as schedule permits.

## 2019-03-06 NOTE — PROGRESS NOTE ADULT - SUBJECTIVE AND OBJECTIVE BOX
Patient is a 59y old  Male who presents with a chief complaint of code stroke         SUBJECTIVE / OVERNIGHT EVENTS: more alert today; after much coaxing and repositioning pt eating with assistance      MEDICATIONS  (STANDING):  aspirin  chewable 81 milliGRAM(s) Oral daily  atorvastatin 80 milliGRAM(s) Oral at bedtime  carBAMazepine Chewable 100 milliGRAM(s) Chew two times a day  docusate sodium Liquid 200 milliGRAM(s) Oral daily  enoxaparin Injectable 90 milliGRAM(s) SubCutaneous two times a day  levETIRAcetam 1000 milliGRAM(s) Oral two times a day  LORazepam     Tablet 1 milliGRAM(s) Oral <User Schedule>  memantine 5 milliGRAM(s) Oral at bedtime  multivitamin 1 Tablet(s) Oral daily  pantoprazole    Tablet 40 milliGRAM(s) Oral before breakfast  senna 2 Tablet(s) Oral at bedtime    MEDICATIONS  (PRN):  bisacodyl 5 milliGRAM(s) Oral daily PRN Constipation  polyethylene glycol 3350 17 Gram(s) Oral daily PRN Constipation      Vital Signs Last 24 Hrs  T(F): 98.2 (06 Mar 2019 06:18), Max: 98.4 (05 Mar 2019 12:39)  HR: 95 (06 Mar 2019 06:18) (60 - 95)  BP: 137/68 (06 Mar 2019 06:18) (137/68 - 148/78)  RR: 16 (06 Mar 2019 06:18) (16 - 18)  SpO2: 98% (06 Mar 2019 06:18) (98% - 100%)          PHYSICAL EXAM  GENERAL: NAD, well-developed  EYES: EOMI, PERRLA, conjunctiva and sclera clear  CHEST/LUNG: non labored  ABDOMEN: Soft, Nontender, Nondistended; Bowel sounds present  EXTREMITIES:   No clubbing, cyanosis, or edema  PSYCH: alert

## 2019-03-06 NOTE — PROGRESS NOTE ADULT - PROBLEM SELECTOR PLAN 1
Acute CVA LT MCA with brain edema possibly embolic RT weakness, s/p tPA  pt will return to inpt [facility]; he was wheelchair bound there prior to this event

## 2019-03-06 NOTE — PROGRESS NOTE BEHAVIORAL HEALTH - NSBHCONSULTRECOMMENDOTHER_PSY_A_CORE FT
would consider attempting a Speech and Swallow evaluation as Patient is still just taking small sips of water and has not eaten any food as per staff would consider attempting a Speech and Swallow evaluation (thus far liquid and pudding intake)

## 2019-03-06 NOTE — SWALLOW BEDSIDE ASSESSMENT ADULT - SLP PERTINENT HISTORY OF CURRENT PROBLEM
59M from [facility] h/o schizophrenia, recurrent DVT s/p IVC filter, seizure disorder, h/o severe C4-5 stenosis c/b cord compression s/p discectomy and fusion adm 2/14 for dysarthria concerning for acute CVA s/p tPA.
r/o dysphagia
r/o dysphagia

## 2019-03-06 NOTE — SWALLOW BEDSIDE ASSESSMENT ADULT - ASR SWALLOW REFERRAL
Registered Dietitian/To facilitate adequate daily caloric intake.
Registered Dietitian/To facilitate adequate daily caloric intake.

## 2019-03-06 NOTE — SWALLOW BEDSIDE ASSESSMENT ADULT - SWALLOW EVAL: CRITERIA FOR SKILLED INTERVENTION MET
no problems identified which require skilled intervention
demonstrates skilled criteria for swallowing intervention
demonstrates skilled criteria for swallowing intervention

## 2019-03-06 NOTE — PROGRESS NOTE ADULT - ASSESSMENT
1. PT- bed mobility,transfers, gait and balance training  2. OT- ADL'S  3. CVA-ASa/Statin  4. Patient would benefit from subacute rehab, given his functional decline- he was able to perform bed mobility/transfer to  prior to stroke.

## 2019-03-06 NOTE — PROGRESS NOTE BEHAVIORAL HEALTH - NSBHCONSULTMEDS_PSY_A_CORE FT
start tapering down/off Ativan - reduce to 1mg PO tid then bid then qd then off (can reduce by 1mg daily); restart haldol 2mg LIQUID PO bid (his standing psych medication at [facility]) continue tapering down/off Ativan - reduce to 1mg PO tid then bid then qd then off (can reduce by 1mg daily); continue haldol 2mg LIQUID PO bid (his standing psych medication at [place])

## 2019-03-06 NOTE — SWALLOW BEDSIDE ASSESSMENT ADULT - ASR SWALLOW ASPIRATION MONITOR
oral hygiene/fever/pneumonia/change of breathing pattern/position upright (90Y)/throat clearing/gurgly voice/cough/upper respiratory infection
throat clearing/upper respiratory infection/change of breathing pattern/cough/fever/oral hygiene/position upright (90Y)/gurgly voice/pneumonia

## 2019-03-06 NOTE — SWALLOW BEDSIDE ASSESSMENT ADULT - SWALLOW EVAL: CURRENT DIET
mechanical soft/thin liquids
Dysphagia 3 with honey thick liquids per MD order
Dysphagia 3 with honey thick liquids per MD order

## 2019-03-06 NOTE — SWALLOW BEDSIDE ASSESSMENT ADULT - SWALLOW EVAL: BUCCAL STRENGTH AND MOBILITY
Unable to obtain a formal assessment of oral-motor function given patient's difficulty following low-level verbal and visual directives.
Unable to obtain a formal assessment of oral-motor function given patient's difficulty following low-level verbal and visual directives.

## 2019-03-06 NOTE — SWALLOW BEDSIDE ASSESSMENT ADULT - COMMENTS
Patient seen upright in bed, cleared by nursing.  Patient with limited eye contact.  Participated well for swallowing evaluation.
The patient was seen this AM for a re-assessment of swallow function, at which time he was cooperative, though nonverbal. PCA present at bedside throughout today's evaluation. The patient is known to this department as he was initially seen for a clinical bedside swallow evaluation on 2/15/19 (please see full report for details), at which time a regular solid with thin liquid diet was recommended. The patient was also seen for a speech/language evaluation, however, a formal assessment could not be obtained given patient's refusal to participate. 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 Discussed results and recommendations from this evaluation with the patient, patient's PCA, and call out to TELE PA at pager #[phone].
The patient was seen this PM for a re-assessment of swallow function, at which time he was cooperative, though nonverbal. PCA present at bedside throughout today's evaluation. The patient is known to this department as he was initially seen for a clinical bedside swallow evaluation on 2/15/19 (please see full report for details), at which time a regular solid with thin liquid diet was recommended. The patient was also seen for a speech/language evaluation, however, a formal assessment could not be obtained given patient's refusal to participate. 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 Discussed results and recommendations from this evaluation with the patient, patient's PCA, and call out to TELE PA at pager #[phone].

## 2019-03-07 LAB
ANION GAP SERPL CALC-SCNC: 11 MMO/L — SIGNIFICANT CHANGE UP (ref 7–14)
BUN SERPL-MCNC: 9 MG/DL — SIGNIFICANT CHANGE UP (ref 7–23)
CALCIUM SERPL-MCNC: 9 MG/DL — SIGNIFICANT CHANGE UP (ref 8.4–10.5)
CHLORIDE SERPL-SCNC: 105 MMOL/L — SIGNIFICANT CHANGE UP (ref 98–107)
CO2 SERPL-SCNC: 27 MMOL/L — SIGNIFICANT CHANGE UP (ref 22–31)
CREAT SERPL-MCNC: 0.89 MG/DL — SIGNIFICANT CHANGE UP (ref 0.5–1.3)
GLUCOSE SERPL-MCNC: 111 MG/DL — HIGH (ref 70–99)
HCT VFR BLD CALC: 41.1 % — SIGNIFICANT CHANGE UP (ref 39–50)
HGB BLD-MCNC: 14.5 G/DL — SIGNIFICANT CHANGE UP (ref 13–17)
MAGNESIUM SERPL-MCNC: 1.6 MG/DL — SIGNIFICANT CHANGE UP (ref 1.6–2.6)
MCHC RBC-ENTMCNC: 32.8 PG — SIGNIFICANT CHANGE UP (ref 27–34)
MCHC RBC-ENTMCNC: 35.3 % — SIGNIFICANT CHANGE UP (ref 32–36)
MCV RBC AUTO: 93 FL — SIGNIFICANT CHANGE UP (ref 80–100)
NRBC # FLD: 0 K/UL — LOW (ref 25–125)
PLATELET # BLD AUTO: 236 K/UL — SIGNIFICANT CHANGE UP (ref 150–400)
PMV BLD: 10.4 FL — SIGNIFICANT CHANGE UP (ref 7–13)
POTASSIUM SERPL-MCNC: 3.2 MMOL/L — LOW (ref 3.5–5.3)
POTASSIUM SERPL-SCNC: 3.2 MMOL/L — LOW (ref 3.5–5.3)
RBC # BLD: 4.42 M/UL — SIGNIFICANT CHANGE UP (ref 4.2–5.8)
RBC # FLD: 12.2 % — SIGNIFICANT CHANGE UP (ref 10.3–14.5)
SODIUM SERPL-SCNC: 143 MMOL/L — SIGNIFICANT CHANGE UP (ref 135–145)
WBC # BLD: 6.61 K/UL — SIGNIFICANT CHANGE UP (ref 3.8–10.5)
WBC # FLD AUTO: 6.61 K/UL — SIGNIFICANT CHANGE UP (ref 3.8–10.5)

## 2019-03-07 PROCEDURE — 99232 SBSQ HOSP IP/OBS MODERATE 35: CPT

## 2019-03-07 PROCEDURE — 99233 SBSQ HOSP IP/OBS HIGH 50: CPT

## 2019-03-07 RX ORDER — POTASSIUM CHLORIDE 20 MEQ
40 PACKET (EA) ORAL EVERY 4 HOURS
Qty: 0 | Refills: 0 | Status: COMPLETED | OUTPATIENT
Start: 2019-03-07 | End: 2019-03-07

## 2019-03-07 RX ORDER — MAGNESIUM SULFATE 500 MG/ML
2 VIAL (ML) INJECTION ONCE
Qty: 0 | Refills: 0 | Status: DISCONTINUED | OUTPATIENT
Start: 2019-03-07 | End: 2019-03-07

## 2019-03-07 RX ORDER — MAGNESIUM OXIDE 400 MG ORAL TABLET 241.3 MG
400 TABLET ORAL
Qty: 0 | Refills: 0 | Status: COMPLETED | OUTPATIENT
Start: 2019-03-07 | End: 2019-03-08

## 2019-03-07 RX ADMIN — Medication 81 MILLIGRAM(S): at 13:52

## 2019-03-07 RX ADMIN — LEVETIRACETAM 1000 MILLIGRAM(S): 250 TABLET, FILM COATED ORAL at 05:10

## 2019-03-07 RX ADMIN — ATORVASTATIN CALCIUM 80 MILLIGRAM(S): 80 TABLET, FILM COATED ORAL at 22:15

## 2019-03-07 RX ADMIN — Medication 1 MILLIGRAM(S): at 14:08

## 2019-03-07 RX ADMIN — Medication 100 MILLIGRAM(S): at 05:10

## 2019-03-07 RX ADMIN — MAGNESIUM OXIDE 400 MG ORAL TABLET 400 MILLIGRAM(S): 241.3 TABLET ORAL at 18:37

## 2019-03-07 RX ADMIN — HALOPERIDOL DECANOATE 2 MILLIGRAM(S): 100 INJECTION INTRAMUSCULAR at 05:55

## 2019-03-07 RX ADMIN — ENOXAPARIN SODIUM 90 MILLIGRAM(S): 100 INJECTION SUBCUTANEOUS at 05:10

## 2019-03-07 RX ADMIN — Medication 40 MILLIEQUIVALENT(S): at 19:39

## 2019-03-07 RX ADMIN — Medication 100 MILLIGRAM(S): at 18:33

## 2019-03-07 RX ADMIN — HALOPERIDOL DECANOATE 2 MILLIGRAM(S): 100 INJECTION INTRAMUSCULAR at 19:38

## 2019-03-07 RX ADMIN — MEMANTINE HYDROCHLORIDE 5 MILLIGRAM(S): 10 TABLET ORAL at 22:15

## 2019-03-07 RX ADMIN — Medication 1 MILLIGRAM(S): at 06:36

## 2019-03-07 RX ADMIN — Medication 40 MILLIEQUIVALENT(S): at 14:00

## 2019-03-07 RX ADMIN — MAGNESIUM OXIDE 400 MG ORAL TABLET 400 MILLIGRAM(S): 241.3 TABLET ORAL at 14:00

## 2019-03-07 RX ADMIN — SENNA PLUS 2 TABLET(S): 8.6 TABLET ORAL at 22:15

## 2019-03-07 RX ADMIN — Medication 200 MILLIGRAM(S): at 14:00

## 2019-03-07 RX ADMIN — ENOXAPARIN SODIUM 90 MILLIGRAM(S): 100 INJECTION SUBCUTANEOUS at 18:33

## 2019-03-07 RX ADMIN — Medication 1 TABLET(S): at 13:54

## 2019-03-07 RX ADMIN — PANTOPRAZOLE SODIUM 40 MILLIGRAM(S): 20 TABLET, DELAYED RELEASE ORAL at 05:11

## 2019-03-07 RX ADMIN — LEVETIRACETAM 1000 MILLIGRAM(S): 250 TABLET, FILM COATED ORAL at 18:34

## 2019-03-07 NOTE — PROGRESS NOTE ADULT - PROBLEM SELECTOR PLAN 3
currently on lovenox  questionable hx of falls  IVC filter found in place already   hypercoag w/u ordered, may need to be repeated as outpt once of AC for this acute event as result may not be reliable with current acute DVT and AC in place  will need to determine if full AC is approp

## 2019-03-07 NOTE — PROGRESS NOTE BEHAVIORAL HEALTH - NSBHCONSULTMEDS_PSY_A_CORE FT
continue tapering down/off Ativan; continue haldol 2mg LIQUID PO bid with no current clinical indication to increase further

## 2019-03-07 NOTE — PROGRESS NOTE BEHAVIORAL HEALTH - NSBHCONSULTFOLLOWDETAILS_PSY_A_CORE FT
ULTIMATE PLAN: return to [facility] as he is still an inpatient there on [address] (phone [phone]) and is under the care of psychiatrist Dr Gardner [phone]. [place] has Patient's legal papers

## 2019-03-07 NOTE — PROGRESS NOTE ADULT - PROBLEM SELECTOR PLAN 2
improving with ativan taper, cont to taper to off  haldol restarted (previous standing med from [place])

## 2019-03-07 NOTE — PROGRESS NOTE ADULT - ASSESSMENT
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

## 2019-03-07 NOTE — PROGRESS NOTE BEHAVIORAL HEALTH - NSBHFUPINTERVALHXFT_PSY_A_CORE
Interval events; Patient tolerated and cooperated with Speech & Swallow assessment. Pt again more alert today than previously, currently undergoing ativan taper, currently on 2mg total daily dose and has not required any PRNs.  Tolerating the low dose haldol without noted adverse side effects which seems to be the lowest effective dose at this time. Pt still nonverbal, however is interacting with staff, observed eating with assistance as per [place] staff (has been eating some pudding and drinking fluids). More responsive to his name again today and was more alert.

## 2019-03-07 NOTE — PROGRESS NOTE BEHAVIORAL HEALTH - SUMMARY
60 yo male with chronic schizophrenia, + dementia x 2 years, used wheelchair with baseline of basic communication skills as per [place] psychiatrist, Patient has NO HISTORY of catatonia. Patient's presentation at this time is consistent with large L MCA CVA's effects juxtaposed on a Patient who was already impaired physically and in cognition. Continuing tapering down the Ativan which has been tolerated well with some improvement in alertness and overall clinical presentation. (NOTE: Patient has no hx of catatonia as per his psychiatrist and it is unlikely that he currently has it). Tolerating haldol 2mg liquid PO bid for now which seems to be the lowest effective dose at this time. ULTIMATE PLAN: return to [facility] as he is still an inpatient there on [address] (phone [phone]) and is under the care of psychiatrist Dr Gardner [phone]

## 2019-03-07 NOTE — PROGRESS NOTE ADULT - SUBJECTIVE AND OBJECTIVE BOX
Patient is a 59y old  Male who presents with a chief complaint of code stroke       SUBJECTIVE / OVERNIGHT EVENTS: more alert, eating with assistance      MEDICATIONS  (STANDING):  aspirin  chewable 81 milliGRAM(s) Oral daily  atorvastatin 80 milliGRAM(s) Oral at bedtime  carBAMazepine Chewable 100 milliGRAM(s) Chew two times a day  docusate sodium Liquid 200 milliGRAM(s) Oral daily  enoxaparin Injectable 90 milliGRAM(s) SubCutaneous two times a day  haloperidol    Concentrate 2 milliGRAM(s) Oral two times a day  levETIRAcetam 1000 milliGRAM(s) Oral two times a day  LORazepam     Tablet 1 milliGRAM(s) Oral <User Schedule>  magnesium oxide 400 milliGRAM(s) Oral three times a day with meals  memantine 5 milliGRAM(s) Oral at bedtime  multivitamin 1 Tablet(s) Oral daily  pantoprazole    Tablet 40 milliGRAM(s) Oral before breakfast  potassium chloride    Tablet ER 40 milliEquivalent(s) Oral every 4 hours  senna 2 Tablet(s) Oral at bedtime    MEDICATIONS  (PRN):  bisacodyl 5 milliGRAM(s) Oral daily PRN Constipation  polyethylene glycol 3350 17 Gram(s) Oral daily PRN Constipation      Vital Signs Last 24 Hrs  T(F): 98.4 (07 Mar 2019 05:00), Max: 98.4 (07 Mar 2019 05:00)  HR: 88 (07 Mar 2019 05:00) (88 - 105)  BP: 149/77 (07 Mar 2019 05:00) (118/68 - 149/77)  RR: 18 (07 Mar 2019 05:00) (18 - 18)  SpO2: 98% (07 Mar 2019 05:00) (98% - 98%)            PHYSICAL EXAM  GENERAL: NAD, well-developed  HEAD:  poor dentition   EYES: EOMI, PERRLA, conjunctiva and sclera clear  CHEST/LUNG: grossly clear  EXTREMITIES: No clubbing, cyanosis, or edema      LABS:                        14.5   6.61  )-----------( 236      ( 07 Mar 2019 07:15 )             41.1     03-07    143  |  105  |  9   ----------------------------<  111<H>  3.2<L>   |  27  |  0.89    Ca    9.0      07 Mar 2019 07:13  Mg     1.6     03-07

## 2019-03-08 PROCEDURE — 99233 SBSQ HOSP IP/OBS HIGH 50: CPT

## 2019-03-08 RX ADMIN — ATORVASTATIN CALCIUM 80 MILLIGRAM(S): 80 TABLET, FILM COATED ORAL at 21:23

## 2019-03-08 RX ADMIN — Medication 81 MILLIGRAM(S): at 15:00

## 2019-03-08 RX ADMIN — SENNA PLUS 2 TABLET(S): 8.6 TABLET ORAL at 21:23

## 2019-03-08 RX ADMIN — LEVETIRACETAM 1000 MILLIGRAM(S): 250 TABLET, FILM COATED ORAL at 06:29

## 2019-03-08 RX ADMIN — HALOPERIDOL DECANOATE 2 MILLIGRAM(S): 100 INJECTION INTRAMUSCULAR at 06:33

## 2019-03-08 RX ADMIN — ENOXAPARIN SODIUM 90 MILLIGRAM(S): 100 INJECTION SUBCUTANEOUS at 17:55

## 2019-03-08 RX ADMIN — PANTOPRAZOLE SODIUM 40 MILLIGRAM(S): 20 TABLET, DELAYED RELEASE ORAL at 06:29

## 2019-03-08 RX ADMIN — LEVETIRACETAM 1000 MILLIGRAM(S): 250 TABLET, FILM COATED ORAL at 17:55

## 2019-03-08 RX ADMIN — Medication 100 MILLIGRAM(S): at 06:29

## 2019-03-08 RX ADMIN — HALOPERIDOL DECANOATE 2 MILLIGRAM(S): 100 INJECTION INTRAMUSCULAR at 21:42

## 2019-03-08 RX ADMIN — ENOXAPARIN SODIUM 90 MILLIGRAM(S): 100 INJECTION SUBCUTANEOUS at 06:29

## 2019-03-08 RX ADMIN — Medication 1 MILLIGRAM(S): at 15:00

## 2019-03-08 RX ADMIN — MAGNESIUM OXIDE 400 MG ORAL TABLET 400 MILLIGRAM(S): 241.3 TABLET ORAL at 15:00

## 2019-03-08 RX ADMIN — Medication 1 TABLET(S): at 15:00

## 2019-03-08 RX ADMIN — MEMANTINE HYDROCHLORIDE 5 MILLIGRAM(S): 10 TABLET ORAL at 21:23

## 2019-03-08 RX ADMIN — Medication 100 MILLIGRAM(S): at 17:55

## 2019-03-08 RX ADMIN — Medication 100 MILLIGRAM(S): at 15:00

## 2019-03-08 NOTE — PROGRESS NOTE BEHAVIORAL HEALTH - SUMMARY
60 yo male with chronic schizophrenia, + dementia x 2 years, used wheelchair with baseline of basic communication skills as per [place] psychiatrist, Patient has NO HISTORY of catatonia. Patient's presentation at this time is consistent with large L MCA CVA's effects juxtaposed on a Patient who was already impaired physically and in cognition. Continuing tapering down the Ativan which has been tolerated well with some improvement in alertness and overall clinical presentation. (NOTE: Patient has no hx of catatonia as per his psychiatrist and it is unlikely that he currently has it). Tolerating haldol 2mg liquid PO bid for now which seems to be the lowest effective dose at this time. ULTIMATE PLAN: return to [facility] as he is still an inpatient there on [address] (phone [phone]) and is under the care of psychiatrist Dr Gardner [phone] 60 yo male with chronic schizophrenia, + dementia x 2 years, used wheelchair with baseline of basic communication skills as per [place] psychiatrist, Patient has NO HISTORY of catatonia. Patient's presentation at this time is consistent with large L MCA CVA's effects juxtaposed on a Patient who was already impaired physically and in cognition. Continuing tapering down the Ativan which has been tolerated well with some improvement in alertness and overall clinical presentation. (NOTE: Patient has no hx of catatonia as per his psychiatrist and it is unlikely that he currently has it). Tolerating haldol 2mg liquid PO bid for now which seems to be the lowest effective dose at this time. ULTIMATE PLAN: return to [facility] as he is still an inpatient there on [address] (phone [phone]) and is under the care of psychiatrist Dr Gardner [phone].

## 2019-03-08 NOTE — PROGRESS NOTE BEHAVIORAL HEALTH - NSBHCONSULTFOLLOWAFTERCARE_PSY_A_CORE FT
returning to [facility] unit where he is receiving daily psychiatric and medical services

## 2019-03-08 NOTE — PROGRESS NOTE ADULT - SUBJECTIVE AND OBJECTIVE BOX
Patient is a 59y old  Male who presents with a chief complaint of code stroke         SUBJECTIVE / OVERNIGHT EVENTS: covered with blanket again and willfully keeping eyes closed; staff in EC room states pt not eating today; long d/w NM to have routine with this pt and active staff to feed him in a safe manner as we need to document consistent PO      MEDICATIONS  (STANDING):  aspirin  chewable 81 milliGRAM(s) Oral daily  atorvastatin 80 milliGRAM(s) Oral at bedtime  carBAMazepine Chewable 100 milliGRAM(s) Chew two times a day  docusate sodium Liquid 200 milliGRAM(s) Oral daily  enoxaparin Injectable 90 milliGRAM(s) SubCutaneous two times a day  haloperidol    Concentrate 2 milliGRAM(s) Oral two times a day  levETIRAcetam 1000 milliGRAM(s) Oral two times a day  LORazepam     Tablet 1 milliGRAM(s) Oral daily  magnesium oxide 400 milliGRAM(s) Oral three times a day with meals  memantine 5 milliGRAM(s) Oral at bedtime  multivitamin 1 Tablet(s) Oral daily  pantoprazole    Tablet 40 milliGRAM(s) Oral before breakfast  senna 2 Tablet(s) Oral at bedtime    MEDICATIONS  (PRN):  bisacodyl 5 milliGRAM(s) Oral daily PRN Constipation  polyethylene glycol 3350 17 Gram(s) Oral daily PRN Constipation      Vital Signs Last 24 Hrs  T(F): 98.3 (08 Mar 2019 06:28), Max: 98.4 (07 Mar 2019 20:33)  HR: 82 (08 Mar 2019 06:28) (82 - 99)  BP: 129/86 (08 Mar 2019 06:28) (116/64 - 145/84)  RR: 18 (08 Mar 2019 06:28) (18 - 18)  SpO2: 100% (08 Mar 2019 06:28) (98% - 100%)          PHYSICAL EXAM  GENERAL: NAD, well-developed  HEAD:  poor dentition  EYES: EOMI, PERRLA, conjunctiva and sclera clear  CHEST/LUNG: grossly clear b/l  HEART: Regular rate and rhythm;   ABDOMEN: Soft, Nontender, Nondistended; Bowel sounds present  EXTREMITIES:   PSYCH: non verbal, eyes closed willfully; covered eyes with hand as I removed blanket  SKIN: RUE mild edema    LABS:                        14.5   6.61  )-----------( 236      ( 07 Mar 2019 07:15 )             41.1     03-07    143  |  105  |  9   ----------------------------<  111<H>  3.2<L>   |  27  |  0.89    Ca    9.0      07 Mar 2019 07:13  Mg     1.6     03-07

## 2019-03-08 NOTE — PROGRESS NOTE BEHAVIORAL HEALTH - OTHER
right UE bent and appeared fixed consistent with L MCA stroke limited eye contact has improved as compared to admission unable to assess, in bed and is wheelchair bound at baseline Pt nonverbal Unable to assess, Pt nonverbal unable to ascertain at this time unable to ascertain at this time as Patient is nonverbal does not seem to be responding to internal stimuli maintains with much effort

## 2019-03-08 NOTE — PROGRESS NOTE BEHAVIORAL HEALTH - NSBHCHARTREVIEWVS_PSY_A_CORE FT
Vital Signs - Last 24 Hrs    T(C): 37.1 (19 @ 05:53), Max: 37.4 (19 @ 16:28)  HR: 69 (19 @ 05:53) (69 - 98)  BP: 113/71 (19 @ 05:53) (113/71 - 126/63)  RR: 18 (19 @ 05:53) (16 - 18)  SpO2: 97% (19 @ 05:53) (97% - 98%)  Wt(kg): --  Daily     Daily Weight in k.5 (2019 05:53)    I&O's Summary    2019 07:01  -  2019 07:00  --------------------------------------------------------  IN: 100 mL / OUT: 0 mL / NET: 100 mL
Vital Signs Last 24 Hrs  T(C): 36.3 (01 Mar 2019 05:19), Max: 36.6 (28 Feb 2019 21:30)  T(F): 97.4 (01 Mar 2019 05:19), Max: 97.9 (28 Feb 2019 21:30)  HR: 66 (01 Mar 2019 05:19) (63 - 66)  BP: 122/77 (01 Mar 2019 05:19) (122/77 - 135/84)  BP(mean): --  RR: 18 (01 Mar 2019 05:19) (18 - 18)  SpO2: 100% (01 Mar 2019 05:19) (100% - 100%)
Vital Signs Last 24 Hrs  T(C): 36.8 (06 Mar 2019 06:18), Max: 36.9 (05 Mar 2019 12:39)  T(F): 98.2 (06 Mar 2019 06:18), Max: 98.4 (05 Mar 2019 12:39)  HR: 95 (06 Mar 2019 06:18) (60 - 95)  BP: 137/68 (06 Mar 2019 06:18) (137/68 - 148/78)  BP(mean): --  RR: 16 (06 Mar 2019 06:18) (16 - 18)  SpO2: 98% (06 Mar 2019 06:18) (98% - 100%)
reviewed
reviewed
ICU Vital Signs Last 24 Hrs  T(C): 36.8 (18 Feb 2019 05:45), Max: 37.2 (17 Feb 2019 14:46)  T(F): 98.2 (18 Feb 2019 05:45), Max: 99 (17 Feb 2019 14:46)  HR: 81 (18 Feb 2019 05:45) (72 - 82)  BP: 125/66 (18 Feb 2019 05:45) (110/70 - 127/81)  BP(mean): --  ABP: --  ABP(mean): --  RR: 16 (18 Feb 2019 05:45) (16 - 18)  SpO2: 100% (18 Feb 2019 05:45) (97% - 100%)
Vital Signs - Last 24 Hrs    T(C): 36.6 (19 @ 06:19), Max: 37.1 (19 @ 14:35)  HR: 80 (19 @ 06:19) (68 - 80)  BP: 126/65 (19 @ 06:19) (119/59 - 126/66)  RR: 18 (19 @ 06:19) (17 - 18)  SpO2: 100% (19 @ 06:19) (100% - 100%)  Wt(kg): --  Daily     Daily Weight in k.8 (2019 06:19)    I&O's Summary
Vital Signs Last 24 Hrs  T(C): 36.5 (26 Feb 2019 05:50), Max: 37.1 (25 Feb 2019 15:24)  T(F): 97.7 (26 Feb 2019 05:50), Max: 98.7 (25 Feb 2019 15:24)  HR: 80 (26 Feb 2019 05:50) (69 - 86)  BP: 125/73 (26 Feb 2019 05:50) (114/65 - 128/73)  BP(mean): --  RR: 17 (26 Feb 2019 05:50) (17 - 18)  SpO2: 100% (26 Feb 2019 05:50) (95% - 100%)
Vital Signs - Last 24 Hrs    T(C): 37.1 (02-22-19 @ 06:47), Max: 37.1 (02-22-19 @ 06:47)  HR: 71 (02-22-19 @ 06:47) (70 - 79)  BP: 122/64 (02-22-19 @ 06:47) (120/62 - 125/70)  RR: 18 (02-22-19 @ 06:47) (18 - 18)  SpO2: 99% (02-22-19 @ 06:47) (99% - 99%)  Wt(kg): --  Daily     Daily     I&O's Summary    21 Feb 2019 07:01  -  22 Feb 2019 07:00  --------------------------------------------------------  IN: 200 mL / OUT: 0 mL / NET: 200 mL
Vital Signs - Last 24 Hrs    T(C): 36.7 (19 @ 05:45), Max: 36.7 (19 @ 05:45)  HR: 62 (19 @ 05:45) (62 - 100)  BP: 126/72 (19 @ 05:45) (117/80 - 146/73)  RR: 18 (19 @ 05:45) (16 - 18)  SpO2: 100% (19 @ 05:45) (99% - 100%)  Wt(kg): --  Daily     Daily Weight in k.1 (2019 05:45)    I&O's Summary    2019 07:01  -  2019 07:00  --------------------------------------------------------  IN: 220 mL / OUT: 0 mL / NET: 220 mL

## 2019-03-08 NOTE — PROGRESS NOTE BEHAVIORAL HEALTH - NSBHCONSULTFOLLOWDETAILS_PSY_A_CORE FT
- highly likely that patient has reached his new baseline and there is not much more clinical improvement able to happen  ULTIMATE PLAN: return to [facility] as he is still an inpatient there on [address] (phone [phone]) and is under the care of psychiatrist Dr Gardner [phone]. [place] has Patient's legal papers - highly likely that patient has reached his new baseline and there is not much more clinical improvement able to happen  ULTIMATE PLAN: return to [facility] as he is still an inpatient there on [address] (phone [phone]) and is under the care of psychiatrist Dr Gardner [phone]. [place] has Patient's legal papers  - Patient at this time is psychiatrically cleared for transfer back to [place]

## 2019-03-08 NOTE — PROGRESS NOTE ADULT - PROBLEM SELECTOR PLAN 1
Acute CVA LT MCA with brain edema possibly embolic RT weakness, s/p tPA  [facility] there prior to this event  pt can get PT services at [place]

## 2019-03-08 NOTE — PROGRESS NOTE ADULT - ASSESSMENT
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

## 2019-03-08 NOTE — PROGRESS NOTE BEHAVIORAL HEALTH - NSBHFUPINTERVALHXFT_PSY_A_CORE
No significant interval events. Patient continues to tolerate the Ativan taper down/off and is currently on 1mg total daily dose then stop. He has not required any PRNs; no behavioral issues. No noted adverse medication side effects. Pt still nonverbal which is expected and likely chronic/irreversible secondary to his CVA. He is however able to interact with staff in a very basic manner and accepts meal assistance.

## 2019-03-08 NOTE — PROGRESS NOTE ADULT - PROBLEM SELECTOR PLAN 2
improving with ativan taper, cont to taper to off  haldol restarted (previous standing med from [place])  d/w RN NM to create a daily routine with pt for feeding, washing, OOB, etc as per staff pt not eating again

## 2019-03-08 NOTE — PROGRESS NOTE BEHAVIORAL HEALTH - NSBHCHARTREVIEWLAB_PSY_A_CORE FT
14.5   4.49  )-----------( 206      ( 01 Mar 2019 07:20 )             42.5
15.2   6.59  )-----------( 182      ( 19 Feb 2019 06:36 )             45.0   02-19    142  |  103  |  25<H>  ----------------------------<  100<H>  4.1   |  24  |  1.16    Ca    9.8      19 Feb 2019 06:36  Mg     2.1     02-19
reviewed
today's CBC and BMP reviewed
02-20    145  |  107  |  23  ----------------------------<  106<H>  4.1   |  24  |  1.08    Ca    10.1      20 Feb 2019 06:15  Mg     2.1     02-19                          15.6   6.44  )-----------( 206      ( 20 Feb 2019 06:15 )             47.5
16.8   5.52  )-----------( 236      ( 26 Feb 2019 05:20 )             50.1   02-26    149<H>  |  108<H>  |  22  ----------------------------<  100<H>  3.9   |  26  |  1.04    Ca    10.4      26 Feb 2019 05:20  Mg     2.3     02-26
15.9   5.83  )-----------( 200      ( 22 Feb 2019 08:10 )             48.0   02-22    148<H>  |  111<H>  |  23  ----------------------------<  110<H>  4.1   |  26  |  1.08    Ca    9.8      22 Feb 2019 08:10
15.4   5.22  )-----------( 179      ( 18 Feb 2019 06:05 )             45.6       02-18    141  |  103  |  23  ----------------------------<  102<H>  4.2   |  25  |  1.24    Ca    10.0      18 Feb 2019 06:05  Mg     2.0     02-18                        Lactate Trend      CARDIAC MARKERS ( 18 Feb 2019 06:05 )  x     / x     / 120 u/L / x     / x            CAPILLARY BLOOD GLUCOSE  114 (16 Feb 2019 12:21)      POCT Blood Glucose.: 114 mg/dL (16 Feb 2019 12:18)
15.4   5.10  )-----------( 178      ( 21 Feb 2019 05:40 )             47.4   02-21    144  |  108<H>  |  24<H>  ----------------------------<  106<H>  4.3   |  25  |  1.12    Ca    9.8      21 Feb 2019 05:40

## 2019-03-08 NOTE — PROGRESS NOTE BEHAVIORAL HEALTH - NSBHFUPINTERVALCCFT_PSY_A_CORE
(silent)
I'm ok
Nonverbal
n/a
n/a
(Silent)
Patient non-verbal at present, only makes grunts.
(Silent)

## 2019-03-09 LAB
ANION GAP SERPL CALC-SCNC: 13 MMO/L — SIGNIFICANT CHANGE UP (ref 7–14)
BUN SERPL-MCNC: 12 MG/DL — SIGNIFICANT CHANGE UP (ref 7–23)
CALCIUM SERPL-MCNC: 10.1 MG/DL — SIGNIFICANT CHANGE UP (ref 8.4–10.5)
CHLORIDE SERPL-SCNC: 106 MMOL/L — SIGNIFICANT CHANGE UP (ref 98–107)
CO2 SERPL-SCNC: 27 MMOL/L — SIGNIFICANT CHANGE UP (ref 22–31)
CREAT SERPL-MCNC: 0.97 MG/DL — SIGNIFICANT CHANGE UP (ref 0.5–1.3)
GLUCOSE SERPL-MCNC: 114 MG/DL — HIGH (ref 70–99)
POTASSIUM SERPL-MCNC: 3.9 MMOL/L — SIGNIFICANT CHANGE UP (ref 3.5–5.3)
POTASSIUM SERPL-SCNC: 3.9 MMOL/L — SIGNIFICANT CHANGE UP (ref 3.5–5.3)
SODIUM SERPL-SCNC: 146 MMOL/L — HIGH (ref 135–145)

## 2019-03-09 PROCEDURE — 99232 SBSQ HOSP IP/OBS MODERATE 35: CPT

## 2019-03-09 RX ORDER — POTASSIUM CHLORIDE 20 MEQ
10 PACKET (EA) ORAL
Qty: 0 | Refills: 0 | Status: DISCONTINUED | OUTPATIENT
Start: 2019-03-09 | End: 2019-03-09

## 2019-03-09 RX ADMIN — ATORVASTATIN CALCIUM 80 MILLIGRAM(S): 80 TABLET, FILM COATED ORAL at 22:19

## 2019-03-09 RX ADMIN — HALOPERIDOL DECANOATE 2 MILLIGRAM(S): 100 INJECTION INTRAMUSCULAR at 17:55

## 2019-03-09 RX ADMIN — ENOXAPARIN SODIUM 90 MILLIGRAM(S): 100 INJECTION SUBCUTANEOUS at 06:50

## 2019-03-09 RX ADMIN — Medication 200 MILLIGRAM(S): at 12:49

## 2019-03-09 RX ADMIN — Medication 81 MILLIGRAM(S): at 12:49

## 2019-03-09 RX ADMIN — SENNA PLUS 2 TABLET(S): 8.6 TABLET ORAL at 22:18

## 2019-03-09 RX ADMIN — Medication 100 MILLIGRAM(S): at 06:50

## 2019-03-09 RX ADMIN — LEVETIRACETAM 1000 MILLIGRAM(S): 250 TABLET, FILM COATED ORAL at 06:50

## 2019-03-09 RX ADMIN — MEMANTINE HYDROCHLORIDE 5 MILLIGRAM(S): 10 TABLET ORAL at 22:19

## 2019-03-09 RX ADMIN — LEVETIRACETAM 1000 MILLIGRAM(S): 250 TABLET, FILM COATED ORAL at 17:55

## 2019-03-09 RX ADMIN — Medication 100 MILLIGRAM(S): at 17:55

## 2019-03-09 RX ADMIN — PANTOPRAZOLE SODIUM 40 MILLIGRAM(S): 20 TABLET, DELAYED RELEASE ORAL at 06:50

## 2019-03-09 RX ADMIN — ENOXAPARIN SODIUM 90 MILLIGRAM(S): 100 INJECTION SUBCUTANEOUS at 17:56

## 2019-03-09 RX ADMIN — Medication 1 TABLET(S): at 12:49

## 2019-03-09 NOTE — PROGRESS NOTE ADULT - ASSESSMENT
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

## 2019-03-09 NOTE — PROGRESS NOTE ADULT - PROBLEM SELECTOR PLAN 1
Acute CVA LT MCA with brain edema possibly embolic RT weakness, s/p tPA  [facility] to this event  pt can get PT services at [place]

## 2019-03-09 NOTE — PROGRESS NOTE ADULT - SUBJECTIVE AND OBJECTIVE BOX
Patient is a 59y old  Male who presents with a chief complaint of code stroke (08 Mar 2019 13:10)    HPI: Pt curled up in bed, face covered with a blanket. Does not want to talk.     Vital Signs Last 24 Hrs  T(C): 36.7 (09 Mar 2019 05:12), Max: 36.9 (08 Mar 2019 21:09)  T(F): 98.1 (09 Mar 2019 05:12), Max: 98.4 (08 Mar 2019 21:09)  HR: 79 (09 Mar 2019 05:12) (65 - 94)  BP: 136/86 (09 Mar 2019 05:12) (130/72 - 136/86)  BP(mean): --  RR: 18 (09 Mar 2019 05:12) (18 - 18)  SpO2: 96% (09 Mar 2019 05:12) (96% - 100%)        MEDICATIONS  (STANDING):  aspirin  chewable 81 milliGRAM(s) Oral daily  atorvastatin 80 milliGRAM(s) Oral at bedtime  carBAMazepine Chewable 100 milliGRAM(s) Chew two times a day  docusate sodium Liquid 200 milliGRAM(s) Oral daily  enoxaparin Injectable 90 milliGRAM(s) SubCutaneous two times a day  haloperidol    Concentrate 2 milliGRAM(s) Oral two times a day  levETIRAcetam 1000 milliGRAM(s) Oral two times a day  memantine 5 milliGRAM(s) Oral at bedtime  multivitamin 1 Tablet(s) Oral daily  pantoprazole    Tablet 40 milliGRAM(s) Oral before breakfast  senna 2 Tablet(s) Oral at bedtime    MEDICATIONS  (PRN):  bisacodyl 5 milliGRAM(s) Oral daily PRN Constipation  polyethylene glycol 3350 17 Gram(s) Oral daily PRN Constipation

## 2019-03-09 NOTE — PROGRESS NOTE ADULT - PROBLEM SELECTOR PLAN 2
improving with ativan taper, cont to taper to off  haldol restarted (previous standing med from [place])  d/w RN NM to create a daily routine with pt for feeding, washing, OOB, etc as per staff pt not eating again

## 2019-03-10 LAB
ANION GAP SERPL CALC-SCNC: 12 MMO/L — SIGNIFICANT CHANGE UP (ref 7–14)
BUN SERPL-MCNC: 12 MG/DL — SIGNIFICANT CHANGE UP (ref 7–23)
CALCIUM SERPL-MCNC: 10.1 MG/DL — SIGNIFICANT CHANGE UP (ref 8.4–10.5)
CHLORIDE SERPL-SCNC: 106 MMOL/L — SIGNIFICANT CHANGE UP (ref 98–107)
CO2 SERPL-SCNC: 26 MMOL/L — SIGNIFICANT CHANGE UP (ref 22–31)
CREAT SERPL-MCNC: 0.86 MG/DL — SIGNIFICANT CHANGE UP (ref 0.5–1.3)
GLUCOSE SERPL-MCNC: 104 MG/DL — HIGH (ref 70–99)
HCT VFR BLD CALC: 41.7 % — SIGNIFICANT CHANGE UP (ref 39–50)
HGB BLD-MCNC: 14.2 G/DL — SIGNIFICANT CHANGE UP (ref 13–17)
MAGNESIUM SERPL-MCNC: 1.8 MG/DL — SIGNIFICANT CHANGE UP (ref 1.6–2.6)
MCHC RBC-ENTMCNC: 32.7 PG — SIGNIFICANT CHANGE UP (ref 27–34)
MCHC RBC-ENTMCNC: 34.1 % — SIGNIFICANT CHANGE UP (ref 32–36)
MCV RBC AUTO: 96.1 FL — SIGNIFICANT CHANGE UP (ref 80–100)
NRBC # FLD: 0 K/UL — LOW (ref 25–125)
PLATELET # BLD AUTO: 227 K/UL — SIGNIFICANT CHANGE UP (ref 150–400)
PMV BLD: 10.8 FL — SIGNIFICANT CHANGE UP (ref 7–13)
POTASSIUM SERPL-MCNC: 3.8 MMOL/L — SIGNIFICANT CHANGE UP (ref 3.5–5.3)
POTASSIUM SERPL-SCNC: 3.8 MMOL/L — SIGNIFICANT CHANGE UP (ref 3.5–5.3)
RBC # BLD: 4.34 M/UL — SIGNIFICANT CHANGE UP (ref 4.2–5.8)
RBC # FLD: 12.4 % — SIGNIFICANT CHANGE UP (ref 10.3–14.5)
SODIUM SERPL-SCNC: 144 MMOL/L — SIGNIFICANT CHANGE UP (ref 135–145)
WBC # BLD: 5.39 K/UL — SIGNIFICANT CHANGE UP (ref 3.8–10.5)
WBC # FLD AUTO: 5.39 K/UL — SIGNIFICANT CHANGE UP (ref 3.8–10.5)

## 2019-03-10 PROCEDURE — 99232 SBSQ HOSP IP/OBS MODERATE 35: CPT

## 2019-03-10 RX ADMIN — HALOPERIDOL DECANOATE 2 MILLIGRAM(S): 100 INJECTION INTRAMUSCULAR at 05:06

## 2019-03-10 RX ADMIN — PANTOPRAZOLE SODIUM 40 MILLIGRAM(S): 20 TABLET, DELAYED RELEASE ORAL at 05:07

## 2019-03-10 RX ADMIN — Medication 100 MILLIGRAM(S): at 05:03

## 2019-03-10 RX ADMIN — ATORVASTATIN CALCIUM 80 MILLIGRAM(S): 80 TABLET, FILM COATED ORAL at 21:17

## 2019-03-10 RX ADMIN — ENOXAPARIN SODIUM 90 MILLIGRAM(S): 100 INJECTION SUBCUTANEOUS at 05:03

## 2019-03-10 RX ADMIN — LEVETIRACETAM 1000 MILLIGRAM(S): 250 TABLET, FILM COATED ORAL at 17:59

## 2019-03-10 RX ADMIN — ENOXAPARIN SODIUM 90 MILLIGRAM(S): 100 INJECTION SUBCUTANEOUS at 18:00

## 2019-03-10 RX ADMIN — Medication 100 MILLIGRAM(S): at 17:59

## 2019-03-10 RX ADMIN — Medication 200 MILLIGRAM(S): at 12:04

## 2019-03-10 RX ADMIN — SENNA PLUS 2 TABLET(S): 8.6 TABLET ORAL at 21:17

## 2019-03-10 RX ADMIN — Medication 81 MILLIGRAM(S): at 12:04

## 2019-03-10 RX ADMIN — LEVETIRACETAM 1000 MILLIGRAM(S): 250 TABLET, FILM COATED ORAL at 05:03

## 2019-03-10 RX ADMIN — MEMANTINE HYDROCHLORIDE 5 MILLIGRAM(S): 10 TABLET ORAL at 21:17

## 2019-03-10 RX ADMIN — Medication 1 TABLET(S): at 12:05

## 2019-03-10 RX ADMIN — HALOPERIDOL DECANOATE 2 MILLIGRAM(S): 100 INJECTION INTRAMUSCULAR at 17:59

## 2019-03-10 NOTE — PROGRESS NOTE ADULT - PROBLEM SELECTOR PLAN 3
currently on lovenox  questionable hx of falls  IVC filter found in place already   hypercoag w/u will need to be repeated as outpt once of AC for this acute event as result may not be reliable with current acute DVT and AC in place  will need to determine if full AC is approp currently on lovenox  questionable hx of falls  IVC filter found in place already   hypercoag w/u will need to be repeated as outpt once off AC for this acute event as result may not be reliable with current acute DVT and AC in place  will need to determine if full AC is approp

## 2019-03-10 NOTE — PROGRESS NOTE ADULT - SUBJECTIVE AND OBJECTIVE BOX
Patient is a 59y old  Male who presents with a chief complaint of code stroke (09 Mar 2019 12:47)    HPI: Pt curled up in bed, face covered with a blanked. Woke up a little on prompting.     Vital Signs Last 24 Hrs  T(C): 36.8 (10 Mar 2019 12:07), Max: 36.8 (10 Mar 2019 12:07)  T(F): 98.3 (10 Mar 2019 12:07), Max: 98.3 (10 Mar 2019 12:07)  HR: 69 (10 Mar 2019 12:07) (59 - 70)  BP: 121/76 (10 Mar 2019 12:07) (121/76 - 130/69)  BP(mean): --  RR: 18 (10 Mar 2019 12:07) (18 - 18)  SpO2: 100% (10 Mar 2019 12:07) (98% - 100%)                          14.2   5.39  )-----------( 227      ( 10 Mar 2019 07:30 )             41.7     03-10    144  |  106  |  12  ----------------------------<  104<H>  3.8   |  26  |  0.86    Ca    10.1      10 Mar 2019 07:25  Mg     1.8     03-10      MEDICATIONS  (STANDING):  aspirin  chewable 81 milliGRAM(s) Oral daily  atorvastatin 80 milliGRAM(s) Oral at bedtime  carBAMazepine Chewable 100 milliGRAM(s) Chew two times a day  docusate sodium Liquid 200 milliGRAM(s) Oral daily  enoxaparin Injectable 90 milliGRAM(s) SubCutaneous two times a day  haloperidol    Concentrate 2 milliGRAM(s) Oral two times a day  levETIRAcetam 1000 milliGRAM(s) Oral two times a day  memantine 5 milliGRAM(s) Oral at bedtime  multivitamin 1 Tablet(s) Oral daily  pantoprazole    Tablet 40 milliGRAM(s) Oral before breakfast  senna 2 Tablet(s) Oral at bedtime    MEDICATIONS  (PRN):  bisacodyl 5 milliGRAM(s) Oral daily PRN Constipation  polyethylene glycol 3350 17 Gram(s) Oral daily PRN Constipation Patient is a 59y old  Male who presents with a chief complaint of code stroke (09 Mar 2019 12:47)    HPI: Pt curled up in bed, face covered with a blankets. Woke up a little on prompting.     Vital Signs Last 24 Hrs  T(C): 36.8 (10 Mar 2019 12:07), Max: 36.8 (10 Mar 2019 12:07)  T(F): 98.3 (10 Mar 2019 12:07), Max: 98.3 (10 Mar 2019 12:07)  HR: 69 (10 Mar 2019 12:07) (59 - 70)  BP: 121/76 (10 Mar 2019 12:07) (121/76 - 130/69)  BP(mean): --  RR: 18 (10 Mar 2019 12:07) (18 - 18)  SpO2: 100% (10 Mar 2019 12:07) (98% - 100%)                          14.2   5.39  )-----------( 227      ( 10 Mar 2019 07:30 )             41.7     03-10    144  |  106  |  12  ----------------------------<  104<H>  3.8   |  26  |  0.86    Ca    10.1      10 Mar 2019 07:25  Mg     1.8     03-10      MEDICATIONS  (STANDING):  aspirin  chewable 81 milliGRAM(s) Oral daily  atorvastatin 80 milliGRAM(s) Oral at bedtime  carBAMazepine Chewable 100 milliGRAM(s) Chew two times a day  docusate sodium Liquid 200 milliGRAM(s) Oral daily  enoxaparin Injectable 90 milliGRAM(s) SubCutaneous two times a day  haloperidol    Concentrate 2 milliGRAM(s) Oral two times a day  levETIRAcetam 1000 milliGRAM(s) Oral two times a day  memantine 5 milliGRAM(s) Oral at bedtime  multivitamin 1 Tablet(s) Oral daily  pantoprazole    Tablet 40 milliGRAM(s) Oral before breakfast  senna 2 Tablet(s) Oral at bedtime    MEDICATIONS  (PRN):  bisacodyl 5 milliGRAM(s) Oral daily PRN Constipation  polyethylene glycol 3350 17 Gram(s) Oral daily PRN Constipation Patient is a 59y old  Male who presents with a chief complaint of code stroke (09 Mar 2019 12:47)    HPI: Pt curled up in bed, face covered with a blanket. Woke up a little on prompting.     Vital Signs Last 24 Hrs  T(C): 36.8 (10 Mar 2019 12:07), Max: 36.8 (10 Mar 2019 12:07)  T(F): 98.3 (10 Mar 2019 12:07), Max: 98.3 (10 Mar 2019 12:07)  HR: 69 (10 Mar 2019 12:07) (59 - 70)  BP: 121/76 (10 Mar 2019 12:07) (121/76 - 130/69)  BP(mean): --  RR: 18 (10 Mar 2019 12:07) (18 - 18)  SpO2: 100% (10 Mar 2019 12:07) (98% - 100%)                          14.2   5.39  )-----------( 227      ( 10 Mar 2019 07:30 )             41.7     03-10    144  |  106  |  12  ----------------------------<  104<H>  3.8   |  26  |  0.86    Ca    10.1      10 Mar 2019 07:25  Mg     1.8     03-10      MEDICATIONS  (STANDING):  aspirin  chewable 81 milliGRAM(s) Oral daily  atorvastatin 80 milliGRAM(s) Oral at bedtime  carBAMazepine Chewable 100 milliGRAM(s) Chew two times a day  docusate sodium Liquid 200 milliGRAM(s) Oral daily  enoxaparin Injectable 90 milliGRAM(s) SubCutaneous two times a day  haloperidol    Concentrate 2 milliGRAM(s) Oral two times a day  levETIRAcetam 1000 milliGRAM(s) Oral two times a day  memantine 5 milliGRAM(s) Oral at bedtime  multivitamin 1 Tablet(s) Oral daily  pantoprazole    Tablet 40 milliGRAM(s) Oral before breakfast  senna 2 Tablet(s) Oral at bedtime    MEDICATIONS  (PRN):  bisacodyl 5 milliGRAM(s) Oral daily PRN Constipation  polyethylene glycol 3350 17 Gram(s) Oral daily PRN Constipation

## 2019-03-10 NOTE — PROGRESS NOTE ADULT - ASSESSMENT
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

## 2019-03-10 NOTE — PROGRESS NOTE ADULT - PROBLEM SELECTOR PLAN 1
Acute CVA LT MCA with brain edema possibly embolic RT weakness, s[facility]p tPA  [facility]  Pt can get PT services at [place]

## 2019-03-11 LAB
ANION GAP SERPL CALC-SCNC: 12 MMO/L — SIGNIFICANT CHANGE UP (ref 7–14)
BUN SERPL-MCNC: 13 MG/DL — SIGNIFICANT CHANGE UP (ref 7–23)
CALCIUM SERPL-MCNC: 10.2 MG/DL — SIGNIFICANT CHANGE UP (ref 8.4–10.5)
CHLORIDE SERPL-SCNC: 103 MMOL/L — SIGNIFICANT CHANGE UP (ref 98–107)
CO2 SERPL-SCNC: 28 MMOL/L — SIGNIFICANT CHANGE UP (ref 22–31)
CREAT SERPL-MCNC: 0.86 MG/DL — SIGNIFICANT CHANGE UP (ref 0.5–1.3)
GLUCOSE SERPL-MCNC: 102 MG/DL — HIGH (ref 70–99)
HCT VFR BLD CALC: 47.7 % — SIGNIFICANT CHANGE UP (ref 39–50)
HGB BLD-MCNC: 16.1 G/DL — SIGNIFICANT CHANGE UP (ref 13–17)
MAGNESIUM SERPL-MCNC: 1.9 MG/DL — SIGNIFICANT CHANGE UP (ref 1.6–2.6)
MCHC RBC-ENTMCNC: 32.9 PG — SIGNIFICANT CHANGE UP (ref 27–34)
MCHC RBC-ENTMCNC: 33.8 % — SIGNIFICANT CHANGE UP (ref 32–36)
MCV RBC AUTO: 97.3 FL — SIGNIFICANT CHANGE UP (ref 80–100)
NRBC # FLD: 0 K/UL — LOW (ref 25–125)
PLATELET # BLD AUTO: 159 K/UL — SIGNIFICANT CHANGE UP (ref 150–400)
PMV BLD: 11.2 FL — SIGNIFICANT CHANGE UP (ref 7–13)
POTASSIUM SERPL-MCNC: 4 MMOL/L — SIGNIFICANT CHANGE UP (ref 3.5–5.3)
POTASSIUM SERPL-SCNC: 4 MMOL/L — SIGNIFICANT CHANGE UP (ref 3.5–5.3)
RBC # BLD: 4.9 M/UL — SIGNIFICANT CHANGE UP (ref 4.2–5.8)
RBC # FLD: 12.5 % — SIGNIFICANT CHANGE UP (ref 10.3–14.5)
SODIUM SERPL-SCNC: 143 MMOL/L — SIGNIFICANT CHANGE UP (ref 135–145)
WBC # BLD: 5.72 K/UL — SIGNIFICANT CHANGE UP (ref 3.8–10.5)
WBC # FLD AUTO: 5.72 K/UL — SIGNIFICANT CHANGE UP (ref 3.8–10.5)

## 2019-03-11 PROCEDURE — 99232 SBSQ HOSP IP/OBS MODERATE 35: CPT | Mod: GC

## 2019-03-11 PROCEDURE — 99233 SBSQ HOSP IP/OBS HIGH 50: CPT

## 2019-03-11 PROCEDURE — 99232 SBSQ HOSP IP/OBS MODERATE 35: CPT

## 2019-03-11 RX ADMIN — Medication 1 TABLET(S): at 13:58

## 2019-03-11 RX ADMIN — LEVETIRACETAM 1000 MILLIGRAM(S): 250 TABLET, FILM COATED ORAL at 18:55

## 2019-03-11 RX ADMIN — HALOPERIDOL DECANOATE 2 MILLIGRAM(S): 100 INJECTION INTRAMUSCULAR at 05:14

## 2019-03-11 RX ADMIN — Medication 100 MILLIGRAM(S): at 18:55

## 2019-03-11 RX ADMIN — ENOXAPARIN SODIUM 90 MILLIGRAM(S): 100 INJECTION SUBCUTANEOUS at 05:12

## 2019-03-11 RX ADMIN — PANTOPRAZOLE SODIUM 40 MILLIGRAM(S): 20 TABLET, DELAYED RELEASE ORAL at 05:15

## 2019-03-11 RX ADMIN — Medication 100 MILLIGRAM(S): at 05:13

## 2019-03-11 RX ADMIN — MEMANTINE HYDROCHLORIDE 5 MILLIGRAM(S): 10 TABLET ORAL at 22:14

## 2019-03-11 RX ADMIN — ATORVASTATIN CALCIUM 80 MILLIGRAM(S): 80 TABLET, FILM COATED ORAL at 22:14

## 2019-03-11 RX ADMIN — LEVETIRACETAM 1000 MILLIGRAM(S): 250 TABLET, FILM COATED ORAL at 05:15

## 2019-03-11 RX ADMIN — Medication 81 MILLIGRAM(S): at 13:58

## 2019-03-11 RX ADMIN — ENOXAPARIN SODIUM 90 MILLIGRAM(S): 100 INJECTION SUBCUTANEOUS at 18:55

## 2019-03-11 NOTE — PROGRESS NOTE ADULT - PROBLEM SELECTOR PLAN 1
Acute CVA LT MCA with brain edema possibly embolic RT weakness, s/p tPA  [facility]  Pt can get PT services at [place]

## 2019-03-11 NOTE — PROGRESS NOTE BEHAVIORAL HEALTH - NSBHPTASSESSDT_PSY_A_CORE
01-Mar-2019 15:49
05-Mar-2019
06-Mar-2019 12:27
07-Mar-2019
08-Mar-2019
11-Mar-2019
18-Feb-2019 11:53
19-Feb-2019
20-Feb-2019 12:10
21-Feb-2019 11:18
26-Feb-2019 14:18
22-Feb-2019 10:36
04-Mar-2019

## 2019-03-11 NOTE — PROGRESS NOTE BEHAVIORAL HEALTH - PRIMARY DX
Schizophrenia, unspecified type
Vascular dementia without behavioral disturbance
Vascular dementia without behavioral disturbance
Catatonia
Vascular dementia without behavioral disturbance
Vascular dementia without behavioral disturbance
Schizophrenia, unspecified type
Vascular dementia without behavioral disturbance
Schizophrenia, unspecified type

## 2019-03-11 NOTE — PROGRESS NOTE ADULT - PROBLEM SELECTOR PLAN 4
Continue carbamazepine, levetiracetam -
Continue carbamazepine, levetiracetam - give via IV if PO not possible due to catatonia.
Continue carbamazepine, levetiracetam - give via IV if PO not possible due to encephalopathy.
Continue carbamazepine, levetiracetam - give via IV if PO not possible due to catatonia.
Continue carbamazepine, levetiracetam.
Recurrent DVT  - S/p IVC filter.  vascular consult appreciated  - On Full A/C at the time (lovenox)  - On Lovenox BID - once PO intake is more consistent, can change to DOACs.
Continue carbamazepine, levetiracetam -
Continue carbamazepine, levetiracetam -
Continue carbamazepine, levetiracetam -   give via IV if PO not possible due to catatonia.
Recurrent DVT  - S/p IVC filter.  - repeat doppler show clots   continue full dose lovenox   vascular consult appreciated.
Recurrent DVT  - S/p IVC filter.  vascular consult appreciated  - On Full A/C at the time (lovenox)  - On Lovenox BID - once PO intake is more consistent, can change to DOACs.
Recurrent DVT  - S/p IVC filter.  vascular consult appreciated  - On Full A/C at the time, but patient is high fall risk.
Continue carbamazepine, levetiracetam.
Continue carbamazepine, levetiracetam -
Continue carbamazepine, levetiracetam -

## 2019-03-11 NOTE — PROGRESS NOTE ADULT - PROBLEM SELECTOR PROBLEM 1
Cerebrovascular accident (CVA), unspecified mechanism
Cerebrovascular accident (CVA), unspecified mechanism
Hypernatremia
Cerebrovascular accident (CVA), unspecified mechanism
Hypernatremia
Cerebrovascular accident (CVA), unspecified mechanism
Leg DVT (deep venous thromboembolism), acute, bilateral
Cerebrovascular accident (CVA), unspecified mechanism

## 2019-03-11 NOTE — PROGRESS NOTE BEHAVIORAL HEALTH - NSBHCONSULTFOLLOWDETAILS_PSY_A_CORE FT
- highly likely that patient has reached his new baseline and there is not much more clinical improvement able to happen  ULTIMATE PLAN: return to [facility] as he is still an inpatient there on [address] (phone [phone]) and is under the care of psychiatrist Dr Gardner [phone]. [place] has Patient's legal papers  - Patient at this time is psychiatrically cleared for transfer back to [place]

## 2019-03-11 NOTE — PROGRESS NOTE BEHAVIORAL HEALTH - SECONDARY DX1
Vascular dementia without behavioral disturbance
Vascular dementia without behavioral disturbance
Schizophrenia, unspecified type

## 2019-03-11 NOTE — PROGRESS NOTE ADULT - PROBLEM SELECTOR PROBLEM 2
Schizophrenia, unspecified type
Schizophrenia, unspecified type
Cerebrovascular accident (CVA), unspecified mechanism
Schizophrenia, unspecified type
Schizophrenia, unspecified type
Cerebrovascular accident (CVA), unspecified mechanism
Fall, initial encounter
Fall, initial encounter
Schizophrenia, unspecified type

## 2019-03-11 NOTE — PROGRESS NOTE BEHAVIORAL HEALTH - NSBHCONSULTFOLLOWAFTERCARE_PSY_A_CORE FT
returning to [facility] psychiatric unit where he is receiving daily psychiatric and medical services with recommendation to continue only haldol oral solution 3mg PO bid returning to [facility] psychiatric unit where he is receiving daily psychiatric and medical services with recommendation to continue only haldol oral solution 2mg PO bid

## 2019-03-11 NOTE — PROGRESS NOTE BEHAVIORAL HEALTH - NSBHFUPTYPE_PSY_A_CORE
Consult Follow Up
Inpatient

## 2019-03-11 NOTE — PROGRESS NOTE BEHAVIORAL HEALTH - NSBHCONSORIP_PSY_A_CORE
Consult...

## 2019-03-11 NOTE — PROGRESS NOTE BEHAVIORAL HEALTH - NS ED BHA MED ROS HEMATOLOGIC LYMPHATIC
Unable to assess
No complaints
Unable to assess

## 2019-03-11 NOTE — PROGRESS NOTE BEHAVIORAL HEALTH - NSBHCONSULTOBS_PSY_A_CORE
Routine observation
Enhanced supervision

## 2019-03-11 NOTE — PROGRESS NOTE BEHAVIORAL HEALTH - ORIENTATION OTHER
Unable to assess, Pt nonverbal
unable to ascertain at this time as Patient is nonverbal
Unable to assess, Pt nonverbal
Unable to assess, Pt nonverbal
unable to ascertain at this time as Patient is nonverbal
Unable to assess, Pt nonverbal

## 2019-03-11 NOTE — PROGRESS NOTE BEHAVIORAL HEALTH - NSBHFUPVIOL_PSY_A_CORE
unable to assess
none known
unable to assess
none known
unable to assess
none known
unable to assess

## 2019-03-11 NOTE — PROGRESS NOTE BEHAVIORAL HEALTH - NS ED BHA MED ROS CONSTITUTIONAL SYMPTOMS
Unable to assess
Unable to assess
No complaints
Unable to assess
No complaints
Unable to assess

## 2019-03-11 NOTE — PROGRESS NOTE BEHAVIORAL HEALTH - NSBHCONSFOLLOWNEEDS_PSY_A_CORE
Patient needs further psychiatric safety assessment prior to discharge
no psychiatric contraindications to discharge/psychiatrically cleared for transfer back to [place]
Patient needs further psychiatric safety assessment prior to discharge
no psychiatric contraindications to discharge

## 2019-03-11 NOTE — PROGRESS NOTE ADULT - ASSESSMENT
59M from [facility] h/o schizophrenia, recurrent DVT s/p IVC filter, seizure disorder, h/o severe C4-5 stenosis c/b cord compression s/p discectomy and fusion (wheel chair bound) adm 2/14 for dysarthria concerning for acute CVA s/p tPA complicated by brain edema, acute encephalopathy, poss catatonia, dysarthria, hypernatremia.

## 2019-03-11 NOTE — PROGRESS NOTE ADULT - PROBLEM SELECTOR PLAN 3
currently on lovenox  questionable hx of falls  IVC filter found in place already   hypercoag w/u will need to be repeated as outpt once off AC for this acute event as result may not be reliable with current acute DVT and AC in place  will need to determine if full AC is approp

## 2019-03-11 NOTE — PROGRESS NOTE ADULT - SUBJECTIVE AND OBJECTIVE BOX
Patient is a 59y old  Male who presents with a chief complaint of code stroke (11 Mar 2019 09:46)      SUBJECTIVE / OVERNIGHT EVENTS: Sleeping a lot, no complaints.    MEDICATIONS  (STANDING):  aspirin  chewable 81 milliGRAM(s) Oral daily  atorvastatin 80 milliGRAM(s) Oral at bedtime  carBAMazepine Chewable 100 milliGRAM(s) Chew two times a day  docusate sodium Liquid 200 milliGRAM(s) Oral daily  enoxaparin Injectable 90 milliGRAM(s) SubCutaneous two times a day  haloperidol    Concentrate 2 milliGRAM(s) Oral two times a day  levETIRAcetam 1000 milliGRAM(s) Oral two times a day  memantine 5 milliGRAM(s) Oral at bedtime  multivitamin 1 Tablet(s) Oral daily  pantoprazole    Tablet 40 milliGRAM(s) Oral before breakfast  senna 2 Tablet(s) Oral at bedtime    MEDICATIONS  (PRN):  bisacodyl 5 milliGRAM(s) Oral daily PRN Constipation  polyethylene glycol 3350 17 Gram(s) Oral daily PRN Constipation      Vital Signs Last 24 Hrs  T(C): 36.7 (11 Mar 2019 04:41), Max: 36.8 (10 Mar 2019 12:07)  T(F): 98.1 (11 Mar 2019 04:41), Max: 98.3 (10 Mar 2019 12:07)  HR: 63 (11 Mar 2019 04:41) (63 - 70)  BP: 122/60 (11 Mar 2019 04:41) (114/62 - 125/72)  BP(mean): --  RR: 18 (11 Mar 2019 04:41) (18 - 18)  SpO2: 100% (11 Mar 2019 04:41) (98% - 100%)  CAPILLARY BLOOD GLUCOSE        I&O's Summary    10 Mar 2019 07:01  -  11 Mar 2019 07:00  --------------------------------------------------------  IN: 200 mL / OUT: 0 mL / NET: 200 mL        PHYSICAL EXAM:  GENERAL: NAD, well-developed  HEAD:  Atraumatic, Normocephalic  EYES: EOMI, PERRLA, conjunctiva and sclera clear  NECK: Supple, No JVD  CHEST/LUNG: Clear to auscultation bilaterally; No wheeze  HEART: Regular rate and rhythm; No murmurs, rubs, or gallops  ABDOMEN: Soft, Nontender, Nondistended; Bowel sounds present  EXTREMITIES:  2+ Peripheral Pulses, No clubbing, cyanosis, or edema  PSYCH: calm   NEUROLOGY: non-focal  SKIN: No rashes or lesions    LABS:                        16.1   5.72  )-----------( 159      ( 11 Mar 2019 07:15 )             47.7     03-11    143  |  103  |  13  ----------------------------<  102<H>  4.0   |  28  |  0.86    Ca    10.2      11 Mar 2019 07:15  Mg     1.9     03-11                RADIOLOGY & ADDITIONAL TESTS:    Imaging Personally Reviewed:    Consultant(s) Notes Reviewed:      Care Discussed with Consultants/Other Providers:

## 2019-03-11 NOTE — PROGRESS NOTE BEHAVIORAL HEALTH - BEHAVIOR
Cooperative
Uncooperative
Cooperative/Other
Cooperative
Uncooperative
Cooperative
Other/Cooperative
Cooperative
Uncooperative

## 2019-03-11 NOTE — PROGRESS NOTE BEHAVIORAL HEALTH - RISK ASSESSMENT
Elevated risk by history (Schizophrenia, state hospitalizations) and acute presentation, will be mitigated by hospital setting and appropriate care.
unable to meet basic needs at this time and unable to communicate basic needs
unable to meet basic needs at this time and unable to communicate basic needs
Elevated risk by history (Schizophrenia, state hospitalizations) and acute presentation, will be mitigated by hospital setting and appropriate care.
unable to meet basic needs at this time and unable to communicate basic needs
unable to meet basic needs at this time independently but has been calm, cooperative and accepting of assistance
unable to meet basic needs at this time independently but has been calm, cooperative and accepting of assistance

## 2019-03-11 NOTE — PROGRESS NOTE BEHAVIORAL HEALTH - NSBHLOC_PSY_A_CORE
Lethargic, arousable to verbal stimulus
Alert
Lethargic, arousable to verbal stimulus
Alert

## 2019-03-11 NOTE — PROGRESS NOTE ADULT - SUBJECTIVE AND OBJECTIVE BOX
59M from [facility] h/o schizophrenia, recurrent DVT s/p IVC filter, seizure disorder, h/o severe C4-5 stenosis c/b cord compression s/p discectomy and fusion adm 2/14 for dysarthria concerning for acute CVA s/p tPA.    The patient had a code stroke in the ED and was given full dose tPA.  He was transferred to the MICU for q1h neuro checks.  The patient's RUE/RLE weakness improved, but his dysarthria did not.  His mental status remained at baseline.  He passed a speech and swallow (bedside) and was restarted on his psych medications.    Interval: Code stroke called on 2/16 for decreased responsiveness and not moving his RUE, RLE, not verbalizing, nothing new was found on repeat CT head    recurrent DVTs, is on anticoagulation with lovenox 90 BID  sleeping. wakes up but unable to have conversation   participating with PT/OT with a lot of encouragement       REVIEW OF SYSTEMS: No chest pain, shortness of breath, nausea, vomiting or diarhea.      function: max a     Vital Signs Last 24 Hrs  T(C): 36.7 (11 Mar 2019 04:41), Max: 36.8 (10 Mar 2019 12:07)  T(F): 98.1 (11 Mar 2019 04:41), Max: 98.3 (10 Mar 2019 12:07)  HR: 63 (11 Mar 2019 04:41) (63 - 70)  BP: 122/60 (11 Mar 2019 04:41) (114/62 - 125/72)  BP(mean): --  RR: 18 (11 Mar 2019 04:41) (18 - 18)  SpO2: 100% (11 Mar 2019 04:41) (98% - 100%)    Constitutional - NAD, Comfortable  Extremities - No C/C/E, No calf tenderness   Neurologic Exam -                    Cognitive -poor verbal outotput      Communication + dysarthria     Cranial Nerves - CN 2-12 intact     Motor - moves left side uncooperative with exam       Psychiatric - flat

## 2019-03-11 NOTE — PROGRESS NOTE BEHAVIORAL HEALTH - MUSCLE TONE / STRENGTH
Abnormal muscle tone/strength

## 2019-03-11 NOTE — PROGRESS NOTE BEHAVIORAL HEALTH - OTHER
right UE bent and appeared fixed consistent with L MCA stroke eye contact has improved as compared to admission unable to assess, in bed and is wheelchair bound at baseline Pt nonverbal Unable to assess, Pt nonverbal unable to ascertain at this time unable to ascertain at this time as Patient is nonverbal does not seem to be responding to internal stimuli maintains with much effort

## 2019-03-11 NOTE — PROGRESS NOTE ADULT - PROBLEM SELECTOR PROBLEM 4
Seizure
Deep vein thrombosis (DVT) of upper extremity, unspecified chronicity, unspecified laterality, unspecified vein
Seizure
Seizure
Deep vein thrombosis (DVT) of upper extremity, unspecified chronicity, unspecified laterality, unspecified vein
Seizure

## 2019-03-11 NOTE — PROGRESS NOTE BEHAVIORAL HEALTH - NSBHFUPINTERVALHXFT_PSY_A_CORE
No significant interval events over the weekend. Patient has been able to cooperate with PT as per their notes with encouragement. Patient continues to lie in bed with covers over his head - maybe bothered by lights and sounds so his light was turned off. Patient has not required any PRNs; no behavioral issues. No noted adverse medication side effects. Pt still nonverbal which is expected and likely chronic/irreversible secondary to his CVA. He is however able to interact with staff in a very basic manner and accepts meal assistance.

## 2019-03-11 NOTE — PROGRESS NOTE BEHAVIORAL HEALTH - SUMMARY
60 yo male with chronic schizophrenia, + dementia x 2 years, used wheelchair with baseline of basic communication skills as per [facility] psychiatrist, Patient has NO HISTORY of catatonia. Patient's presentation at this time is consistent with large L MCA CVA's effects juxtaposed on a Patient who was already impaired physically and in cognition. Continuing tapering down the Ativan which has been tolerated well with some improvement in alertness and overall clinical presentation. (NOTE: Patient has no hx of catatonia as per his psychiatrist and it is unlikely that he currently has it). Tolerating haldol 2mg liquid PO bid for now which seems to be the lowest effective dose at this time. ULTIMATE PLAN: return to [facility] as he is still an inpatient there on [address] (phone [phone]) and is under the care of psychiatrist Dr Gardner [phone].

## 2019-03-11 NOTE — PROGRESS NOTE ADULT - ASSESSMENT
1. PT- bed mobility,transfers, gait and balance training  2. OT- ADL'S  3. CVA-ASa/Statin  4. dispo- plan to return to [facility] for PT. requires max a with ADL's.

## 2019-03-11 NOTE — PROGRESS NOTE BEHAVIORAL HEALTH - AFFECT RANGE
Blunted

## 2019-03-11 NOTE — PROGRESS NOTE BEHAVIORAL HEALTH - NSBHFUPREASONCONS_PSY_A_CORE
agitation/dementia/med management
med management
med management
med management/dementia
other...
other.../delerium
psychosis/agitation/med management
ramiro
ramiro
other...

## 2019-03-11 NOTE — PROGRESS NOTE BEHAVIORAL HEALTH - NS ED BHA MED ROS GASTROINTESTINAL
Unable to assess
Unable to assess
No complaints
Unable to assess

## 2019-03-11 NOTE — PROGRESS NOTE BEHAVIORAL HEALTH - NSBHATTESTSEENBY_PSY_A_CORE
Attending Psychiatrist supervising NP/Trainee, meeting pt...
Attending Psychiatrist supervising NP/Trainee, meeting pt...
attending Psychiatrist without NP/Trainee
Attending Psychiatrist supervising NP/Trainee, meeting pt...
attending Psychiatrist without NP/Trainee
attending Psychiatrist without NP/Trainee
Attending Psychiatrist supervising NP/Trainee, meeting pt...
attending Psychiatrist without NP/Trainee
Attending Psychiatrist supervising NP/Trainee, meeting pt...
attending Psychiatrist without NP/Trainee

## 2019-03-11 NOTE — PROGRESS NOTE ADULT - PROBLEM SELECTOR PROBLEM 3
Deep vein thrombosis (DVT) of upper extremity, unspecified chronicity, unspecified laterality, unspecified vein
Schizophrenia, unspecified type
Deep vein thrombosis (DVT) of upper extremity, unspecified chronicity, unspecified laterality, unspecified vein
Hypernatremia
Schizophrenia, unspecified type
Deep vein thrombosis (DVT) of upper extremity, unspecified chronicity, unspecified laterality, unspecified vein

## 2019-03-12 LAB
ANION GAP SERPL CALC-SCNC: 13 MMO/L — SIGNIFICANT CHANGE UP (ref 7–14)
BUN SERPL-MCNC: 14 MG/DL — SIGNIFICANT CHANGE UP (ref 7–23)
CALCIUM SERPL-MCNC: 9.8 MG/DL — SIGNIFICANT CHANGE UP (ref 8.4–10.5)
CHLORIDE SERPL-SCNC: 100 MMOL/L — SIGNIFICANT CHANGE UP (ref 98–107)
CO2 SERPL-SCNC: 27 MMOL/L — SIGNIFICANT CHANGE UP (ref 22–31)
CREAT SERPL-MCNC: 0.94 MG/DL — SIGNIFICANT CHANGE UP (ref 0.5–1.3)
GLUCOSE SERPL-MCNC: 140 MG/DL — HIGH (ref 70–99)
HCT VFR BLD CALC: 45.1 % — SIGNIFICANT CHANGE UP (ref 39–50)
HGB BLD-MCNC: 15.2 G/DL — SIGNIFICANT CHANGE UP (ref 13–17)
MAGNESIUM SERPL-MCNC: 1.8 MG/DL — SIGNIFICANT CHANGE UP (ref 1.6–2.6)
MCHC RBC-ENTMCNC: 32.5 PG — SIGNIFICANT CHANGE UP (ref 27–34)
MCHC RBC-ENTMCNC: 33.7 % — SIGNIFICANT CHANGE UP (ref 32–36)
MCV RBC AUTO: 96.6 FL — SIGNIFICANT CHANGE UP (ref 80–100)
NRBC # FLD: 0 K/UL — LOW (ref 25–125)
PLATELET # BLD AUTO: 226 K/UL — SIGNIFICANT CHANGE UP (ref 150–400)
PMV BLD: 10.9 FL — SIGNIFICANT CHANGE UP (ref 7–13)
POTASSIUM SERPL-MCNC: 3.9 MMOL/L — SIGNIFICANT CHANGE UP (ref 3.5–5.3)
POTASSIUM SERPL-SCNC: 3.9 MMOL/L — SIGNIFICANT CHANGE UP (ref 3.5–5.3)
RBC # BLD: 4.67 M/UL — SIGNIFICANT CHANGE UP (ref 4.2–5.8)
RBC # FLD: 12.6 % — SIGNIFICANT CHANGE UP (ref 10.3–14.5)
SODIUM SERPL-SCNC: 140 MMOL/L — SIGNIFICANT CHANGE UP (ref 135–145)
WBC # BLD: 5.03 K/UL — SIGNIFICANT CHANGE UP (ref 3.8–10.5)
WBC # FLD AUTO: 5.03 K/UL — SIGNIFICANT CHANGE UP (ref 3.8–10.5)

## 2019-03-12 PROCEDURE — 99233 SBSQ HOSP IP/OBS HIGH 50: CPT

## 2019-03-12 PROCEDURE — 99232 SBSQ HOSP IP/OBS MODERATE 35: CPT | Mod: GC

## 2019-03-12 RX ADMIN — PANTOPRAZOLE SODIUM 40 MILLIGRAM(S): 20 TABLET, DELAYED RELEASE ORAL at 05:23

## 2019-03-12 RX ADMIN — LEVETIRACETAM 1000 MILLIGRAM(S): 250 TABLET, FILM COATED ORAL at 05:22

## 2019-03-12 RX ADMIN — ATORVASTATIN CALCIUM 80 MILLIGRAM(S): 80 TABLET, FILM COATED ORAL at 21:25

## 2019-03-12 RX ADMIN — SENNA PLUS 2 TABLET(S): 8.6 TABLET ORAL at 21:26

## 2019-03-12 RX ADMIN — ENOXAPARIN SODIUM 90 MILLIGRAM(S): 100 INJECTION SUBCUTANEOUS at 05:22

## 2019-03-12 RX ADMIN — HALOPERIDOL DECANOATE 2 MILLIGRAM(S): 100 INJECTION INTRAMUSCULAR at 18:00

## 2019-03-12 RX ADMIN — Medication 1 TABLET(S): at 13:03

## 2019-03-12 RX ADMIN — Medication 200 MILLIGRAM(S): at 13:02

## 2019-03-12 RX ADMIN — Medication 100 MILLIGRAM(S): at 05:22

## 2019-03-12 RX ADMIN — ENOXAPARIN SODIUM 90 MILLIGRAM(S): 100 INJECTION SUBCUTANEOUS at 18:00

## 2019-03-12 RX ADMIN — MEMANTINE HYDROCHLORIDE 5 MILLIGRAM(S): 10 TABLET ORAL at 21:26

## 2019-03-12 RX ADMIN — Medication 100 MILLIGRAM(S): at 17:59

## 2019-03-12 RX ADMIN — Medication 81 MILLIGRAM(S): at 13:01

## 2019-03-12 RX ADMIN — LEVETIRACETAM 1000 MILLIGRAM(S): 250 TABLET, FILM COATED ORAL at 17:59

## 2019-03-12 RX ADMIN — HALOPERIDOL DECANOATE 2 MILLIGRAM(S): 100 INJECTION INTRAMUSCULAR at 05:22

## 2019-03-12 NOTE — PROGRESS NOTE ADULT - SUBJECTIVE AND OBJECTIVE BOX
Patient is a 59y old  Male who presents with a chief complaint of code stroke (11 Mar 2019 11:52)      SUBJECTIVE / OVERNIGHT EVENTS: no complaints. Still requires assistance for meals.     MEDICATIONS  (STANDING):  aspirin  chewable 81 milliGRAM(s) Oral daily  atorvastatin 80 milliGRAM(s) Oral at bedtime  carBAMazepine Chewable 100 milliGRAM(s) Chew two times a day  docusate sodium Liquid 200 milliGRAM(s) Oral daily  enoxaparin Injectable 90 milliGRAM(s) SubCutaneous two times a day  haloperidol    Concentrate 2 milliGRAM(s) Oral two times a day  levETIRAcetam 1000 milliGRAM(s) Oral two times a day  memantine 5 milliGRAM(s) Oral at bedtime  multivitamin 1 Tablet(s) Oral daily  pantoprazole    Tablet 40 milliGRAM(s) Oral before breakfast  senna 2 Tablet(s) Oral at bedtime    MEDICATIONS  (PRN):  bisacodyl 5 milliGRAM(s) Oral daily PRN Constipation  polyethylene glycol 3350 17 Gram(s) Oral daily PRN Constipation      Vital Signs Last 24 Hrs  T(C): 36.8 (12 Mar 2019 12:44), Max: 36.8 (12 Mar 2019 12:44)  T(F): 98.3 (12 Mar 2019 12:44), Max: 98.3 (12 Mar 2019 12:44)  HR: 62 (12 Mar 2019 12:44) (62 - 88)  BP: 125/80 (12 Mar 2019 12:44) (125/80 - 138/50)  BP(mean): --  RR: 18 (12 Mar 2019 12:44) (16 - 18)  SpO2: 97% (12 Mar 2019 12:44) (97% - 98%)  CAPILLARY BLOOD GLUCOSE        I&O's Summary      PHYSICAL EXAM:  GENERAL: NAD, well-developed  HEAD:  Atraumatic, Normocephalic  EYES: EOMI, PERRLA, conjunctiva and sclera clear  NECK: Supple, No JVD  CHEST/LUNG: Clear to auscultation bilaterally; No wheeze  HEART: Regular rate and rhythm; No murmurs, rubs, or gallops  ABDOMEN: Soft, Nontender, Nondistended; Bowel sounds present  EXTREMITIES:  2+ Peripheral Pulses, No clubbing, cyanosis, or edema  PSYCH: Awake   NEUROLOGY: moves extremities  SKIN: No rashes or lesions    LABS:                        15.2   5.03  )-----------( 226      ( 12 Mar 2019 06:40 )             45.1     03-12    140  |  100  |  14  ----------------------------<  140<H>  3.9   |  27  |  0.94    Ca    9.8      12 Mar 2019 06:40  Mg     1.8     03-12                RADIOLOGY & ADDITIONAL TESTS:    Imaging Personally Reviewed:    Consultant(s) Notes Reviewed:      Care Discussed with Consultants/Other Providers:

## 2019-03-12 NOTE — PROGRESS NOTE ADULT - SUBJECTIVE AND OBJECTIVE BOX
Hematology Follow-up    INTERVAL HPI/OVERNIGHT EVENTS:  Patient S&E at bedside. Patient is catatonic, unable to perform ROS or examined the patient.       VITAL SIGNS:  T(F): 98.3 (03-12-19 @ 12:44)  HR: 62 (03-12-19 @ 12:44)  BP: 125/80 (03-12-19 @ 12:44)  RR: 18 (03-12-19 @ 12:44)  SpO2: 97% (03-12-19 @ 12:44)  Wt(kg): --    PHYSICAL EXAM: not performed     MEDICATIONS  (STANDING):  aspirin  chewable 81 milliGRAM(s) Oral daily  atorvastatin 80 milliGRAM(s) Oral at bedtime  carBAMazepine Chewable 100 milliGRAM(s) Chew two times a day  docusate sodium Liquid 200 milliGRAM(s) Oral daily  enoxaparin Injectable 90 milliGRAM(s) SubCutaneous two times a day  haloperidol    Concentrate 2 milliGRAM(s) Oral two times a day  levETIRAcetam 1000 milliGRAM(s) Oral two times a day  memantine 5 milliGRAM(s) Oral at bedtime  multivitamin 1 Tablet(s) Oral daily  pantoprazole    Tablet 40 milliGRAM(s) Oral before breakfast  senna 2 Tablet(s) Oral at bedtime    MEDICATIONS  (PRN):  bisacodyl 5 milliGRAM(s) Oral daily PRN Constipation  polyethylene glycol 3350 17 Gram(s) Oral daily PRN Constipation      No Known Allergies      LABS:                        15.2   5.03  )-----------( 226      ( 12 Mar 2019 06:40 )             45.1     03-12    140  |  100  |  14  ----------------------------<  140<H>  3.9   |  27  |  0.94    Ca    9.8      12 Mar 2019 06:40  Mg     1.8     03-12             RADIOLOGY & ADDITIONAL TESTS:  Studies reviewed.

## 2019-03-12 NOTE — PROGRESS NOTE ADULT - ASSESSMENT
59M from [facility] h/o schizophrenia, recurrent DVT s/p IVC filter, seizure disorder, h/o severe C4-5 stenosis c/b cord compression s/p discectomy and fusion (wheel chair bound) adm 2/14 for dysarthria concerning for acute CVA s/p tPA complicated by brain edema, acute encephalopathy, poss catatonia, dysarthria, hypernatremia.

## 2019-03-12 NOTE — PROGRESS NOTE ADULT - ASSESSMENT
59M from [facility] h/o schizophrenia, recurrent DVT s/p IVC filter, seizure disorder, h/o severe C4-5 stenosis c/b cord compression s/p discectomy and fusion (wheel chair bound) adm 2/14 for dysarthria concerning for acute CVA s/p tPA complicated by brain edema, acute encephalopathy, poss catatonia, dysarthria, hypernatremia..     #Recurrent DVT   1. History of DVT: s/p IVC filter  - Hematology consulted for hypercoagulable work up given hx of DVT and now with a stroke  - Pt has hx of DVT in 2013 and was on coumadin, then was switched to Xarelto in 2016. Had IVC filter placed in 2017 ? 2/2 falls and high risk of AC. No other history available in terms of circumstances during which clots developed (provoked or unprovoked), if has recurrent DVTs or just one episode of DVT, if had DVT while on AC or off of AC, unclear if has any family hx of clots either.   - LE Dopplers here show age-indeterminate non-occlusive thromboses in R common femoral, femoral, and popliteal veins as well as L popliteal vein.   - Pt is non-ambulatory at baseline which places him at an increased risk of clots as well.   - Given limited history will recommend sending hypercoagulable work up: APLS panel (DRVVT, anti-cardiolipin Ab, beta-2 glycoprotein), factor V leiden, and Prothrombin gene mutation.   - obtain more collateral information from [facility] and outpatient providers if possible  - c/w AC  - cardiac work up for CVA 59M from [facility] h/o schizophrenia, recurrent DVT s/p IVC filter, seizure disorder, h/o severe C4-5 stenosis c/b cord compression s/p discectomy and fusion (wheel chair bound) adm 2/14 for dysarthria concerning for acute CVA s/p tPA complicated by brain edema, acute encephalopathy, poss catatonia, dysarthria, hypernatremia..     #Recurrent DVT   History of DVT s/p IVC filter  - Hematology consulted for hypercoagulable work up given hx of DVT and now with a stroke  - Pt has hx of DVT in 2013 and was on coumadin, then was switched to Xarelto in 2016. Now patient is on lovenox,  if patient is a fall risks, Anticoagulation will be contraindicated. Need a discussion with his next of kind/HCP. If not a fall risk, ok to discharge him on lovenox. WE will need to perform hypercoagulable work-up as an oupatient.   We will set up an appointment upon discharge.     Lora Aguirre   [facility] Onc Fellow  [phone]

## 2019-03-13 PROCEDURE — 99233 SBSQ HOSP IP/OBS HIGH 50: CPT

## 2019-03-13 RX ADMIN — Medication 1 TABLET(S): at 12:52

## 2019-03-13 RX ADMIN — Medication 100 MILLIGRAM(S): at 05:42

## 2019-03-13 RX ADMIN — LEVETIRACETAM 1000 MILLIGRAM(S): 250 TABLET, FILM COATED ORAL at 18:30

## 2019-03-13 RX ADMIN — PANTOPRAZOLE SODIUM 40 MILLIGRAM(S): 20 TABLET, DELAYED RELEASE ORAL at 05:43

## 2019-03-13 RX ADMIN — HALOPERIDOL DECANOATE 2 MILLIGRAM(S): 100 INJECTION INTRAMUSCULAR at 05:42

## 2019-03-13 RX ADMIN — Medication 81 MILLIGRAM(S): at 12:52

## 2019-03-13 RX ADMIN — ENOXAPARIN SODIUM 90 MILLIGRAM(S): 100 INJECTION SUBCUTANEOUS at 18:29

## 2019-03-13 RX ADMIN — ENOXAPARIN SODIUM 90 MILLIGRAM(S): 100 INJECTION SUBCUTANEOUS at 05:42

## 2019-03-13 RX ADMIN — MEMANTINE HYDROCHLORIDE 5 MILLIGRAM(S): 10 TABLET ORAL at 22:44

## 2019-03-13 RX ADMIN — HALOPERIDOL DECANOATE 2 MILLIGRAM(S): 100 INJECTION INTRAMUSCULAR at 19:08

## 2019-03-13 RX ADMIN — ATORVASTATIN CALCIUM 80 MILLIGRAM(S): 80 TABLET, FILM COATED ORAL at 22:44

## 2019-03-13 RX ADMIN — Medication 100 MILLIGRAM(S): at 18:30

## 2019-03-13 RX ADMIN — SENNA PLUS 2 TABLET(S): 8.6 TABLET ORAL at 22:43

## 2019-03-13 RX ADMIN — Medication 200 MILLIGRAM(S): at 12:50

## 2019-03-13 RX ADMIN — LEVETIRACETAM 1000 MILLIGRAM(S): 250 TABLET, FILM COATED ORAL at 05:42

## 2019-03-13 NOTE — PROGRESS NOTE ADULT - SUBJECTIVE AND OBJECTIVE BOX
Patient is a 59y old  Male who presents with a chief complaint of code stroke (12 Mar 2019 17:43)      SUBJECTIVE / OVERNIGHT EVENTS: None verbal, awake. Calm     MEDICATIONS  (STANDING):  aspirin  chewable 81 milliGRAM(s) Oral daily  atorvastatin 80 milliGRAM(s) Oral at bedtime  carBAMazepine Chewable 100 milliGRAM(s) Chew two times a day  docusate sodium Liquid 200 milliGRAM(s) Oral daily  enoxaparin Injectable 90 milliGRAM(s) SubCutaneous two times a day  haloperidol    Concentrate 2 milliGRAM(s) Oral two times a day  levETIRAcetam 1000 milliGRAM(s) Oral two times a day  memantine 5 milliGRAM(s) Oral at bedtime  multivitamin 1 Tablet(s) Oral daily  pantoprazole    Tablet 40 milliGRAM(s) Oral before breakfast  senna 2 Tablet(s) Oral at bedtime    MEDICATIONS  (PRN):  bisacodyl 5 milliGRAM(s) Oral daily PRN Constipation  polyethylene glycol 3350 17 Gram(s) Oral daily PRN Constipation      Vital Signs Last 24 Hrs  T(C): 36.7 (13 Mar 2019 05:40), Max: 36.9 (12 Mar 2019 21:22)  T(F): 98.1 (13 Mar 2019 05:40), Max: 98.4 (12 Mar 2019 21:22)  HR: 71 (13 Mar 2019 05:40) (71 - 76)  BP: 145/93 (13 Mar 2019 05:40) (106/60 - 145/93)  BP(mean): --  RR: 18 (13 Mar 2019 05:40) (18 - 18)  SpO2: 98% (13 Mar 2019 05:40) (98% - 98%)  CAPILLARY BLOOD GLUCOSE        I&O's Summary    12 Mar 2019 07:01  -  13 Mar 2019 07:00  --------------------------------------------------------  IN: 240 mL / OUT: 0 mL / NET: 240 mL        PHYSICAL EXAM:  GENERAL: NAD, well-developed  HEAD:  Atraumatic, Normocephalic  EYES: EOMI, PERRLA, conjunctiva and sclera clear  NECK: Supple, No JVD  CHEST/LUNG: Clear to auscultation bilaterally; No wheeze  HEART: Regular rate and rhythm; No murmurs, rubs, or gallops  ABDOMEN: Soft, Nontender, Nondistended; Bowel sounds present  EXTREMITIES:  2+ Peripheral Pulses, No clubbing, cyanosis, or edema  PSYCH: Awake  NEUROLOGY: moves extremities  SKIN: No rashes or lesions    LABS:                        15.2   5.03  )-----------( 226      ( 12 Mar 2019 06:40 )             45.1     03-12    140  |  100  |  14  ----------------------------<  140<H>  3.9   |  27  |  0.94    Ca    9.8      12 Mar 2019 06:40  Mg     1.8     03-12                RADIOLOGY & ADDITIONAL TESTS:    Imaging Personally Reviewed:    Consultant(s) Notes Reviewed:      Care Discussed with Consultants/Other Providers:

## 2019-03-13 NOTE — PROGRESS NOTE ADULT - ASSESSMENT
59M from [facility] h/o schizophrenia, recurrent DVT s/p IVC filter, seizure disorder, h/o severe C4-5 stenosis c/b cord compression s/p discectomy and fusion (wheel chair bound) adm 2/14 for dysarthria concerning for acute CVA s/p tPA complicated by brain edema, acute encephalopathy, poss catatonia, dysarthria, hypernatremia.

## 2019-03-14 ENCOUNTER — TRANSCRIPTION ENCOUNTER (OUTPATIENT)
Age: 59
End: 2019-03-14

## 2019-03-14 PROCEDURE — 99233 SBSQ HOSP IP/OBS HIGH 50: CPT

## 2019-03-14 PROCEDURE — 99232 SBSQ HOSP IP/OBS MODERATE 35: CPT | Mod: GC

## 2019-03-14 RX ADMIN — Medication 100 MILLIGRAM(S): at 06:09

## 2019-03-14 RX ADMIN — Medication 200 MILLIGRAM(S): at 12:40

## 2019-03-14 RX ADMIN — Medication 5 MILLIGRAM(S): at 06:09

## 2019-03-14 RX ADMIN — Medication 1 TABLET(S): at 12:40

## 2019-03-14 RX ADMIN — LEVETIRACETAM 1000 MILLIGRAM(S): 250 TABLET, FILM COATED ORAL at 06:09

## 2019-03-14 RX ADMIN — PANTOPRAZOLE SODIUM 40 MILLIGRAM(S): 20 TABLET, DELAYED RELEASE ORAL at 06:09

## 2019-03-14 RX ADMIN — Medication 81 MILLIGRAM(S): at 12:40

## 2019-03-14 RX ADMIN — ATORVASTATIN CALCIUM 80 MILLIGRAM(S): 80 TABLET, FILM COATED ORAL at 21:55

## 2019-03-14 RX ADMIN — ENOXAPARIN SODIUM 90 MILLIGRAM(S): 100 INJECTION SUBCUTANEOUS at 17:58

## 2019-03-14 RX ADMIN — LEVETIRACETAM 1000 MILLIGRAM(S): 250 TABLET, FILM COATED ORAL at 17:58

## 2019-03-14 RX ADMIN — SENNA PLUS 2 TABLET(S): 8.6 TABLET ORAL at 21:55

## 2019-03-14 RX ADMIN — HALOPERIDOL DECANOATE 2 MILLIGRAM(S): 100 INJECTION INTRAMUSCULAR at 17:58

## 2019-03-14 RX ADMIN — MEMANTINE HYDROCHLORIDE 5 MILLIGRAM(S): 10 TABLET ORAL at 21:55

## 2019-03-14 RX ADMIN — Medication 100 MILLIGRAM(S): at 17:58

## 2019-03-14 NOTE — PROGRESS NOTE ADULT - ASSESSMENT
59M from [facility] h/o schizophrenia, recurrent DVT s/p IVC filter, seizure disorder, h/o severe C4-5 stenosis c/b cord compression s/p discectomy and fusion (wheel chair bound) adm 2/14 for dysarthria concerning for acute CVA s/p tPA complicated by brain edema, acute encephalopathy, poss catatonia, dysarthria, hypernatremia.

## 2019-03-14 NOTE — PROGRESS NOTE ADULT - SUBJECTIVE AND OBJECTIVE BOX
59M from [facility] h/o schizophrenia, recurrent DVT s/p IVC filter, seizure disorder, h/o severe C4-5 stenosis c/b cord compression s/p discectomy and fusion adm 2/14 for dysarthria concerning for acute CVA s/p tPA.    The patient had a code stroke in the ED and was given full dose tPA.  He was transferred to the MICU for q1h neuro checks.  The patient's RUE/RLE weakness improved, but his dysarthria did not.  His mental status remained at baseline.  He passed a speech and swallow (bedside) and was restarted on his psych medications.    Interval: Code stroke called on 2/16 for decreased responsiveness and not moving his RUE, RLE, not verbalizing, nothing new was found on repeat CT head    recurrent DVTs, is on anticoagulation with lovenox 90 BID  sleeping. participating with PT    REVIEW OF SYSTEMS: No chest pain, shortness of breath, nausea, vomiting or diarhea.      function: dependent     Vital Signs Last 24 Hrs  T(C): 36.9 (14 Mar 2019 14:01), Max: 36.9 (14 Mar 2019 14:01)  T(F): 98.4 (14 Mar 2019 14:01), Max: 98.4 (14 Mar 2019 14:01)  HR: 80 (14 Mar 2019 14:01) (80 - 80)  BP: 115/68 (14 Mar 2019 14:01) (115/68 - 115/68)  BP(mean): --  RR: 17 (14 Mar 2019 14:01) (17 - 17)  SpO2: 96% (14 Mar 2019 14:01) (96% - 96%)  Constitutional - NAD, Comfortable  Extremities - No C/C/E, No calf tenderness   Neurologic Exam -                    Cognitive -poor verbal outotput      Communication + dysarthria     Cranial Nerves - CN 2-12 intact     Motor - moves left side uncooperative with exam       Psychiatric - flat

## 2019-03-14 NOTE — PROGRESS NOTE ADULT - ASSESSMENT
1. PT- bed mobility,transfers, gait and balance training  2. OT- ADL'S  3. CVA-ASa/Statin  4. dispo- plan to return to [facility] for care/ PT. requires max a with all  ADL's.

## 2019-03-14 NOTE — DISCHARGE NOTE NURSING/CASE MANAGEMENT/SOCIAL WORK - NSDCDPATPORTLINK_GEN_ALL_CORE
You can access the [facility] Patient Portal, offered by [facility], by registering with the following website: [email or web address]

## 2019-03-14 NOTE — DISCHARGE NOTE NURSING/CASE MANAGEMENT/SOCIAL WORK - NSDCPEPTSTRK_GEN_ALL_CORE
Stroke support groups for patients, families, and friends/Stroke warning signs and symptoms/Signs and symptoms of stroke/Risk factors for stroke/Stroke education booklet/Call 911 for stroke/Need for follow up after discharge/Prescribed medications

## 2019-03-14 NOTE — PROGRESS NOTE ADULT - SUBJECTIVE AND OBJECTIVE BOX
Patient is a 59y old  Male who presents with a chief complaint of code stroke (13 Mar 2019 15:50)      SUBJECTIVE / OVERNIGHT EVENTS: No complaints. Needs assistance for eating    MEDICATIONS  (STANDING):  aspirin  chewable 81 milliGRAM(s) Oral daily  atorvastatin 80 milliGRAM(s) Oral at bedtime  carBAMazepine Chewable 100 milliGRAM(s) Chew two times a day  docusate sodium Liquid 200 milliGRAM(s) Oral daily  enoxaparin Injectable 90 milliGRAM(s) SubCutaneous two times a day  haloperidol    Concentrate 2 milliGRAM(s) Oral two times a day  levETIRAcetam 1000 milliGRAM(s) Oral two times a day  memantine 5 milliGRAM(s) Oral at bedtime  multivitamin 1 Tablet(s) Oral daily  pantoprazole    Tablet 40 milliGRAM(s) Oral before breakfast  senna 2 Tablet(s) Oral at bedtime    MEDICATIONS  (PRN):  bisacodyl 5 milliGRAM(s) Oral daily PRN Constipation  polyethylene glycol 3350 17 Gram(s) Oral daily PRN Constipation      Vital Signs Last 24 Hrs  T(C): 36.9 (14 Mar 2019 14:01), Max: 36.9 (14 Mar 2019 14:01)  T(F): 98.4 (14 Mar 2019 14:01), Max: 98.4 (14 Mar 2019 14:01)  HR: 80 (14 Mar 2019 14:01) (75 - 80)  BP: 115/68 (14 Mar 2019 14:01) (115/68 - 132/89)  BP(mean): --  RR: 17 (14 Mar 2019 14:01) (16 - 17)  SpO2: 96% (14 Mar 2019 14:01) (96% - 98%)  CAPILLARY BLOOD GLUCOSE        I&O's Summary      PHYSICAL EXAM:  GENERAL: NAD, well-developed  HEAD:  Atraumatic, Normocephalic  EYES: EOMI, PERRLA, conjunctiva and sclera clear  NECK: Supple, No JVD  CHEST/LUNG: Clear to auscultation bilaterally; No wheeze  HEART: Regular rate and rhythm; No murmurs, rubs, or gallops  ABDOMEN: Soft, Nontender, Nondistended; Bowel sounds present  EXTREMITIES:  2+ Peripheral Pulses, No clubbing, cyanosis, or edema  PSYCH: Awake  NEUROLOGY: moves extremities  SKIN: No rashes or lesions    LABS:                    RADIOLOGY & ADDITIONAL TESTS:    Imaging Personally Reviewed:    Consultant(s) Notes Reviewed:      Care Discussed with Consultants/Other Providers:

## 2019-03-14 NOTE — DISCHARGE NOTE NURSING/CASE MANAGEMENT/SOCIAL WORK - NSDCCRNUMBER_GEN_ALL_CORE_FT
8:00am BLS ambulance  via [facility] of the [facility] (p: [phone])

## 2019-03-15 ENCOUNTER — INPATIENT (INPATIENT)
Facility: HOSPITAL | Age: 59
LOS: 4 days | Discharge: PSYCHIATRIC OR SUBSTANCE USE FACILITY | End: 2019-03-20
Attending: HOSPITALIST | Admitting: HOSPITALIST
Payer: MEDICAID

## 2019-03-15 ENCOUNTER — TRANSCRIPTION ENCOUNTER (OUTPATIENT)
Age: 59
End: 2019-03-15

## 2019-03-15 VITALS
DIASTOLIC BLOOD PRESSURE: 82 MMHG | RESPIRATION RATE: 18 BRPM | SYSTOLIC BLOOD PRESSURE: 132 MMHG | OXYGEN SATURATION: 97 % | HEART RATE: 66 BPM | TEMPERATURE: 98 F

## 2019-03-15 VITALS
RESPIRATION RATE: 18 BRPM | DIASTOLIC BLOOD PRESSURE: 78 MMHG | TEMPERATURE: 98 F | SYSTOLIC BLOOD PRESSURE: 115 MMHG | HEART RATE: 71 BPM | OXYGEN SATURATION: 98 %

## 2019-03-15 DIAGNOSIS — I63.9 CEREBRAL INFARCTION, UNSPECIFIED: ICD-10-CM

## 2019-03-15 DIAGNOSIS — Z98.890 OTHER SPECIFIED POSTPROCEDURAL STATES: Chronic | ICD-10-CM

## 2019-03-15 PROCEDURE — 99239 HOSP IP/OBS DSCHRG MGMT >30: CPT

## 2019-03-15 RX ORDER — ENOXAPARIN SODIUM 100 MG/ML
90 INJECTION SUBCUTANEOUS
Qty: 0 | Refills: 0 | COMMUNITY
Start: 2019-03-15

## 2019-03-15 RX ORDER — ENOXAPARIN SODIUM 100 MG/ML
0 INJECTION SUBCUTANEOUS
Qty: 0 | Refills: 0 | COMMUNITY

## 2019-03-15 RX ORDER — ASPIRIN/CALCIUM CARB/MAGNESIUM 324 MG
1 TABLET ORAL
Qty: 30 | Refills: 0 | OUTPATIENT
Start: 2019-03-15 | End: 2019-04-13

## 2019-03-15 RX ADMIN — PANTOPRAZOLE SODIUM 40 MILLIGRAM(S): 20 TABLET, DELAYED RELEASE ORAL at 06:26

## 2019-03-15 RX ADMIN — Medication 100 MILLIGRAM(S): at 05:26

## 2019-03-15 RX ADMIN — ENOXAPARIN SODIUM 90 MILLIGRAM(S): 100 INJECTION SUBCUTANEOUS at 05:26

## 2019-03-15 RX ADMIN — HALOPERIDOL DECANOATE 2 MILLIGRAM(S): 100 INJECTION INTRAMUSCULAR at 06:26

## 2019-03-15 RX ADMIN — LEVETIRACETAM 1000 MILLIGRAM(S): 250 TABLET, FILM COATED ORAL at 05:26

## 2019-03-15 NOTE — PROGRESS NOTE ADULT - SUBJECTIVE AND OBJECTIVE BOX
Patient is a 59y old  Male who presents with a chief complaint of code stroke (15 Mar 2019 05:08)      SUBJECTIVE / OVERNIGHT EVENTS: No complaints, uneventful. Still needs assistance for eating    MEDICATIONS  (STANDING):  aspirin  chewable 81 milliGRAM(s) Oral daily  atorvastatin 80 milliGRAM(s) Oral at bedtime  carBAMazepine Chewable 100 milliGRAM(s) Chew two times a day  docusate sodium Liquid 200 milliGRAM(s) Oral daily  enoxaparin Injectable 90 milliGRAM(s) SubCutaneous two times a day  haloperidol    Concentrate 2 milliGRAM(s) Oral two times a day  levETIRAcetam 1000 milliGRAM(s) Oral two times a day  memantine 5 milliGRAM(s) Oral at bedtime  multivitamin 1 Tablet(s) Oral daily  pantoprazole    Tablet 40 milliGRAM(s) Oral before breakfast  senna 2 Tablet(s) Oral at bedtime    MEDICATIONS  (PRN):  bisacodyl 5 milliGRAM(s) Oral daily PRN Constipation  polyethylene glycol 3350 17 Gram(s) Oral daily PRN Constipation      Vital Signs Last 24 Hrs  T(C): 36.7 (15 Mar 2019 08:39), Max: 37.1 (14 Mar 2019 19:23)  T(F): 98 (15 Mar 2019 08:39), Max: 98.7 (14 Mar 2019 19:23)  HR: 66 (15 Mar 2019 08:39) (66 - 80)  BP: 132/82 (15 Mar 2019 08:39) (115/68 - 132/82)  BP(mean): --  RR: 18 (15 Mar 2019 08:39) (16 - 18)  SpO2: 97% (15 Mar 2019 08:39) (96% - 99%)  CAPILLARY BLOOD GLUCOSE        I&O's Summary      PHYSICAL EXAM:  GENERAL: NAD, well-developed  HEAD:  Atraumatic, Normocephalic  EYES: EOMI, PERRLA, conjunctiva and sclera clear  NECK: Supple, No JVD  CHEST/LUNG: Clear to auscultation bilaterally; No wheeze  HEART: Regular rate and rhythm; No murmurs, rubs, or gallops  ABDOMEN: Soft, Nontender, Nondistended; Bowel sounds present  EXTREMITIES:  2+ Peripheral Pulses, No clubbing, cyanosis, or edema  PSYCH: Awake, non-verbal, calm  NEUROLOGY: moves extremities  SKIN: No rashes or lesions    LABS:                    RADIOLOGY & ADDITIONAL TESTS:    Imaging Personally Reviewed:    Consultant(s) Notes Reviewed:      Care Discussed with Consultants/Other Providers: Neuro

## 2019-03-15 NOTE — DISCHARGE NOTE PROVIDER - PROVIDER TOKENS
FREE:[LAST:[Appointment],PHONE:[(   )    -],FAX:[(   )    -],ADDRESS:[Follow up with Primary Care Provider within 1 week of discharge]]

## 2019-03-15 NOTE — PROGRESS NOTE ADULT - ATTENDING COMMENTS
Medically stable for transfer back to [facility]. D/C medication reviewed.   Spoke to Neuro consult about ASA. OK to continue ASA 81mg and Lovenox SQ (for DVT) as per Neuro.  Total time: 35min
Spoke to Dr Mccord Internist at [facility]. He requested pt be transferred to short term rehab prior to returning to [facility] or Long term care facility.
Pt examined, chart review and case presented on rounds.  Agree with above Hx and PE. Pt is poorly cooperate for exam today but still demonstrates right hemiplegia.  Non-verbal and lethargic. Repeat CT head shows no acute changes. No reported seizure activity.  Agree with above assessment and plan.
agree with above; ROS otherwise negative
59 year old man with schizophrenia resident at [facility],  h/o CVA, recent cervical cord compression, s/p discectomy, wheel chair bound at baseline presented with sudden onset aphasia, and right sided weakness given tPA in the ED admitted to MICU for monitoring    - alert has dysarthria with slurred speech   - continue psych medications  - echo pending  - repeat head CT today  - follow up neurology recommendations
Choice of long term anticoagulation depends on fall risk and medical co-morbid conditions.  He does have a filter, but generally anticoagulation is still recommended as is feasible and safe.
I have personally  seen and examined the patient today and have noted the findings and formulated the plan of care.
Urban Leal MD  pager# [phone]
Difficulty figuring out the primary decision maker for his medical decisions as he lacks the capacity but has no next of kin.  SW investigating.    Urban Leal MD  pager# [phone]
f/u CT scan after code stroke
anticipate dc early next week back to [facility]
anticipate dc once pt with consistent PO
Urban Leal MD  pager# [phone]

## 2019-03-15 NOTE — PROGRESS NOTE ADULT - NSICDXPROBLEM_GEN_ALL_CORE_FT
PROBLEM DIAGNOSES  Problem: Cerebrovascular accident (CVA), unspecified mechanism  Assessment and Plan: S/P tPA, currently stable.  -Plan for short term rehab then [facility] as per [facility] request  -F/U with Neurology  -Continue current meds    Problem: Schizophrenia, unspecified type  Assessment and Plan: Continue current meds.  Re-consult Psych to facilitate transfer back to [facility]    Problem: Leg DVT (deep venous thromboembolism), acute, bilateral  Assessment and Plan: S/P IVC filter.  Continue Lovenox SQ    Problem: Seizure  Assessment and Plan: Continue current meds
PROBLEM DIAGNOSES  Problem: Cerebrovascular accident (CVA), unspecified mechanism  Assessment and Plan: S/P tPA, currently stable.  -Plan for short term rehab then [facility] as per [facility] request  -F/U with Neurology  -Continue current meds    Problem: Schizophrenia, unspecified type  Assessment and Plan: Continue current meds.  Re-consult Psych to facilitate transfer back to [facility]    Problem: Leg DVT (deep venous thromboembolism), acute, bilateral  Assessment and Plan: S/P IVC filter.  Continue Lovenox SQ    Problem: Seizure  Assessment and Plan: Continue current meds
PROBLEM DIAGNOSES  Problem: Cerebrovascular accident (CVA), unspecified mechanism  Assessment and Plan: S/P tPA, currently stable.  -Plan for short term rehab then [facility]  -F/U with Neurology  -Continue current meds    Problem: Schizophrenia, unspecified type  Assessment and Plan: Continue current meds    Problem: Leg DVT (deep venous thromboembolism), acute, bilateral  Assessment and Plan: S/P IVC filter.  Continue Lovenox SQ    Problem: Seizure  Assessment and Plan: Continue current meds
PROBLEM DIAGNOSES  Problem: Cerebrovascular accident (CVA), unspecified mechanism  Assessment and Plan: S/P tPA, currently stable.  -Plan for short term rehab then [facility] as per [facility] request  -F/U with Neurology  -Continue current meds    Problem: Schizophrenia, unspecified type  Assessment and Plan: Continue current meds.  Re-consult Psych to facilitate transfer back to [facility]    Problem: Leg DVT (deep venous thromboembolism), acute, bilateral  Assessment and Plan: S/P IVC filter.  Continue Lovenox SQ    Problem: Seizure  Assessment and Plan: Continue current meds

## 2019-03-15 NOTE — DISCHARGE NOTE PROVIDER - NSDCCPCAREPLAN_GEN_ALL_CORE_FT
PRINCIPAL DISCHARGE DIAGNOSIS  Diagnosis: Cerebrovascular accident (CVA), unspecified mechanism  Assessment and Plan of Treatment: To help recover or improve as much as possible your sensory and motor abilities, to become more independent, and prevent future strokes.

## 2019-03-15 NOTE — ED PROVIDER NOTE - CLINICAL SUMMARY MEDICAL DECISION MAKING FREE TEXT BOX
Social admission.  Admit to medicine. pt is unable to care for self needs strict cva rehab and [facility] unable to provide.  Admit to medicine.

## 2019-03-15 NOTE — PROGRESS NOTE ADULT - NSHPATTENDINGPLANDISCUSS_GEN_ALL_CORE
PA
PA, Neuro
Neuro team
PA
medical assistant.
MICU team
neurology resident Dr. Medrano
surg ho
Tele PA

## 2019-03-15 NOTE — PROGRESS NOTE ADULT - REASON FOR ADMISSION
code stroke

## 2019-03-15 NOTE — ED ADULT TRIAGE NOTE - CHIEF COMPLAINT QUOTE
BIBEMS from [facility], pt d/c this morning for CVA sent to [facility], per EMS [facility] refused to accept pt due to the fact that pt now has R sided oni-paresis and [facility] is not "equipped" to provide care for pts condition. [facility] reports pt is having decreased PO intake and sent pt in for SW. Accompanied by [facility] staff, 2:1

## 2019-03-15 NOTE — PROGRESS NOTE ADULT - PROVIDER SPECIALTY LIST ADULT
Heme/Onc
Hospitalist
MICU
Neurology
Neurology
Rehab Medicine
Vascular Surgery
Hospitalist

## 2019-03-15 NOTE — ED PROVIDER NOTE - PHYSICAL EXAMINATION
Gen:  NAD, nonverbal  HEENT:  sclera clear, PERRL  Heart:  RRR, no M/R/G  Lung:  CTA b/l, no rales or wheeze  Abd:  ND, soft, NT  Ext:  No joint swelling or edema  Neuro:  Tone normal, moving all 4 but not on command  Skin:  NO rash or ecchymosis

## 2019-03-15 NOTE — DISCHARGE NOTE PROVIDER - CARE PROVIDER_API CALL
Appointment,   Follow up with Primary Care Provider within 1 week of discharge  Phone: (   )    -  Fax: (   )    -  Follow Up Time:

## 2019-03-15 NOTE — ED PROVIDER NOTE - ATTENDING CONTRIBUTION TO CARE
[place]: 59 y.o. male Schizophrenia, cervical cord compression (chronic RLE weakness wheelchair-dependent) was discharged today from [facility] after a hospital stay for CVA. pt received tpa and medically managed.  sent to [facility].  [facility] unable to provide post stroke care so sent back to ed.  no falls, pt not providing hx but follows command    *GEN:   comfortable, in no apparent distress, Awake  *EYES:   PERRL, slight right gaze preference  *HEENT:   airway patent, moist mucosal membranes, uvula midline  *CV:   regular rate and rhythm, normal S1/S2, no murmur  *RESP:   clear to auscultation bilaterally, non-labored, speaking in full sentences  *ABD:   soft, non tender, no guarding  *MSK:   no musculoskeletal tenderness,  moving all extremity  *SKIN:   dry, intact, no rash  *NEURO:   Awake, moving extremity difficult to assess strength due to pt not cooperative.    a/p: known cva sent back from [facility] due to pt requiring rehab placement and higher level of care.  admit

## 2019-03-15 NOTE — ED ADULT NURSE NOTE - CHIEF COMPLAINT QUOTE
BIBEMS from [facility], pt d/c this morning for CVA sent to [facility], per EMS [facility] refused to accept pt due to the fact that pt now has R sided oni-paresis and [facility] is not "equipped" to provide care for pts condition. [facility] reports pt is having decreased PO intake and sent pt in for SW. Accompanied by [facility] staff, 2:1

## 2019-03-15 NOTE — DISCHARGE NOTE PROVIDER - HOSPITAL COURSE
59yoM pmh Schizophrenia (lives at [facility]), DVT on lovenox, w/ recent admission for cervical cord compression s/p discectomy presented to the ED for acute onset of severely slurred speech, RUE weakness and R facial droop around noon. Per [facility] aid, patient was sitting at lunch and was unable to transfer himself from chair to wheelchair due to weakness and staff noted slurring of his speech.  Pt is in wheelchair at baseline and receives PT for chronic RLE weakness, but staff noticed his right side weakness was significantly worse than usual and had additional weakness of RUE. In the ED, the weakness mildly improved over the hour but the patient's speech difficulty continued. Patient is able to follow commands and denies any pain but is having difficulty verbalizing anything.          In the ED:    Vital Signs Last 24 Hrs    HR: 83 (02-14-19 @ 13:03) (83 - 83)    BP: 136/90 (02-14-19 @ 13:03) (136/90 - 136/90)    RR: 18 (02-14-19 @ 13:03) (18 - 18)    SpO2: 97% (02-14-19 @ 13:03) (97% - 97%)        Labs:    Received: tPA @ 1:35 PM (14 Feb 2019 14:00)        Hospital Course        Labs/procedures        Alk phos: 127            HgbA1-c: 5.5             Glucose: 106    2/14 CT Head - Small scattered hypodensities involving the left frontotemporal lobe and     insula compatible with acute infarcts in the MCA territory. No evidence for hemorrhagic transformation.    2/15 Repeat CT Head - Limited exam secondary to motion artifact. Small scattered hypodensities involving the left frontotemporal lobe and insula compatible with acute infarcts in the MCA territory. No evidence for hemorrhagic transformation.    2/15 MRI/MRA Head/Neck - Multiple foci of diffusion restriction are seen of the left     insular cortex, left posterior frontal and parietal lobes, consistent with acute ischemic insults in the left middle cerebral artery territory. No hemorrhage is seen. MRA of the brain and MRA the neck show no  abnormalities.    2/16 B/L LE Doppler - Age-indeterminate, non-occlusive thromboses noted in the    right common femoral, femoral, and popliteal veins. Age-indeterminate, non-occlusive thrombosis noted in the left popliteal vein    2/18 AXR - IVC filter in the region of the suprarenal IVC. Nonobstructive bowel gas pattern. Visualized osseous structures demonstrate no acute abnormality.    2/19 Repeat CT Head - Evolving/enlarging left MCA distribution infarction with associated mass effect. No intracranial hemorrhage.    2/20 TTE - EF: 56%, Normal trileaflet aortic valve. Normal left ventricular internal dimensions and wall thicknesses. Normal left ventricular systolic function. No segmental wall motion abnormalities. Normal right ventricular size and function.    Unable to replace IV for bubble study.        Patient received tPA s/p code stroke was noted to have acute infarcts in the MCA territory and was transferred to MICU for closer monitoring.  Once stable was transferred to the floor.  Noted to have recurrent DVT (deep venous thromboembolism) with IVC filter, anticoagulation with Lovenox SQ.  Plan to return to [facility]. 59yoM pmh Schizophrenia (lives at [facility]), DVT on lovenox, w/ recent admission for cervical cord compression s/p discectomy presented to the ED for acute onset of severely slurred speech, RUE weakness and R facial droop around noon. Per [facility] aid, patient was sitting at lunch and was unable to transfer himself from chair to wheelchair due to weakness and staff noted slurring of his speech.  Pt is in wheelchair at baseline and receives PT for chronic RLE weakness, but staff noticed his right side weakness was significantly worse than usual and had additional weakness of RUE. In the ED, the weakness mildly improved over the hour but the patient's speech difficulty continued. Patient is able to follow commands and denies any pain but is having difficulty verbalizing anything.          In the ED:    Vital Signs Last 24 Hrs    HR: 83 (02-14-19 @ 13:03) (83 - 83)    BP: 136/90 (02-14-19 @ 13:03) (136/90 - 136/90)    RR: 18 (02-14-19 @ 13:03) (18 - 18)    SpO2: 97% (02-14-19 @ 13:03) (97% - 97%)        Labs:    Received: tPA @ 1:35 PM (14 Feb 2019 14:00)        Hospital Course        Labs/procedures        Alk phos: 127            HgbA1-c: 5.5             Glucose: 106    2/14 CT Head - Small scattered hypodensities involving the left frontotemporal lobe and     insula compatible with acute infarcts in the MCA territory. No evidence for hemorrhagic transformation.    2/15 Repeat CT Head - Limited exam secondary to motion artifact. Small scattered hypodensities involving the left frontotemporal lobe and insula compatible with acute infarcts in the MCA territory. No evidence for hemorrhagic transformation.    2/15 MRI/MRA Head/Neck - Multiple foci of diffusion restriction are seen of the left     insular cortex, left posterior frontal and parietal lobes, consistent with acute ischemic insults in the left middle cerebral artery territory. No hemorrhage is seen. MRA of the brain and MRA the neck show no  abnormalities.    2/16 B/L LE Doppler - Age-indeterminate, non-occlusive thromboses noted in the    right common femoral, femoral, and popliteal veins. Age-indeterminate, non-occlusive thrombosis noted in the left popliteal vein    2/18 AXR - IVC filter in the region of the suprarenal IVC. Nonobstructive bowel gas pattern. Visualized osseous structures demonstrate no acute abnormality.    2/19 Repeat CT Head - Evolving/enlarging left MCA distribution infarction with associated mass effect. No intracranial hemorrhage.    2/20 TTE - EF: 56%, Normal trileaflet aortic valve. Normal left ventricular internal dimensions and wall thicknesses. Normal left ventricular systolic function. No segmental wall motion abnormalities. Normal right ventricular size and function.    Unable to replace IV for bubble study.    3/14: Neuro: neurovascular workup is complete, patient was found to have recurrent DVTs, is on anticoagulation with lovenox 90 BID, possibly lifelong for recurrent DVT, so any cardiac source of embolism, if found, would not . No further workup as inpatient from neurovascular perspective. Continue current medications. May be discharged back to [facility]    3/14: as per PMD, will reevaluate the risks and benefits of full anticoagulaton and consider workup hypercoagulable workup     as per HEM: #Recurrent DVT     History of DVT s/p IVC filter    - Hematology consulted for hypercoagulable work up given hx of DVT and now with a stroke    - Pt has hx of DVT in 2013 and was on coumadin, then was switched to Xarelto in 2016. Now patient is on lovenox,  if patient is a fall risks, Anticoagulation will be contraindicated. Need a discussion with his next of kind/HCP. If not a fall risk, ok to discharge him on lovenox. WE will need to perform hypercoagulable work-up as an oupatient.     We will set up an appointment upon discharge.             Patient received tPA s/p code stroke was noted to have acute infarcts in the MCA territory and was transferred to MICU for closer monitoring.  Once stable was transferred to the floor.  Noted to have recurrent DVT (deep venous thromboembolism) with IVC filter, anticoagulation with Lovenox SQ.  Plan to return to [facility].        3/15: Patient stable for discharge as per Dr. Mohr

## 2019-03-15 NOTE — PROGRESS NOTE ADULT - ASSESSMENT
59M from [facility] h/o schizophrenia, recurrent DVT s/p IVC filter, seizure disorder, h/o severe C4-5 stenosis c/b cord compression s/p discectomy and fusion (wheel chair bound) adm 2/14 for dysarthria concerning for acute CVA s/p tPA complicated by brain edema, acute encephalopathy, poss catatonia, dysarthria, hypernatremia.

## 2019-03-15 NOTE — ED ADULT NURSE NOTE - OBJECTIVE STATEMENT
Pt received in rm 8, 59Y male, outpatient at [facility]. Pt d/c from facility this AM for CVA and sent back to [facility]. According to staff [facility] will not accept pt due to residual r sided hemiparesis. Upon arrival to room pt has 2:1 [facility] staff at bedside. Pt appears in NAD,  MD at bedside for eval, will continue to monitor.

## 2019-03-15 NOTE — ED ADULT NURSE NOTE - CAS EDP DISCH DISPOSITION ADMI
From: Mariposa Garcia  To: Nicky Harris DO  Sent: 4/6/2017 6:12 PM CDT  Subject: Medication refills    Dear Mauri Saunders there! Hope you're well!     I just wanted to check in and confirm that you're still my provider? I thought so, but the last time I called your office they told me that you weren't seeing any patients anymore. Sorry to bug you with this, but sadly I'm hitting the perfect storm where all my medications (Lutera, Epinastine eyedrops, and Paxil) won't refill for me anymore without provider authorization. Any chance you could help me with those?    Thank you so much, and have a great weekend!    Best,  Mariposa Garcia   [facility]/[id number]

## 2019-03-16 DIAGNOSIS — F20.2 CATATONIC SCHIZOPHRENIA: ICD-10-CM

## 2019-03-16 DIAGNOSIS — R56.9 UNSPECIFIED CONVULSIONS: ICD-10-CM

## 2019-03-16 DIAGNOSIS — I82.409 ACUTE EMBOLISM AND THROMBOSIS OF UNSPECIFIED DEEP VEINS OF UNSPECIFIED LOWER EXTREMITY: ICD-10-CM

## 2019-03-16 DIAGNOSIS — Z29.9 ENCOUNTER FOR PROPHYLACTIC MEASURES, UNSPECIFIED: ICD-10-CM

## 2019-03-16 DIAGNOSIS — I63.9 CEREBRAL INFARCTION, UNSPECIFIED: ICD-10-CM

## 2019-03-16 LAB
ALBUMIN SERPL ELPH-MCNC: 4 G/DL — SIGNIFICANT CHANGE UP (ref 3.3–5)
ALP SERPL-CCNC: 120 U/L — SIGNIFICANT CHANGE UP (ref 40–120)
ALT FLD-CCNC: 27 U/L — SIGNIFICANT CHANGE UP (ref 4–41)
ANION GAP SERPL CALC-SCNC: 14 MMO/L — SIGNIFICANT CHANGE UP (ref 7–14)
APTT BLD: 21.1 SEC — LOW (ref 27.5–36.3)
AST SERPL-CCNC: 24 U/L — SIGNIFICANT CHANGE UP (ref 4–40)
BASOPHILS # BLD AUTO: 0.01 K/UL — SIGNIFICANT CHANGE UP (ref 0–0.2)
BASOPHILS NFR BLD AUTO: 0.2 % — SIGNIFICANT CHANGE UP (ref 0–2)
BILIRUB SERPL-MCNC: 0.4 MG/DL — SIGNIFICANT CHANGE UP (ref 0.2–1.2)
BUN SERPL-MCNC: 15 MG/DL — SIGNIFICANT CHANGE UP (ref 7–23)
CALCIUM SERPL-MCNC: 9.7 MG/DL — SIGNIFICANT CHANGE UP (ref 8.4–10.5)
CHLORIDE SERPL-SCNC: 105 MMOL/L — SIGNIFICANT CHANGE UP (ref 98–107)
CO2 SERPL-SCNC: 25 MMOL/L — SIGNIFICANT CHANGE UP (ref 22–31)
CREAT SERPL-MCNC: 0.88 MG/DL — SIGNIFICANT CHANGE UP (ref 0.5–1.3)
EOSINOPHIL # BLD AUTO: 0.09 K/UL — SIGNIFICANT CHANGE UP (ref 0–0.5)
EOSINOPHIL NFR BLD AUTO: 2 % — SIGNIFICANT CHANGE UP (ref 0–6)
GLUCOSE SERPL-MCNC: 104 MG/DL — HIGH (ref 70–99)
HCT VFR BLD CALC: 48.2 % — SIGNIFICANT CHANGE UP (ref 39–50)
HGB BLD-MCNC: 15.8 G/DL — SIGNIFICANT CHANGE UP (ref 13–17)
IMM GRANULOCYTES NFR BLD AUTO: 0 % — SIGNIFICANT CHANGE UP (ref 0–1.5)
INR BLD: 1.18 — HIGH (ref 0.88–1.17)
LYMPHOCYTES # BLD AUTO: 1.4 K/UL — SIGNIFICANT CHANGE UP (ref 1–3.3)
LYMPHOCYTES # BLD AUTO: 31 % — SIGNIFICANT CHANGE UP (ref 13–44)
MAGNESIUM SERPL-MCNC: 2 MG/DL — SIGNIFICANT CHANGE UP (ref 1.6–2.6)
MCHC RBC-ENTMCNC: 32.2 PG — SIGNIFICANT CHANGE UP (ref 27–34)
MCHC RBC-ENTMCNC: 32.8 % — SIGNIFICANT CHANGE UP (ref 32–36)
MCV RBC AUTO: 98.4 FL — SIGNIFICANT CHANGE UP (ref 80–100)
MONOCYTES # BLD AUTO: 0.52 K/UL — SIGNIFICANT CHANGE UP (ref 0–0.9)
MONOCYTES NFR BLD AUTO: 11.5 % — SIGNIFICANT CHANGE UP (ref 2–14)
NEUTROPHILS # BLD AUTO: 2.49 K/UL — SIGNIFICANT CHANGE UP (ref 1.8–7.4)
NEUTROPHILS NFR BLD AUTO: 55.3 % — SIGNIFICANT CHANGE UP (ref 43–77)
NRBC # FLD: 0 K/UL — LOW (ref 25–125)
PHOSPHATE SERPL-MCNC: 2.6 MG/DL — SIGNIFICANT CHANGE UP (ref 2.5–4.5)
PLATELET # BLD AUTO: 174 K/UL — SIGNIFICANT CHANGE UP (ref 150–400)
PMV BLD: 11.1 FL — SIGNIFICANT CHANGE UP (ref 7–13)
POTASSIUM SERPL-MCNC: 4.2 MMOL/L — SIGNIFICANT CHANGE UP (ref 3.5–5.3)
POTASSIUM SERPL-SCNC: 4.2 MMOL/L — SIGNIFICANT CHANGE UP (ref 3.5–5.3)
PROT SERPL-MCNC: 7.5 G/DL — SIGNIFICANT CHANGE UP (ref 6–8.3)
PROTHROM AB SERPL-ACNC: 13.2 SEC — HIGH (ref 9.8–13.1)
RBC # BLD: 4.9 M/UL — SIGNIFICANT CHANGE UP (ref 4.2–5.8)
RBC # FLD: 12.5 % — SIGNIFICANT CHANGE UP (ref 10.3–14.5)
SODIUM SERPL-SCNC: 144 MMOL/L — SIGNIFICANT CHANGE UP (ref 135–145)
WBC # BLD: 4.51 K/UL — SIGNIFICANT CHANGE UP (ref 3.8–10.5)
WBC # FLD AUTO: 4.51 K/UL — SIGNIFICANT CHANGE UP (ref 3.8–10.5)

## 2019-03-16 PROCEDURE — 92610 EVALUATE SWALLOWING FUNCTION: CPT

## 2019-03-16 PROCEDURE — 99233 SBSQ HOSP IP/OBS HIGH 50: CPT

## 2019-03-16 PROCEDURE — 93010 ELECTROCARDIOGRAM REPORT: CPT

## 2019-03-16 RX ORDER — QUETIAPINE FUMARATE 200 MG/1
1 TABLET, FILM COATED ORAL
Qty: 0 | Refills: 0 | COMMUNITY

## 2019-03-16 RX ORDER — BENZTROPINE MESYLATE 1 MG
1 TABLET ORAL
Qty: 0 | Refills: 0 | Status: DISCONTINUED | OUTPATIENT
Start: 2019-03-16 | End: 2019-03-16

## 2019-03-16 RX ORDER — BENZTROPINE MESYLATE 1 MG
1 TABLET ORAL
Qty: 0 | Refills: 0 | COMMUNITY

## 2019-03-16 RX ORDER — LANOLIN ALCOHOL/MO/W.PET/CERES
3 CREAM (GRAM) TOPICAL AT BEDTIME
Qty: 0 | Refills: 0 | Status: DISCONTINUED | OUTPATIENT
Start: 2019-03-16 | End: 2019-03-20

## 2019-03-16 RX ORDER — PANTOPRAZOLE SODIUM 20 MG/1
40 TABLET, DELAYED RELEASE ORAL
Qty: 0 | Refills: 0 | Status: DISCONTINUED | OUTPATIENT
Start: 2019-03-16 | End: 2019-03-20

## 2019-03-16 RX ORDER — ASPIRIN/CALCIUM CARB/MAGNESIUM 324 MG
81 TABLET ORAL DAILY
Qty: 0 | Refills: 0 | Status: DISCONTINUED | OUTPATIENT
Start: 2019-03-16 | End: 2019-03-20

## 2019-03-16 RX ORDER — DOCUSATE SODIUM 100 MG
100 CAPSULE ORAL THREE TIMES A DAY
Qty: 0 | Refills: 0 | Status: DISCONTINUED | OUTPATIENT
Start: 2019-03-16 | End: 2019-03-20

## 2019-03-16 RX ORDER — ONDANSETRON 8 MG/1
4 TABLET, FILM COATED ORAL EVERY 6 HOURS
Qty: 0 | Refills: 0 | Status: DISCONTINUED | OUTPATIENT
Start: 2019-03-16 | End: 2019-03-20

## 2019-03-16 RX ORDER — MEMANTINE HYDROCHLORIDE 10 MG/1
5 TABLET ORAL AT BEDTIME
Qty: 0 | Refills: 0 | Status: DISCONTINUED | OUTPATIENT
Start: 2019-03-16 | End: 2019-03-20

## 2019-03-16 RX ORDER — ATORVASTATIN CALCIUM 80 MG/1
80 TABLET, FILM COATED ORAL AT BEDTIME
Qty: 0 | Refills: 0 | Status: DISCONTINUED | OUTPATIENT
Start: 2019-03-16 | End: 2019-03-20

## 2019-03-16 RX ORDER — ATORVASTATIN CALCIUM 80 MG/1
1 TABLET, FILM COATED ORAL
Qty: 0 | Refills: 0 | COMMUNITY

## 2019-03-16 RX ORDER — QUETIAPINE FUMARATE 200 MG/1
200 TABLET, FILM COATED ORAL
Qty: 0 | Refills: 0 | Status: DISCONTINUED | OUTPATIENT
Start: 2019-03-16 | End: 2019-03-16

## 2019-03-16 RX ORDER — ENOXAPARIN SODIUM 100 MG/ML
90 INJECTION SUBCUTANEOUS
Qty: 0 | Refills: 0 | Status: DISCONTINUED | OUTPATIENT
Start: 2019-03-16 | End: 2019-03-20

## 2019-03-16 RX ORDER — ATORVASTATIN CALCIUM 80 MG/1
10 TABLET, FILM COATED ORAL AT BEDTIME
Qty: 0 | Refills: 0 | Status: DISCONTINUED | OUTPATIENT
Start: 2019-03-16 | End: 2019-03-16

## 2019-03-16 RX ORDER — ACETAMINOPHEN 500 MG
650 TABLET ORAL EVERY 6 HOURS
Qty: 0 | Refills: 0 | Status: DISCONTINUED | OUTPATIENT
Start: 2019-03-16 | End: 2019-03-20

## 2019-03-16 RX ORDER — LEVETIRACETAM 250 MG/1
1000 TABLET, FILM COATED ORAL
Qty: 0 | Refills: 0 | Status: DISCONTINUED | OUTPATIENT
Start: 2019-03-16 | End: 2019-03-20

## 2019-03-16 RX ORDER — CARBAMAZEPINE 200 MG
100 TABLET ORAL
Qty: 0 | Refills: 0 | Status: DISCONTINUED | OUTPATIENT
Start: 2019-03-16 | End: 2019-03-20

## 2019-03-16 RX ORDER — HALOPERIDOL DECANOATE 100 MG/ML
10 INJECTION INTRAMUSCULAR
Qty: 0 | Refills: 0 | Status: DISCONTINUED | OUTPATIENT
Start: 2019-03-16 | End: 2019-03-20

## 2019-03-16 RX ADMIN — ENOXAPARIN SODIUM 90 MILLIGRAM(S): 100 INJECTION SUBCUTANEOUS at 07:11

## 2019-03-16 RX ADMIN — Medication 100 MILLIGRAM(S): at 18:13

## 2019-03-16 RX ADMIN — HALOPERIDOL DECANOATE 10 MILLIGRAM(S): 100 INJECTION INTRAMUSCULAR at 18:14

## 2019-03-16 RX ADMIN — Medication 100 MILLIGRAM(S): at 07:08

## 2019-03-16 RX ADMIN — ATORVASTATIN CALCIUM 80 MILLIGRAM(S): 80 TABLET, FILM COATED ORAL at 22:48

## 2019-03-16 RX ADMIN — Medication 100 MILLIGRAM(S): at 22:48

## 2019-03-16 RX ADMIN — LEVETIRACETAM 1000 MILLIGRAM(S): 250 TABLET, FILM COATED ORAL at 07:10

## 2019-03-16 RX ADMIN — ENOXAPARIN SODIUM 90 MILLIGRAM(S): 100 INJECTION SUBCUTANEOUS at 18:14

## 2019-03-16 RX ADMIN — Medication 3 MILLIGRAM(S): at 22:48

## 2019-03-16 RX ADMIN — Medication 100 MILLIGRAM(S): at 07:10

## 2019-03-16 RX ADMIN — Medication 81 MILLIGRAM(S): at 11:36

## 2019-03-16 RX ADMIN — MEMANTINE HYDROCHLORIDE 5 MILLIGRAM(S): 10 TABLET ORAL at 22:48

## 2019-03-16 RX ADMIN — HALOPERIDOL DECANOATE 10 MILLIGRAM(S): 100 INJECTION INTRAMUSCULAR at 07:09

## 2019-03-16 RX ADMIN — LEVETIRACETAM 1000 MILLIGRAM(S): 250 TABLET, FILM COATED ORAL at 18:14

## 2019-03-16 RX ADMIN — PANTOPRAZOLE SODIUM 40 MILLIGRAM(S): 20 TABLET, DELAYED RELEASE ORAL at 07:08

## 2019-03-16 RX ADMIN — Medication 1 TABLET(S): at 11:36

## 2019-03-16 NOTE — H&P ADULT - NSHPPOAPULMEMBOLUS_GEN_A_CORE
no Acute deep vein thrombosis (DVT) of lower extremity, unspecified laterality, unspecified vein    BCC (basal cell carcinoma of skin)    Essential hypertension    Hypothyroidism, unspecified type    SCC (squamous cell carcinoma)    Spinal stenosis, unspecified spinal region

## 2019-03-16 NOTE — PHYSICAL THERAPY INITIAL EVALUATION ADULT - DISCHARGE DISPOSITION, PT EVAL
Patient will benefit from inpatient restorative rehab at this time; please follow for progress with PT/rehabilitation facility

## 2019-03-16 NOTE — ED ADULT NURSE REASSESSMENT NOTE - NS ED NURSE REASSESS COMMENT FT1
Spoke with MD Yi re: pt becoming aggressive when RN attempted to place IV. [facility] aides at bedside advised RN to leave pt alone when he gets aggressive like this. MD Yi and RN on 4S aware.

## 2019-03-16 NOTE — SWALLOW BEDSIDE ASSESSMENT ADULT - ASR SWALLOW ASPIRATION MONITOR
pneumonia/upper respiratory infection/fever/change of breathing pattern/oral hygiene/position upright (90Y)/cough/gurgly voice/throat clearing

## 2019-03-16 NOTE — ED ADULT NURSE REASSESSMENT NOTE - NS ED NURSE REASSESS COMMENT FT1
Pt at baseline, attempt to get IV access x2 RNs. Pt getting agitated on second attempt, appear to get aggressive, [facility] staff at bedside, Medicine resident Kd aware, floor RN notified.

## 2019-03-16 NOTE — H&P ADULT - NSHPLABSRESULTS_GEN_ALL_CORE
RADIOLOGY:  Transthoracic Echocardiogram (02.20.19 @ 12:40) >  CONCLUSIONS:  1. Normal trileaflet aortic valve.  2. Normal left ventricular internaldimensions and wall  thicknesses.  3. Normal left ventricular systolic function. No segmental  wall motion abnormalities.  4. Normal right ventricular size and function.  Unable to replace IV for bubble study.    2/19/19 CTH:  VENTRICLES AND SULCI /INTRA-AXIAL:  There is a confluent zone of   hypodensity with associated sulcal effacement, gyral swelling and loss of   gray-white matter differentiation compatible with an evolving MCA   distribution infarction within the frontal parietal and temporal lobes.   The area of involvement is larger than the area of diffusion restriction   seen on the prior MR scan. There is involvement of the lentiform nucleus,   insula and internal capsule. There is mass effect with ventricular   effacement and rightward midline shift which has not changed   significantly measuring 6 mm    EXTRA-AXIAL:  No mass or collection is seen.    VISUALIZED SINUSES:  Clear.    VISUALIZED MASTOIDS:  Clear.    CALVARIUM:  Normal.    MISCELLANEOUS:  None.      IMPRESSION:    Evolving/enlarging left MCA distribution infarction with associated mass   effect.    No intracranial hemorrhage.

## 2019-03-16 NOTE — H&P ADULT - NSICDXFAMILYHX_GEN_ALL_CORE_FT
FAMILY HISTORY:  No pertinent family history in first degree relatives  No pertinent family history in first degree relatives

## 2019-03-16 NOTE — PROGRESS NOTE ADULT - ASSESSMENT
59 y.o. male w/ hx Schizophrenia, chronic RLE weakness due to cervical cord compression/ Wheelchair bound was discharged from [facility] after CVA now sent back from [facility] for slurred speech and new RUE weakness. 59 y.o. male w/ hx Schizophrenia, seizure D/O chronic RLE weakness due to C4/5 stenosis c/b cord compression s/p discectomy/fusion/ Wheelchair bound was discharged from [facility] on 3/15 to [facility] after hospital stay for Left MCA CVA s/p TPA c/b catatonia and dysarthria. Patient now sent back from [facility] for inability to be fed with assistance. Patient has passed swallow eval assessing Dysphagia 1 with honey thickened liquids. 59 y.o. male w/ hx Schizophrenia, seizure D/O chronic RLE weakness due to C4/5 stenosis c/b cord compression s/p discectomy/fusion/ Wheelchair bound was discharged from [facility] on 3/15 to [facility] after hospital stay for Left MCA CVA s/p TPA c/b catatonia and dysarthria. Patient now sent back from [facility] for inability to be fed with assistance. Patient has passed swallow eval assessing Dysphagia 1 with honey thickened liquids.      Problem Selector:  PROBLEM DIAGNOSES  Problem: Cerebrovascular accident (CVA), unspecified mechanism  Assessment and Plan: S/P tPA, currently stable.  -passed repeat swallow eval but not eating due to catatonia.   -may need PEG; check Cinesophagram.   -insert NGT and start tube feeds and facilitate medication administration.   -need psych eval for catatonia    Problem: Schizophrenia, unspecified type  Assessment and Plan: Continue current meds.      Problem: Leg DVT (deep venous thromboembolism), acute, bilateral  Assessment and Plan: S/P IVC filter.  Continue Lovenox SQ    Problem: Seizure  Assessment and Plan: Continue current meds.

## 2019-03-16 NOTE — SWALLOW BEDSIDE ASSESSMENT ADULT - SWALLOW EVAL: PROGNOSIS
Pending MBSS 5. The patient demonstrated a severe pharyngeal dysphagia for nectar thick and soft solid textures marked by a delayed pharyngeal swallow trigger with reduced hyolaryngeal elevation upon digital palpation resulting in coughing suggestive of airway penetration.                                                                                                                                                     Pending MBSS 5. The patient demonstrated a severe pharyngeal dysphagia for nectar thick and soft solid textures marked by a delayed pharyngeal swallow trigger with reduced hyolaryngeal elevation upon digital palpation resulting in coughing suggestive of airway penetration. *It should be noted that the patient was unable to follow directives to take small bites/sips despite maximum prompting by this clinician.                                                                                                                                                     Pending MBSS

## 2019-03-16 NOTE — H&P ADULT - NSICDXPASTMEDICALHX_GEN_ALL_CORE_FT
PAST MEDICAL HISTORY:  DVT (deep venous thrombosis)     Paranoid schizophrenia     Schizophrenia     Seizure     Seizure disorder

## 2019-03-16 NOTE — SWALLOW BEDSIDE ASSESSMENT ADULT - SWALLOW EVAL: RECOMMENDED FEEDING/EATING TECHNIQUES
alternate food with liquid/oral hygiene/allow for swallow between intakes/check mouth frequently for oral residue/pocketing/crush medication (when feasible)/maintain upright posture during/after eating for 30 mins/no straws/hard swallow w/ each bite or sip/position upright (90 degrees)/small sips/bites

## 2019-03-16 NOTE — PHYSICAL THERAPY INITIAL EVALUATION ADULT - PREDICTED DURATION OF THERAPY (DAYS/WKS), PT EVAL
patient will be placed on trial for 1-2 more sessions; patient able to follow commands ~25% of time for evaluation.

## 2019-03-16 NOTE — PHYSICAL THERAPY INITIAL EVALUATION ADULT - PATIENT PROFILE REVIEW, REHAB EVAL
yes/PT orders received:  increase as tolerated. Consult with BASHIR CAMPOS, pt may participate in PT evaluation.

## 2019-03-16 NOTE — SWALLOW BEDSIDE ASSESSMENT ADULT - PHARYNGEAL PHASE
Decreased laryngeal elevation/Delayed pharyngeal swallow Delayed pharyngeal swallow/Decreased laryngeal elevation Delayed pharyngeal swallow/Decreased laryngeal elevation/Cough post oral intake

## 2019-03-16 NOTE — SWALLOW BEDSIDE ASSESSMENT ADULT - COMMENTS
The patient was seen this AM for an initial assessment of swallow function, at which time he was awake and cooperative, though remained nonverbal throughout today's evaluation. The patient is known to this department from a previous admission, at which time he was most recently seen for a clinical bedside swallow evaluation on 3/6/19 (please see full report for details), at which time a dysphagia 1 with honey thick liquid diet was recommended.     Per charting, the patient is a 60 yo M from [facility] (schizophrenia), recurrent DVT (2013) B/L s/p IVC filter and on full AC, seizure disorder, C4/5 stenosis c/b cord compression s/p discectomy and fusion (wheelchair bound), recent hospitalization at [facility] (DC'd yesterday) for acute L MCA CVA s/p TPA c/b catatonia, dysarthria, hypernatremia, presenting after [facility] facility rejected pt 2/2 inability to accommodate his nursing needs. Discussed results and recommendations from this evaluation with the patient and call out to TELE PA.

## 2019-03-16 NOTE — H&P ADULT - NSHPPHYSICALEXAM_GEN_ALL_CORE
Vital Signs Last 24 Hrs  T(C): 36.4 (16 Mar 2019 02:25), Max: 36.7 (15 Mar 2019 08:39)  T(F): 97.5 (16 Mar 2019 02:25), Max: 98 (15 Mar 2019 08:39)  HR: 57 (16 Mar 2019 02:25) (57 - 82)  BP: 128/73 (16 Mar 2019 02:25) (115/78 - 134/75)  BP(mean): --  RR: 17 (16 Mar 2019 02:25) (17 - 20)  SpO2: 99% (16 Mar 2019 02:25) (97% - 100%)    Gen: NAD  HEENT: normocephalic, EOMI, PERRLA, neck supple, no thyromegaly  Chest/CV: RRR, +2 peripheral pulses  Pulm: CTAB/L  Abd/GI: soft, NT, ND +BS  MSK: no peripheral edema/cyanosis  Heme/lymph: no cervical lymphadenopathy  Skin: no rashes  Neuro: CN 2-12 grossly intact  Psych: alert and oriented, normal mood, affect Vital Signs Last 24 Hrs  T(C): 36.4 (16 Mar 2019 02:25), Max: 36.7 (15 Mar 2019 08:39)  T(F): 97.5 (16 Mar 2019 02:25), Max: 98 (15 Mar 2019 08:39)  HR: 57 (16 Mar 2019 02:25) (57 - 82)  BP: 128/73 (16 Mar 2019 02:25) (115/78 - 134/75)  BP(mean): --  RR: 17 (16 Mar 2019 02:25) (17 - 20)  SpO2: 99% (16 Mar 2019 02:25) (97% - 100%)    Gen:  male, NAD, lying on bed  HEENT: normocephalic, rt downward gaze, PERRLA  Chest/CV: RRR, +2 peripheral pulses  Pulm: CTAB/L  Abd/GI: soft, NT, ND  MSK: no peripheral edema/cyanosis  Heme/lymph: no cervical lymphadenopathy  Skin: no rashes  Neuro: rt sided weakness, can lift LUE against resistance, no resting tremors  Psych: non-verbal Vital Signs Last 24 Hrs  T(C): 36.4 (16 Mar 2019 02:25), Max: 36.7 (15 Mar 2019 08:39)  T(F): 97.5 (16 Mar 2019 02:25), Max: 98 (15 Mar 2019 08:39)  HR: 57 (16 Mar 2019 02:25) (57 - 82)  BP: 128/73 (16 Mar 2019 02:25) (115/78 - 134/75)  BP(mean): --  RR: 17 (16 Mar 2019 02:25) (17 - 20)  SpO2: 99% (16 Mar 2019 02:25) (97% - 100%)    Gen:  male, NAD, lying on bed  HEENT: normocephalic, rt downward gaze, PERRLA  Chest/CV: RRR, +2 peripheral pulses  Pulm: CTAB/L  Abd/GI: soft, NT, ND  MSK: no peripheral edema/cyanosis  Heme/lymph: no cervical lymphadenopathy  Skin: no rashes  Neuro: rt sided weakness, can lift LUE against resistance, no resting tremors, aphasic  Psych: non-verbal Vital Signs Last 24 Hrs  T(C): 36.4 (16 Mar 2019 02:25), Max: 36.7 (15 Mar 2019 08:39)  T(F): 97.5 (16 Mar 2019 02:25), Max: 98 (15 Mar 2019 08:39)  HR: 57 (16 Mar 2019 02:25) (57 - 82)  BP: 128/73 (16 Mar 2019 02:25) (115/78 - 134/75)  BP(mean): --  RR: 17 (16 Mar 2019 02:25) (17 - 20)  SpO2: 99% (16 Mar 2019 02:25) (97% - 100%)    Gen:  male, NAD, lying on bed  Eyes: rt downward gaze, PERRL  Neck: no JVD, trachea midline, no LAD  Chest/CV: RRR, +S1S2, no LE edema b/l  Pulm: poor inspiratory effort, nonlabored breathing, CTAB/L  Abd/GI: BS+, soft, NT, ND  MSK: R sided hemiplegia, moving LUE spontaneously  Skin: warm and dry, no rashes  Neuro: unable to fully assess given poor mental status and inability to follow commands. rt sided weakness, can lift LUE against resistance, no resting tremors, nonverbal  Psych: nonagitated, nonverbal

## 2019-03-16 NOTE — PHYSICAL THERAPY INITIAL EVALUATION ADULT - PATIENT PROFILE REVIEW, REHAB EVAL
PT orders received:  increase as tolerated. Consult with BASHIR CAMPOS , pt may participate in PT evaluation./yes

## 2019-03-16 NOTE — H&P ADULT - HISTORY OF PRESENT ILLNESS
60 yo M pt from [facility] (schizophrenia), recurrent DVT (2013) B/L s/p IVC filter and on full AC, seizure disorder, C4/5 stenosis c/b cord compression s/p discectomy and fusion (wheelchair bound), recent hospitalization at [facility] (DC'd yesterday) for acute L MCA CVA s/p TPA c/b catatonia, dysarthria, hypernatremia, presenting after [facility] facility rejected pt 2/2 inability to accommodate his nursing needs.    In the ED, pt vitals were:  · BP Systolic	115 mm Hg  · BP Diastolic	78 mm Hg  · Heart Rate	71 /min  · Respiration Rate (breaths/min)	18 /min  · Temp (F)	97.7 Degrees F  · Temp (C)	36.5 Degrees C  · Temp site	axillary  · SpO2 (%)	98 %  He was admitted to medicine 60 yo M pt from [place] (schizophrenia), recurrent DVT s/p IVC filter (currently on Lovenox), seizure disorder, C4/5 stenosis c/b cord compression s/p discectomy and fusion (wheelchair bound), recent hospitalization at [facility] (DC'd yesterday) for acute L MCA CVA s/p TPA c/b catatonia, dysarthria, hypernatremia, presenting after [place] facility rejected pt 2/2 inability to accommodate his nursing needs. Per [facility] documentation accompanying pt, pt is unable to be spoon-fed meals, even with honey-thickened liquids, and is now bedbound due to R sided hemiplegia from recent CVA.    In the ED, pt vitals were:  · BP Systolic	115 mm Hg  · BP Diastolic	78 mm Hg  · Heart Rate	71 /min  · Respiration Rate (breaths/min)	18 /min  · Temp (F)	97.7 Degrees F  · Temp (C)	36.5 Degrees C  · Temp site	axillary  · SpO2 (%)	98 %  He was admitted to medicine

## 2019-03-16 NOTE — H&P ADULT - ASSESSMENT
60 yo M pt from [facility] (schizophrenia), recurrent DVT (2013) B/L s/p IVC filter and on full AC, seizure disorder, C4/5 stenosis c/b cord compression s/p discectomy and fusion (wheelchair bound), recent hospitalization at [facility] (DC'd yesterday) for acute L MCA CVA s/p TPA c/b catatonia, dysarthria, hypernatremia, presenting after [facility] facility rejected pt 2/2 inability to accommodate his nursing needs. 58 yo M pt from [place] (schizophrenia), recurrent DVT s/p IVC filter (currently on Lovenox), seizure disorder, C4/5 stenosis c/b cord compression s/p discectomy and fusion (wheelchair bound), recent hospitalization at [facility] (DC'd yesterday) for acute L MCA CVA s/p TPA c/b catatonia, dysarthria, hypernatremia, presenting after [facility] facility rejected pt 2/2 inability to accommodate his nursing needs.

## 2019-03-16 NOTE — SWALLOW BEDSIDE ASSESSMENT ADULT - ASR SWALLOW LINGUAL MOBILITY
A formal assessment of oral-motor function could not be obtained given patient's difficulty following low-level verbal/visual directives.

## 2019-03-16 NOTE — SWALLOW BEDSIDE ASSESSMENT ADULT - SWALLOW EVAL: DIAGNOSIS
1. The patient demonstrated a mild oral dysphagia for puree, honey thick, and nectar thick liquid textures marked by delayed bolus collection, transfer, and posterior transport. 2. The patient demonstrated a moderate-severe oral dysphagia for soft solid textures marked by 1. The patient demonstrated a mild oral dysphagia for puree, honey thick, and nectar thick liquid textures marked by delayed bolus collection, transfer, and posterior transport. 2. The patient demonstrated a moderate oral dysphagia for soft solid textures marked by absent mastication resulting in immediate posterior transport of the bolus in the oral cavity. 3. The patient demonstrated a mild pharyngeal dysphagia for puree textures marked by a delayed pharyngeal swallow trigger with reduced hyolaryngeal elevation upon digital palpation w/o evidence of airway penetration. 4. The patient demonstrated a mild-moderate pharyngeal dysphagia for honey thick liquid textures marked by delayed pharyngeal swallow trigger with reduced hyolaryngeal elevation upon digital palpation resulting in coughing suggestive of airway penetration when the patient self-fed large cup sips, when presented with smaller cup sips by this clinician, there was no evidence of coughing suggesting airway penetration. 1. The patient demonstrated a mild oral dysphagia for puree, honey thick, and nectar thick liquid textures marked by delayed bolus collection, transfer, and posterior transport. 2. The patient demonstrated a moderate oral dysphagia for soft solid textures marked by absent mastication resulting in immediate posterior transport of the bolus in the oral cavity. 3. The patient demonstrated a mild pharyngeal dysphagia for puree textures marked by a delayed pharyngeal swallow trigger with reduced hyolaryngeal elevation upon digital palpation w/o evidence of airway penetration. 4. The patient demonstrated a mild-moderate pharyngeal dysphagia for honey thick liquid textures marked by delayed pharyngeal swallow trigger with reduced hyolaryngeal elevation upon digital palpation resulting in coughing suggestive of airway penetration when the patient self-fed large cup sips. When presented with smaller cup sips by this clinician, there was no evidence of coughing suggesting airway penetration.

## 2019-03-16 NOTE — H&P ADULT - NSHPREVIEWOFSYSTEMS_GEN_ALL_CORE
Denies headaches, F/C, dizziness, syncope, CP/SOB, N/V, abdominal pain, dysuria, changes in BM, peripheral swelling, skin changes, new joint aches. Tolerating PO, ambulatory. Unable to assess as pt agitated, non-cooperative Unable to assess as pt not responding to questions, non-verbal Unable to assess as pt not responding to questions, nonverbal.

## 2019-03-16 NOTE — H&P ADULT - NSHPSOCIALHISTORY_GEN_ALL_CORE
Occupation:  Tobacco use:  Alcohol use:  Recreational drug use:  High-risk sexual activity: lives in [facility], wheelchair bound lives in [place] facility, wheelchair bound  unable to obtain Social History given pt's poor mental status, also pt nonverbal

## 2019-03-16 NOTE — PHYSICAL THERAPY INITIAL EVALUATION ADULT - ADDITIONAL COMMENTS
Patient non-verbal, unable to provide information for PT evaluation. All information was obtained through chart review from recent admission. Patient admitted to [facility] from [facility]. Patient non-verbal, unable to provide information for PT evaluation. All information was obtained through chart review from recent admission. Patient admitted to [facility] from [facility]. Prior to recent admission, patient was able to ambulate with a rolling walker, but mainly used a wheelchair.    Patient was left semi-supine in bed as found, all lines/tubes intact and call dickens within reach, BASHIR kolb

## 2019-03-16 NOTE — PHYSICAL THERAPY INITIAL EVALUATION ADULT - PERTINENT HX OF CURRENT PROBLEM, REHAB EVAL
Patient is a 59 year old male admitted to [facility] on 3/15 from [place] facility rejected pt 2/2 inability to accommodate his nursing needs. 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

## 2019-03-16 NOTE — ED ADULT NURSE REASSESSMENT NOTE - NS ED NURSE REASSESS COMMENT FT1
Received report from break BASHIR Carlos Pt at baseline, 2:1 rajani staff at bedside will continue to monitor.

## 2019-03-17 LAB
ANION GAP SERPL CALC-SCNC: 13 MMO/L — SIGNIFICANT CHANGE UP (ref 7–14)
BASOPHILS # BLD AUTO: 0.01 K/UL — SIGNIFICANT CHANGE UP (ref 0–0.2)
BASOPHILS NFR BLD AUTO: 0.2 % — SIGNIFICANT CHANGE UP (ref 0–2)
BUN SERPL-MCNC: 20 MG/DL — SIGNIFICANT CHANGE UP (ref 7–23)
CALCIUM SERPL-MCNC: 9.4 MG/DL — SIGNIFICANT CHANGE UP (ref 8.4–10.5)
CHLORIDE SERPL-SCNC: 102 MMOL/L — SIGNIFICANT CHANGE UP (ref 98–107)
CO2 SERPL-SCNC: 24 MMOL/L — SIGNIFICANT CHANGE UP (ref 22–31)
CREAT SERPL-MCNC: 0.99 MG/DL — SIGNIFICANT CHANGE UP (ref 0.5–1.3)
EOSINOPHIL # BLD AUTO: 0.06 K/UL — SIGNIFICANT CHANGE UP (ref 0–0.5)
EOSINOPHIL NFR BLD AUTO: 1.2 % — SIGNIFICANT CHANGE UP (ref 0–6)
GLUCOSE SERPL-MCNC: 98 MG/DL — SIGNIFICANT CHANGE UP (ref 70–99)
HCT VFR BLD CALC: 44.5 % — SIGNIFICANT CHANGE UP (ref 39–50)
HGB BLD-MCNC: 15 G/DL — SIGNIFICANT CHANGE UP (ref 13–17)
IMM GRANULOCYTES NFR BLD AUTO: 0.2 % — SIGNIFICANT CHANGE UP (ref 0–1.5)
LYMPHOCYTES # BLD AUTO: 1.35 K/UL — SIGNIFICANT CHANGE UP (ref 1–3.3)
LYMPHOCYTES # BLD AUTO: 26.4 % — SIGNIFICANT CHANGE UP (ref 13–44)
MAGNESIUM SERPL-MCNC: 1.9 MG/DL — SIGNIFICANT CHANGE UP (ref 1.6–2.6)
MCHC RBC-ENTMCNC: 32.3 PG — SIGNIFICANT CHANGE UP (ref 27–34)
MCHC RBC-ENTMCNC: 33.7 % — SIGNIFICANT CHANGE UP (ref 32–36)
MCV RBC AUTO: 95.7 FL — SIGNIFICANT CHANGE UP (ref 80–100)
MONOCYTES # BLD AUTO: 0.68 K/UL — SIGNIFICANT CHANGE UP (ref 0–0.9)
MONOCYTES NFR BLD AUTO: 13.3 % — SIGNIFICANT CHANGE UP (ref 2–14)
NEUTROPHILS # BLD AUTO: 3 K/UL — SIGNIFICANT CHANGE UP (ref 1.8–7.4)
NEUTROPHILS NFR BLD AUTO: 58.7 % — SIGNIFICANT CHANGE UP (ref 43–77)
NRBC # FLD: 0 K/UL — LOW (ref 25–125)
PLATELET # BLD AUTO: 202 K/UL — SIGNIFICANT CHANGE UP (ref 150–400)
PMV BLD: 10.1 FL — SIGNIFICANT CHANGE UP (ref 7–13)
POTASSIUM SERPL-MCNC: 4 MMOL/L — SIGNIFICANT CHANGE UP (ref 3.5–5.3)
POTASSIUM SERPL-SCNC: 4 MMOL/L — SIGNIFICANT CHANGE UP (ref 3.5–5.3)
RBC # BLD: 4.65 M/UL — SIGNIFICANT CHANGE UP (ref 4.2–5.8)
RBC # FLD: 12.6 % — SIGNIFICANT CHANGE UP (ref 10.3–14.5)
SODIUM SERPL-SCNC: 139 MMOL/L — SIGNIFICANT CHANGE UP (ref 135–145)
WBC # BLD: 5.11 K/UL — SIGNIFICANT CHANGE UP (ref 3.8–10.5)
WBC # FLD AUTO: 5.11 K/UL — SIGNIFICANT CHANGE UP (ref 3.8–10.5)

## 2019-03-17 PROCEDURE — 99233 SBSQ HOSP IP/OBS HIGH 50: CPT

## 2019-03-17 RX ADMIN — LEVETIRACETAM 1000 MILLIGRAM(S): 250 TABLET, FILM COATED ORAL at 05:27

## 2019-03-17 RX ADMIN — Medication 600 MILLIGRAM(S): at 17:10

## 2019-03-17 RX ADMIN — LEVETIRACETAM 1000 MILLIGRAM(S): 250 TABLET, FILM COATED ORAL at 17:10

## 2019-03-17 RX ADMIN — PANTOPRAZOLE SODIUM 40 MILLIGRAM(S): 20 TABLET, DELAYED RELEASE ORAL at 05:26

## 2019-03-17 RX ADMIN — Medication 100 MILLIGRAM(S): at 22:49

## 2019-03-17 RX ADMIN — Medication 100 MILLIGRAM(S): at 11:37

## 2019-03-17 RX ADMIN — MEMANTINE HYDROCHLORIDE 5 MILLIGRAM(S): 10 TABLET ORAL at 22:49

## 2019-03-17 RX ADMIN — Medication 100 MILLIGRAM(S): at 05:27

## 2019-03-17 RX ADMIN — ENOXAPARIN SODIUM 90 MILLIGRAM(S): 100 INJECTION SUBCUTANEOUS at 17:10

## 2019-03-17 RX ADMIN — Medication 1 TABLET(S): at 11:36

## 2019-03-17 RX ADMIN — Medication 81 MILLIGRAM(S): at 11:36

## 2019-03-17 RX ADMIN — Medication 5 MILLIGRAM(S): at 22:50

## 2019-03-17 RX ADMIN — Medication 100 MILLIGRAM(S): at 17:10

## 2019-03-17 RX ADMIN — ATORVASTATIN CALCIUM 80 MILLIGRAM(S): 80 TABLET, FILM COATED ORAL at 22:49

## 2019-03-17 RX ADMIN — Medication 5 MILLIGRAM(S): at 05:27

## 2019-03-17 RX ADMIN — HALOPERIDOL DECANOATE 10 MILLIGRAM(S): 100 INJECTION INTRAMUSCULAR at 17:10

## 2019-03-17 RX ADMIN — Medication 3 MILLIGRAM(S): at 22:49

## 2019-03-17 RX ADMIN — HALOPERIDOL DECANOATE 10 MILLIGRAM(S): 100 INJECTION INTRAMUSCULAR at 05:28

## 2019-03-17 RX ADMIN — ENOXAPARIN SODIUM 90 MILLIGRAM(S): 100 INJECTION SUBCUTANEOUS at 05:28

## 2019-03-17 RX ADMIN — Medication 100 MILLIGRAM(S): at 05:26

## 2019-03-17 NOTE — OCCUPATIONAL THERAPY INITIAL EVALUATION ADULT - NS ASR OT EQUIP NEEDS DISCH
Will continue to follow to determine if pt is appropriate for rehab services. Pt unable to follow commands.

## 2019-03-17 NOTE — OCCUPATIONAL THERAPY INITIAL EVALUATION ADULT - PLANNED THERAPY INTERVENTIONS, OT EVAL
cognitive, visual perceptual/neuromuscular re-education/ADL retraining/balance training/bed mobility training/fine motor coordination training/strengthening/transfer training/ROM

## 2019-03-17 NOTE — OCCUPATIONAL THERAPY INITIAL EVALUATION ADULT - MD ORDER
Occupational Therapy (OT) to evaluate and treat. Occupational Therapy (OT) to evaluate and treat. Increase as Tolerated.

## 2019-03-17 NOTE — OCCUPATIONAL THERAPY INITIAL EVALUATION ADULT - LIVES WITH, PROFILE
Pt unable to provide Social History secondary to noted non-verbal with difficulty following commands during OT evaluation. Per chart, pt presented to [facility] from [place] facility.

## 2019-03-17 NOTE — PROGRESS NOTE ADULT - ASSESSMENT
59 y.o. male w/ hx Schizophrenia, seizure D/O chronic RLE weakness due to C4/5 stenosis c/b cord compression s/p discectomy/fusion/ Wheelchair bound was discharged from [facility] on 3/15 to [facility] after hospital stay for Left MCA CVA s/p TPA c/b catatonia and dysarthria. Patient now sent back from [facility] for inability to be fed with assistance. Patient has passed swallow eval assessing Dysphagia 1 with honey thickened liquids.      Problem Selector:  PROBLEM DIAGNOSES  Problem: Cerebrovascular accident (CVA), unspecified mechanism  Assessment and Plan: S/P tPA, currently stable.  -passed repeat swallow eval but not eating due to catatonia.   -may need PEG; check Cinesophagram on 3/18.   -need psych eval for catatonia    Problem: Schizophrenia, unspecified type  Assessment and Plan: Continue current meds.      Problem: Leg DVT (deep venous thromboembolism), acute, bilateral  Assessment and Plan: S/P IVC filter.  Continue Lovenox SQ    Problem: Seizure  Assessment and Plan: Continue current meds.

## 2019-03-17 NOTE — OCCUPATIONAL THERAPY INITIAL EVALUATION ADULT - PERTINENT HX OF CURRENT PROBLEM, REHAB EVAL
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 unable to be spoon-fed meals even with honey-thickened liquids). Pt passed repeat swallow eval but may not eating due to catatonia.

## 2019-03-17 NOTE — OCCUPATIONAL THERAPY INITIAL EVALUATION ADULT - ADDITIONAL COMMENTS
Pt unable to provide Prior Functioning status secondary to noted non-verbal with difficulty following commands during OT evaluation. Per chart, pt. "wheelchair bound."

## 2019-03-17 NOTE — OCCUPATIONAL THERAPY INITIAL EVALUATION ADULT - DIAGNOSIS, OT EVAL
Hx of Recent acute Left MCA CVA s/p TPA c/b catatonia, dysarthria, & hypernatremia; Decreased ability to following commands; Decreased functional mobility; Decreased ADL management

## 2019-03-18 LAB
ANION GAP SERPL CALC-SCNC: 14 MMO/L — SIGNIFICANT CHANGE UP (ref 7–14)
BASOPHILS # BLD AUTO: 0.01 K/UL — SIGNIFICANT CHANGE UP (ref 0–0.2)
BASOPHILS NFR BLD AUTO: 0.2 % — SIGNIFICANT CHANGE UP (ref 0–2)
BUN SERPL-MCNC: 17 MG/DL — SIGNIFICANT CHANGE UP (ref 7–23)
CALCIUM SERPL-MCNC: 9.8 MG/DL — SIGNIFICANT CHANGE UP (ref 8.4–10.5)
CHLORIDE SERPL-SCNC: 102 MMOL/L — SIGNIFICANT CHANGE UP (ref 98–107)
CO2 SERPL-SCNC: 23 MMOL/L — SIGNIFICANT CHANGE UP (ref 22–31)
CREAT SERPL-MCNC: 0.94 MG/DL — SIGNIFICANT CHANGE UP (ref 0.5–1.3)
EOSINOPHIL # BLD AUTO: 0.06 K/UL — SIGNIFICANT CHANGE UP (ref 0–0.5)
EOSINOPHIL NFR BLD AUTO: 1.4 % — SIGNIFICANT CHANGE UP (ref 0–6)
GLUCOSE SERPL-MCNC: 107 MG/DL — HIGH (ref 70–99)
HCT VFR BLD CALC: 45.7 % — SIGNIFICANT CHANGE UP (ref 39–50)
HGB BLD-MCNC: 15.4 G/DL — SIGNIFICANT CHANGE UP (ref 13–17)
IMM GRANULOCYTES NFR BLD AUTO: 0.2 % — SIGNIFICANT CHANGE UP (ref 0–1.5)
LYMPHOCYTES # BLD AUTO: 1.21 K/UL — SIGNIFICANT CHANGE UP (ref 1–3.3)
LYMPHOCYTES # BLD AUTO: 28.9 % — SIGNIFICANT CHANGE UP (ref 13–44)
MAGNESIUM SERPL-MCNC: 1.9 MG/DL — SIGNIFICANT CHANGE UP (ref 1.6–2.6)
MCHC RBC-ENTMCNC: 32.2 PG — SIGNIFICANT CHANGE UP (ref 27–34)
MCHC RBC-ENTMCNC: 33.7 % — SIGNIFICANT CHANGE UP (ref 32–36)
MCV RBC AUTO: 95.6 FL — SIGNIFICANT CHANGE UP (ref 80–100)
MONOCYTES # BLD AUTO: 0.52 K/UL — SIGNIFICANT CHANGE UP (ref 0–0.9)
MONOCYTES NFR BLD AUTO: 12.4 % — SIGNIFICANT CHANGE UP (ref 2–14)
NEUTROPHILS # BLD AUTO: 2.37 K/UL — SIGNIFICANT CHANGE UP (ref 1.8–7.4)
NEUTROPHILS NFR BLD AUTO: 56.9 % — SIGNIFICANT CHANGE UP (ref 43–77)
NRBC # FLD: 0 K/UL — LOW (ref 25–125)
PLATELET # BLD AUTO: 143 K/UL — LOW (ref 150–400)
PMV BLD: 11.1 FL — SIGNIFICANT CHANGE UP (ref 7–13)
POTASSIUM SERPL-MCNC: 4.4 MMOL/L — SIGNIFICANT CHANGE UP (ref 3.5–5.3)
POTASSIUM SERPL-SCNC: 4.4 MMOL/L — SIGNIFICANT CHANGE UP (ref 3.5–5.3)
RBC # BLD: 4.78 M/UL — SIGNIFICANT CHANGE UP (ref 4.2–5.8)
RBC # FLD: 12.3 % — SIGNIFICANT CHANGE UP (ref 10.3–14.5)
SODIUM SERPL-SCNC: 139 MMOL/L — SIGNIFICANT CHANGE UP (ref 135–145)
WBC # BLD: 4.18 K/UL — SIGNIFICANT CHANGE UP (ref 3.8–10.5)
WBC # FLD AUTO: 4.18 K/UL — SIGNIFICANT CHANGE UP (ref 3.8–10.5)

## 2019-03-18 PROCEDURE — 74230 X-RAY XM SWLNG FUNCJ C+: CPT | Mod: 26

## 2019-03-18 PROCEDURE — 99233 SBSQ HOSP IP/OBS HIGH 50: CPT

## 2019-03-18 RX ADMIN — ATORVASTATIN CALCIUM 80 MILLIGRAM(S): 80 TABLET, FILM COATED ORAL at 22:38

## 2019-03-18 RX ADMIN — Medication 100 MILLIGRAM(S): at 11:31

## 2019-03-18 RX ADMIN — Medication 100 MILLIGRAM(S): at 17:55

## 2019-03-18 RX ADMIN — ENOXAPARIN SODIUM 90 MILLIGRAM(S): 100 INJECTION SUBCUTANEOUS at 05:50

## 2019-03-18 RX ADMIN — LEVETIRACETAM 1000 MILLIGRAM(S): 250 TABLET, FILM COATED ORAL at 05:48

## 2019-03-18 RX ADMIN — HALOPERIDOL DECANOATE 10 MILLIGRAM(S): 100 INJECTION INTRAMUSCULAR at 05:53

## 2019-03-18 RX ADMIN — Medication 600 MILLIGRAM(S): at 17:55

## 2019-03-18 RX ADMIN — Medication 100 MILLIGRAM(S): at 05:48

## 2019-03-18 RX ADMIN — LEVETIRACETAM 1000 MILLIGRAM(S): 250 TABLET, FILM COATED ORAL at 17:55

## 2019-03-18 RX ADMIN — Medication 600 MILLIGRAM(S): at 05:48

## 2019-03-18 RX ADMIN — HALOPERIDOL DECANOATE 10 MILLIGRAM(S): 100 INJECTION INTRAMUSCULAR at 17:55

## 2019-03-18 RX ADMIN — Medication 100 MILLIGRAM(S): at 21:13

## 2019-03-18 RX ADMIN — MEMANTINE HYDROCHLORIDE 5 MILLIGRAM(S): 10 TABLET ORAL at 21:15

## 2019-03-18 RX ADMIN — Medication 81 MILLIGRAM(S): at 11:31

## 2019-03-18 RX ADMIN — ENOXAPARIN SODIUM 90 MILLIGRAM(S): 100 INJECTION SUBCUTANEOUS at 17:55

## 2019-03-18 RX ADMIN — Medication 3 MILLIGRAM(S): at 21:14

## 2019-03-18 RX ADMIN — Medication 1 TABLET(S): at 11:31

## 2019-03-18 NOTE — PROGRESS NOTE ADULT - NSICDXPROBLEM_GEN_ALL_CORE_FT
PROBLEM DIAGNOSES  Problem: Cerebrovascular accident (CVA)  Assessment and Plan: -L MCA stroke c/b R sided hemiplegia and aphasia as baseline   -will c/w dysphagia diet     -neuro checks per routine     -PT/OT eval - no evidence that his decreased PO intake is related to worsening neurological function.       Problem: Schizophrenia, catatonic type  Assessment and Plan: -c/w Haldol 10mg BID   -Ativan for agitation PRN    - suspect decreased PO intake could be behavior mediated.  - awaiting psychiatry consultation    Problem: DVT, lower extremity  Assessment and Plan: -on therapeutic Lovenox 90 mg bid due to inconsistent PO dosing for DOAC   -original DVT as per chart reviews discovered in 2013, IVC placed after possible failure of Xarelto PROBLEM DIAGNOSES  Problem: Cerebrovascular accident (CVA)  Assessment and Plan: -L MCA stroke c/b R sided hemiplegia and aphasia as baseline   -will c/w dysphagia diet     -neuro checks per routine     -PT/OT eval - no evidence that his decreased PO intake is related to worsening neurological function.       Problem: Schizophrenia, catatonic type  Assessment and Plan: -c/w Haldol 10mg BID   -Ativan for agitation PRN    - suspect decreased PO intake could be behavior mediated.  - awaiting psychiatry consultation  - attempted to contact Dr. Ayers at [facility] to discuss about returning to the facility - line was busy.  Will continue to reattempt.    Problem: DVT, lower extremity  Assessment and Plan: -on therapeutic Lovenox 90 mg bid due to inconsistent PO dosing for DOAC   -original DVT as per chart reviews discovered in 2013, IVC placed after possible failure of Xarelto

## 2019-03-18 NOTE — SWALLOW VFSS/MBS ASSESSMENT ADULT - PHARYNGEAL PHASE COMMENTS
adequate initiation of the pharyngeal swallow, adequate laryngeal elevation, adequate tongue base retraction, adequate pharyngeal constriction unable to assess delayed initiation of the pharyngeal swallow, reduced laryngeal elevation, adequate tongue base retraction, adequate pharyngeal constriction

## 2019-03-18 NOTE — SWALLOW VFSS/MBS ASSESSMENT ADULT - ORAL PHASE
Reduced anterior - posterior transport/Delayed oral transit time within functional limits Within functional limits

## 2019-03-18 NOTE — SWALLOW VFSS/MBS ASSESSMENT ADULT - DIAGNOSTIC IMPRESSIONS
Patient presents with Mild to Moderate Oral Stage and Moderate to Severe Pharyngeal Stage Dysphagia. The Oral Stage is characterized by adequate oral containment, adequate bolus manipulation, adequate tongue motion with adequate anterior to posterior transfer of the bolus; patient was given a solid consistency with inability to masticate/chew the solid and expectorated the solid consistency from the oral cavity.  There is adequate oral clearance post swallow.   The Pharyngeal Stage is characterized by delayed initiation of the pharyngeal swallow (Bolus head is at the pyriforms for all Liquids), reduced laryngeal elevation with incomplete laryngeal vestibular closure, adequate tongue base retraction and adequate pharyngeal constriction. There is adequate pharyngeal clearance post swallow.  There was Laryngeal Penetration during the swallow for Nectar Thick Liquids without retrieval leaving trace residue in the laryngeal vestibule.  There was Deep Laryngeal Penetration with subsequent Aspiration (Silent) during the swallow for Thin Liquids. Patient is not sensate to the Aspiration given no reflexive cough response. Patient is verbally cued to cough but Patient does not follow through with directive.  There was No Aspiration observed before, during or after the swallow for Puree and Honey Thick Liquids.    Of Note: Cervical hardware is noted.

## 2019-03-18 NOTE — SWALLOW VFSS/MBS ASSESSMENT ADULT - ROSENBEK'S PENETRATION ASPIRATION SCALE
(1) no aspiration, contrast does not enter airway (8) contrast passes glottis, visible subglottic residue remains, absent patient response (aspiration) (3) contrast remains above the vocal cords, visible residue remains (penetration)

## 2019-03-18 NOTE — SWALLOW VFSS/MBS ASSESSMENT ADULT - RECOMMENDED CONSISTENCY
1.) Puree with Honey Thick Liquids  2.) Feeding/Swallowing Guidelines: Sit upright, provide teaspoon amount at a time, allow patient to swallow prior to next presentation, provide small single cup sips of honey thick liquids.   3.) Aspiration Precautions  4.) Maintain Good Oral Hygiene Care

## 2019-03-18 NOTE — PROGRESS NOTE ADULT - ASSESSMENT
59 y.o. male w/ hx Schizophrenia, seizure D/O chronic RLE weakness due to C4/5 stenosis c/b cord compression s/p discectomy/fusion/ Wheelchair bound was discharged from [facility] on 3/15 to [facility] after hospital stay for Left MCA CVA s/p TPA c/b catatonia and dysarthria. Patient now sent back from [facility] for inability to be fed with assistance. Patient has passed swallow eval assessing Dysphagia 1 with honey thickened liquids.

## 2019-03-18 NOTE — SWALLOW VFSS/MBS ASSESSMENT ADULT - COMMENTS
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    Patient was seen for a Clinical Swallow Eval on 3/16/2019 (See Consult).    Of Note: Patient overall nonverbal state, makes no attempt to verbally/gesturally communicate. Patient provided with constant verbal cueing for transfer and to remain seated in a specialized seating unit for Cinesophagram study.  Patient calm state.

## 2019-03-19 ENCOUNTER — TRANSCRIPTION ENCOUNTER (OUTPATIENT)
Age: 59
End: 2019-03-19

## 2019-03-19 DIAGNOSIS — Z71.89 OTHER SPECIFIED COUNSELING: ICD-10-CM

## 2019-03-19 PROCEDURE — 99232 SBSQ HOSP IP/OBS MODERATE 35: CPT

## 2019-03-19 RX ADMIN — HALOPERIDOL DECANOATE 10 MILLIGRAM(S): 100 INJECTION INTRAMUSCULAR at 05:53

## 2019-03-19 RX ADMIN — Medication 3 MILLIGRAM(S): at 22:43

## 2019-03-19 RX ADMIN — LEVETIRACETAM 1000 MILLIGRAM(S): 250 TABLET, FILM COATED ORAL at 18:13

## 2019-03-19 RX ADMIN — Medication 81 MILLIGRAM(S): at 12:24

## 2019-03-19 RX ADMIN — Medication 100 MILLIGRAM(S): at 05:53

## 2019-03-19 RX ADMIN — ENOXAPARIN SODIUM 90 MILLIGRAM(S): 100 INJECTION SUBCUTANEOUS at 05:52

## 2019-03-19 RX ADMIN — PANTOPRAZOLE SODIUM 40 MILLIGRAM(S): 20 TABLET, DELAYED RELEASE ORAL at 06:26

## 2019-03-19 RX ADMIN — HALOPERIDOL DECANOATE 10 MILLIGRAM(S): 100 INJECTION INTRAMUSCULAR at 18:13

## 2019-03-19 RX ADMIN — Medication 1 TABLET(S): at 12:24

## 2019-03-19 RX ADMIN — Medication 600 MILLIGRAM(S): at 05:53

## 2019-03-19 RX ADMIN — MEMANTINE HYDROCHLORIDE 5 MILLIGRAM(S): 10 TABLET ORAL at 22:43

## 2019-03-19 RX ADMIN — ATORVASTATIN CALCIUM 80 MILLIGRAM(S): 80 TABLET, FILM COATED ORAL at 22:43

## 2019-03-19 RX ADMIN — LEVETIRACETAM 1000 MILLIGRAM(S): 250 TABLET, FILM COATED ORAL at 05:53

## 2019-03-19 RX ADMIN — ENOXAPARIN SODIUM 90 MILLIGRAM(S): 100 INJECTION SUBCUTANEOUS at 18:13

## 2019-03-19 RX ADMIN — Medication 600 MILLIGRAM(S): at 18:15

## 2019-03-19 RX ADMIN — Medication 100 MILLIGRAM(S): at 18:13

## 2019-03-19 NOTE — PROGRESS NOTE ADULT - NSICDXPROBLEM_GEN_ALL_CORE_FT
PROBLEM DIAGNOSES  Problem: Cerebrovascular accident (CVA)  Assessment and Plan: -L MCA stroke c/b R sided hemiplegia and aphasia as baseline   -will c/w dysphagia diet     -neuro checks per routine     -PT/OT eval - no evidence that his decreased PO intake is related to worsening neurological function.       Problem: Schizophrenia, catatonic type  Assessment and Plan: -c/w Haldol 10mg BID   -Ativan for agitation PRN    - suspect decreased PO intake could be behavior mediated.  - discussed with Dr. Ayers at [facility].  Agreeable to accepting the patient in AM.  Patient is medically optimized for discharge.    Problem: DVT, lower extremity  Assessment and Plan: -on therapeutic Lovenox 90 mg bid due to inconsistent PO dosing for DOAC   -original DVT as per chart reviews discovered in 2013, IVC placed after possible failure of Xarelto

## 2019-03-19 NOTE — PROGRESS NOTE ADULT - ASSESSMENT
59 y.o. male w/ hx Schizophrenia, seizure D/O chronic RLE weakness due to C4/5 stenosis c/b cord compression s/p discectomy/fusion/ Wheelchair bound was discharged from [facility] on 3/15 to [facility] after hospital stay for Left MCA CVA s/p TPA c/b catatonia and dysarthria. Patient now sent back from [facility] for inability to be fed with assistance. Patient has passed swallow eval assessing Dysphagia 1 with honey thickened liquids.

## 2019-03-20 ENCOUNTER — INPATIENT (INPATIENT)
Facility: HOSPITAL | Age: 59
LOS: 11 days | Discharge: NURSING, REHAB OR LONG-TERM CARE | End: 2019-04-01
Attending: HOSPITALIST | Admitting: HOSPITALIST
Payer: MEDICAID

## 2019-03-20 ENCOUNTER — TRANSCRIPTION ENCOUNTER (OUTPATIENT)
Age: 59
End: 2019-03-20

## 2019-03-20 VITALS
RESPIRATION RATE: 16 BRPM | SYSTOLIC BLOOD PRESSURE: 130 MMHG | DIASTOLIC BLOOD PRESSURE: 68 MMHG | TEMPERATURE: 99 F | OXYGEN SATURATION: 100 % | HEART RATE: 64 BPM

## 2019-03-20 VITALS
DIASTOLIC BLOOD PRESSURE: 73 MMHG | RESPIRATION RATE: 16 BRPM | HEART RATE: 66 BPM | TEMPERATURE: 98 F | SYSTOLIC BLOOD PRESSURE: 131 MMHG | OXYGEN SATURATION: 98 %

## 2019-03-20 DIAGNOSIS — Z98.890 OTHER SPECIFIED POSTPROCEDURAL STATES: Chronic | ICD-10-CM

## 2019-03-20 LAB
ALBUMIN SERPL ELPH-MCNC: 3.8 G/DL — SIGNIFICANT CHANGE UP (ref 3.3–5)
ALP SERPL-CCNC: 119 U/L — SIGNIFICANT CHANGE UP (ref 40–120)
ALT FLD-CCNC: 22 U/L — SIGNIFICANT CHANGE UP (ref 4–41)
ANION GAP SERPL CALC-SCNC: 12 MMO/L — SIGNIFICANT CHANGE UP (ref 7–14)
AST SERPL-CCNC: 19 U/L — SIGNIFICANT CHANGE UP (ref 4–40)
BILIRUB SERPL-MCNC: 0.4 MG/DL — SIGNIFICANT CHANGE UP (ref 0.2–1.2)
BUN SERPL-MCNC: 10 MG/DL — SIGNIFICANT CHANGE UP (ref 7–23)
CALCIUM SERPL-MCNC: 9.5 MG/DL — SIGNIFICANT CHANGE UP (ref 8.4–10.5)
CHLORIDE SERPL-SCNC: 101 MMOL/L — SIGNIFICANT CHANGE UP (ref 98–107)
CO2 SERPL-SCNC: 23 MMOL/L — SIGNIFICANT CHANGE UP (ref 22–31)
CREAT SERPL-MCNC: 0.82 MG/DL — SIGNIFICANT CHANGE UP (ref 0.5–1.3)
GLUCOSE SERPL-MCNC: 114 MG/DL — HIGH (ref 70–99)
HCT VFR BLD CALC: 43.2 % — SIGNIFICANT CHANGE UP (ref 39–50)
HGB BLD-MCNC: 14.9 G/DL — SIGNIFICANT CHANGE UP (ref 13–17)
MCHC RBC-ENTMCNC: 32.3 PG — SIGNIFICANT CHANGE UP (ref 27–34)
MCHC RBC-ENTMCNC: 34.5 % — SIGNIFICANT CHANGE UP (ref 32–36)
MCV RBC AUTO: 93.5 FL — SIGNIFICANT CHANGE UP (ref 80–100)
NRBC # FLD: 0 K/UL — LOW (ref 25–125)
PLATELET # BLD AUTO: 202 K/UL — SIGNIFICANT CHANGE UP (ref 150–400)
PMV BLD: 9.4 FL — SIGNIFICANT CHANGE UP (ref 7–13)
POTASSIUM SERPL-MCNC: 3.7 MMOL/L — SIGNIFICANT CHANGE UP (ref 3.5–5.3)
POTASSIUM SERPL-SCNC: 3.7 MMOL/L — SIGNIFICANT CHANGE UP (ref 3.5–5.3)
PROT SERPL-MCNC: 7.2 G/DL — SIGNIFICANT CHANGE UP (ref 6–8.3)
RBC # BLD: 4.62 M/UL — SIGNIFICANT CHANGE UP (ref 4.2–5.8)
RBC # FLD: 12.3 % — SIGNIFICANT CHANGE UP (ref 10.3–14.5)
SODIUM SERPL-SCNC: 136 MMOL/L — SIGNIFICANT CHANGE UP (ref 135–145)
WBC # BLD: 4.66 K/UL — SIGNIFICANT CHANGE UP (ref 3.8–10.5)
WBC # FLD AUTO: 4.66 K/UL — SIGNIFICANT CHANGE UP (ref 3.8–10.5)

## 2019-03-20 PROCEDURE — 99239 HOSP IP/OBS DSCHRG MGMT >30: CPT

## 2019-03-20 RX ORDER — ATORVASTATIN CALCIUM 80 MG/1
1 TABLET, FILM COATED ORAL
Qty: 0 | Refills: 0 | COMMUNITY
Start: 2019-03-20

## 2019-03-20 RX ORDER — POLYETHYLENE GLYCOL 3350 17 G/17G
17 POWDER, FOR SOLUTION ORAL
Qty: 30 | Refills: 0 | OUTPATIENT
Start: 2019-03-20 | End: 2019-04-18

## 2019-03-20 RX ORDER — POLYETHYLENE GLYCOL 3350 17 G/17G
17 POWDER, FOR SOLUTION ORAL
Qty: 0 | Refills: 0 | COMMUNITY

## 2019-03-20 RX ORDER — DOCUSATE SODIUM 100 MG
1 CAPSULE ORAL
Qty: 0 | Refills: 0 | COMMUNITY
Start: 2019-03-20

## 2019-03-20 RX ORDER — LEVETIRACETAM 250 MG/1
1 TABLET, FILM COATED ORAL
Qty: 0 | Refills: 0 | COMMUNITY

## 2019-03-20 RX ORDER — LEVETIRACETAM 250 MG/1
1 TABLET, FILM COATED ORAL
Qty: 0 | Refills: 0 | COMMUNITY
Start: 2019-03-20

## 2019-03-20 RX ORDER — DOCUSATE SODIUM 100 MG
2 CAPSULE ORAL
Qty: 0 | Refills: 0 | COMMUNITY

## 2019-03-20 RX ORDER — DOCUSATE SODIUM 100 MG
1 CAPSULE ORAL
Qty: 90 | Refills: 0 | OUTPATIENT
Start: 2019-03-20 | End: 2019-04-18

## 2019-03-20 RX ORDER — CARBAMAZEPINE 200 MG
1 TABLET ORAL
Qty: 0 | Refills: 0 | COMMUNITY

## 2019-03-20 RX ORDER — OMEPRAZOLE 10 MG/1
1 CAPSULE, DELAYED RELEASE ORAL
Qty: 0 | Refills: 0 | COMMUNITY
Start: 2019-03-20 | End: 2019-04-18

## 2019-03-20 RX ORDER — MEMANTINE HYDROCHLORIDE 10 MG/1
1 TABLET ORAL
Qty: 0 | Refills: 0 | COMMUNITY

## 2019-03-20 RX ORDER — ASPIRIN/CALCIUM CARB/MAGNESIUM 324 MG
1 TABLET ORAL
Qty: 0 | Refills: 0 | COMMUNITY
Start: 2019-03-20

## 2019-03-20 RX ORDER — ACETAMINOPHEN 500 MG
1 TABLET ORAL
Qty: 0 | Refills: 0 | COMMUNITY

## 2019-03-20 RX ORDER — ACETAMINOPHEN 500 MG
2 TABLET ORAL
Qty: 0 | Refills: 0 | COMMUNITY
Start: 2019-03-20

## 2019-03-20 RX ORDER — CARBAMAZEPINE 200 MG
1 TABLET ORAL
Qty: 0 | Refills: 0 | COMMUNITY
Start: 2019-03-20

## 2019-03-20 RX ORDER — OMEPRAZOLE 10 MG/1
1 CAPSULE, DELAYED RELEASE ORAL
Qty: 0 | Refills: 0 | COMMUNITY

## 2019-03-20 RX ORDER — MEMANTINE HYDROCHLORIDE 10 MG/1
1 TABLET ORAL
Qty: 0 | Refills: 0 | COMMUNITY
Start: 2019-03-20

## 2019-03-20 RX ORDER — HALOPERIDOL DECANOATE 100 MG/ML
1 INJECTION INTRAMUSCULAR
Qty: 0 | Refills: 0 | COMMUNITY
Start: 2019-03-20

## 2019-03-20 RX ORDER — OMEPRAZOLE 10 MG/1
1 CAPSULE, DELAYED RELEASE ORAL
Qty: 30 | Refills: 0 | OUTPATIENT
Start: 2019-03-20 | End: 2019-04-18

## 2019-03-20 RX ORDER — ATORVASTATIN CALCIUM 80 MG/1
1 TABLET, FILM COATED ORAL
Qty: 0 | Refills: 0 | COMMUNITY

## 2019-03-20 RX ORDER — SENNA PLUS 8.6 MG/1
2 TABLET ORAL
Qty: 0 | Refills: 0 | COMMUNITY

## 2019-03-20 RX ORDER — HALOPERIDOL DECANOATE 100 MG/ML
1 INJECTION INTRAMUSCULAR
Qty: 0 | Refills: 0 | COMMUNITY

## 2019-03-20 RX ORDER — ASPIRIN/CALCIUM CARB/MAGNESIUM 324 MG
1 TABLET ORAL
Qty: 30 | Refills: 0 | OUTPATIENT
Start: 2019-03-20 | End: 2019-04-18

## 2019-03-20 RX ORDER — LEVETIRACETAM 250 MG/1
1 TABLET, FILM COATED ORAL
Qty: 60 | Refills: 0 | OUTPATIENT
Start: 2019-03-20 | End: 2019-04-18

## 2019-03-20 RX ORDER — ATORVASTATIN CALCIUM 80 MG/1
1 TABLET, FILM COATED ORAL
Qty: 30 | Refills: 0 | OUTPATIENT
Start: 2019-03-20 | End: 2019-04-18

## 2019-03-20 RX ORDER — CARBAMAZEPINE 200 MG
1 TABLET ORAL
Qty: 60 | Refills: 0 | OUTPATIENT
Start: 2019-03-20 | End: 2019-04-18

## 2019-03-20 RX ORDER — SENNA PLUS 8.6 MG/1
2 TABLET ORAL
Qty: 60 | Refills: 0 | OUTPATIENT
Start: 2019-03-20 | End: 2019-04-18

## 2019-03-20 RX ORDER — MEMANTINE HYDROCHLORIDE 10 MG/1
1 TABLET ORAL
Qty: 30 | Refills: 0 | OUTPATIENT
Start: 2019-03-20 | End: 2019-04-18

## 2019-03-20 RX ADMIN — Medication 600 MILLIGRAM(S): at 05:46

## 2019-03-20 RX ADMIN — LEVETIRACETAM 1000 MILLIGRAM(S): 250 TABLET, FILM COATED ORAL at 05:46

## 2019-03-20 RX ADMIN — HALOPERIDOL DECANOATE 10 MILLIGRAM(S): 100 INJECTION INTRAMUSCULAR at 05:46

## 2019-03-20 RX ADMIN — PANTOPRAZOLE SODIUM 40 MILLIGRAM(S): 20 TABLET, DELAYED RELEASE ORAL at 05:46

## 2019-03-20 RX ADMIN — Medication 100 MILLIGRAM(S): at 05:46

## 2019-03-20 RX ADMIN — ENOXAPARIN SODIUM 90 MILLIGRAM(S): 100 INJECTION SUBCUTANEOUS at 05:46

## 2019-03-20 NOTE — DISCHARGE NOTE PROVIDER - NSDCCPCAREPLAN_GEN_ALL_CORE_FT
PRINCIPAL DISCHARGE DIAGNOSIS  Diagnosis: Stroke  Assessment and Plan of Treatment: Symptom resolution, medication compliance, prevent disease progression      SECONDARY DISCHARGE DIAGNOSES  Diagnosis: Schizophrenia, catatonic type  Assessment and Plan of Treatment: continue psych medications as prescribed, follow up with psychiatrist in one week PRINCIPAL DISCHARGE DIAGNOSIS  Diagnosis: Stroke  Assessment and Plan of Treatment: Symptom resolution, medication compliance, prevent disease progression      SECONDARY DISCHARGE DIAGNOSES  Diagnosis: DVT, lower extremity  Assessment and Plan of Treatment: Resolution of symptoms, follow up with PMD as outpatient within 1 week. . Continue with medications as prescribed      Diagnosis: Schizophrenia, catatonic type  Assessment and Plan of Treatment: continue psych medications as prescribed, follow up with psychiatrist in one week

## 2019-03-20 NOTE — ED ADULT NURSE REASSESSMENT NOTE - NS ED NURSE REASSESS COMMENT FT1
Pt restless, not cooperative with EKG. Moving, pulling clothing. MD Galaviz aware, states EKG not needed at this time.

## 2019-03-20 NOTE — DISCHARGE NOTE PROVIDER - NSDCQMMRS_NEU_ALL_CORE
3 - Moderate disability. Requires some help, but able to walk unassisted. 4 - Moderately severe disability. Unable to own bodily needs without assistance and unable to walk unassisted

## 2019-03-20 NOTE — ED PROVIDER NOTE - CLINICAL SUMMARY MEDICAL DECISION MAKING FREE TEXT BOX
59M sent from [facility] after not eating today, no change in behavior according to staff member, will need admission as patient at risk for dehydration and malnourishment 59M sent from [place] after not eating today, no change in behavior according to staff member.  No tachycardia or dry mucous membranes.  Will check labs, BUN/Cr, electrolytes.  Dysphagia screen.  PO trial.  If unable to eat, will require admission.

## 2019-03-20 NOTE — DISCHARGE NOTE PROVIDER - CONDITIONS AT DISCHARGE
discussed with dr Leal- patient stable for discharge  Prescriptions placed in transfer folder. dr Ayers aware of prescriptions being sent.

## 2019-03-20 NOTE — ED PROVIDER NOTE - OBJECTIVE STATEMENT
59M PMH of schizophrenia, seizure, HLD, GERD, renal mass, DM, recent CVA presents from [facility] for not eating today. Patient minimally verbal at baseline according to [facility] staff member at bedside. Chart review reveals patient had a CVA one month ago w/ residual R sided weakness. Patient has been sent to ED and admitted twice for not eating at [facility]. Patient passed swallow testing during admission twice and was discharge. Patient was discharged this morning and immediately sent back for admission. Staff member states patient has not been acting differently or complaining of pain 59M PMH of schizophrenia, seizure, HLD, GERD, renal mass, DM, recent CVA presents from [facility] for not eating today. Patient minimally verbal at baseline according to [facility] staff member at bedside. Chart review reveals patient had a CVA one month ago w/ residual R sided weakness. Patient has been sent to ED and admitted twice for not eating at [facility]. Patient passed swallow testing during admission twice and was discharge. Patient was discharged this morning and immediately sent back for admission. Staff member states patient has not been acting differently or complaining of pain.  Attending - Agree with above.  I evaluated patient myself.  Reviewed previous [facility] records including [facility] admission 3/15 through discharge today.  Noted recent CVA with resulting right sided weakness.  Passed dysphagia screen and patient returned to [facility] earlier today.  Referred back to ED with report that patient not eating.  Aide at bedside states he knows patient and at current baseline mental status.  Nonverbal.

## 2019-03-20 NOTE — ED ADULT NURSE NOTE - CHIEF COMPLAINT QUOTE
BIBEMS from [facility] with staff from [facility] (Gary Santacruz) for not talking and unable to eat or ambulate for a month, need feeding tube for alternative feeding modalities per EMS , hx: multiple strokes (right side weakness), schizophrenia

## 2019-03-20 NOTE — DISCHARGE NOTE NURSING/CASE MANAGEMENT/SOCIAL WORK - NSDCPEPTSTRK_GEN_ALL_CORE
Stroke support groups for patients, families, and friends/Need for follow up after discharge/Prescribed medications/Call 911 for stroke/Stroke warning signs and symptoms/Signs and symptoms of stroke/Risk factors for stroke/Stroke education booklet

## 2019-03-20 NOTE — ED PROVIDER NOTE - PHYSICAL EXAMINATION
CONSTITUTIONAL: NAD, awake  HEAD: Normocephalic; atraumatic  EYES: PERRLA, EOMI  ENMT: External appears normal, MMM  CARD: Normal Sl, S2; no audible murmurs  RESP: Breathing comfortably on RA, normal wob, lungs ctab  ABD: Soft, non-distended; non-tender  EXT: No edema, normal ROM in all four extremities  SKIN: Warm, dry, no rashes  NEURO: minimally verbal, moving all extremities spontaneously, moving RLE minimally CONSTITUTIONAL: NAD, awake  HEAD: Normocephalic; atraumatic  EYES: PERRLA, EOMI  ENMT: External appears normal, MMM  CARD: Normal Sl, S2; no audible murmurs  RESP: Breathing comfortably on RA, normal wob, lungs ctab  ABD: Soft, non-distended; non-tender  EXT: No edema, normal ROM in all four extremities  SKIN: Warm, dry, no rashes  NEURO: minimally verbal, moving all extremities spontaneously, moving RLE minimally  ATTENDING PHYSICAL EXAM DR. Parsons ***GEN - lying on stretcher, looks at examiner, no verbal response besides groaning with blood draw ***HEAD - NC/AT ***EYES/NOSE - PERRL, EOMI, mucous membranes appear moist ***THROAT: Oral cavity and pharynx normal. No inflammation, swelling, exudate, or lesions.  ***NECK: Neck supple, no JVD.   ***PULMONARY - CTA b/l, symmetric breath sounds. ***CARDIAC -s1s2, RRR, no M,R,G  ***ABDOMEN - +BS, ND, No apparent tenderness, soft, no guarding, no rebound, no masses  ***EXTREMITIES - symmetric pulses, 2+ dp, capillary refill < 2 seconds, no clubbing, no cyanosis, no edema   ***NEUROLOGIC - noncompliant with exam.  Noted movement of LUE/LLE.  No movement right side.

## 2019-03-20 NOTE — ED ADULT NURSE NOTE - NSIMPLEMENTINTERV_GEN_ALL_ED
Implemented All Universal Safety Interventions:  [place] to call system. Call bell, personal items and telephone within reach. Instruct patient to call for assistance. Room bathroom lighting operational. Non-slip footwear when patient is off stretcher. Physically safe environment: no spills, clutter or unnecessary equipment. Stretcher in lowest position, wheels locked, appropriate side rails in place. Implemented All Fall Risk Interventions:  [place] to call system. Call bell, personal items and telephone within reach. Instruct patient to call for assistance. Room bathroom lighting operational. Non-slip footwear when patient is off stretcher. Physically safe environment: no spills, clutter or unnecessary equipment. Stretcher in lowest position, wheels locked, appropriate side rails in place. Provide visual cue, wrist band, yellow gown, etc. Monitor gait and stability. Monitor for mental status changes and reorient to person, place, and time. Review medications for side effects contributing to fall risk. Reinforce activity limits and safety measures with patient and family.

## 2019-03-20 NOTE — DISCHARGE NOTE PROVIDER - CARE PROVIDER_API CALL
Urban Leal)  Internal Medicine  [address] [address] [place] [address]  Phone: [phone]  Fax: [phone]  Follow Up Time:     Physician at [facility],   Phone: (   )    -  Fax: (   )    -  Follow Up Time: Urban Leal)  Internal Medicine  [id number] [place] [address]  Phone: [phone]  Fax: [phone]  Follow Up Time:     Dr Ayers,   at [facility]  Phone: (   )    -  Fax: (   )    -  Follow Up Time:

## 2019-03-20 NOTE — DISCHARGE NOTE PROVIDER - PROVIDER TOKENS
PROVIDER:[TOKEN:[[id number]]],FREE:[LAST:[Physician at [facility]],PHONE:[(   )    -],FAX:[(   )    -]] PROVIDER:[TOKEN:[[id number]]],FREE:[LAST:[Dr Ayers],PHONE:[(   )    -],FAX:[(   )    -],ADDRESS:[at [facility]]]

## 2019-03-20 NOTE — PROGRESS NOTE ADULT - ASSESSMENT
59 y.o. male w/ hx Schizophrenia, seizure D/O chronic RLE weakness due to C4/5 stenosis c/b cord compression s/p discectomy/fusion/ Wheelchair bound was discharged from [facility] on 3/15 to [facility] after hospital stay for Left MCA CVA s/p TPA c/b catatonia and dysarthria. Patient now sent back from [facility] for inability to be fed with assistance. Patient has passed swallow eval assessing Dysphagia 1 with honey thickened liquids.      Problem: Cerebrovascular accident (CVA)  Assessment and Plan: -L MCA stroke c/b R sided hemiplegia and aphasia as baseline   -will c/w dysphagia diet     -neuro checks per routine     -PT/OT eval - no evidence that his decreased PO intake is related to worsening neurological function.       Problem: Schizophrenia, catatonic type  Assessment and Plan: -c/w Haldol 10mg BID   -Ativan for agitation PRN    - suspect decreased PO intake could be behavior mediated.  - discussed with Dr. Ayers at [facility].  Patient is medically optimized for discharge.    Problem: DVT, lower extremity  Assessment and Plan: -on therapeutic Lovenox 90 mg bid due to inconsistent PO dosing for DOAC   -original DVT as per chart reviews discovered in 2013, IVC placed after possible failure of Xarelto.

## 2019-03-20 NOTE — PROGRESS NOTE ADULT - ATTENDING COMMENTS
Urban Leal MD  pager# [phone]
Patient medically optimized for discharge.  Discharge planning 40 minutes - discussed with consultants and [facility] physician accepting the transfer.    Urban Leal MD  Pager# [phone]
Patient medically optimized for discharge.  Discharge planning 40 minutes - discussed with consultants and [facility] physician accepting the transfer.    Urban Leal MD  Pager# [phone]

## 2019-03-20 NOTE — ED ADULT NURSE NOTE - OBJECTIVE STATEMENT
Pt nonverbal at this time, moving around bed, reported R sided weakness post CVA. Pt sent back from [facility] post recent discharge for not eating outpatient. Pt appears comfortable. R side appears weaker than left. VSS. [facility] staff member at bedside. 22G placed to L upper arm by [employer]ASH[facility] [employer] via a-stick by MD Galaviz. Safety and comfort maintained

## 2019-03-20 NOTE — PROGRESS NOTE ADULT - REASON FOR ADMISSION
needs long-term placement; rejected from home facility

## 2019-03-20 NOTE — PROGRESS NOTE ADULT - SUBJECTIVE AND OBJECTIVE BOX
CC: F/U for poor PO intake    SUBJECTIVE / OVERNIGHT EVENTS:  Patient was not interactive during the physical exam.  Did not answer any of my questions.  No events overnight.  Able to complete the MBS.  No overnight issues with F/C, vomiting, SOB, Cough, diarrhea.      MEDICATIONS  (STANDING):  aspirin  chewable 81 milliGRAM(s) Oral daily  atorvastatin 80 milliGRAM(s) Oral at bedtime  carBAMazepine Chewable 100 milliGRAM(s) Chew two times a day  docusate sodium 100 milliGRAM(s) Oral three times a day  enoxaparin Injectable 90 milliGRAM(s) SubCutaneous two times a day  guaiFENesin  milliGRAM(s) Oral every 12 hours  haloperidol     Tablet 10 milliGRAM(s) Oral two times a day  levETIRAcetam 1000 milliGRAM(s) Oral two times a day  melatonin 3 milliGRAM(s) Oral at bedtime  memantine 5 milliGRAM(s) Oral at bedtime  multivitamin 1 Tablet(s) Oral daily  pantoprazole    Tablet 40 milliGRAM(s) Oral before breakfast    MEDICATIONS  (PRN):  acetaminophen   Tablet .. 650 milliGRAM(s) Oral every 6 hours PRN Temp greater or equal to 38C (100.4F), Mild Pain (1 - 3), Moderate Pain (4 - 6)  bisacodyl 5 milliGRAM(s) Oral every 12 hours PRN Constipation  LORazepam   Injectable 1 milliGRAM(s) IV Push every 6 hours PRN Agitation  ondansetron Injectable 4 milliGRAM(s) IV Push every 6 hours PRN Nausea      Vital Signs Last 24 Hrs  T(C): 37.1 (18 Mar 2019 05:46), Max: 37.1 (18 Mar 2019 05:46)  T(F): 98.8 (18 Mar 2019 05:46), Max: 98.8 (18 Mar 2019 05:46)  HR: 85 (18 Mar 2019 15:25) (75 - 85)  BP: 128/74 (18 Mar 2019 15:25) (111/75 - 128/74)  BP(mean): --  RR: 18 (18 Mar 2019 15:25) (18 - 18)  SpO2: 99% (18 Mar 2019 15:25) (98% - 100%)  CAPILLARY BLOOD GLUCOSE        I&O's Summary      PHYSICAL EXAM:  GENERAL: NAD, well-developed   HEAD:  Atraumatic, Normocephalic  EYES: EOMI, PERRLA, conjunctiva and sclera clear  NECK: Supple, No JVD  CHEST/LUNG: Clear to auscultation bilaterally; No wheeze  HEART: Regular rate and rhythm; No murmurs, rubs, or gallops  ABDOMEN: Soft, Nontender, Nondistended; Bowel sounds present  EXTREMITIES:  2+ Peripheral Pulses, No clubbing, cyanosis, or edema  PSYCH: AAOx0; awake. Nonverbal  NEUROLOGY: RUE and RLE 0/5 strength;  LUE 4/5 strength LLE 3/5 strength.   SKIN: No rashes or lesions    LABS:                        15.4   4.18  )-----------( 143      ( 18 Mar 2019 06:59 )             45.7     03-18    139  |  102  |  17  ----------------------------<  107<H>  4.4   |  23  |  0.94    Ca    9.8      18 Mar 2019 06:59  Mg     1.9     03-18                RADIOLOGY & ADDITIONAL TESTS:    Imaging Personally Reviewed:    Care Discussed with Consultants/Other Providers:
CC: F/U for dysphagia    SUBJECTIVE / OVERNIGHT EVENTS:  No new events overnight.  No overnight issues with F/C, vomiting, SOB, Cough, diarrhea.      MEDICATIONS  (STANDING):  aspirin  chewable 81 milliGRAM(s) Oral daily  atorvastatin 80 milliGRAM(s) Oral at bedtime  carBAMazepine Chewable 100 milliGRAM(s) Chew two times a day  docusate sodium 100 milliGRAM(s) Oral three times a day  enoxaparin Injectable 90 milliGRAM(s) SubCutaneous two times a day  guaiFENesin  milliGRAM(s) Oral every 12 hours  haloperidol     Tablet 10 milliGRAM(s) Oral two times a day  levETIRAcetam 1000 milliGRAM(s) Oral two times a day  melatonin 3 milliGRAM(s) Oral at bedtime  memantine 5 milliGRAM(s) Oral at bedtime  multivitamin 1 Tablet(s) Oral daily  pantoprazole    Tablet 40 milliGRAM(s) Oral before breakfast    MEDICATIONS  (PRN):  acetaminophen   Tablet .. 650 milliGRAM(s) Oral every 6 hours PRN Temp greater or equal to 38C (100.4F), Mild Pain (1 - 3), Moderate Pain (4 - 6)  bisacodyl 5 milliGRAM(s) Oral every 12 hours PRN Constipation  LORazepam   Injectable 1 milliGRAM(s) IV Push every 6 hours PRN Agitation  ondansetron Injectable 4 milliGRAM(s) IV Push every 6 hours PRN Nausea      Vital Signs Last 24 Hrs  T(C): 36.7 (20 Mar 2019 05:42), Max: 36.7 (19 Mar 2019 22:35)  T(F): 98.1 (20 Mar 2019 05:42), Max: 98.1 (20 Mar 2019 05:42)  HR: 66 (20 Mar 2019 05:42) (66 - 69)  BP: 131/73 (20 Mar 2019 05:42) (105/58 - 131/73)  BP(mean): --  RR: 16 (20 Mar 2019 05:42) (16 - 16)  SpO2: 98% (20 Mar 2019 05:42) (98% - 98%)  CAPILLARY BLOOD GLUCOSE        I&O's Summary      PHYSICAL EXAM:  GENERAL: NAD, well-developed   HEAD:  Atraumatic, Normocephalic  EYES: EOMI, PERRLA, conjunctiva and sclera clear  NECK: Supple, No JVD  CHEST/LUNG: Clear to auscultation bilaterally; No wheeze  HEART: Regular rate and rhythm; No murmurs, rubs, or gallops  ABDOMEN: Soft, Nontender, Nondistended; Bowel sounds present  EXTREMITIES:  2+ Peripheral Pulses, No clubbing, cyanosis, or edema  PSYCH: AAOx0; awake. Nonverbal  NEUROLOGY: RUE and RLE 0/5 strength;  LUE 4/5 strength LLE 3/5 strength.   SKIN: No rashes or lesions    LABS:                    RADIOLOGY & ADDITIONAL TESTS:    Imaging Personally Reviewed:    Care Discussed with Consultants/Other Providers:
CC: F/U for dysphagia    SUBJECTIVE / OVERNIGHT EVENTS:  No new events overnight.  Not cooperative with the examination.  No overnight issues with F/C, vomiting, SOB, Cough, diarrhea.      MEDICATIONS  (STANDING):  aspirin  chewable 81 milliGRAM(s) Oral daily  atorvastatin 80 milliGRAM(s) Oral at bedtime  carBAMazepine Chewable 100 milliGRAM(s) Chew two times a day  docusate sodium 100 milliGRAM(s) Oral three times a day  enoxaparin Injectable 90 milliGRAM(s) SubCutaneous two times a day  guaiFENesin  milliGRAM(s) Oral every 12 hours  haloperidol     Tablet 10 milliGRAM(s) Oral two times a day  levETIRAcetam 1000 milliGRAM(s) Oral two times a day  melatonin 3 milliGRAM(s) Oral at bedtime  memantine 5 milliGRAM(s) Oral at bedtime  multivitamin 1 Tablet(s) Oral daily  pantoprazole    Tablet 40 milliGRAM(s) Oral before breakfast    MEDICATIONS  (PRN):  acetaminophen   Tablet .. 650 milliGRAM(s) Oral every 6 hours PRN Temp greater or equal to 38C (100.4F), Mild Pain (1 - 3), Moderate Pain (4 - 6)  bisacodyl 5 milliGRAM(s) Oral every 12 hours PRN Constipation  LORazepam   Injectable 1 milliGRAM(s) IV Push every 6 hours PRN Agitation  ondansetron Injectable 4 milliGRAM(s) IV Push every 6 hours PRN Nausea      Vital Signs Last 24 Hrs  T(C): 36.8 (19 Mar 2019 06:24), Max: 36.8 (18 Mar 2019 21:09)  T(F): 98.2 (19 Mar 2019 06:24), Max: 98.2 (18 Mar 2019 21:09)  HR: 78 (19 Mar 2019 10:06) (74 - 85)  BP: 113/70 (19 Mar 2019 10:06) (111/64 - 128/74)  BP(mean): --  RR: 17 (19 Mar 2019 10:06) (17 - 18)  SpO2: 98% (19 Mar 2019 10:06) (98% - 100%)  CAPILLARY BLOOD GLUCOSE        I&O's Summary      PHYSICAL EXAM:  GENERAL: NAD, well-developed   HEAD:  Atraumatic, Normocephalic  EYES: EOMI, PERRLA, conjunctiva and sclera clear  NECK: Supple, No JVD  CHEST/LUNG: Clear to auscultation bilaterally; No wheeze  HEART: Regular rate and rhythm; No murmurs, rubs, or gallops  ABDOMEN: Soft, Nontender, Nondistended; Bowel sounds present  EXTREMITIES:  2+ Peripheral Pulses, No clubbing, cyanosis, or edema  PSYCH: AAOx0; awake. Nonverbal  NEUROLOGY: RUE and RLE 0/5 strength;  LUE 4/5 strength LLE 3/5 strength.   SKIN: No rashes or lesions    LABS:                        15.4   4.18  )-----------( 143      ( 18 Mar 2019 06:59 )             45.7     03-18    139  |  102  |  17  ----------------------------<  107<H>  4.4   |  23  |  0.94    Ca    9.8      18 Mar 2019 06:59  Mg     1.9     03-18                RADIOLOGY & ADDITIONAL TESTS:    Imaging Personally Reviewed:    Care Discussed with Consultants/Other Providers:
Patient is a 59y old  Male who presents with a chief complaint of needs long-term placement; rejected from home facility (16 Mar 2019 03:43)      SUBJECTIVE / OVERNIGHT EVENTS:  Patient nonverbal. unable to get ROS.     MEDICATIONS  (STANDING):  aspirin  chewable 81 milliGRAM(s) Oral daily  atorvastatin 80 milliGRAM(s) Oral at bedtime  carBAMazepine Chewable 100 milliGRAM(s) Chew two times a day  docusate sodium 100 milliGRAM(s) Oral three times a day  enoxaparin Injectable 90 milliGRAM(s) SubCutaneous two times a day  haloperidol     Tablet 10 milliGRAM(s) Oral two times a day  levETIRAcetam 1000 milliGRAM(s) Oral two times a day  melatonin 3 milliGRAM(s) Oral at bedtime  memantine 5 milliGRAM(s) Oral at bedtime  multivitamin 1 Tablet(s) Oral daily  pantoprazole    Tablet 40 milliGRAM(s) Oral before breakfast    MEDICATIONS  (PRN):  acetaminophen   Tablet .. 650 milliGRAM(s) Oral every 6 hours PRN Temp greater or equal to 38C (100.4F), Mild Pain (1 - 3), Moderate Pain (4 - 6)  bisacodyl 5 milliGRAM(s) Oral every 12 hours PRN Constipation  LORazepam   Injectable 1 milliGRAM(s) IV Push every 6 hours PRN Agitation  ondansetron Injectable 4 milliGRAM(s) IV Push every 6 hours PRN Nausea      Vital Signs Last 24 Hrs  T(C): 36.3 (16 Mar 2019 06:54), Max: 36.6 (16 Mar 2019 04:00)  T(F): 97.3 (16 Mar 2019 06:54), Max: 97.9 (16 Mar 2019 04:00)  HR: 66 (16 Mar 2019 06:54) (57 - 119)  BP: 119/74 (16 Mar 2019 06:54) (113/62 - 134/75)  BP(mean): --  RR: 16 (16 Mar 2019 06:54) (16 - 20)  SpO2: 97% (16 Mar 2019 06:54) (95% - 100%)  CAPILLARY BLOOD GLUCOSE        I&O's Summary      PHYSICAL EXAM:  GENERAL: NAD, well-developed  HEAD:  Atraumatic, Normocephalic  EYES: EOMI, PERRLA, conjunctiva and sclera clear  NECK: Supple, No JVD  CHEST/LUNG: Clear to auscultation bilaterally; No wheeze  HEART: Regular rate and rhythm; No murmurs, rubs, or gallops  ABDOMEN: Soft, Nontender, Nondistended; Bowel sounds present  EXTREMITIES:  2+ Peripheral Pulses, No clubbing, cyanosis, or edema  PSYCH: AAOx0; awake. Nonverbal  NEUROLOGY: RUE and RLE 0/5 strength;  LUE 4/5 strength LLE 3/5 strength.   SKIN: No rashes or lesions    LABS:                        15.8   4.51  )-----------( 174      ( 16 Mar 2019 08:50 )             48.2     03-16    144  |  105  |  15  ----------------------------<  104<H>  4.2   |  25  |  0.88    Ca    9.7      16 Mar 2019 07:45  Phos  2.6     03-16  Mg     2.0     03-16    TPro  7.5  /  Alb  4.0  /  TBili  0.4  /  DBili  x   /  AST  24  /  ALT  27  /  AlkPhos  120  03-16    PT/INR - ( 16 Mar 2019 08:50 )   PT: 13.2 SEC;   INR: 1.18          PTT - ( 16 Mar 2019 08:50 )  PTT:21.1 SEC          RADIOLOGY & ADDITIONAL TESTS:    Imaging Personally Reviewed:    Consultant(s) Notes Reviewed:      Care Discussed with Consultants/Other Providers:
Patient is a 59y old  Male who presents with a chief complaint of needs long-term placement; rejected from home facility (16 Mar 2019 11:57)      SUBJECTIVE / OVERNIGHT EVENTS:  no active issues overnight. Nursing says they are able to give meds orally to patient but his eating patterns are sporadic. Patient is nonverbal.      MEDICATIONS  (STANDING):  aspirin  chewable 81 milliGRAM(s) Oral daily  atorvastatin 80 milliGRAM(s) Oral at bedtime  carBAMazepine Chewable 100 milliGRAM(s) Chew two times a day  docusate sodium 100 milliGRAM(s) Oral three times a day  enoxaparin Injectable 90 milliGRAM(s) SubCutaneous two times a day  guaiFENesin  milliGRAM(s) Oral every 12 hours  haloperidol     Tablet 10 milliGRAM(s) Oral two times a day  levETIRAcetam 1000 milliGRAM(s) Oral two times a day  melatonin 3 milliGRAM(s) Oral at bedtime  memantine 5 milliGRAM(s) Oral at bedtime  multivitamin 1 Tablet(s) Oral daily  pantoprazole    Tablet 40 milliGRAM(s) Oral before breakfast    MEDICATIONS  (PRN):  acetaminophen   Tablet .. 650 milliGRAM(s) Oral every 6 hours PRN Temp greater or equal to 38C (100.4F), Mild Pain (1 - 3), Moderate Pain (4 - 6)  bisacodyl 5 milliGRAM(s) Oral every 12 hours PRN Constipation  LORazepam   Injectable 1 milliGRAM(s) IV Push every 6 hours PRN Agitation  ondansetron Injectable 4 milliGRAM(s) IV Push every 6 hours PRN Nausea      Vital Signs Last 24 Hrs  T(C): 36.6 (17 Mar 2019 05:24), Max: 36.6 (17 Mar 2019 05:24)  T(F): 97.8 (17 Mar 2019 05:24), Max: 97.8 (17 Mar 2019 05:24)  HR: 66 (17 Mar 2019 05:24) (66 - 66)  BP: 125/64 (17 Mar 2019 05:24) (125/64 - 125/64)  BP(mean): --  RR: 18 (17 Mar 2019 05:24) (18 - 18)  SpO2: 100% (17 Mar 2019 05:24) (100% - 100%)  CAPILLARY BLOOD GLUCOSE        I&O's Summary      PHYSICAL EXAM:  GENERAL: NAD, well-developed   HEAD:  Atraumatic, Normocephalic  EYES: EOMI, PERRLA, conjunctiva and sclera clear  NECK: Supple, No JVD  CHEST/LUNG: Clear to auscultation bilaterally; No wheeze  HEART: Regular rate and rhythm; No murmurs, rubs, or gallops  ABDOMEN: Soft, Nontender, Nondistended; Bowel sounds present  EXTREMITIES:  2+ Peripheral Pulses, No clubbing, cyanosis, or edema  PSYCH: AAOx0; awake. Nonverbal  NEUROLOGY: RUE and RLE 0/5 strength;  LUE 4/5 strength LLE 3/5 strength.   SKIN: No rashes or lesions    LABS:                        15.0   5.11  )-----------( 202      ( 17 Mar 2019 04:37 )             44.5     03-17    139  |  102  |  20  ----------------------------<  98  4.0   |  24  |  0.99    Ca    9.4      17 Mar 2019 04:37  Phos  2.6     03-16  Mg     1.9     03-17    TPro  7.5  /  Alb  4.0  /  TBili  0.4  /  DBili  x   /  AST  24  /  ALT  27  /  AlkPhos  120  03-16    PT/INR - ( 16 Mar 2019 08:50 )   PT: 13.2 SEC;   INR: 1.18          PTT - ( 16 Mar 2019 08:50 )  PTT:21.1 SEC          RADIOLOGY & ADDITIONAL TESTS:    Imaging Personally Reviewed:    Consultant(s) Notes Reviewed:      Care Discussed with Consultants/Other Providers:

## 2019-03-20 NOTE — DISCHARGE NOTE PROVIDER - HOSPITAL COURSE
59 y.o. male w/ hx Schizophrenia, seizure D/O chronic RLE weakness due to C4/5 stenosis c/b cord compression s/p discectomy/fusion/ Wheelchair bound was discharged from [facility] on 3/15 to [facility] after hospital stay for Left MCA CVA s/p TPA c/b catatonia and dysarthria. Patient now sent back from [facility] for inability to be fed with assistance. Patient has passed swallow eval assessing Dysphagia 1 with honey thickened liquids.           Problem Selector:    PROBLEM DIAGNOSES    Problem: Cerebrovascular accident (CVA)    Assessment and Plan: -L MCA stroke c/b R sided hemiplegia and aphasia as baseline     -will c/w dysphagia diet       -neuro checks per routine       -PT/OT eval - no evidence that his decreased PO intake is related to worsening neurological function.             Problem: Schizophrenia, catatonic type    Assessment and Plan: -c/w Haldol 10mg BID     -Ativan for agitation PRN      - suspect decreased PO intake could be behavior mediated.    - discussed with Dr. Ayers at [facility].  Agreeable to accepting the patient in AM.  Patient is medically optimized for discharge.        Problem: DVT, lower extremity    Assessment and Plan: -on therapeutic Lovenox 90 mg bid due to inconsistent PO dosing for DOAC     -original DVT as per chart reviews discovered in 2013, IVC placed after possible failure of Xarelto.            Patient medically optimized for discharge.  Discharge planning 40 minutes - discussed with consultants and [facility] physician accepting the transfer. 59 y.o. male w/ hx Schizophrenia, seizure D/O chronic RLE weakness due to C4/5 stenosis c/b cord compression s/p discectomy/fusion/ Wheelchair bound was discharged from [facility] on 3/15 to [facility] after hospital stay for Left MCA CVA s/p TPA c/b catatonia and dysarthria. Patient now sent back from [facility] for inability to be fed with assistance. Patient has passed swallow eval assessing Dysphagia 1 with honey thickened liquids.           Problem Selector:    PROBLEM DIAGNOSES    Problem: Cerebrovascular accident (CVA)    Assessment and Plan: -L MCA stroke c/b R sided hemiplegia and aphasia as baseline     -will c/w dysphagia diet       -neuro checks per routine       -PT/OT eval - no evidence that his decreased PO intake is related to worsening neurological function.             Problem: Schizophrenia, catatonic type    Assessment and Plan: -c/w Haldol 10mg BID     -Ativan for agitation PRN      - suspect decreased PO intake could be behavior mediated.    - discussed with Dr. Ayers at [facility].  Agreeable to accepting the patient in AM.  Patient is medically optimized for discharge.        Problem: DVT, lower extremity    Assessment and Plan: -on therapeutic Lovenox 90 mg bid due to inconsistent PO dosing for DOAC     -original DVT as per chart reviews discovered in 2013, IVC placed after possible failure of Xarelto.            Patient medically optimized for discharge.  Discharge discussed with [facility] physician

## 2019-03-20 NOTE — ED PROVIDER NOTE - PROGRESS NOTE DETAILS
[place]: [facility] staff, Dr. Vasquez, states patient looks dehydrated on exam and they are not able to take care of him, request patient be send to a nursing home

## 2019-03-20 NOTE — DISCHARGE NOTE NURSING/CASE MANAGEMENT/SOCIAL WORK - NSDCDPATPORTLINK_GEN_ALL_CORE
You can access the [facility] Patient Portal, offered by [facility], by registering with the following website: [email or web address]

## 2019-03-20 NOTE — ED ADULT NURSE NOTE - NSSUHOSCREENINGYN_ED_ALL_ED
Yes - the patient is able to be screened No - the patient is unable to be screened due to medical condition

## 2019-03-20 NOTE — DISCHARGE NOTE PROVIDER - NSDCFUADDINST_GEN_ALL_CORE_FT
Discuss with PMD the need for hypercoaguable work up as patient with DVT Discuss with PMD the need for hypercoaguable work up as patient with DVT  Prescriptions placed in folder

## 2019-03-20 NOTE — ED ADULT TRIAGE NOTE - CHIEF COMPLAINT QUOTE
BIBEMS from [facility] with staff from [facility] (Gary Santacruz) for not talking and unable to eat or ambulate for a month, need feeding tube for alternative feeding modalities per EMS , hx: multiple strokes (right side weakness), schizophrenia

## 2019-03-21 DIAGNOSIS — Z29.9 ENCOUNTER FOR PROPHYLACTIC MEASURES, UNSPECIFIED: ICD-10-CM

## 2019-03-21 DIAGNOSIS — I82.509 CHRONIC EMBOLISM AND THROMBOSIS OF UNSPECIFIED DEEP VEINS OF UNSPECIFIED LOWER EXTREMITY: ICD-10-CM

## 2019-03-21 DIAGNOSIS — K21.9 GASTRO-ESOPHAGEAL REFLUX DISEASE WITHOUT ESOPHAGITIS: ICD-10-CM

## 2019-03-21 DIAGNOSIS — Z79.01 LONG TERM (CURRENT) USE OF ANTICOAGULANTS: ICD-10-CM

## 2019-03-21 DIAGNOSIS — R13.10 DYSPHAGIA, UNSPECIFIED: ICD-10-CM

## 2019-03-21 DIAGNOSIS — F20.9 SCHIZOPHRENIA, UNSPECIFIED: ICD-10-CM

## 2019-03-21 DIAGNOSIS — Z79.899 OTHER LONG TERM (CURRENT) DRUG THERAPY: ICD-10-CM

## 2019-03-21 DIAGNOSIS — R62.7 ADULT FAILURE TO THRIVE: ICD-10-CM

## 2019-03-21 DIAGNOSIS — I63.9 CEREBRAL INFARCTION, UNSPECIFIED: ICD-10-CM

## 2019-03-21 DIAGNOSIS — M48.00 SPINAL STENOSIS, SITE UNSPECIFIED: ICD-10-CM

## 2019-03-21 DIAGNOSIS — R56.9 UNSPECIFIED CONVULSIONS: ICD-10-CM

## 2019-03-21 DIAGNOSIS — E43 UNSPECIFIED SEVERE PROTEIN-CALORIE MALNUTRITION: ICD-10-CM

## 2019-03-21 PROCEDURE — 99223 1ST HOSP IP/OBS HIGH 75: CPT

## 2019-03-21 PROCEDURE — 12345: CPT | Mod: NC

## 2019-03-21 RX ORDER — LACTULOSE 10 G/15ML
10 SOLUTION ORAL
Qty: 0 | Refills: 0 | Status: DISCONTINUED | OUTPATIENT
Start: 2019-03-21 | End: 2019-04-01

## 2019-03-21 RX ORDER — ENOXAPARIN SODIUM 100 MG/ML
40 INJECTION SUBCUTANEOUS EVERY 24 HOURS
Qty: 0 | Refills: 0 | Status: DISCONTINUED | OUTPATIENT
Start: 2019-03-21 | End: 2019-03-21

## 2019-03-21 RX ORDER — HALOPERIDOL DECANOATE 100 MG/ML
10 INJECTION INTRAMUSCULAR
Qty: 0 | Refills: 0 | Status: DISCONTINUED | OUTPATIENT
Start: 2019-03-21 | End: 2019-04-01

## 2019-03-21 RX ORDER — PANTOPRAZOLE SODIUM 20 MG/1
40 TABLET, DELAYED RELEASE ORAL
Qty: 0 | Refills: 0 | Status: DISCONTINUED | OUTPATIENT
Start: 2019-03-21 | End: 2019-04-01

## 2019-03-21 RX ORDER — MEMANTINE HYDROCHLORIDE 10 MG/1
5 TABLET ORAL AT BEDTIME
Qty: 0 | Refills: 0 | Status: DISCONTINUED | OUTPATIENT
Start: 2019-03-21 | End: 2019-04-01

## 2019-03-21 RX ORDER — CARBAMAZEPINE 200 MG
100 TABLET ORAL
Qty: 0 | Refills: 0 | Status: DISCONTINUED | OUTPATIENT
Start: 2019-03-21 | End: 2019-04-01

## 2019-03-21 RX ORDER — SENNA PLUS 8.6 MG/1
2 TABLET ORAL AT BEDTIME
Qty: 0 | Refills: 0 | Status: DISCONTINUED | OUTPATIENT
Start: 2019-03-21 | End: 2019-04-01

## 2019-03-21 RX ORDER — ATORVASTATIN CALCIUM 80 MG/1
10 TABLET, FILM COATED ORAL AT BEDTIME
Qty: 0 | Refills: 0 | Status: DISCONTINUED | OUTPATIENT
Start: 2019-03-21 | End: 2019-04-01

## 2019-03-21 RX ORDER — BENZTROPINE MESYLATE 1 MG
1 TABLET ORAL
Qty: 0 | Refills: 0 | Status: DISCONTINUED | OUTPATIENT
Start: 2019-03-21 | End: 2019-04-01

## 2019-03-21 RX ORDER — SODIUM CHLORIDE 9 MG/ML
1000 INJECTION, SOLUTION INTRAVENOUS
Qty: 0 | Refills: 0 | Status: DISCONTINUED | OUTPATIENT
Start: 2019-03-21 | End: 2019-03-21

## 2019-03-21 RX ORDER — POLYETHYLENE GLYCOL 3350 17 G/17G
17 POWDER, FOR SOLUTION ORAL DAILY
Qty: 0 | Refills: 0 | Status: DISCONTINUED | OUTPATIENT
Start: 2019-03-21 | End: 2019-04-01

## 2019-03-21 RX ORDER — QUETIAPINE FUMARATE 200 MG/1
200 TABLET, FILM COATED ORAL
Qty: 0 | Refills: 0 | Status: DISCONTINUED | OUTPATIENT
Start: 2019-03-21 | End: 2019-04-01

## 2019-03-21 RX ORDER — ASPIRIN/CALCIUM CARB/MAGNESIUM 324 MG
81 TABLET ORAL DAILY
Qty: 0 | Refills: 0 | Status: DISCONTINUED | OUTPATIENT
Start: 2019-03-21 | End: 2019-04-01

## 2019-03-21 RX ORDER — DOCUSATE SODIUM 100 MG
100 CAPSULE ORAL THREE TIMES A DAY
Qty: 0 | Refills: 0 | Status: DISCONTINUED | OUTPATIENT
Start: 2019-03-21 | End: 2019-04-01

## 2019-03-21 RX ORDER — ENOXAPARIN SODIUM 100 MG/ML
80 INJECTION SUBCUTANEOUS
Qty: 0 | Refills: 0 | Status: DISCONTINUED | OUTPATIENT
Start: 2019-03-21 | End: 2019-04-01

## 2019-03-21 RX ORDER — LEVETIRACETAM 250 MG/1
1000 TABLET, FILM COATED ORAL
Qty: 0 | Refills: 0 | Status: DISCONTINUED | OUTPATIENT
Start: 2019-03-21 | End: 2019-04-01

## 2019-03-21 RX ADMIN — Medication 1 MILLIGRAM(S): at 06:48

## 2019-03-21 RX ADMIN — HALOPERIDOL DECANOATE 10 MILLIGRAM(S): 100 INJECTION INTRAMUSCULAR at 17:24

## 2019-03-21 RX ADMIN — MEMANTINE HYDROCHLORIDE 5 MILLIGRAM(S): 10 TABLET ORAL at 22:29

## 2019-03-21 RX ADMIN — QUETIAPINE FUMARATE 200 MILLIGRAM(S): 200 TABLET, FILM COATED ORAL at 06:48

## 2019-03-21 RX ADMIN — ATORVASTATIN CALCIUM 10 MILLIGRAM(S): 80 TABLET, FILM COATED ORAL at 22:29

## 2019-03-21 RX ADMIN — Medication 1 TABLET(S): at 12:06

## 2019-03-21 RX ADMIN — SENNA PLUS 2 TABLET(S): 8.6 TABLET ORAL at 22:29

## 2019-03-21 RX ADMIN — Medication 100 MILLIGRAM(S): at 06:48

## 2019-03-21 RX ADMIN — Medication 100 MILLIGRAM(S): at 17:24

## 2019-03-21 RX ADMIN — HALOPERIDOL DECANOATE 10 MILLIGRAM(S): 100 INJECTION INTRAMUSCULAR at 06:48

## 2019-03-21 RX ADMIN — LEVETIRACETAM 1000 MILLIGRAM(S): 250 TABLET, FILM COATED ORAL at 06:48

## 2019-03-21 RX ADMIN — ENOXAPARIN SODIUM 80 MILLIGRAM(S): 100 INJECTION SUBCUTANEOUS at 17:23

## 2019-03-21 RX ADMIN — Medication 81 MILLIGRAM(S): at 12:06

## 2019-03-21 RX ADMIN — QUETIAPINE FUMARATE 200 MILLIGRAM(S): 200 TABLET, FILM COATED ORAL at 17:24

## 2019-03-21 RX ADMIN — Medication 100 MILLIGRAM(S): at 22:48

## 2019-03-21 RX ADMIN — ENOXAPARIN SODIUM 80 MILLIGRAM(S): 100 INJECTION SUBCUTANEOUS at 06:48

## 2019-03-21 RX ADMIN — PANTOPRAZOLE SODIUM 40 MILLIGRAM(S): 20 TABLET, DELAYED RELEASE ORAL at 06:48

## 2019-03-21 RX ADMIN — SODIUM CHLORIDE 100 MILLILITER(S): 9 INJECTION, SOLUTION INTRAVENOUS at 06:47

## 2019-03-21 RX ADMIN — Medication 1 MILLIGRAM(S): at 17:24

## 2019-03-21 RX ADMIN — SODIUM CHLORIDE 100 MILLILITER(S): 9 INJECTION, SOLUTION INTRAVENOUS at 04:12

## 2019-03-21 RX ADMIN — LEVETIRACETAM 1000 MILLIGRAM(S): 250 TABLET, FILM COATED ORAL at 17:24

## 2019-03-21 NOTE — H&P ADULT - NSHPSOCIALHISTORY_GEN_ALL_CORE
has been sent back and forth from [facility] in last months due to catatonia and dysphagia has been sent back and forth from [facility] in last few months due to catatonia and dysphagia  unable to elicit Social History 2/2 pt's poor mental status and being nonverbal

## 2019-03-21 NOTE — H&P ADULT - PROBLEM SELECTOR PLAN 6
Unclear of when diagnosed or if ever followed up.  -will half dose of Omeprazole to 20mg QD in hopes of deprescribing unclear of when last evaluated (last neuro note was in regards to stroke and does not mention medication dosing)  -would c/s Neuro for reevaluation of seizure medications  -c/w Kepra 1000mg BID for now unclear of when last evaluated (last neuro note was in regards to stroke and does not mention medication dosing)  -would c/s Neuro for reevaluation of seizure medications  -c/w Keppra 1000mg BID for now  -c/w Carbamazepine

## 2019-03-21 NOTE — H&P ADULT - PROBLEM SELECTOR PLAN 1
since stroke in 2/2019, pt has been catatonic and has become difficult to feed. Pt can no longer be taken care of at [facility].   -will c/s SW to begin process of finding long-term care facility able to manage pt's complex care since stroke in 2/2019, pt has been catatonic and has become difficult to feed. Pt can no longer be taken care of at [facility].   -will c/s SW to begin process of finding long-term care facility able to manage pt's complex care

## 2019-03-21 NOTE — H&P ADULT - ASSESSMENT
59M PMH schizophrenia, seizure, GERD, renal mass, recurrent DVT (on Lovenox, w/ IVC filter), C4/5 stenosis (c/b cord compression s/p discectomy and fusion), recent CVA being admitted for failure to thrive 2/2 catatonia in setting of recent stroke.

## 2019-03-21 NOTE — H&P ADULT - PROBLEM SELECTOR PLAN 3
pt w/ stroke in 2/2019, since he has been w/ flat affect.   -c/w Atorvastatin 10mg qHS (pt does NOT have HLD, last lipid profile WNL), ASA 81mg QD, Benztropine 1mg BID pt w/ stroke in 2/2019, now with poor mental status and essentially bedbound  -c/w Atorvastatin 10mg qHS (pt does NOT have HLD, last lipid profile WNL), ASA 81mg QD

## 2019-03-21 NOTE — BEHAVIORAL HEALTH ASSESSMENT NOTE - RISK ASSESSMENT
Patient nonverbal at this time, unable to fully assess suicidality.  Patient has not demonstrated acts to harm self. He has been compliant with medications during this admission and has been cooperative with staff during feeds.    ongoing monitoring.  chronic protective factors: residential stability, med compliance

## 2019-03-21 NOTE — H&P ADULT - PROBLEM SELECTOR PLAN 4
previously diagnosed, unclear when last evaluation of medications.  -c/s psych for reevaluation of all medications and new baseline after stroke  -c/w meds from [facility] for now (Haldol 10mg BID, Quetiapine 200mg BID) in setting of recurring DVT s/p IVC filter (confirmed on abd XR performed 2/17). No details on inpatient side for hypercoagulable work up and unclear as to when last reassessed. Pt currently on Lovenox 90 BID, however, is high fall risk.   -c/w Lovenox 80 BID for now as his weight is now closer to 80kg  -value of full anticoagulation vs risk of fall risk  -PT SHOULD NOT BE DISCHARGE WITHOUT ASSESSING NEED FOR ANTICOAGULATION in setting of recurring DVT s/p IVC filter (confirmed on abd XR performed 2/17). No details on inpatient side for hypercoagulable work up and unclear as to when last reassessed. Pt currently on Lovenox 90 BID, however, is high fall risk.   -c/w Lovenox 80 BID for now as his weight is now closer to 80kg  -value of full anticoagulation vs risk of fall risk  -HASBLED score of 3  -PT SHOULD NOT BE DISCHARGE WITHOUT ASSESSING NEED FOR ANTICOAGULATION in setting of recurring DVT s/p IVC filter (confirmed on abd XR performed 2/17). No details on inpatient side for hypercoagulable work up and unclear as to when last reassessed. Pt currently on Lovenox 90 BID, however, is high fall risk.   -c/w Lovenox 80 BID for now as his weight is now closer to 80kg  -value of full anticoagulation vs risk of fall risk  -HASBLED score of 3  -PT SHOULD NOT BE DISCHARGED WITHOUT ASSESSING NEED FOR ANTICOAGULATION

## 2019-03-21 NOTE — PROGRESS NOTE ADULT - ATTENDING COMMENTS
Urban Leal MD  pager# [phone] Addendum: Discussed with Dr. Chakraborty in [facility] for safe patient disposition.  Will obtain 72hour calorie count to see if he's keeping up with his nutritional requirement and in-patient psychiatry consult to further optimize his psychiatric/behavior issues.    Urban Leal MD  pager# [phone]

## 2019-03-21 NOTE — H&P ADULT - PROBLEM SELECTOR PLAN 7
in setting of recurring DVT s/p IVC filter (confirmed on abd XR performed 2/17). No details on inpatient side for hypercoagulable work up and unclear as to when last reassessed. Pt currently on Lovenox 90 BID, however, is high fall risk.   -c/w Lovenox 90 BID for now  -value of full anticoagulation vs risk of fall risk  -PT SHOULD NOT BE DISCHARGE WITHOUT ASSESSING NEED FOR ANTICOAGULATION Unclear of when diagnosed or if ever followed up.  -will half dose of Omeprazole to 20mg QD in hopes of deprescribing Unclear of when diagnosed or if ever followed up.  -will half dose of Omeprazole to 20mg QD in hopes of discontinuing

## 2019-03-21 NOTE — H&P ADULT - PROBLEM SELECTOR PLAN 2
Ashley insisting that they are unable to feed pt w/ dysphagia 1 honey thickened and requesting evaluation for a feeding tube. Multiple previous evaluations at [facility] have shown that pt is able to swallow  proven through cinosophogram without aspiration.    -as per [facility] records, no need for alternate feeding modality at this time as pt has passed multiple swallow evaluations  -will c/s behavioral medicine to evaluate pt's reluctancy to oral feeds Lauren insisting that they are unable to feed pt w/ dysphagia 1 honey thickened and requesting evaluation for a feeding tube. Multiple previous evaluations at [facility] have shown that pt is able to swallow proven through cinosophogram without aspiration.    -as per [facility] records, no need for alternate feeding modality at this time as pt has passed multiple swallow evaluations  -will c/s behavioral medicine to evaluate pt's reluctancy to oral feeds

## 2019-03-21 NOTE — H&P ADULT - NSHPPHYSICALEXAM_GEN_ALL_CORE
T(C): 36.4 (03-21-19 @ 02:41), Max: 37.1 (03-20-19 @ 19:49)  HR: 70 (03-21-19 @ 02:41) (64 - 70)  BP: 126/67 (03-21-19 @ 02:41) (124/56 - 131/73)  RR: 16 (03-21-19 @ 02:41) (16 - 20)  SpO2: 100% (03-21-19 @ 02:41) (98% - 100%)    GENERAL: nonverbal, frail appearing man  HEAD:  Atraumatic, Normocephalic  EYES: EOMI, PERRLA, conjunctiva and sclera clear  NECK: Supple, No JVD  CHEST/LUNG: Clear to auscultation bilaterally; No wheeze  HEART: Regular rate and rhythm; No murmurs, rubs, or gallops  ABDOMEN: Soft, Nontender, Nondistended; Bowel sounds present  EXTREMITIES:  2+ Peripheral Pulses, No clubbing, cyanosis, or edema  PSYCH: pt catatonic, will make eye contact  NEUROLOGY: reported R sided weakness, but pt does not follow directions, unable to assess  SKIN: No rashes or lesions T(C): 36.4 (03-21-19 @ 02:41), Max: 37.1 (03-20-19 @ 19:49)  HR: 70 (03-21-19 @ 02:41) (64 - 70)  BP: 126/67 (03-21-19 @ 02:41) (124/56 - 131/73)  RR: 16 (03-21-19 @ 02:41) (16 - 20)  SpO2: 100% (03-21-19 @ 02:41) (98% - 100%)    GENERAL: nonverbal, frail appearing man  HEAD:  Atraumatic, Normocephalic  EYES: EOMI, PERRLA, conjunctiva and sclera clear  NECK: Supple, No JVD  CHEST/LUNG: Nonlabored breathing; Clear to auscultation bilaterally; No wheeze  HEART: Regular rate and rhythm; +S1S2; No murmurs, rubs, or gallops; No LE edema b/l  ABDOMEN: Soft, Nontender, Nondistended; Bowel sounds present  EXTREMITIES: 2+ Peripheral Pulses, No clubbing, cyanosis, or edema  PSYCH: Pt catatonic, will make eye contact  NEUROLOGY: Reported R sided weakness, but pt does not follow directions, unable to assess; pt unable to follow commands and perform maneuvers  SKIN: Warm and dry; No rashes or lesions

## 2019-03-21 NOTE — H&P ADULT - NSHPLABSRESULTS_GEN_ALL_CORE
14.9   4.66  )-----------( 202      ( 20 Mar 2019 23:00 )             43.2       03-20    136  |  101  |  10  ----------------------------<  114<H>  3.7   |  23  |  0.82    Ca    9.5      20 Mar 2019 23:00    TPro  7.2  /  Alb  3.8  /  TBili  0.4  /  DBili  x   /  AST  19  /  ALT  22  /  AlkPhos  119  03-20

## 2019-03-21 NOTE — PROGRESS NOTE ADULT - PROBLEM SELECTOR PLAN 2
Monitor for catatonia  - continue haldol, namenda, cogentin, carbamazepine Monitor for catatonia  - continue haldol, namenda, cogentin, carbamazepine  - in-patient psychiatry consult for management assistance.

## 2019-03-21 NOTE — H&P ADULT - PROBLEM SELECTOR PLAN 8
Pt has inconsistent medication list from [facility], unclear of what he was receiving (electronic list sent inconsistent with handwritten list). It is abundantly clear that a full medication reconciliation including the needs for each medication has not been performed recently. As pt will likely be set up with a different long-term care facility  after this hospitalization, we MUST reassess medications before discharge. Pt w/ old and incorrect diagnoses in chart and unclear if in need of all medications.  -every c/s with goal of deprescribing

## 2019-03-21 NOTE — PROVIDER CONTACT NOTE (OTHER) - ASSESSMENT
Patient nonverbal, aphasia noted, one on one directions utilized, repetition utilized, reoriented as needed. no acute distress noted at this time. no s/s of bleeding noted. Aspiration and seizure precautions maintained. No s/s of pain or SOB noted. Was told verbally during hand off report that patient "slid from bed" and "did not hit head" however did not witness. Provider at bedside and saw patient, no further orders for this at this time. Fall risk protocol maintained. Bed in lowest setting, bed alarm activated, appropriate side rails in place, red nonskid socks on, will continue to monitor.

## 2019-03-21 NOTE — BEHAVIORAL HEALTH ASSESSMENT NOTE - CASE SUMMARY
Patient seen yesterday by NP, evaluated today by myself. Agree with above. 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 Per chart, pt is nonverbal at baseline (since stroke). On exam pt is mute aside from mumbling very softly a few brief things I cannot make out, does move around to adjust his hospital gown. Poor eye contact. Patient did remove his hand, and look at writer however returned to same position and did not follow any commands that were asked of him.  No rigidity, no other sx of catatonia present on exam. Impression: chronic schizophrenia. Plan: as above- c/w current psychotropic meds from [facility], Haldol prns, calorie count. At this time, no new psychiatric intervention indicated. Will continue to follow closely.

## 2019-03-21 NOTE — BEHAVIORAL HEALTH ASSESSMENT NOTE - HPI (INCLUDE ILLNESS QUALITY, SEVERITY, DURATION, TIMING, CONTEXT, MODIFYING FACTORS, ASSOCIATED SIGNS AND SYMPTOMS)
Pt is 59 year-old M with PPHx Schizophrenia, currently domiciled at [facility] [address] psychiatrist Dr Ghohs, unknown number of psychiatric hospitalizations, PMH of CVA (2/2019), seizure disorder, recurrent DVT s/p IVC filter, h/o severe C4-5 stenosis c/b cord compression s/p discectomy and fusion, who presented from [place] for decreased PO intake, seen in ED, discharged and then was again sent back next day.  Patient was also a recent discharge from [facility] earlier this month.     Pt seen and evaluated at bedside.  Pt minimally cooperative with interview, covering eyes with hand.  As per chart review, patient is nonverbal at baseline (since stroke). Patient did remove his hand, and look at writer however returned to same position and did not follow any commands that were asked of him.  Patient did allow writer to test for rigidity, none noted at this time.  No catalepsy echoalia. mitgehen, autonomic abnormalities, gegenhalten, posturing or sterotypy.  Due to patient nonverbal baseline taylor barbie scale for catatonia scoring can be altered.     As per staff on the unit, patient has been compliant with eating meals.  Calorie count was initiated and patient has eaten full tray of breakfast and lunch.  Nurse reported no problems with feds today.  [place] state patient does appear restless at times, however no severe behavioral issues.

## 2019-03-21 NOTE — BEHAVIORAL HEALTH ASSESSMENT NOTE - NSBHCHARTREVIEWIMAGING_PSY_A_CORE FT
Posterior Capsular Fibrosis Counseling: The diagnosis of posterior capsular fibrosis (PCF), also referred to as a secondary cataract or posterior capsular opacification (PCO), was discussed with the patient. The patient understands and desires to monitor condition for now. < from: CT Head No Cont (02.19.19 @ 09:37) >    IMPRESSION:    Evolving/enlarging left MCA distribution infarction with associated mass   effect.    < end of copied text >

## 2019-03-21 NOTE — BEHAVIORAL HEALTH ASSESSMENT NOTE - SUMMARY
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  Patient was also a recent discharge from [facility] earlier this month.   As per chart review, patient is nonverbal at baseline (since stroke). Patient did remove his hand, and look at writer however returned to same position and did not follow any commands that were asked of him.  Patient did allow writer to test for rigidity, none noted at this time.  No catalepsy echoalia. mitgehen, autonomic abnormalities, gegenhalten, posturing or sterotypy.  Due to patient nonverbal baseline taylor barbie scale for catatonia scoring can be altered.     As per staff on the unit, patient has been compliant with eating meals.  Calorie count was initiated and patient has eaten full tray of breakfast and lunch.  Nurse reported no problems with feds today.  [place] state patient does appear restless at times, however no severe behavioral issues.    PLAN  - no clear s/s of catatonia, may be CVA residual.  Patient also seen earlier this month, given ativan for r/o catatonia and was tapered due to no clear indication this was catatonia   - c/w home regime of psychotropic medications. med rec sent from [place] in chart from 3/20.  seroquel 200mg BID  Cogentin 1mg BID  Haldol 10mg BID  Carbamazepine 100BID  - PRN haldol 2mg PO/IM/IV for agitation q6hrs  - continue calorie count and assist with feeds to ensure adequate PO intake

## 2019-03-21 NOTE — H&P ADULT - NSHPREVIEWOFSYSTEMS_GEN_ALL_CORE
Unable to assess due to non verbal pt. Unable to assess ROS due to pt's poor mental status and being nonverbal.

## 2019-03-21 NOTE — H&P ADULT - PROBLEM SELECTOR PLAN 9
VTE: Lovenox full AC for now  Diet: dysphagia 1 honey thickened VTE ppx: Lovenox full AC for now  Diet: dysphagia 1 honey thickened

## 2019-03-21 NOTE — H&P ADULT - PROBLEM SELECTOR PLAN 5
unclear of when last evaluated (last neuro note was in regards to stroke and does not mention medication dosing)  -would c/s Neuro for reevaluation of seizure medications  -c/w Kepra 1000mg BID for now previously diagnosed, unclear when last evaluation of medications.  -c/s psych for reevaluation of all medications and new baseline after stroke  -c/w meds from [facility] for now (Haldol 10mg BID, Quetiapine 200mg BID) previously diagnosed, unclear when last evaluation of medications.  -c/s psych for reevaluation of all medications and new baseline after stroke  -c/w meds from [facility] for now (Haldol 10mg BID, Quetiapine 200mg BID)  -c/w Benztropine 1mg BID  -monitor QTc while pt is on Haldol and Quetiapine

## 2019-03-21 NOTE — BEHAVIORAL HEALTH ASSESSMENT NOTE - NSBHCHARTREVIEWVS_PSY_A_CORE FT
Vital Signs Last 24 Hrs  T(C): 36.8 (21 Mar 2019 13:55), Max: 37.1 (20 Mar 2019 19:49)  T(F): 98.2 (21 Mar 2019 13:55), Max: 98.8 (20 Mar 2019 19:49)  HR: 60 (21 Mar 2019 13:55) (60 - 70)  BP: 135/76 (21 Mar 2019 13:55) (124/56 - 147/82)  BP(mean): --  RR: 18 (21 Mar 2019 13:55) (16 - 20)  SpO2: 100% (21 Mar 2019 13:55) (100% - 100%)

## 2019-03-22 PROCEDURE — 99232 SBSQ HOSP IP/OBS MODERATE 35: CPT

## 2019-03-22 PROCEDURE — 90792 PSYCH DIAG EVAL W/MED SRVCS: CPT

## 2019-03-22 RX ADMIN — Medication 81 MILLIGRAM(S): at 11:22

## 2019-03-22 RX ADMIN — Medication 1 TABLET(S): at 11:22

## 2019-03-22 RX ADMIN — PANTOPRAZOLE SODIUM 40 MILLIGRAM(S): 20 TABLET, DELAYED RELEASE ORAL at 06:09

## 2019-03-22 RX ADMIN — Medication 100 MILLIGRAM(S): at 13:55

## 2019-03-22 RX ADMIN — POLYETHYLENE GLYCOL 3350 17 GRAM(S): 17 POWDER, FOR SOLUTION ORAL at 11:22

## 2019-03-22 RX ADMIN — SENNA PLUS 2 TABLET(S): 8.6 TABLET ORAL at 21:20

## 2019-03-22 RX ADMIN — Medication 1 MILLIGRAM(S): at 06:11

## 2019-03-22 RX ADMIN — QUETIAPINE FUMARATE 200 MILLIGRAM(S): 200 TABLET, FILM COATED ORAL at 06:08

## 2019-03-22 RX ADMIN — Medication 100 MILLIGRAM(S): at 06:08

## 2019-03-22 RX ADMIN — HALOPERIDOL DECANOATE 10 MILLIGRAM(S): 100 INJECTION INTRAMUSCULAR at 17:07

## 2019-03-22 RX ADMIN — ATORVASTATIN CALCIUM 10 MILLIGRAM(S): 80 TABLET, FILM COATED ORAL at 21:20

## 2019-03-22 RX ADMIN — ENOXAPARIN SODIUM 80 MILLIGRAM(S): 100 INJECTION SUBCUTANEOUS at 17:07

## 2019-03-22 RX ADMIN — HALOPERIDOL DECANOATE 10 MILLIGRAM(S): 100 INJECTION INTRAMUSCULAR at 06:09

## 2019-03-22 RX ADMIN — LEVETIRACETAM 1000 MILLIGRAM(S): 250 TABLET, FILM COATED ORAL at 06:08

## 2019-03-22 RX ADMIN — ENOXAPARIN SODIUM 80 MILLIGRAM(S): 100 INJECTION SUBCUTANEOUS at 06:09

## 2019-03-22 RX ADMIN — Medication 100 MILLIGRAM(S): at 17:07

## 2019-03-22 RX ADMIN — LEVETIRACETAM 1000 MILLIGRAM(S): 250 TABLET, FILM COATED ORAL at 17:07

## 2019-03-22 RX ADMIN — MEMANTINE HYDROCHLORIDE 5 MILLIGRAM(S): 10 TABLET ORAL at 21:20

## 2019-03-22 RX ADMIN — QUETIAPINE FUMARATE 200 MILLIGRAM(S): 200 TABLET, FILM COATED ORAL at 17:07

## 2019-03-22 RX ADMIN — Medication 1 MILLIGRAM(S): at 17:07

## 2019-03-22 NOTE — PROGRESS NOTE ADULT - PROBLEM SELECTOR PLAN 2
- continue haldol, namenda, cogentin, carbamazepine  - in-patient psychiatry consult for management assistance.

## 2019-03-22 NOTE — PROGRESS NOTE BEHAVIORAL HEALTH - NSBHCHARTREVIEWINVESTIGATE_PSY_A_CORE FT
Ventricular Rate 68 BPM    Atrial Rate 68 BPM    P-R Interval 128 ms    QRS Duration 90 ms    Q-T Interval 384 ms    QTC Calculation(Bezet) 408 ms    3/20/19   P Axis 61 degrees    R Axis -63 degrees    T Axis 75 degrees    Diagnosis Line Sinus rhythm with Premature supraventricular complexes  Left anterior fascicular block  Nonspecific T wave abnormality  Abnormal ECG

## 2019-03-22 NOTE — PROGRESS NOTE BEHAVIORAL HEALTH - NSBHFUPINTERVALHXFT_PSY_A_CORE
Patient seen for f/u. No acute events overnight. Did not receive prn medication. Patient has been continued on his home psych meds. Clinically unchanged since seen by psychiatry yesterday. Does not follow commands. Is resistant to allowing writer to test for rigidity. However, able to test and no rigidity present. No catalepsy, echolalia, mitgehen, gegenhalten, autonomic abnormalities, posturing, or stereotypy.

## 2019-03-22 NOTE — PROGRESS NOTE BEHAVIORAL HEALTH - NSBHCHARTREVIEWIMAGING_PSY_A_CORE FT
< from: CT Head No Cont (02.19.19 @ 09:37) >    IMPRESSION:    Evolving/enlarging left MCA distribution infarction with associated mass   effect.    < end of copied text >

## 2019-03-22 NOTE — PROGRESS NOTE BEHAVIORAL HEALTH - NSBHCHARTREVIEWVS_PSY_A_CORE FT
Vital Signs Last 24 Hrs  T(C): 36.8 (22 Mar 2019 04:58), Max: 36.8 (21 Mar 2019 13:55)  T(F): 98.3 (22 Mar 2019 04:58), Max: 98.3 (22 Mar 2019 04:58)  HR: 60 (22 Mar 2019 04:58) (60 - 66)  BP: 105/61 (22 Mar 2019 04:58) (105/61 - 135/76)  BP(mean): --  RR: 18 (22 Mar 2019 04:58) (18 - 18)  SpO2: 100% (22 Mar 2019 04:58) (100% - 100%)

## 2019-03-22 NOTE — PROGRESS NOTE BEHAVIORAL HEALTH - CASE SUMMARY
Pt seen with resident today, see attending summary at bottom of initial behavioral health assessment note. Will follow.

## 2019-03-23 PROCEDURE — 99232 SBSQ HOSP IP/OBS MODERATE 35: CPT

## 2019-03-23 RX ADMIN — LEVETIRACETAM 1000 MILLIGRAM(S): 250 TABLET, FILM COATED ORAL at 05:40

## 2019-03-23 RX ADMIN — ENOXAPARIN SODIUM 80 MILLIGRAM(S): 100 INJECTION SUBCUTANEOUS at 17:02

## 2019-03-23 RX ADMIN — Medication 1 MILLIGRAM(S): at 05:40

## 2019-03-23 RX ADMIN — Medication 1 MILLIGRAM(S): at 17:02

## 2019-03-23 RX ADMIN — QUETIAPINE FUMARATE 200 MILLIGRAM(S): 200 TABLET, FILM COATED ORAL at 05:40

## 2019-03-23 RX ADMIN — POLYETHYLENE GLYCOL 3350 17 GRAM(S): 17 POWDER, FOR SOLUTION ORAL at 13:07

## 2019-03-23 RX ADMIN — HALOPERIDOL DECANOATE 10 MILLIGRAM(S): 100 INJECTION INTRAMUSCULAR at 05:40

## 2019-03-23 RX ADMIN — QUETIAPINE FUMARATE 200 MILLIGRAM(S): 200 TABLET, FILM COATED ORAL at 17:02

## 2019-03-23 RX ADMIN — Medication 81 MILLIGRAM(S): at 13:07

## 2019-03-23 RX ADMIN — Medication 100 MILLIGRAM(S): at 13:07

## 2019-03-23 RX ADMIN — LEVETIRACETAM 1000 MILLIGRAM(S): 250 TABLET, FILM COATED ORAL at 17:02

## 2019-03-23 RX ADMIN — Medication 100 MILLIGRAM(S): at 17:02

## 2019-03-23 RX ADMIN — Medication 1 TABLET(S): at 13:07

## 2019-03-23 RX ADMIN — HALOPERIDOL DECANOATE 10 MILLIGRAM(S): 100 INJECTION INTRAMUSCULAR at 17:02

## 2019-03-23 RX ADMIN — Medication 100 MILLIGRAM(S): at 05:40

## 2019-03-23 RX ADMIN — PANTOPRAZOLE SODIUM 40 MILLIGRAM(S): 20 TABLET, DELAYED RELEASE ORAL at 06:51

## 2019-03-23 RX ADMIN — ENOXAPARIN SODIUM 80 MILLIGRAM(S): 100 INJECTION SUBCUTANEOUS at 05:40

## 2019-03-23 RX ADMIN — Medication 100 MILLIGRAM(S): at 05:41

## 2019-03-23 NOTE — DIETITIAN INITIAL EVALUATION ADULT. - PROBLEM SELECTOR PLAN 5
previously diagnosed, unclear when last evaluation of medications.  -c/s psych for reevaluation of all medications and new baseline after stroke  -c/w meds from [place] for now (Haldol 10mg BID, Quetiapine 200mg BID)  -c/w Benztropine 1mg BID  -monitor QTc while pt is on Haldol and Quetiapine

## 2019-03-23 NOTE — DIETITIAN INITIAL EVALUATION ADULT. - PERTINENT MEDS FT
MEDICATIONS  (STANDING):  aspirin  chewable 81 milliGRAM(s) Oral daily  atorvastatin 10 milliGRAM(s) Oral at bedtime  benztropine 1 milliGRAM(s) Oral two times a day  carBAMazepine Chewable 100 milliGRAM(s) Chew two times a day  docusate sodium 100 milliGRAM(s) Oral three times a day  enoxaparin Injectable 80 milliGRAM(s) SubCutaneous two times a day  haloperidol     Tablet 10 milliGRAM(s) Oral two times a day  levETIRAcetam 1000 milliGRAM(s) Oral two times a day  memantine 5 milliGRAM(s) Oral at bedtime  multivitamin 1 Tablet(s) Oral daily  pantoprazole    Tablet 40 milliGRAM(s) Oral before breakfast  polyethylene glycol 3350 17 Gram(s) Oral daily  QUEtiapine 200 milliGRAM(s) Oral two times a day  senna 2 Tablet(s) Oral at bedtime    MEDICATIONS  (PRN):  bisacodyl 5 milliGRAM(s) Oral every 12 hours PRN Constipation  lactulose Syrup 10 Gram(s) Oral two times a day PRN if no BM in 24 hours

## 2019-03-23 NOTE — DIETITIAN INITIAL EVALUATION ADULT. - ENERGY NEEDS
Ht: no height in chart- per previous RDN note - 74 inches   Wt: 180.1 pounds BMI: 23.1 kg/m2 IBW: 190 pounds (+/-10%) %IBW: 94%  Edema: + 1 edema - right and left foot.  Skin: intact, no pressure injuries noted

## 2019-03-23 NOTE — DIETITIAN INITIAL EVALUATION ADULT. - PROBLEM SELECTOR PLAN 7
Unclear of when diagnosed or if ever followed up.  -will half dose of Omeprazole to 20mg QD in hopes of discontinuing

## 2019-03-23 NOTE — DIETITIAN INITIAL EVALUATION ADULT. - PROBLEM SELECTOR PLAN 1
since stroke in 2/2019, pt has been catatonic and has become difficult to feed. Pt can no longer be taken care of at [place].   -will c/s SW to begin process of finding long-term care facility able to manage pt's complex care

## 2019-03-23 NOTE — DIETITIAN INITIAL EVALUATION ADULT. - ORAL INTAKE PTA
Unable to obtain. Per H&P- Patient sent in from [facility]-- patient aphasic and was unable to be fed-- unable to obtain time frame.

## 2019-03-23 NOTE — DIETITIAN INITIAL EVALUATION ADULT. - PROBLEM SELECTOR PLAN 2
Lauren insisting that they are unable to feed pt w/ dysphagia 1 honey thickened and requesting evaluation for a feeding tube. Multiple previous evaluations at [facility] have shown that pt is able to swallow proven through cinosophogram without aspiration.    -as per [facility] records, no need for alternate feeding modality at this time as pt has passed multiple swallow evaluations  -will c/s behavioral medicine to evaluate pt's reluctancy to oral feeds

## 2019-03-23 NOTE — DIETITIAN INITIAL EVALUATION ADULT. - PROBLEM SELECTOR PLAN 3
pt w/ stroke in 2/2019, now with poor mental status and essentially bedbound  -c/w Atorvastatin 10mg qHS (pt does NOT have HLD, last lipid profile WNL), ASA 81mg QD

## 2019-03-23 NOTE — DIETITIAN INITIAL EVALUATION ADULT. - OTHER INFO
Patient seen for nutrition consult for Calorie Count. Per chart: Patient with history of schizophrenia, seizure, GERD, renal mass, recurrent DVT, C4/5 stenosis, recent CVA- being admitted for failure to thrive 2/2 catatonia in setting of recent stroke. Per RN patient with good PO intake- consuming 100% of meals. Calorie Count found in chart ( completed two days) Calorie count for 3/21- incomplete- however patient consumed 100% of meals for breakfast and lunch. 3/22 Patient consumed 100% for all 3 meals. Per RN no GI distress and is tolerating current diet order dysphagia 1 pureed- honey consistency. No allergies or intolerances noted in chart. Unable to obtain weight history. Per Previous RDN note (2/2019) weight: 197.3 pounds. Current weight: 180.1 pounds--- possible weight decrease due to suspected decrease in PO intake PTA. Patient noted with + 1 edema.

## 2019-03-23 NOTE — DIETITIAN INITIAL EVALUATION ADULT. - PROBLEM SELECTOR PLAN 4
in setting of recurring DVT s/p IVC filter (confirmed on abd XR performed 2/17). No details on inpatient side for hypercoagulable work up and unclear as to when last reassessed. Pt currently on Lovenox 90 BID, however, is high fall risk.   -c/w Lovenox 80 BID for now as his weight is now closer to 80kg  -value of full anticoagulation vs risk of fall risk  -HASBLED score of 3  -PT SHOULD NOT BE DISCHARGED WITHOUT ASSESSING NEED FOR ANTICOAGULATION

## 2019-03-23 NOTE — DIETITIAN INITIAL EVALUATION ADULT. - PROBLEM SELECTOR PLAN 6
unclear of when last evaluated (last neuro note was in regards to stroke and does not mention medication dosing)  -would c/s Neuro for reevaluation of seizure medications  -c/w Keppra 1000mg BID for now  -c/w Carbamazepine

## 2019-03-23 NOTE — DIETITIAN INITIAL EVALUATION ADULT. - PHYSICAL APPEARANCE
Unable to perform Nutrition Focus Physical Exam - due to mental status - per previous RDN note patient with history of severe muscle wasting in temporal, clavicle and interosseous region.

## 2019-03-24 PROCEDURE — 99232 SBSQ HOSP IP/OBS MODERATE 35: CPT

## 2019-03-24 RX ADMIN — LEVETIRACETAM 1000 MILLIGRAM(S): 250 TABLET, FILM COATED ORAL at 18:07

## 2019-03-24 RX ADMIN — PANTOPRAZOLE SODIUM 40 MILLIGRAM(S): 20 TABLET, DELAYED RELEASE ORAL at 05:13

## 2019-03-24 RX ADMIN — HALOPERIDOL DECANOATE 10 MILLIGRAM(S): 100 INJECTION INTRAMUSCULAR at 18:06

## 2019-03-24 RX ADMIN — Medication 1 MILLIGRAM(S): at 18:10

## 2019-03-24 RX ADMIN — Medication 1 TABLET(S): at 12:07

## 2019-03-24 RX ADMIN — ATORVASTATIN CALCIUM 10 MILLIGRAM(S): 80 TABLET, FILM COATED ORAL at 21:52

## 2019-03-24 RX ADMIN — QUETIAPINE FUMARATE 200 MILLIGRAM(S): 200 TABLET, FILM COATED ORAL at 18:07

## 2019-03-24 RX ADMIN — SENNA PLUS 2 TABLET(S): 8.6 TABLET ORAL at 21:52

## 2019-03-24 RX ADMIN — LEVETIRACETAM 1000 MILLIGRAM(S): 250 TABLET, FILM COATED ORAL at 05:12

## 2019-03-24 RX ADMIN — Medication 100 MILLIGRAM(S): at 21:53

## 2019-03-24 RX ADMIN — MEMANTINE HYDROCHLORIDE 5 MILLIGRAM(S): 10 TABLET ORAL at 21:53

## 2019-03-24 RX ADMIN — ENOXAPARIN SODIUM 80 MILLIGRAM(S): 100 INJECTION SUBCUTANEOUS at 18:06

## 2019-03-24 RX ADMIN — Medication 1 MILLIGRAM(S): at 05:12

## 2019-03-24 RX ADMIN — Medication 100 MILLIGRAM(S): at 18:06

## 2019-03-24 RX ADMIN — HALOPERIDOL DECANOATE 10 MILLIGRAM(S): 100 INJECTION INTRAMUSCULAR at 05:13

## 2019-03-24 RX ADMIN — POLYETHYLENE GLYCOL 3350 17 GRAM(S): 17 POWDER, FOR SOLUTION ORAL at 11:59

## 2019-03-24 RX ADMIN — Medication 100 MILLIGRAM(S): at 15:01

## 2019-03-24 RX ADMIN — ENOXAPARIN SODIUM 80 MILLIGRAM(S): 100 INJECTION SUBCUTANEOUS at 05:13

## 2019-03-24 RX ADMIN — Medication 100 MILLIGRAM(S): at 05:12

## 2019-03-24 RX ADMIN — QUETIAPINE FUMARATE 200 MILLIGRAM(S): 200 TABLET, FILM COATED ORAL at 05:12

## 2019-03-24 RX ADMIN — Medication 81 MILLIGRAM(S): at 11:59

## 2019-03-25 PROCEDURE — 99232 SBSQ HOSP IP/OBS MODERATE 35: CPT

## 2019-03-25 RX ADMIN — MEMANTINE HYDROCHLORIDE 5 MILLIGRAM(S): 10 TABLET ORAL at 22:07

## 2019-03-25 RX ADMIN — Medication 1 MILLIGRAM(S): at 05:59

## 2019-03-25 RX ADMIN — PANTOPRAZOLE SODIUM 40 MILLIGRAM(S): 20 TABLET, DELAYED RELEASE ORAL at 11:48

## 2019-03-25 RX ADMIN — Medication 1 TABLET(S): at 11:49

## 2019-03-25 RX ADMIN — LEVETIRACETAM 1000 MILLIGRAM(S): 250 TABLET, FILM COATED ORAL at 05:59

## 2019-03-25 RX ADMIN — Medication 100 MILLIGRAM(S): at 18:01

## 2019-03-25 RX ADMIN — Medication 100 MILLIGRAM(S): at 05:59

## 2019-03-25 RX ADMIN — ENOXAPARIN SODIUM 80 MILLIGRAM(S): 100 INJECTION SUBCUTANEOUS at 05:59

## 2019-03-25 RX ADMIN — HALOPERIDOL DECANOATE 10 MILLIGRAM(S): 100 INJECTION INTRAMUSCULAR at 05:59

## 2019-03-25 RX ADMIN — Medication 1 MILLIGRAM(S): at 18:05

## 2019-03-25 RX ADMIN — ENOXAPARIN SODIUM 80 MILLIGRAM(S): 100 INJECTION SUBCUTANEOUS at 18:01

## 2019-03-25 RX ADMIN — Medication 81 MILLIGRAM(S): at 11:49

## 2019-03-25 RX ADMIN — SENNA PLUS 2 TABLET(S): 8.6 TABLET ORAL at 22:07

## 2019-03-25 RX ADMIN — QUETIAPINE FUMARATE 200 MILLIGRAM(S): 200 TABLET, FILM COATED ORAL at 05:59

## 2019-03-25 RX ADMIN — QUETIAPINE FUMARATE 200 MILLIGRAM(S): 200 TABLET, FILM COATED ORAL at 18:01

## 2019-03-25 RX ADMIN — Medication 100 MILLIGRAM(S): at 06:00

## 2019-03-25 RX ADMIN — HALOPERIDOL DECANOATE 10 MILLIGRAM(S): 100 INJECTION INTRAMUSCULAR at 18:01

## 2019-03-25 RX ADMIN — Medication 100 MILLIGRAM(S): at 12:29

## 2019-03-25 RX ADMIN — POLYETHYLENE GLYCOL 3350 17 GRAM(S): 17 POWDER, FOR SOLUTION ORAL at 11:49

## 2019-03-25 RX ADMIN — Medication 100 MILLIGRAM(S): at 22:07

## 2019-03-25 RX ADMIN — LEVETIRACETAM 1000 MILLIGRAM(S): 250 TABLET, FILM COATED ORAL at 18:01

## 2019-03-25 RX ADMIN — ATORVASTATIN CALCIUM 10 MILLIGRAM(S): 80 TABLET, FILM COATED ORAL at 22:07

## 2019-03-25 NOTE — PROGRESS NOTE ADULT - ATTENDING COMMENTS
Patient medically optimized for discharge.  Discharge planning 40 minutes - discussed with patient and consultants.  Discussion with [facility] for safe disposition is in progress.    Urban Leal MD  Pager# [phone]

## 2019-03-26 PROCEDURE — 99232 SBSQ HOSP IP/OBS MODERATE 35: CPT

## 2019-03-26 RX ADMIN — LEVETIRACETAM 1000 MILLIGRAM(S): 250 TABLET, FILM COATED ORAL at 17:52

## 2019-03-26 RX ADMIN — Medication 100 MILLIGRAM(S): at 06:12

## 2019-03-26 RX ADMIN — MEMANTINE HYDROCHLORIDE 5 MILLIGRAM(S): 10 TABLET ORAL at 22:01

## 2019-03-26 RX ADMIN — PANTOPRAZOLE SODIUM 40 MILLIGRAM(S): 20 TABLET, DELAYED RELEASE ORAL at 06:11

## 2019-03-26 RX ADMIN — Medication 100 MILLIGRAM(S): at 11:49

## 2019-03-26 RX ADMIN — ENOXAPARIN SODIUM 80 MILLIGRAM(S): 100 INJECTION SUBCUTANEOUS at 17:53

## 2019-03-26 RX ADMIN — Medication 1 MILLIGRAM(S): at 17:52

## 2019-03-26 RX ADMIN — POLYETHYLENE GLYCOL 3350 17 GRAM(S): 17 POWDER, FOR SOLUTION ORAL at 11:50

## 2019-03-26 RX ADMIN — LEVETIRACETAM 1000 MILLIGRAM(S): 250 TABLET, FILM COATED ORAL at 06:12

## 2019-03-26 RX ADMIN — HALOPERIDOL DECANOATE 10 MILLIGRAM(S): 100 INJECTION INTRAMUSCULAR at 06:12

## 2019-03-26 RX ADMIN — QUETIAPINE FUMARATE 200 MILLIGRAM(S): 200 TABLET, FILM COATED ORAL at 06:11

## 2019-03-26 RX ADMIN — Medication 1 TABLET(S): at 11:49

## 2019-03-26 RX ADMIN — QUETIAPINE FUMARATE 200 MILLIGRAM(S): 200 TABLET, FILM COATED ORAL at 17:52

## 2019-03-26 RX ADMIN — Medication 100 MILLIGRAM(S): at 06:11

## 2019-03-26 RX ADMIN — Medication 100 MILLIGRAM(S): at 22:01

## 2019-03-26 RX ADMIN — Medication 81 MILLIGRAM(S): at 11:49

## 2019-03-26 RX ADMIN — ENOXAPARIN SODIUM 80 MILLIGRAM(S): 100 INJECTION SUBCUTANEOUS at 06:12

## 2019-03-26 RX ADMIN — SENNA PLUS 2 TABLET(S): 8.6 TABLET ORAL at 22:01

## 2019-03-26 RX ADMIN — HALOPERIDOL DECANOATE 10 MILLIGRAM(S): 100 INJECTION INTRAMUSCULAR at 17:52

## 2019-03-26 RX ADMIN — ATORVASTATIN CALCIUM 10 MILLIGRAM(S): 80 TABLET, FILM COATED ORAL at 22:02

## 2019-03-26 RX ADMIN — Medication 1 MILLIGRAM(S): at 06:51

## 2019-03-26 NOTE — PROGRESS NOTE ADULT - ATTENDING COMMENTS
Patient medically optimized for discharge.  Discharge planning 40 minutes - discussed with  staff. Dr. Chakraborty in [facility] aware of patient being medically optimized for discharge back to [facility].    Urban Leal MD  Pager# [phone]

## 2019-03-27 PROCEDURE — 99232 SBSQ HOSP IP/OBS MODERATE 35: CPT

## 2019-03-27 RX ADMIN — LEVETIRACETAM 1000 MILLIGRAM(S): 250 TABLET, FILM COATED ORAL at 05:14

## 2019-03-27 RX ADMIN — MEMANTINE HYDROCHLORIDE 5 MILLIGRAM(S): 10 TABLET ORAL at 21:15

## 2019-03-27 RX ADMIN — QUETIAPINE FUMARATE 200 MILLIGRAM(S): 200 TABLET, FILM COATED ORAL at 17:01

## 2019-03-27 RX ADMIN — Medication 1 MILLIGRAM(S): at 17:01

## 2019-03-27 RX ADMIN — HALOPERIDOL DECANOATE 10 MILLIGRAM(S): 100 INJECTION INTRAMUSCULAR at 05:14

## 2019-03-27 RX ADMIN — SENNA PLUS 2 TABLET(S): 8.6 TABLET ORAL at 21:15

## 2019-03-27 RX ADMIN — PANTOPRAZOLE SODIUM 40 MILLIGRAM(S): 20 TABLET, DELAYED RELEASE ORAL at 05:14

## 2019-03-27 RX ADMIN — ENOXAPARIN SODIUM 80 MILLIGRAM(S): 100 INJECTION SUBCUTANEOUS at 05:14

## 2019-03-27 RX ADMIN — ENOXAPARIN SODIUM 80 MILLIGRAM(S): 100 INJECTION SUBCUTANEOUS at 17:01

## 2019-03-27 RX ADMIN — ATORVASTATIN CALCIUM 10 MILLIGRAM(S): 80 TABLET, FILM COATED ORAL at 21:15

## 2019-03-27 RX ADMIN — Medication 100 MILLIGRAM(S): at 05:14

## 2019-03-27 RX ADMIN — POLYETHYLENE GLYCOL 3350 17 GRAM(S): 17 POWDER, FOR SOLUTION ORAL at 13:49

## 2019-03-27 RX ADMIN — QUETIAPINE FUMARATE 200 MILLIGRAM(S): 200 TABLET, FILM COATED ORAL at 05:14

## 2019-03-27 RX ADMIN — Medication 1 TABLET(S): at 13:49

## 2019-03-27 RX ADMIN — LEVETIRACETAM 1000 MILLIGRAM(S): 250 TABLET, FILM COATED ORAL at 17:01

## 2019-03-27 RX ADMIN — Medication 81 MILLIGRAM(S): at 13:49

## 2019-03-27 RX ADMIN — HALOPERIDOL DECANOATE 10 MILLIGRAM(S): 100 INJECTION INTRAMUSCULAR at 17:02

## 2019-03-27 RX ADMIN — Medication 100 MILLIGRAM(S): at 13:49

## 2019-03-27 RX ADMIN — Medication 1 MILLIGRAM(S): at 05:14

## 2019-03-27 RX ADMIN — Medication 100 MILLIGRAM(S): at 17:01

## 2019-03-27 RX ADMIN — Medication 100 MILLIGRAM(S): at 21:15

## 2019-03-27 NOTE — PROGRESS NOTE ADULT - ATTENDING COMMENTS
Patient medically optimized for discharge.  Discharge planning 40 minutes. Discussed with Dr. Chakraborty, Dr. Loyd[facility] - they are aware of patient being medically optimized for discharge back to [facility], however they are refusing because they feel that patient requires more nursing help than they can provide at the facility. Dispo pending.    Urban Leal MD  Pager# [phone]

## 2019-03-27 NOTE — PROGRESS NOTE ADULT - PROBLEM SELECTOR PLAN 2
- continue haldol, namenda, cogentin, carbamazepine  - in-patient psychiatry consult appreciated for management assistance.

## 2019-03-28 PROCEDURE — 99232 SBSQ HOSP IP/OBS MODERATE 35: CPT

## 2019-03-28 PROCEDURE — 93010 ELECTROCARDIOGRAM REPORT: CPT

## 2019-03-28 RX ADMIN — QUETIAPINE FUMARATE 200 MILLIGRAM(S): 200 TABLET, FILM COATED ORAL at 17:22

## 2019-03-28 RX ADMIN — ENOXAPARIN SODIUM 80 MILLIGRAM(S): 100 INJECTION SUBCUTANEOUS at 05:01

## 2019-03-28 RX ADMIN — PANTOPRAZOLE SODIUM 40 MILLIGRAM(S): 20 TABLET, DELAYED RELEASE ORAL at 05:00

## 2019-03-28 RX ADMIN — HALOPERIDOL DECANOATE 10 MILLIGRAM(S): 100 INJECTION INTRAMUSCULAR at 05:00

## 2019-03-28 RX ADMIN — LEVETIRACETAM 1000 MILLIGRAM(S): 250 TABLET, FILM COATED ORAL at 17:22

## 2019-03-28 RX ADMIN — ENOXAPARIN SODIUM 80 MILLIGRAM(S): 100 INJECTION SUBCUTANEOUS at 17:22

## 2019-03-28 RX ADMIN — Medication 100 MILLIGRAM(S): at 17:22

## 2019-03-28 RX ADMIN — Medication 1 TABLET(S): at 13:00

## 2019-03-28 RX ADMIN — Medication 81 MILLIGRAM(S): at 13:00

## 2019-03-28 RX ADMIN — Medication 1 MILLIGRAM(S): at 17:21

## 2019-03-28 RX ADMIN — LEVETIRACETAM 1000 MILLIGRAM(S): 250 TABLET, FILM COATED ORAL at 05:01

## 2019-03-28 RX ADMIN — POLYETHYLENE GLYCOL 3350 17 GRAM(S): 17 POWDER, FOR SOLUTION ORAL at 13:00

## 2019-03-28 RX ADMIN — Medication 100 MILLIGRAM(S): at 23:57

## 2019-03-28 RX ADMIN — Medication 100 MILLIGRAM(S): at 05:00

## 2019-03-28 RX ADMIN — Medication 1 MILLIGRAM(S): at 05:01

## 2019-03-28 RX ADMIN — HALOPERIDOL DECANOATE 10 MILLIGRAM(S): 100 INJECTION INTRAMUSCULAR at 17:22

## 2019-03-28 RX ADMIN — Medication 100 MILLIGRAM(S): at 13:00

## 2019-03-28 RX ADMIN — Medication 100 MILLIGRAM(S): at 05:01

## 2019-03-28 RX ADMIN — MEMANTINE HYDROCHLORIDE 5 MILLIGRAM(S): 10 TABLET ORAL at 23:57

## 2019-03-28 RX ADMIN — ATORVASTATIN CALCIUM 10 MILLIGRAM(S): 80 TABLET, FILM COATED ORAL at 23:56

## 2019-03-28 RX ADMIN — QUETIAPINE FUMARATE 200 MILLIGRAM(S): 200 TABLET, FILM COATED ORAL at 05:00

## 2019-03-28 RX ADMIN — SENNA PLUS 2 TABLET(S): 8.6 TABLET ORAL at 23:56

## 2019-03-28 NOTE — PROGRESS NOTE ADULT - ATTENDING COMMENTS
Patient medically optimized for discharge.  Discharge planning 40 minutes. Difficult disposition at this time.      Urban Leal MD  Pager# [phone]

## 2019-03-29 ENCOUNTER — TRANSCRIPTION ENCOUNTER (OUTPATIENT)
Age: 59
End: 2019-03-29

## 2019-03-29 PROCEDURE — 99232 SBSQ HOSP IP/OBS MODERATE 35: CPT

## 2019-03-29 RX ADMIN — Medication 81 MILLIGRAM(S): at 14:18

## 2019-03-29 RX ADMIN — POLYETHYLENE GLYCOL 3350 17 GRAM(S): 17 POWDER, FOR SOLUTION ORAL at 14:18

## 2019-03-29 RX ADMIN — Medication 1 MILLIGRAM(S): at 05:32

## 2019-03-29 RX ADMIN — Medication 1 MILLIGRAM(S): at 17:34

## 2019-03-29 RX ADMIN — LEVETIRACETAM 1000 MILLIGRAM(S): 250 TABLET, FILM COATED ORAL at 17:35

## 2019-03-29 RX ADMIN — HALOPERIDOL DECANOATE 10 MILLIGRAM(S): 100 INJECTION INTRAMUSCULAR at 17:35

## 2019-03-29 RX ADMIN — SENNA PLUS 2 TABLET(S): 8.6 TABLET ORAL at 21:49

## 2019-03-29 RX ADMIN — Medication 100 MILLIGRAM(S): at 21:49

## 2019-03-29 RX ADMIN — Medication 100 MILLIGRAM(S): at 05:31

## 2019-03-29 RX ADMIN — ENOXAPARIN SODIUM 80 MILLIGRAM(S): 100 INJECTION SUBCUTANEOUS at 05:33

## 2019-03-29 RX ADMIN — ATORVASTATIN CALCIUM 10 MILLIGRAM(S): 80 TABLET, FILM COATED ORAL at 21:49

## 2019-03-29 RX ADMIN — QUETIAPINE FUMARATE 200 MILLIGRAM(S): 200 TABLET, FILM COATED ORAL at 05:31

## 2019-03-29 RX ADMIN — Medication 100 MILLIGRAM(S): at 14:18

## 2019-03-29 RX ADMIN — HALOPERIDOL DECANOATE 10 MILLIGRAM(S): 100 INJECTION INTRAMUSCULAR at 05:31

## 2019-03-29 RX ADMIN — MEMANTINE HYDROCHLORIDE 5 MILLIGRAM(S): 10 TABLET ORAL at 21:51

## 2019-03-29 RX ADMIN — Medication 1 TABLET(S): at 14:18

## 2019-03-29 RX ADMIN — LEVETIRACETAM 1000 MILLIGRAM(S): 250 TABLET, FILM COATED ORAL at 05:33

## 2019-03-29 RX ADMIN — Medication 100 MILLIGRAM(S): at 17:34

## 2019-03-29 RX ADMIN — Medication 100 MILLIGRAM(S): at 05:33

## 2019-03-29 RX ADMIN — PANTOPRAZOLE SODIUM 40 MILLIGRAM(S): 20 TABLET, DELAYED RELEASE ORAL at 05:34

## 2019-03-29 RX ADMIN — QUETIAPINE FUMARATE 200 MILLIGRAM(S): 200 TABLET, FILM COATED ORAL at 17:35

## 2019-03-29 RX ADMIN — ENOXAPARIN SODIUM 80 MILLIGRAM(S): 100 INJECTION SUBCUTANEOUS at 17:34

## 2019-03-29 NOTE — DISCHARGE NOTE PROVIDER - PROVIDER TOKENS
FREE:[[facility]PHONE:[(   )    -],FAX:[(   )    -]] FREE:[LAST:[nursing home],PHONE:[(   )    -],FAX:[(   )    -]]

## 2019-03-29 NOTE — PROGRESS NOTE ADULT - ATTENDING COMMENTS
Patient medically optimized for discharge.  Discharge planning 40 minutes. Difficult disposition at this time.

## 2019-03-29 NOTE — DISCHARGE NOTE PROVIDER - HOSPITAL COURSE
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             Failure to thrive in adult; Protein-calorie malnutrition     - patient keeps getting sent back to the hospital for "poor PO intake"     - calorie count complete- needs assist to feed, good intake     - continue multivitamin         Dysphagia    - SW spoke with [facility] to return pt back as level II, pt is able to swallow proven through cinosophogram without aspiration.      - as per [facility] records, no need for alternate feeding modality at this time as pt has passed multiple swallow evaluations    - c/w dysphagia pureed honey consistency diet         Stroke, with left hemiparesis     - c/w atorvastatin, ASA, benztropine        Anticoagulant long-term use for recurrent DVT s/p IVC filter     - IVC filter confirmed on abd XR performed 2/17.     - c/w Lovenox 80 BID for now as his weight is now closer to 80kg, high risk fall     - HASBLED score of 3    - Dr. Leal; will need full anticoagulation d/t high risk for embolic CVA; -Dr. Leal to speak with [facility] director about taking patient back        Schizophrenia    - c/w seroquel, haldol         Seizure    - c/w tegretol, keppra         GERD     - Protonix         PPX:    - Continue bowel regimen - Colace/Senna/Miralax/Lactulose     - continue Memantine at bedtime         DISPO: [facility] level II 59M PMH schizophrenia, seizure, GERD, renal mass, recurrent DVT (on Lovenox, w/ IVC filter), C4/5 stenosis (c/b cord compression s/p discectomy and fusion), recent CVA being admitted for failure to thrive 2/2 catatonia and dysphagia in setting of recent stroke.             Failure to thrive in adult; Protein-calorie malnutrition     - patient keeps getting sent back to the hospital for "poor PO intake"     - calorie count complete- needs assist to feed, good intake when fed    - continue multivitamin         Dysphagia    - SW spoke with [facility] to return pt back as level II, pt is able to swallow proven through cinosophogram without aspiration.      - as per [facility] records, no need for alternate feeding modality at this time as pt has passed multiple swallow evaluations    - c/w dysphagia pureed honey consistency diet         Stroke, with left hemiparesis     - c/w atorvastatin, ASA, benztropine        Anticoagulant long-term use for recurrent DVT s/p IVC filter     - IVC filter confirmed on abd XR performed 2/17.     - c/w Lovenox 80 BID for now as his weight is now closer to 80kg, high risk fall     - HASBLED score of 3    - pt will need full anticoagulation d/t high risk for embolic CVA;        Schizophrenia    - c/w seroquel, haldol         Seizure    - c/w tegretol, keppra         GERD     - Protonix         PPX:    - Continue bowel regimen - Colace/Senna/Miralax/Lactulose     - continue Memantine at bedtime         DISPO: [facility] level II

## 2019-03-29 NOTE — DISCHARGE NOTE PROVIDER - CARE PROVIDER_API CALL
[facility] inpatient Medical Doctor,   Phone: (   )    -  Fax: (   )    -  Follow Up Time: nursing home,   Phone: (   )    -  Fax: (   )    -  Follow Up Time:

## 2019-03-30 DIAGNOSIS — M48.00 SPINAL STENOSIS, SITE UNSPECIFIED: ICD-10-CM

## 2019-03-30 PROCEDURE — 99232 SBSQ HOSP IP/OBS MODERATE 35: CPT

## 2019-03-30 RX ADMIN — Medication 100 MILLIGRAM(S): at 17:37

## 2019-03-30 RX ADMIN — Medication 100 MILLIGRAM(S): at 06:45

## 2019-03-30 RX ADMIN — QUETIAPINE FUMARATE 200 MILLIGRAM(S): 200 TABLET, FILM COATED ORAL at 06:35

## 2019-03-30 RX ADMIN — MEMANTINE HYDROCHLORIDE 5 MILLIGRAM(S): 10 TABLET ORAL at 22:04

## 2019-03-30 RX ADMIN — HALOPERIDOL DECANOATE 10 MILLIGRAM(S): 100 INJECTION INTRAMUSCULAR at 17:37

## 2019-03-30 RX ADMIN — Medication 81 MILLIGRAM(S): at 12:06

## 2019-03-30 RX ADMIN — ENOXAPARIN SODIUM 80 MILLIGRAM(S): 100 INJECTION SUBCUTANEOUS at 17:36

## 2019-03-30 RX ADMIN — Medication 100 MILLIGRAM(S): at 22:05

## 2019-03-30 RX ADMIN — PANTOPRAZOLE SODIUM 40 MILLIGRAM(S): 20 TABLET, DELAYED RELEASE ORAL at 06:36

## 2019-03-30 RX ADMIN — Medication 1 MILLIGRAM(S): at 17:37

## 2019-03-30 RX ADMIN — LEVETIRACETAM 1000 MILLIGRAM(S): 250 TABLET, FILM COATED ORAL at 17:37

## 2019-03-30 RX ADMIN — QUETIAPINE FUMARATE 200 MILLIGRAM(S): 200 TABLET, FILM COATED ORAL at 17:37

## 2019-03-30 RX ADMIN — ATORVASTATIN CALCIUM 10 MILLIGRAM(S): 80 TABLET, FILM COATED ORAL at 22:05

## 2019-03-30 RX ADMIN — Medication 100 MILLIGRAM(S): at 06:35

## 2019-03-30 RX ADMIN — HALOPERIDOL DECANOATE 10 MILLIGRAM(S): 100 INJECTION INTRAMUSCULAR at 06:35

## 2019-03-30 RX ADMIN — ENOXAPARIN SODIUM 80 MILLIGRAM(S): 100 INJECTION SUBCUTANEOUS at 06:34

## 2019-03-30 RX ADMIN — Medication 1 TABLET(S): at 12:06

## 2019-03-30 RX ADMIN — SENNA PLUS 2 TABLET(S): 8.6 TABLET ORAL at 22:05

## 2019-03-30 RX ADMIN — Medication 1 MILLIGRAM(S): at 06:35

## 2019-03-30 RX ADMIN — LEVETIRACETAM 1000 MILLIGRAM(S): 250 TABLET, FILM COATED ORAL at 06:36

## 2019-03-31 PROCEDURE — 99232 SBSQ HOSP IP/OBS MODERATE 35: CPT

## 2019-03-31 RX ADMIN — Medication 100 MILLIGRAM(S): at 17:06

## 2019-03-31 RX ADMIN — Medication 100 MILLIGRAM(S): at 05:38

## 2019-03-31 RX ADMIN — QUETIAPINE FUMARATE 200 MILLIGRAM(S): 200 TABLET, FILM COATED ORAL at 17:06

## 2019-03-31 RX ADMIN — MEMANTINE HYDROCHLORIDE 5 MILLIGRAM(S): 10 TABLET ORAL at 22:27

## 2019-03-31 RX ADMIN — Medication 100 MILLIGRAM(S): at 11:21

## 2019-03-31 RX ADMIN — SENNA PLUS 2 TABLET(S): 8.6 TABLET ORAL at 22:26

## 2019-03-31 RX ADMIN — Medication 100 MILLIGRAM(S): at 22:26

## 2019-03-31 RX ADMIN — POLYETHYLENE GLYCOL 3350 17 GRAM(S): 17 POWDER, FOR SOLUTION ORAL at 11:21

## 2019-03-31 RX ADMIN — LEVETIRACETAM 1000 MILLIGRAM(S): 250 TABLET, FILM COATED ORAL at 05:38

## 2019-03-31 RX ADMIN — HALOPERIDOL DECANOATE 10 MILLIGRAM(S): 100 INJECTION INTRAMUSCULAR at 17:06

## 2019-03-31 RX ADMIN — ATORVASTATIN CALCIUM 10 MILLIGRAM(S): 80 TABLET, FILM COATED ORAL at 22:26

## 2019-03-31 RX ADMIN — LEVETIRACETAM 1000 MILLIGRAM(S): 250 TABLET, FILM COATED ORAL at 17:05

## 2019-03-31 RX ADMIN — Medication 1 MILLIGRAM(S): at 05:37

## 2019-03-31 RX ADMIN — QUETIAPINE FUMARATE 200 MILLIGRAM(S): 200 TABLET, FILM COATED ORAL at 05:37

## 2019-03-31 RX ADMIN — Medication 1 TABLET(S): at 11:21

## 2019-03-31 RX ADMIN — ENOXAPARIN SODIUM 80 MILLIGRAM(S): 100 INJECTION SUBCUTANEOUS at 05:37

## 2019-03-31 RX ADMIN — Medication 81 MILLIGRAM(S): at 11:21

## 2019-03-31 RX ADMIN — PANTOPRAZOLE SODIUM 40 MILLIGRAM(S): 20 TABLET, DELAYED RELEASE ORAL at 05:37

## 2019-03-31 RX ADMIN — ENOXAPARIN SODIUM 80 MILLIGRAM(S): 100 INJECTION SUBCUTANEOUS at 17:06

## 2019-03-31 RX ADMIN — Medication 1 MILLIGRAM(S): at 17:05

## 2019-03-31 RX ADMIN — HALOPERIDOL DECANOATE 10 MILLIGRAM(S): 100 INJECTION INTRAMUSCULAR at 05:38

## 2019-03-31 NOTE — PROGRESS NOTE ADULT - PROBLEM SELECTOR PROBLEM 9
Severe protein-calorie malnutrition

## 2019-03-31 NOTE — PROGRESS NOTE ADULT - PROBLEM SELECTOR PLAN 9
Continue to support PO intake.
On 72 hour Calorie count.  Assist with PO intake.
On 72 hour Calorie count.  Assist with PO intake.
Will initiate 72 hour Calorie count.  Assist with PO intake.
Will initiate 72 hour Calorie count.  Assist with PO intake.

## 2019-04-01 ENCOUNTER — TRANSCRIPTION ENCOUNTER (OUTPATIENT)
Age: 59
End: 2019-04-01

## 2019-04-01 VITALS
TEMPERATURE: 98 F | SYSTOLIC BLOOD PRESSURE: 136 MMHG | HEART RATE: 88 BPM | RESPIRATION RATE: 18 BRPM | DIASTOLIC BLOOD PRESSURE: 82 MMHG | OXYGEN SATURATION: 100 %

## 2019-04-01 PROCEDURE — 99239 HOSP IP/OBS DSCHRG MGMT >30: CPT

## 2019-04-01 RX ORDER — SENNA PLUS 8.6 MG/1
2 TABLET ORAL
Qty: 0 | Refills: 0 | COMMUNITY
Start: 2019-04-01

## 2019-04-01 RX ORDER — HALOPERIDOL DECANOATE 100 MG/ML
1 INJECTION INTRAMUSCULAR
Qty: 0 | Refills: 0 | COMMUNITY
Start: 2019-04-01

## 2019-04-01 RX ORDER — ZINC OXIDE 200 MG/G
0 OINTMENT TOPICAL
Qty: 0 | Refills: 0 | COMMUNITY

## 2019-04-01 RX ORDER — DOCUSATE SODIUM 100 MG
1 CAPSULE ORAL
Qty: 0 | Refills: 0 | COMMUNITY
Start: 2019-04-01

## 2019-04-01 RX ORDER — LACTULOSE 10 G/15ML
30 SOLUTION ORAL
Qty: 0 | Refills: 0 | COMMUNITY

## 2019-04-01 RX ORDER — LACTULOSE 10 G/15ML
15 SOLUTION ORAL
Qty: 0 | Refills: 0 | COMMUNITY
Start: 2019-04-01

## 2019-04-01 RX ORDER — POLYETHYLENE GLYCOL 3350 17 G/17G
17 POWDER, FOR SOLUTION ORAL
Qty: 0 | Refills: 0 | COMMUNITY
Start: 2019-04-01

## 2019-04-01 RX ORDER — ATORVASTATIN CALCIUM 80 MG/1
1 TABLET, FILM COATED ORAL
Qty: 0 | Refills: 0 | COMMUNITY
Start: 2019-04-01

## 2019-04-01 RX ORDER — DOCUSATE SODIUM 100 MG
2 CAPSULE ORAL
Qty: 0 | Refills: 0 | COMMUNITY

## 2019-04-01 RX ORDER — QUETIAPINE FUMARATE 200 MG/1
1 TABLET, FILM COATED ORAL
Qty: 0 | Refills: 0 | COMMUNITY
Start: 2019-04-01

## 2019-04-01 RX ORDER — BENZTROPINE MESYLATE 1 MG
1 TABLET ORAL
Qty: 0 | Refills: 0 | COMMUNITY
Start: 2019-04-01

## 2019-04-01 RX ORDER — PANTOPRAZOLE SODIUM 20 MG/1
1 TABLET, DELAYED RELEASE ORAL
Qty: 0 | Refills: 0 | COMMUNITY
Start: 2019-04-01

## 2019-04-01 RX ORDER — MEMANTINE HYDROCHLORIDE 10 MG/1
1 TABLET ORAL
Qty: 0 | Refills: 0 | COMMUNITY
Start: 2019-04-01

## 2019-04-01 RX ORDER — BENZTROPINE MESYLATE 1 MG
1 TABLET ORAL
Qty: 0 | Refills: 0 | COMMUNITY

## 2019-04-01 RX ORDER — LEVETIRACETAM 250 MG/1
1 TABLET, FILM COATED ORAL
Qty: 0 | Refills: 0 | COMMUNITY
Start: 2019-04-01

## 2019-04-01 RX ORDER — ASPIRIN/CALCIUM CARB/MAGNESIUM 324 MG
1 TABLET ORAL
Qty: 0 | Refills: 0 | COMMUNITY
Start: 2019-04-01

## 2019-04-01 RX ORDER — CARBAMAZEPINE 200 MG
1 TABLET ORAL
Qty: 0 | Refills: 0 | COMMUNITY
Start: 2019-04-01

## 2019-04-01 RX ORDER — QUETIAPINE FUMARATE 200 MG/1
1 TABLET, FILM COATED ORAL
Qty: 0 | Refills: 0 | COMMUNITY

## 2019-04-01 RX ORDER — ENOXAPARIN SODIUM 100 MG/ML
80 INJECTION SUBCUTANEOUS
Qty: 0 | Refills: 0 | COMMUNITY
Start: 2019-04-01

## 2019-04-01 RX ORDER — ATORVASTATIN CALCIUM 80 MG/1
1 TABLET, FILM COATED ORAL
Qty: 0 | Refills: 0 | COMMUNITY

## 2019-04-01 RX ADMIN — Medication 81 MILLIGRAM(S): at 13:01

## 2019-04-01 RX ADMIN — Medication 100 MILLIGRAM(S): at 05:04

## 2019-04-01 RX ADMIN — POLYETHYLENE GLYCOL 3350 17 GRAM(S): 17 POWDER, FOR SOLUTION ORAL at 13:01

## 2019-04-01 RX ADMIN — Medication 100 MILLIGRAM(S): at 05:03

## 2019-04-01 RX ADMIN — Medication 100 MILLIGRAM(S): at 16:59

## 2019-04-01 RX ADMIN — HALOPERIDOL DECANOATE 10 MILLIGRAM(S): 100 INJECTION INTRAMUSCULAR at 05:04

## 2019-04-01 RX ADMIN — Medication 1 MILLIGRAM(S): at 05:04

## 2019-04-01 RX ADMIN — ENOXAPARIN SODIUM 80 MILLIGRAM(S): 100 INJECTION SUBCUTANEOUS at 05:04

## 2019-04-01 RX ADMIN — LEVETIRACETAM 1000 MILLIGRAM(S): 250 TABLET, FILM COATED ORAL at 05:03

## 2019-04-01 RX ADMIN — HALOPERIDOL DECANOATE 10 MILLIGRAM(S): 100 INJECTION INTRAMUSCULAR at 16:58

## 2019-04-01 RX ADMIN — PANTOPRAZOLE SODIUM 40 MILLIGRAM(S): 20 TABLET, DELAYED RELEASE ORAL at 05:04

## 2019-04-01 RX ADMIN — QUETIAPINE FUMARATE 200 MILLIGRAM(S): 200 TABLET, FILM COATED ORAL at 16:59

## 2019-04-01 RX ADMIN — QUETIAPINE FUMARATE 200 MILLIGRAM(S): 200 TABLET, FILM COATED ORAL at 05:04

## 2019-04-01 RX ADMIN — Medication 1 TABLET(S): at 13:01

## 2019-04-01 RX ADMIN — LEVETIRACETAM 1000 MILLIGRAM(S): 250 TABLET, FILM COATED ORAL at 16:59

## 2019-04-01 RX ADMIN — ENOXAPARIN SODIUM 80 MILLIGRAM(S): 100 INJECTION SUBCUTANEOUS at 17:00

## 2019-04-01 RX ADMIN — Medication 1 MILLIGRAM(S): at 16:59

## 2019-04-01 NOTE — PROGRESS NOTE ADULT - REASON FOR ADMISSION
catatonia and dysphagia
dysphagia

## 2019-04-01 NOTE — PROGRESS NOTE ADULT - PROBLEM SELECTOR PLAN 8
Monitor for bleed on lovenox.
Continue to support PO intake.  passed 72 hour calorie count  dispo: can go back to [facility] or [facility]. D/C 40 minutes.
Monitor for bleed on lovenox.

## 2019-04-01 NOTE — PROGRESS NOTE ADULT - PROBLEM SELECTOR PLAN 7
s/p IVC filter.  Continue lovenox SC BID at therapeutic dose.

## 2019-04-01 NOTE — PROGRESS NOTE ADULT - ASSESSMENT
59M from [facility] h/o schizophrenia, recurrent DVT s/p IVC filter, seizure disorder, h/o severe C4-5 stenosis c/b cord compression s/p discectomy and fusion (wheel chair bound) recent admission for acute CVA s/p tPA complicated by brain edema, acute encephalopathy, catatonia, dysarthria. This is 2nd admission related to inability to monitor PO intake at [facility] - unable to provide assistance to PO intake including meds.
59 y.o. Male from [facility] w/ Hx Schizophrenia, recurrent DVT s/p IVC filter, seizure disorder, h/o severe C4-5 stenosis c/b cord compression s/p discectomy and fusion (wheel chair bound) recent admission for acute CVA s/p tPA complicated by brain edema, acute encephalopathy, catatonia, dysarthria. This is 2nd admission related to inability to monitor PO intake at [facility] - unable to provide assistance to PO intake including meds.  Patient had been taking PO without difficulty during hospital stay.
59M from [facility] h/o schizophrenia, recurrent DVT s/p IVC filter, seizure disorder, h/o severe C4-5 stenosis c/b cord compression s/p discectomy and fusion (wheel chair bound) recent admission for acute CVA s/p tPA complicated by brain edema, acute encephalopathy, catatonia, dysarthria. This is 2nd admission related to inability to monitor PO intake at [facility] - unable to provide assistance to PO intake including meds.
59M from [facility] h/o schizophrenia, recurrent DVT s/p IVC filter, seizure disorder, h/o severe C4-5 stenosis c/b cord compression s/p discectomy and fusion (wheel chair bound) recent admission for acute CVA s/p tPA complicated by brain edema, acute encephalopathy, catatonia, dysarthria. This is 2nd admission related to inability to monitor PO intake at [facility] - unable to provide assistance to PO intake including meds.  Patient had been taking PO without difficulty during hospital stay.
59M from [facility] h/o schizophrenia, recurrent DVT s/p IVC filter, seizure disorder, h/o severe C4-5 stenosis c/b cord compression s/p discectomy and fusion (wheel chair bound) recent admission for acute CVA s/p tPA complicated by brain edema, acute encephalopathy, catatonia, dysarthria. This is 2nd admission related to inability to monitor PO intake at [facility] - unable to provide assistance to PO intake including meds.
59M from [facility] h/o schizophrenia, recurrent DVT s/p IVC filter, seizure disorder, h/o severe C4-5 stenosis c/b cord compression s/p discectomy and fusion (wheel chair bound) recent admission for acute CVA s/p tPA complicated by brain edema, acute encephalopathy, catatonia, dysarthria. This is 2nd admission related to inability to monitor PO intake at [facility] - unable to provide assistance to PO intake including meds.  Patient had been taking PO without difficulty during hospital stay.
59M from [facility] h/o schizophrenia, recurrent DVT s/p IVC filter, seizure disorder, h/o severe C4-5 stenosis c/b cord compression s/p discectomy and fusion (wheel chair bound) recent admission for acute CVA s/p tPA complicated by brain edema, acute encephalopathy, catatonia, dysarthria. This is 2nd admission related to inability to monitor PO intake at [facility] - unable to provide assistance to PO intake including meds.
59M from [facility] h/o schizophrenia, recurrent DVT s/p IVC filter, seizure disorder, h/o severe C4-5 stenosis c/b cord compression s/p discectomy and fusion (wheel chair bound) recent admission for acute CVA s/p tPA complicated by brain edema, acute encephalopathy, catatonia, dysarthria. This is 2nd admission related to inability to monitor PO intake at [facility] - unable to provide assistance to PO intake including meds.  Patient had been taking PO without difficulty during hospital stay.
59M from [facility] h/o schizophrenia, recurrent DVT s/p IVC filter, seizure disorder, h/o severe C4-5 stenosis c/b cord compression s/p discectomy and fusion (wheel chair bound) recent admission for acute CVA s/p tPA complicated by brain edema, acute encephalopathy, catatonia, dysarthria. This is 2nd admission related to inability to monitor PO intake at [facility] - unable to provide assistance to PO intake including meds.  Patient had been taking PO without difficulty during hospital stay.
59M from [facility] h/o schizophrenia, recurrent DVT s/p IVC filter, seizure disorder, h/o severe C4-5 stenosis c/b cord compression s/p discectomy and fusion (wheel chair bound) recent admission for acute CVA s/p tPA complicated by brain edema, acute encephalopathy, catatonia, dysarthria. This is 2nd admission related to inability to monitor PO intake at [facility] - unable to provide assistance to PO intake including meds.  Patient had been taking PO without difficulty during hospital stay.
59M from [facility] h/o schizophrenia, recurrent DVT s/p IVC filter, seizure disorder, h/o severe C4-5 stenosis c/b cord compression s/p discectomy and fusion (wheel chair bound) recent admission for acute CVA s/p tPA complicated by brain edema, acute encephalopathy, catatonia, dysarthria. This is 2nd admission related to inability to monitor PO intake at [facility] - unable to provide assistance to PO intake including meds.  Patient had been taking PO without difficulty during hospital stay.
59M from [facility] h/o schizophrenia, recurrent DVT s/p IVC filter, seizure disorder, h/o severe C4-5 stenosis c/b cord compression s/p discectomy and fusion (wheel chair bound) recent admission for acute CVA s/p tPA complicated by brain edema, acute encephalopathy, catatonia, dysarthria. This is 2nd admission related to inability to monitor PO intake at [facility] - unable to provide assistance to PO intake including meds.

## 2019-04-01 NOTE — DISCHARGE NOTE NURSING/CASE MANAGEMENT/SOCIAL WORK - NSDCCRNAME_GEN_ALL_CORE_FT
[address] [facility] [address] via [facility] [phone]

## 2019-04-01 NOTE — PROGRESS NOTE ADULT - PROBLEM SELECTOR PROBLEM 2
Schizophrenia, unspecified type

## 2019-04-01 NOTE — PROGRESS NOTE ADULT - PROBLEM SELECTOR PLAN 5
Continue protonix

## 2019-04-01 NOTE — PROGRESS NOTE ADULT - PROBLEM SELECTOR PLAN 3
Continue keppra  - no active seizures at this time.

## 2019-04-01 NOTE — PROGRESS NOTE ADULT - PROBLEM SELECTOR PROBLEM 4
Dysphagia, unspecified type

## 2019-04-01 NOTE — PROGRESS NOTE ADULT - PROBLEM SELECTOR PROBLEM 8
Medication management
Severe protein-calorie malnutrition

## 2019-04-01 NOTE — PROGRESS NOTE ADULT - SUBJECTIVE AND OBJECTIVE BOX
CC: F/U for CVA    SUBJECTIVE / OVERNIGHT EVENTS:  As per nursing staff, very good with PO intake.  Patient awake but not interactive with me.  No overnight issues with F/C, vomiting, SOB, Cough, diarrhea.      MEDICATIONS  (STANDING):  aspirin  chewable 81 milliGRAM(s) Oral daily  atorvastatin 10 milliGRAM(s) Oral at bedtime  benztropine 1 milliGRAM(s) Oral two times a day  carBAMazepine Chewable 100 milliGRAM(s) Chew two times a day  docusate sodium 100 milliGRAM(s) Oral three times a day  enoxaparin Injectable 80 milliGRAM(s) SubCutaneous two times a day  haloperidol     Tablet 10 milliGRAM(s) Oral two times a day  levETIRAcetam 1000 milliGRAM(s) Oral two times a day  memantine 5 milliGRAM(s) Oral at bedtime  multivitamin 1 Tablet(s) Oral daily  pantoprazole    Tablet 40 milliGRAM(s) Oral before breakfast  polyethylene glycol 3350 17 Gram(s) Oral daily  QUEtiapine 200 milliGRAM(s) Oral two times a day  senna 2 Tablet(s) Oral at bedtime    MEDICATIONS  (PRN):  bisacodyl 5 milliGRAM(s) Oral every 12 hours PRN Constipation  lactulose Syrup 10 Gram(s) Oral two times a day PRN if no BM in 24 hours      Vital Signs Last 24 Hrs  T(C): 36.8 (22 Mar 2019 04:58), Max: 36.8 (21 Mar 2019 13:55)  T(F): 98.3 (22 Mar 2019 04:58), Max: 98.3 (22 Mar 2019 04:58)  HR: 60 (22 Mar 2019 04:58) (60 - 66)  BP: 105/61 (22 Mar 2019 04:58) (105/61 - 135/76)  BP(mean): --  RR: 18 (22 Mar 2019 04:58) (18 - 18)  SpO2: 100% (22 Mar 2019 04:58) (100% - 100%)  CAPILLARY BLOOD GLUCOSE        I&O's Summary      PHYSICAL EXAM:  GENERAL: NAD, well-developed   HEAD:  Atraumatic, Normocephalic  EYES: EOMI, PERRLA, conjunctiva and sclera clear  NECK: Supple, No JVD  CHEST/LUNG: Clear to auscultation bilaterally; No wheeze  HEART: Regular rate and rhythm; No murmurs, rubs, or gallops  ABDOMEN: Soft, Nontender, Nondistended; Bowel sounds present  EXTREMITIES:  2+ Peripheral Pulses, No clubbing, cyanosis, or edema  PSYCH: AAOx0; awake. Nonverbal  NEUROLOGY: RUE and RLE 0/5 strength;  LUE 4/5 strength LLE 3/5 strength.   SKIN: No rashes or lesions    LABS:                        14.9   4.66  )-----------( 202      ( 20 Mar 2019 23:00 )             43.2     03-20    136  |  101  |  10  ----------------------------<  114<H>  3.7   |  23  |  0.82    Ca    9.5      20 Mar 2019 23:00    TPro  7.2  /  Alb  3.8  /  TBili  0.4  /  DBili  x   /  AST  19  /  ALT  22  /  AlkPhos  119  03-20              RADIOLOGY & ADDITIONAL TESTS:    Imaging Personally Reviewed:    Care Discussed with Consultants/Other Providers:
CC: F/U for CVA    SUBJECTIVE / OVERNIGHT EVENTS:  No events overnight.  No F/C, N/V, CP, SOB, Cough, lightheadedness, dizziness, abdominal pain, diarrhea, dysuria.      MEDICATIONS  (STANDING):  aspirin  chewable 81 milliGRAM(s) Oral daily  atorvastatin 10 milliGRAM(s) Oral at bedtime  benztropine 1 milliGRAM(s) Oral two times a day  carBAMazepine Chewable 100 milliGRAM(s) Chew two times a day  docusate sodium 100 milliGRAM(s) Oral three times a day  enoxaparin Injectable 80 milliGRAM(s) SubCutaneous two times a day  haloperidol     Tablet 10 milliGRAM(s) Oral two times a day  levETIRAcetam 1000 milliGRAM(s) Oral two times a day  memantine 5 milliGRAM(s) Oral at bedtime  multivitamin 1 Tablet(s) Oral daily  pantoprazole    Tablet 40 milliGRAM(s) Oral before breakfast  polyethylene glycol 3350 17 Gram(s) Oral daily  QUEtiapine 200 milliGRAM(s) Oral two times a day  senna 2 Tablet(s) Oral at bedtime    MEDICATIONS  (PRN):  bisacodyl 5 milliGRAM(s) Oral every 12 hours PRN Constipation  lactulose Syrup 10 Gram(s) Oral two times a day PRN if no BM in 24 hours      Vital Signs Last 24 Hrs  T(C): 36.6 (28 Mar 2019 05:00), Max: 36.7 (27 Mar 2019 19:50)  T(F): 97.8 (28 Mar 2019 05:00), Max: 98 (27 Mar 2019 19:50)  HR: 109 (28 Mar 2019 05:00) (95 - 109)  BP: 113/71 (28 Mar 2019 05:00) (113/71 - 137/89)  BP(mean): --  RR: 18 (28 Mar 2019 05:00) (18 - 18)  SpO2: 100% (28 Mar 2019 05:00) (100% - 100%)  CAPILLARY BLOOD GLUCOSE        I&O's Summary      PHYSICAL EXAM:  GENERAL: NAD, well-developed   HEAD:  Atraumatic, Normocephalic  EYES: EOMI, PERRLA, conjunctiva and sclera clear  NECK: Supple, No JVD  CHEST/LUNG: Clear to auscultation bilaterally; No wheeze  HEART: Regular rate and rhythm; No murmurs, rubs, or gallops  ABDOMEN: Soft, Nontender, Nondistended; Bowel sounds present  EXTREMITIES:  2+ Peripheral Pulses, No clubbing, cyanosis, or edema  PSYCH: AAOx0; awake. Nonverbal  NEUROLOGY: Rt hemiparesis.  SKIN: No rashes or lesions    LABS:                    RADIOLOGY & ADDITIONAL TESTS:    Imaging Personally Reviewed:    Care Discussed with Consultants/Other Providers:
CC: F/U for dysphagia    SUBJECTIVE / OVERNIGHT EVENTS:  Patient able to tolerate good PO intake.  No agitation.  No overnight issues with F/C, vomiting, SOB, Cough, diarrhea.      MEDICATIONS  (STANDING):  aspirin  chewable 81 milliGRAM(s) Oral daily  atorvastatin 10 milliGRAM(s) Oral at bedtime  benztropine 1 milliGRAM(s) Oral two times a day  carBAMazepine Chewable 100 milliGRAM(s) Chew two times a day  docusate sodium 100 milliGRAM(s) Oral three times a day  enoxaparin Injectable 80 milliGRAM(s) SubCutaneous two times a day  haloperidol     Tablet 10 milliGRAM(s) Oral two times a day  levETIRAcetam 1000 milliGRAM(s) Oral two times a day  memantine 5 milliGRAM(s) Oral at bedtime  multivitamin 1 Tablet(s) Oral daily  pantoprazole    Tablet 40 milliGRAM(s) Oral before breakfast  polyethylene glycol 3350 17 Gram(s) Oral daily  QUEtiapine 200 milliGRAM(s) Oral two times a day  senna 2 Tablet(s) Oral at bedtime    MEDICATIONS  (PRN):  bisacodyl 5 milliGRAM(s) Oral every 12 hours PRN Constipation  lactulose Syrup 10 Gram(s) Oral two times a day PRN if no BM in 24 hours      Vital Signs Last 24 Hrs  T(C): 36.2 (24 Mar 2019 04:41), Max: 36.5 (23 Mar 2019 19:35)  T(F): 97.1 (24 Mar 2019 04:41), Max: 97.7 (23 Mar 2019 19:35)  HR: 63 (24 Mar 2019 04:41) (63 - 78)  BP: 124/81 (24 Mar 2019 04:41) (102/54 - 128/82)  BP(mean): --  RR: 18 (24 Mar 2019 04:41) (17 - 18)  SpO2: 100% (24 Mar 2019 04:41) (100% - 100%)  CAPILLARY BLOOD GLUCOSE        I&O's Summary      PHYSICAL EXAM:  GENERAL: NAD, well-developed   HEAD:  Atraumatic, Normocephalic  EYES: EOMI, PERRLA, conjunctiva and sclera clear  NECK: Supple, No JVD  CHEST/LUNG: Clear to auscultation bilaterally; No wheeze  HEART: Regular rate and rhythm; No murmurs, rubs, or gallops  ABDOMEN: Soft, Nontender, Nondistended; Bowel sounds present  EXTREMITIES:  2+ Peripheral Pulses, No clubbing, cyanosis, or edema  PSYCH: AAOx0; awake. Nonverbal  NEUROLOGY: RUE and RLE 0/5 strength;  LUE 4/5 strength LLE 3/5 strength.   SKIN: No rashes or lesions    LABS:                    RADIOLOGY & ADDITIONAL TESTS:    Imaging Personally Reviewed:    Care Discussed with Consultants/Other Providers:
CC: F/U for poor PO intake.    SUBJECTIVE / OVERNIGHT EVENTS:  Patient is awake/alert but not directable or verbal for proper history and physical.  Reviewed Calorie count so far - taking 100% of the PO intake.  No overnight issues with F/C, vomiting, SOB, Cough, diarrhea.      MEDICATIONS  (STANDING):  aspirin  chewable 81 milliGRAM(s) Oral daily  atorvastatin 10 milliGRAM(s) Oral at bedtime  benztropine 1 milliGRAM(s) Oral two times a day  carBAMazepine Chewable 100 milliGRAM(s) Chew two times a day  docusate sodium 100 milliGRAM(s) Oral three times a day  enoxaparin Injectable 80 milliGRAM(s) SubCutaneous two times a day  haloperidol     Tablet 10 milliGRAM(s) Oral two times a day  levETIRAcetam 1000 milliGRAM(s) Oral two times a day  memantine 5 milliGRAM(s) Oral at bedtime  multivitamin 1 Tablet(s) Oral daily  pantoprazole    Tablet 40 milliGRAM(s) Oral before breakfast  polyethylene glycol 3350 17 Gram(s) Oral daily  QUEtiapine 200 milliGRAM(s) Oral two times a day  senna 2 Tablet(s) Oral at bedtime    MEDICATIONS  (PRN):  bisacodyl 5 milliGRAM(s) Oral every 12 hours PRN Constipation  lactulose Syrup 10 Gram(s) Oral two times a day PRN if no BM in 24 hours      Vital Signs Last 24 Hrs  T(C): 36.3 (23 Mar 2019 05:35), Max: 36.7 (22 Mar 2019 12:50)  T(F): 97.3 (23 Mar 2019 05:35), Max: 98 (22 Mar 2019 12:50)  HR: 60 (23 Mar 2019 05:35) (60 - 95)  BP: 108/73 (23 Mar 2019 05:35) (108/73 - 143/82)  BP(mean): --  RR: 17 (23 Mar 2019 05:35) (17 - 17)  SpO2: 100% (23 Mar 2019 05:35) (98% - 100%)  CAPILLARY BLOOD GLUCOSE        I&O's Summary      PHYSICAL EXAM:  GENERAL: NAD, well-developed   HEAD:  Atraumatic, Normocephalic  EYES: EOMI, PERRLA, conjunctiva and sclera clear  NECK: Supple, No JVD  CHEST/LUNG: Clear to auscultation bilaterally; No wheeze  HEART: Regular rate and rhythm; No murmurs, rubs, or gallops  ABDOMEN: Soft, Nontender, Nondistended; Bowel sounds present  EXTREMITIES:  2+ Peripheral Pulses, No clubbing, cyanosis, or edema  PSYCH: AAOx0; awake. Nonverbal  NEUROLOGY: RUE and RLE 0/5 strength;  LUE 4/5 strength LLE 3/5 strength.   SKIN: No rashes or lesions    LABS:                    RADIOLOGY & ADDITIONAL TESTS:    Imaging Personally Reviewed:    Care Discussed with Consultants/Other Providers:
CC: F/U for poor PO intake.    SUBJECTIVE / OVERNIGHT EVENTS:  Patient not cooperative with the exam.  ignoring my presence.  No overnight issues with F/C, vomiting, SOB, Cough, diarrhea.      MEDICATIONS  (STANDING):  aspirin  chewable 81 milliGRAM(s) Oral daily  atorvastatin 10 milliGRAM(s) Oral at bedtime  benztropine 1 milliGRAM(s) Oral two times a day  carBAMazepine Chewable 100 milliGRAM(s) Chew two times a day  dextrose 5% + sodium chloride 0.45%. 1000 milliLiter(s) (100 mL/Hr) IV Continuous <Continuous>  docusate sodium 100 milliGRAM(s) Oral three times a day  enoxaparin Injectable 80 milliGRAM(s) SubCutaneous two times a day  haloperidol     Tablet 10 milliGRAM(s) Oral two times a day  levETIRAcetam 1000 milliGRAM(s) Oral two times a day  memantine 5 milliGRAM(s) Oral at bedtime  multivitamin 1 Tablet(s) Oral daily  pantoprazole    Tablet 40 milliGRAM(s) Oral before breakfast  polyethylene glycol 3350 17 Gram(s) Oral daily  QUEtiapine 200 milliGRAM(s) Oral two times a day  senna 2 Tablet(s) Oral at bedtime    MEDICATIONS  (PRN):  bisacodyl 5 milliGRAM(s) Oral every 12 hours PRN Constipation  lactulose Syrup 10 Gram(s) Oral two times a day PRN if no BM in 24 hours      Vital Signs Last 24 Hrs  T(C): 36.8 (21 Mar 2019 06:41), Max: 37.1 (20 Mar 2019 19:49)  T(F): 98.3 (21 Mar 2019 06:41), Max: 98.8 (20 Mar 2019 19:49)  HR: 68 (21 Mar 2019 06:41) (64 - 70)  BP: 147/82 (21 Mar 2019 06:41) (124/56 - 147/82)  BP(mean): --  RR: 16 (21 Mar 2019 06:41) (16 - 20)  SpO2: 100% (21 Mar 2019 06:41) (100% - 100%)  CAPILLARY BLOOD GLUCOSE        I&O's Summary      PHYSICAL EXAM:  GENERAL: NAD, well-developed   HEAD:  Atraumatic, Normocephalic  EYES: EOMI, PERRLA, conjunctiva and sclera clear  NECK: Supple, No JVD  CHEST/LUNG: Clear to auscultation bilaterally; No wheeze  HEART: Regular rate and rhythm; No murmurs, rubs, or gallops  ABDOMEN: Soft, Nontender, Nondistended; Bowel sounds present  EXTREMITIES:  2+ Peripheral Pulses, No clubbing, cyanosis, or edema  PSYCH: AAOx0; awake. Nonverbal  NEUROLOGY: RUE and RLE 0/5 strength;  LUE 4/5 strength LLE 3/5 strength.   SKIN: No rashes or lesions    LABS:                        14.9   4.66  )-----------( 202      ( 20 Mar 2019 23:00 )             43.2     03-20    136  |  101  |  10  ----------------------------<  114<H>  3.7   |  23  |  0.82    Ca    9.5      20 Mar 2019 23:00    TPro  7.2  /  Alb  3.8  /  TBili  0.4  /  DBili  x   /  AST  19  /  ALT  22  /  AlkPhos  119  03-20              RADIOLOGY & ADDITIONAL TESTS:    Imaging Personally Reviewed:    Care Discussed with Consultants/Other Providers:
CC: F/U for schizophrenia    SUBJECTIVE / OVERNIGHT EVENTS:  No new events overnight.  No overnight issues with F/C, vomiting, SOB, Cough, diarrhea.      MEDICATIONS  (STANDING):  aspirin  chewable 81 milliGRAM(s) Oral daily  atorvastatin 10 milliGRAM(s) Oral at bedtime  benztropine 1 milliGRAM(s) Oral two times a day  carBAMazepine Chewable 100 milliGRAM(s) Chew two times a day  docusate sodium 100 milliGRAM(s) Oral three times a day  enoxaparin Injectable 80 milliGRAM(s) SubCutaneous two times a day  haloperidol     Tablet 10 milliGRAM(s) Oral two times a day  levETIRAcetam 1000 milliGRAM(s) Oral two times a day  memantine 5 milliGRAM(s) Oral at bedtime  multivitamin 1 Tablet(s) Oral daily  pantoprazole    Tablet 40 milliGRAM(s) Oral before breakfast  polyethylene glycol 3350 17 Gram(s) Oral daily  QUEtiapine 200 milliGRAM(s) Oral two times a day  senna 2 Tablet(s) Oral at bedtime    MEDICATIONS  (PRN):  bisacodyl 5 milliGRAM(s) Oral every 12 hours PRN Constipation  lactulose Syrup 10 Gram(s) Oral two times a day PRN if no BM in 24 hours      Vital Signs Last 24 Hrs  T(C): 36.8 (27 Mar 2019 05:13), Max: 36.8 (27 Mar 2019 05:13)  T(F): 98.3 (27 Mar 2019 05:13), Max: 98.3 (27 Mar 2019 05:13)  HR: 82 (27 Mar 2019 05:13) (81 - 82)  BP: 130/75 (27 Mar 2019 05:13) (129/83 - 137/77)  BP(mean): --  RR: 18 (27 Mar 2019 05:13) (18 - 18)  SpO2: 100% (27 Mar 2019 05:13) (100% - 100%)  CAPILLARY BLOOD GLUCOSE        I&O's Summary      PHYSICAL EXAM:  GENERAL: NAD, well-developed   HEAD:  Atraumatic, Normocephalic  EYES: EOMI, PERRLA, conjunctiva and sclera clear  NECK: Supple, No JVD  CHEST/LUNG: Clear to auscultation bilaterally; No wheeze  HEART: Regular rate and rhythm; No murmurs, rubs, or gallops  ABDOMEN: Soft, Nontender, Nondistended; Bowel sounds present  EXTREMITIES:  2+ Peripheral Pulses, No clubbing, cyanosis, or edema  PSYCH: AAOx0; awake. Nonverbal  NEUROLOGY: Rt hemiparesis.  SKIN: No rashes or lesions    LABS:                    RADIOLOGY & ADDITIONAL TESTS:    Imaging Personally Reviewed:    Care Discussed with Consultants/Other Providers:
CC: F/U for schizophrenia    SUBJECTIVE / OVERNIGHT EVENTS:  No new events overnight. Tolerating good PO intake.  No overnight issues with F/C, vomiting, SOB, Cough, diarrhea.      MEDICATIONS  (STANDING):  aspirin  chewable 81 milliGRAM(s) Oral daily  atorvastatin 10 milliGRAM(s) Oral at bedtime  benztropine 1 milliGRAM(s) Oral two times a day  carBAMazepine Chewable 100 milliGRAM(s) Chew two times a day  docusate sodium 100 milliGRAM(s) Oral three times a day  enoxaparin Injectable 80 milliGRAM(s) SubCutaneous two times a day  haloperidol     Tablet 10 milliGRAM(s) Oral two times a day  levETIRAcetam 1000 milliGRAM(s) Oral two times a day  memantine 5 milliGRAM(s) Oral at bedtime  multivitamin 1 Tablet(s) Oral daily  pantoprazole    Tablet 40 milliGRAM(s) Oral before breakfast  polyethylene glycol 3350 17 Gram(s) Oral daily  QUEtiapine 200 milliGRAM(s) Oral two times a day  senna 2 Tablet(s) Oral at bedtime    MEDICATIONS  (PRN):  bisacodyl 5 milliGRAM(s) Oral every 12 hours PRN Constipation  lactulose Syrup 10 Gram(s) Oral two times a day PRN if no BM in 24 hours      Vital Signs Last 24 Hrs  T(C): 36.7 (26 Mar 2019 05:15), Max: 36.8 (25 Mar 2019 19:45)  T(F): 98.1 (26 Mar 2019 05:15), Max: 98.3 (25 Mar 2019 19:45)  HR: 75 (26 Mar 2019 05:15) (75 - 84)  BP: 127/77 (26 Mar 2019 05:15) (125/84 - 148/82)  BP(mean): --  RR: 18 (26 Mar 2019 05:15) (18 - 18)  SpO2: 100% (26 Mar 2019 05:15) (97% - 100%)  CAPILLARY BLOOD GLUCOSE        I&O's Summary      PHYSICAL EXAM:  GENERAL: NAD, well-developed   HEAD:  Atraumatic, Normocephalic  EYES: EOMI, PERRLA, conjunctiva and sclera clear  NECK: Supple, No JVD  CHEST/LUNG: Clear to auscultation bilaterally; No wheeze  HEART: Regular rate and rhythm; No murmurs, rubs, or gallops  ABDOMEN: Soft, Nontender, Nondistended; Bowel sounds present  EXTREMITIES:  2+ Peripheral Pulses, No clubbing, cyanosis, or edema  PSYCH: AAOx0; awake. Nonverbal  NEUROLOGY: Rt hemiparesis.  SKIN: No rashes or lesions    LABS:                    RADIOLOGY & ADDITIONAL TESTS:    Imaging Personally Reviewed:    Care Discussed with Consultants/Other Providers:
CC: schitzophrenia    SUBJECTIVE / OVERNIGHT EVENTS:  No events overnight. Patient seen and evaluated at bedside. Patient with gown hanging off of body, talking to himself. Not aggressive, but not following commands.   MEDICATIONS  (STANDING):  aspirin  chewable 81 milliGRAM(s) Oral daily  atorvastatin 10 milliGRAM(s) Oral at bedtime  benztropine 1 milliGRAM(s) Oral two times a day  carBAMazepine Chewable 100 milliGRAM(s) Chew two times a day  docusate sodium 100 milliGRAM(s) Oral three times a day  enoxaparin Injectable 80 milliGRAM(s) SubCutaneous two times a day  haloperidol     Tablet 10 milliGRAM(s) Oral two times a day  levETIRAcetam 1000 milliGRAM(s) Oral two times a day  memantine 5 milliGRAM(s) Oral at bedtime  multivitamin 1 Tablet(s) Oral daily  pantoprazole    Tablet 40 milliGRAM(s) Oral before breakfast  polyethylene glycol 3350 17 Gram(s) Oral daily  QUEtiapine 200 milliGRAM(s) Oral two times a day  senna 2 Tablet(s) Oral at bedtime    MEDICATIONS  (PRN):  bisacodyl 5 milliGRAM(s) Oral every 12 hours PRN Constipation  lactulose Syrup 10 Gram(s) Oral two times a day PRN if no BM in 24 hours        Vital Signs Last 24 Hrs  T(C): 36.7 (29 Mar 2019 05:25), Max: 36.8 (28 Mar 2019 12:58)  T(F): 98 (29 Mar 2019 05:25), Max: 98.3 (28 Mar 2019 12:58)  HR: 80 (29 Mar 2019 05:25) (80 - 97)  BP: 130/84 (29 Mar 2019 05:25) (129/81 - 136/83)  BP(mean): --  RR: 18 (29 Mar 2019 05:25) (18 - 18)  SpO2: 98% (29 Mar 2019 05:25) (97% - 98%)      I&O's Summary      PHYSICAL EXAM:  GEN: in bed, NAD  HEENT: intermittent tongue protrusion  CARDS: +S1S2. rrr. No m/r/g  RESP: CTA b/l. No r/r/w  ABD: Soft. NT/ND. +BS  EXT: No c/c/e  Psych: not folloiwng commands, calm.  Neuro: Not following commands, but spontaneous movements L>R extremities.   LABS:                    RADIOLOGY & ADDITIONAL TESTS:    Imaging Personally Reviewed:    Care Discussed with Consultants/Other Providers:
Patient is a 59y old  Male who presents with a chief complaint of catatonia and dysphagia (29 Mar 2019 12:48)      SUBJECTIVE / OVERNIGHT EVENTS:  Patient seen and examined. Appeared to be catatonic, not verbal or communicative.     MEDICATIONS  (STANDING):  aspirin  chewable 81 milliGRAM(s) Oral daily  atorvastatin 10 milliGRAM(s) Oral at bedtime  benztropine 1 milliGRAM(s) Oral two times a day  carBAMazepine Chewable 100 milliGRAM(s) Chew two times a day  docusate sodium 100 milliGRAM(s) Oral three times a day  enoxaparin Injectable 80 milliGRAM(s) SubCutaneous two times a day  haloperidol     Tablet 10 milliGRAM(s) Oral two times a day  levETIRAcetam 1000 milliGRAM(s) Oral two times a day  memantine 5 milliGRAM(s) Oral at bedtime  multivitamin 1 Tablet(s) Oral daily  pantoprazole    Tablet 40 milliGRAM(s) Oral before breakfast  polyethylene glycol 3350 17 Gram(s) Oral daily  QUEtiapine 200 milliGRAM(s) Oral two times a day  senna 2 Tablet(s) Oral at bedtime    MEDICATIONS  (PRN):  bisacodyl 5 milliGRAM(s) Oral every 12 hours PRN Constipation  lactulose Syrup 10 Gram(s) Oral two times a day PRN if no BM in 24 hours      PHYSICAL EXAM:  T(C): 36.8 (03-30-19 @ 05:12), Max: 36.8 (03-30-19 @ 05:12)  HR: 81 (03-30-19 @ 05:12) (80 - 81)  BP: 128/76 (03-30-19 @ 05:12) (128/76 - 130/88)  RR: 17 (03-30-19 @ 05:12) (17 - 18)  SpO2: 100% (03-30-19 @ 05:12) (100% - 100%)  I&O's Summary    GENERAL: NAD, well-developed, non engaged  EYES: conjunctiva and sclera clear  NECK: Supple, No elevated JVD  CHEST/LUNG: Clear to auscultation bilaterally; No wheeze, poor inspiratory effort  HEART: Regular rate and rhythm; No murmurs, rubs, or gallops  EXTREMITIES: No clubbing, cyanosis, or edema  PSYCH: non engaged, catatonic        LABS:  CAPILLARY BLOOD GLUCOSE                          RADIOLOGY & ADDITIONAL TESTS:    Imaging Personally Reviewed:    Consultant(s) Notes Reviewed:      Care Discussed with Consultants/Other Providers:
Patient is a 59y old  Male who presents with a chief complaint of catatonia and dysphagia (30 Mar 2019 14:02)      SUBJECTIVE / OVERNIGHT EVENTS:  Patient seen and examined. Appeared to be catatonic, not verbal or communicative. On the edge of the bed, with only diaper making incomprehensible sounds.     MEDICATIONS  (STANDING):  aspirin  chewable 81 milliGRAM(s) Oral daily  atorvastatin 10 milliGRAM(s) Oral at bedtime  benztropine 1 milliGRAM(s) Oral two times a day  carBAMazepine Chewable 100 milliGRAM(s) Chew two times a day  docusate sodium 100 milliGRAM(s) Oral three times a day  enoxaparin Injectable 80 milliGRAM(s) SubCutaneous two times a day  haloperidol     Tablet 10 milliGRAM(s) Oral two times a day  levETIRAcetam 1000 milliGRAM(s) Oral two times a day  memantine 5 milliGRAM(s) Oral at bedtime  multivitamin 1 Tablet(s) Oral daily  pantoprazole    Tablet 40 milliGRAM(s) Oral before breakfast  polyethylene glycol 3350 17 Gram(s) Oral daily  QUEtiapine 200 milliGRAM(s) Oral two times a day  senna 2 Tablet(s) Oral at bedtime    MEDICATIONS  (PRN):  bisacodyl 5 milliGRAM(s) Oral every 12 hours PRN Constipation  lactulose Syrup 10 Gram(s) Oral two times a day PRN if no BM in 24 hours      PHYSICAL EXAM:  T(C): 36.6 (03-31-19 @ 05:36), Max: 36.9 (03-30-19 @ 21:51)  HR: 97 (03-31-19 @ 05:36) (97 - 102)  BP: 122/82 (03-31-19 @ 05:36) (122/82 - 140/91)  RR: 18 (03-31-19 @ 05:36) (18 - 18)  SpO2: 100% (03-31-19 @ 05:36) (100% - 100%)  I&O's Summary    GENERAL: NAD, well-developed, non engaged  EYES: conjunctiva and sclera clear  NECK: Supple, No elevated JVD  CHEST/LUNG: Clear to auscultation bilaterally; No wheeze, poor inspiratory effort  HEART: Regular rate and rhythm; No murmurs, rubs, or gallops  EXTREMITIES: No clubbing, cyanosis, or edema  PSYCH: non engaged, seems more agitated than yesterday    LABS:  CAPILLARY BLOOD GLUCOSE                          RADIOLOGY & ADDITIONAL TESTS:    Imaging Personally Reviewed:    Consultant(s) Notes Reviewed:      Care Discussed with Consultants/Other Providers:
Patient is a 59y old  Male who presents with a chief complaint of catatonia and dysphagia (31 Mar 2019 14:45)      SUBJECTIVE / OVERNIGHT EVENTS:  No active issues overnight. Patient nonverbal or communicative    MEDICATIONS  (STANDING):   aspirin  chewable 81 milliGRAM(s) Oral daily  atorvastatin 10 milliGRAM(s) Oral at bedtime  benztropine 1 milliGRAM(s) Oral two times a day  carBAMazepine Chewable 100 milliGRAM(s) Chew two times a day  docusate sodium 100 milliGRAM(s) Oral three times a day  enoxaparin Injectable 80 milliGRAM(s) SubCutaneous two times a day  haloperidol     Tablet 10 milliGRAM(s) Oral two times a day  levETIRAcetam 1000 milliGRAM(s) Oral two times a day  memantine 5 milliGRAM(s) Oral at bedtime  multivitamin 1 Tablet(s) Oral daily  pantoprazole    Tablet 40 milliGRAM(s) Oral before breakfast  polyethylene glycol 3350 17 Gram(s) Oral daily  QUEtiapine 200 milliGRAM(s) Oral two times a day  senna 2 Tablet(s) Oral at bedtime    MEDICATIONS  (PRN):  bisacodyl 5 milliGRAM(s) Oral every 12 hours PRN Constipation  lactulose Syrup 10 Gram(s) Oral two times a day PRN if no BM in 24 hours      Vital Signs Last 24 Hrs  T(C): 36.4 (01 Apr 2019 13:27), Max: 36.7 (31 Mar 2019 14:41)  T(F): 97.5 (01 Apr 2019 13:27), Max: 98.1 (31 Mar 2019 14:41)  HR: 88 (01 Apr 2019 13:27) (81 - 101)  BP: 136/82 (01 Apr 2019 13:27) (120/68 - 143/76)  BP(mean): --  RR: 18 (01 Apr 2019 13:27) (18 - 18)  SpO2: 100% (01 Apr 2019 13:27) (99% - 100%)  CAPILLARY BLOOD GLUCOSE        I&O's Summary      PHYSICAL EXAM:  GENERAL: NAD, well-developed  HEAD:  Atraumatic, Normocephalic  EYES: EOMI, PERRLA, conjunctiva and sclera clear  NECK: Supple, No JVD  CHEST/LUNG: Clear to auscultation bilaterally; No wheeze  HEART: Regular rate and rhythm; No murmurs, rubs, or gallops  ABDOMEN: Soft, Nontender, Nondistended; Bowel sounds present  EXTREMITIES:  2+ Peripheral Pulses, No clubbing, cyanosis, or edema  PSYCH: AAOx1; nonverbal  NEUROLOGY: non-focal  SKIN: No rashes or lesions    LABS:                    RADIOLOGY & ADDITIONAL TESTS:    Imaging Personally Reviewed:    Consultant(s) Notes Reviewed:      Care Discussed with Consultants/Other Providers:
CC: F/U for poor PO intake.    SUBJECTIVE / OVERNIGHT EVENTS:  No evidence of poor PO intake while in-patient.  Patient alert but not interacting for the history taking and physical exam.  No overnight issues with F/C, vomiting, SOB, Cough, diarrhea.      MEDICATIONS  (STANDING):  aspirin  chewable 81 milliGRAM(s) Oral daily  atorvastatin 10 milliGRAM(s) Oral at bedtime  benztropine 1 milliGRAM(s) Oral two times a day  carBAMazepine Chewable 100 milliGRAM(s) Chew two times a day  docusate sodium 100 milliGRAM(s) Oral three times a day  enoxaparin Injectable 80 milliGRAM(s) SubCutaneous two times a day  haloperidol     Tablet 10 milliGRAM(s) Oral two times a day  levETIRAcetam 1000 milliGRAM(s) Oral two times a day  memantine 5 milliGRAM(s) Oral at bedtime  multivitamin 1 Tablet(s) Oral daily  pantoprazole    Tablet 40 milliGRAM(s) Oral before breakfast  polyethylene glycol 3350 17 Gram(s) Oral daily  QUEtiapine 200 milliGRAM(s) Oral two times a day  senna 2 Tablet(s) Oral at bedtime    MEDICATIONS  (PRN):  bisacodyl 5 milliGRAM(s) Oral every 12 hours PRN Constipation  lactulose Syrup 10 Gram(s) Oral two times a day PRN if no BM in 24 hours      Vital Signs Last 24 Hrs  T(C): 36.6 (25 Mar 2019 05:52), Max: 36.7 (24 Mar 2019 14:25)  T(F): 97.8 (25 Mar 2019 05:52), Max: 98 (24 Mar 2019 14:25)  HR: 92 (25 Mar 2019 05:52) (68 - 94)  BP: 140/61 (25 Mar 2019 05:52) (116/62 - 140/61)  BP(mean): --  RR: 18 (25 Mar 2019 05:52) (18 - 18)  SpO2: 100% (25 Mar 2019 05:52) (100% - 100%)  CAPILLARY BLOOD GLUCOSE        I&O's Summary      PHYSICAL EXAM:  GENERAL: NAD, well-developed   HEAD:  Atraumatic, Normocephalic  EYES: EOMI, PERRLA, conjunctiva and sclera clear  NECK: Supple, No JVD  CHEST/LUNG: Clear to auscultation bilaterally; No wheeze  HEART: Regular rate and rhythm; No murmurs, rubs, or gallops  ABDOMEN: Soft, Nontender, Nondistended; Bowel sounds present  EXTREMITIES:  2+ Peripheral Pulses, No clubbing, cyanosis, or edema  PSYCH: AAOx0; awake. Nonverbal  NEUROLOGY: RUE and RLE 0/5 strength;  LUE 4/5 strength LLE 3/5 strength.   SKIN: No rashes or lesions    LABS:                    RADIOLOGY & ADDITIONAL TESTS:    Imaging Personally Reviewed:    Care Discussed with Consultants/Other Providers:

## 2019-04-01 NOTE — PROGRESS NOTE ADULT - PROBLEM SELECTOR PLAN 6
Bedbound at baseline

## 2019-04-01 NOTE — PROGRESS NOTE ADULT - PROBLEM SELECTOR PLAN 4
Secondary to CVA and underlying behavior pathology  - needs assistance with PO intake.
Secondary to CVA and underlying behavior pathology  - provide assistance with PO intake.
Secondary to CVA and underlying behavior pathology  - provide assistance with PO intake.  - passed 72 hour calorie count

## 2019-04-01 NOTE — PROGRESS NOTE ADULT - PROVIDER SPECIALTY LIST ADULT
Hospitalist

## 2019-04-01 NOTE — DISCHARGE NOTE NURSING/CASE MANAGEMENT/SOCIAL WORK - NSDCDPATPORTLINK_GEN_ALL_CORE
You can access the [facility], offered by [facility], by registering with the following website: [email or web address]

## 2019-04-01 NOTE — PROGRESS NOTE ADULT - PROBLEM SELECTOR PROBLEM 7
Chronic deep vein thrombosis (DVT) of lower extremity, unspecified laterality, unspecified vein

## 2019-04-01 NOTE — PROGRESS NOTE ADULT - PROBLEM SELECTOR PLAN 1
with dysphagia at baseline.  - no clinical signs to indicate deterioration  - Continue ASA, lipitor  - Bedbound due to left hemiparesis  - reviewed nutritionist's note - medically, patient will need to be on full A/C due to very high risk for embolic CVA.  Dosing changes and converting the formulation to  PO can be address when his body habitus and/or behavior issues improve.
with dysphagia at baseline.  - no clinical signs to indicate deterioration  - Continue ASA, lipitor  - Bedbound due to left hemiparesis
with dysphagia at baseline.  - no clinical signs to indicate deterioration  - Continue ASA, lipitor  - Bedbound due to left hemiparesis  - reviewed nutritionist's note - medically, patient will need to be on full A/C due to very high risk for embolic CVA.  Dosing changes and converting the formulation to  PO can be address when his body habitus and/or behavior issues improve.
with dysphagia at baseline.  - no clinical signs to indicate neurological deterioration  - Continue ASA, lipitor  - Bedbound due to left hemiparesis
with dysphagia at baseline.  - no clinical signs to indicate neurological deterioration  - Continue ASA, lipitor  - Bedbound due to left hemiparesis
with dysphagia at baseline.  - no clinical signs to indicate neurological deterioration  - Continue ASA, lipitor  - Bedbound due to right hemiparesis

## 2019-04-01 NOTE — PROGRESS NOTE ADULT - PROBLEM SELECTOR PROBLEM 6
Spinal stenosis, unspecified spinal region

## 2019-04-01 NOTE — DISCHARGE NOTE NURSING/CASE MANAGEMENT/SOCIAL WORK - NSDCPEPTSTRK_GEN_ALL_CORE
Prescribed medications/Risk factors for stroke/Stroke support groups for patients, families, and friends/Stroke warning signs and symptoms/Signs and symptoms of stroke/Call 911 for stroke/Stroke education booklet/Need for follow up after discharge

## 2020-01-27 NOTE — PATIENT PROFILE ADULT - NSASFUNCLEVELADLTRANSFER_GEN_A_NUR
Carotid Artery Problems: Blockage     A healthy carotid artery lets blood flow easily to the brain.   The blood carries oxygen and nutrients throughout the body. The carotid arteries are large blood vessels that carry blood to the brain. Certain health problems can make the inside of the carotid arteries narrow and rough. Over time, this damage increases the chances of having a stroke. A stroke is a sudden loss of brain function.   Open carotid arteries  In a healthy carotid artery, the inside of the artery is open. The lining of the artery wall is also smooth. This lets blood flow freely from the heart to the brain. The brain gets all the oxygen and nutrients it needs to function well.  Narrowed carotid arteries  Health factors, such as high blood pressure, high cholesterol, smoking, and diabetes, can damage artery walls and make them rough. This allows cholesterol and other particles in the blood to stick to the artery walls and form plaque or fatty deposits. As the plaque builds up, it can narrow the artery. Blood may also collect on the plaque and form blood clots.  How a stroke happens     Emboli can enter the bloodstream and travel to the brain. Brain tissue is damaged when emboli block arteries in the brain.   A stroke can happen when plaque in the carotid artery ruptures. This can allow small pieces of plaque to break off into the bloodstream. At the same time, rupture can make more blood clots. The pieces of plaque and tiny blood clots or emboli can flow in the blood until they get stuck in a small blood vessel in the brain. This blocks blood flow to part of the brain and causes a stroke.  Date Last Reviewed: 6/8/2015  © 4620-7315 [employer]. [address] [place] [address], [place] [address]. All rights reserved. This information is not intended as a substitute for professional medical care. Always follow your healthcare professional's instructions.        
3 = assistive equipment and person

## 2020-03-27 NOTE — ED PROVIDER NOTE - ENMT NEGATIVE STATEMENT, MLM
0 Ears: no ear pain and no hearing problems.Nose: no nasal congestion and no nasal drainage.Mouth/Throat: no dysphagia, no hoarseness and no throat pain.Neck: no lumps, no pain, no stiffness and no swollen glands.

## 2020-04-23 NOTE — ED PROVIDER NOTE - TOBACCO USE
Problem: Depressive Symptoms  Goal: Depressive Symptoms  Description: Signs and symptoms of listed problems will be absent or manageable.  Suicidal ideation,   Depression.   Flowsheets (Taken 4/23/2020 1432)  Depressive Symptoms Assessed: all  Depressive Symptoms Present:   affect   mood   anxiety   insight   thought process   suicidality  Note: Patient at the beginning of the shift endorsed suicidal ideation and refused to eat breakfast, come out of her room, and finish her MMPI test. Around lunch time with staff encouragement Patient eat lunch and finish her MMPI. Staff told patient that if does not give any effort to her treatment that she will most likely be admitted back to a hospital for suicidal ideation and depression. Patient did get up and ate lunch. Patient still appears very depressed and anxious. Patient still working on her MMPI.       Never smoker

## 2020-09-30 NOTE — OCCUPATIONAL THERAPY INITIAL EVALUATION ADULT - ORIENTATION, REHAB EVAL
person Double O-Z Plasty Text: The defect edges were debeveled with a #15 scalpel blade.  Given the location of the defect, shape of the defect and the proximity to free margins a Double O-Z plasty (double transposition flap) was deemed most appropriate.  Using a sterile surgical marker, the appropriate transposition flaps were drawn incorporating the defect and placing the expected incisions within the relaxed skin tension lines where possible. The area thus outlined was incised deep to adipose tissue with a #15 scalpel blade.  The skin margins were undermined to an appropriate distance in all directions utilizing iris scissors.  Hemostasis was achieved with electrocautery.  The flaps were then transposed into place, one clockwise and the other counterclockwise, and anchored with interrupted buried subcutaneous sutures.

## 2020-11-27 ENCOUNTER — EMERGENCY (EMERGENCY)
Facility: HOSPITAL | Age: 60
LOS: 1 days | Discharge: HOME | End: 2020-11-27
Attending: EMERGENCY MEDICINE | Admitting: EMERGENCY MEDICINE
Payer: MEDICARE

## 2020-11-27 VITALS
SYSTOLIC BLOOD PRESSURE: 122 MMHG | DIASTOLIC BLOOD PRESSURE: 81 MMHG | TEMPERATURE: 98 F | RESPIRATION RATE: 18 BRPM | OXYGEN SATURATION: 100 % | HEART RATE: 78 BPM | HEIGHT: 74.5 IN

## 2020-11-27 VITALS
OXYGEN SATURATION: 100 % | RESPIRATION RATE: 18 BRPM | SYSTOLIC BLOOD PRESSURE: 140 MMHG | DIASTOLIC BLOOD PRESSURE: 79 MMHG | HEART RATE: 74 BPM

## 2020-11-27 DIAGNOSIS — Z98.890 OTHER SPECIFIED POSTPROCEDURAL STATES: Chronic | ICD-10-CM

## 2020-11-27 PROCEDURE — 73590 X-RAY EXAM OF LOWER LEG: CPT | Mod: 26,RT

## 2020-11-27 PROCEDURE — 72170 X-RAY EXAM OF PELVIS: CPT | Mod: 26

## 2020-11-27 PROCEDURE — 72125 CT NECK SPINE W/O DYE: CPT | Mod: 26

## 2020-11-27 PROCEDURE — 99284 EMERGENCY DEPT VISIT MOD MDM: CPT | Mod: GC

## 2020-11-27 PROCEDURE — 70450 CT HEAD/BRAIN W/O DYE: CPT | Mod: 26

## 2020-11-27 PROCEDURE — 71045 X-RAY EXAM CHEST 1 VIEW: CPT | Mod: 26

## 2020-11-27 PROCEDURE — 73030 X-RAY EXAM OF SHOULDER: CPT | Mod: 26,RT

## 2020-11-27 RX ORDER — ACETAMINOPHEN 500 MG
975 TABLET ORAL ONCE
Refills: 0 | Status: COMPLETED | OUTPATIENT
Start: 2020-11-27 | End: 2020-11-27

## 2020-11-27 RX ADMIN — Medication 975 MILLIGRAM(S): at 11:39

## 2020-11-27 NOTE — ED PROVIDER NOTE - CARE PLAN
Principal Discharge DX:	Fall at nursing home, initial encounter  Secondary Diagnosis:	Abrasion of skin   Principal Discharge DX:	Abrasion of lower extremity, unspecified laterality, initial encounter  Secondary Diagnosis:	Fall at nursing home, initial encounter

## 2020-11-27 NOTE — ED ADULT TRIAGE NOTE - CHIEF COMPLAINT QUOTE
[facility]pt fell face down out of wheelchair--no LOC. Pt has raised bump on rt side of forehead--no c/o  Pt non-verbal at baseline

## 2020-11-27 NOTE — ED PROVIDER NOTE - ATTENDING CONTRIBUTION TO CARE
Dr. Mendoza: 58 yo male with schizophrenia, seizure disorder, GERD, DVT on Lovenox and with IVC filter, recent CVA with aphasia at baseline, sent to ED from NH due to unwitnessed fall from wheelchair.  Staff heard loud noise and found pt on floor.  Pt unable to communicate general information overall but did say "no" when asked if he had pain anywhere.  On exam pt chronically-ill appearing but in NAD, heart RRR, lungs CTAB, abd NTND, extremities without swelling but there is an abration to right shin, no active bleeding, strength equally decreased in all extremities and skin without rash.

## 2020-11-27 NOTE — ED PROVIDER NOTE - NS ED ROS FT
ROS:  GENERAL: No fever, no chills  EYES: no change in vision  HEENT: no trouble swallowing, no trouble speaking  CARDIAC: no chest pain  PULMONARY: no cough, no shortness of breath  GI: no abdominal pain, no nausea, no vomiting, no diarrhea, no constipation  : No dysuria, no frequency, no change in appearance, or odor of urine  SKIN: no rashes  NEURO: no headache, no weakness  MSK: r shin abrasion and pain   ~Jose E Mendieta D.O. -Resident

## 2020-11-27 NOTE — ED PROVIDER NOTE - CLINICAL SUMMARY MEDICAL DECISION MAKING FREE TEXT BOX
60yo m PMHx schizophrenia, seizure, GERD, renal mass, recurrent DVT (on Lovenox, w/ IVC filter), C4/5 stenosis (c/b cord compression s/p discectomy and fusion), recent CVA, pw fall from wheelchair this am at [facility], [place], operated by [facility]. on asp no other ac no loc, at baseline ms now, will obtain xrays cts analgesia

## 2020-11-27 NOTE — ED ADULT NURSE NOTE - ED STAT RN HANDOFF DETAILS
report given to [facility]. Pt in NAD at this time. Vital signs as noted, pt aware of plan of care.

## 2020-11-27 NOTE — ED ADULT NURSE NOTE - MODE OF DISCHARGE
FREE:[LAST:[Gervais],FIRST:[Hospital],PHONE:[phone]],FAX:[(   [phone]    -],ADDRESS:[[address] [place] [address]],FOLLOWUP:[1 week]] Stretcher

## 2020-11-27 NOTE — ED ADULT NURSE NOTE - CHIEF COMPLAINT QUOTE
Pt arrives via EMS /[facility] --pt fell face down out of wheelchair--no LOC. Pt has raised bump on rt side of forehead--no c/o  Pt non-verbal at baseline

## 2020-11-27 NOTE — ED PROVIDER NOTE - NSFOLLOWUPINSTRUCTIONS_ED_ALL_ED_FT
1. TAKE ALL MEDICATIONS AS DIRECTED.    2. FOR PAIN OR FEVER YOU CAN TAKE IBUPROFEN (MOTRIN, ADVIL) OR ACETAMINOPHEN (TYLENOL) AS NEEDED, AS DIRECTED ON PACKAGING.  3. FOLLOW UP WITH YOUR PRIMARY DOCTOR WITHIN 5 DAYS AS DIRECTED.  4. IF YOU HAD LABS OR IMAGING DONE, YOU WERE GIVEN COPIES OF ALL LABS AND/OR IMAGING RESULTS FROM YOUR ER VISIT--PLEASE TAKE THEM WITH YOU TO YOUR FOLLOW UP APPOINTMENTS.  5. RETURN TO THE ER FOR ANY WORSENING SYMPTOMS OR CONCERNS.    FALL PREVENTION - Discharge Care           Fall Prevention    WHAT YOU NEED TO KNOW:    Fall prevention includes ways to make your home and other areas safer. It also includes ways you can move more carefully to prevent a fall. Health conditions that cause changes in your blood pressure, vision, or muscle strength and coordination may increase your risk for falls. Medicines may also increase your risk for falls if they make you dizzy, weak, or sleepy.    DISCHARGE INSTRUCTIONS:    Call 911 or have someone else call if:   •You have fallen and are unconscious.      •You have fallen and cannot move part of your body.      Contact your healthcare provider if:   •You have fallen and have pain or a headache.      •You have questions or concerns about your condition or care.      Fall prevention tips:   •Stand or sit up slowly. This may help you keep your balance and prevent falls.      •Use assistive devices as directed. Your healthcare provider may suggest that you use a cane or walker to help you keep your balance. You may need to have grab bars put in your bathroom near the toilet or in the shower.      •Wear shoes that fit well and have soles that . Wear shoes both inside and outside. Use slippers with good . Do not wear shoes with high heels.      •Wear a personal alarm. This is a device that allows you to call 911 if you fall and need help. Ask your healthcare provider for more information.      •Stay active. Exercise can help strengthen your muscles and improve your balance. Your healthcare provider may recommend water aerobics or walking. He or she may also recommend physical therapy to improve your coordination. Never start an exercise program without talking to your healthcare provider first.  Walking for Exercise           •Manage your medical conditions.  Keep all appointments with your healthcare providers. Visit your eye doctor as directed.      Home safety tips:     Fall Prevention for Adults     •Add items to prevent falls in the bathroom. Put nonslip strips on your bath or shower floor to prevent you from slipping. Use a bath mat if you do not have carpet in the bathroom. This will prevent you from falling when you step out of the bath or shower. Use a shower seat so you do not need to stand while you shower. Sit on the toilet or a chair in your bathroom to dry yourself and put on clothing. This will prevent you from losing your balance from drying or dressing yourself while you are standing.      •Keep paths clear. Remove books, shoes, and other objects from walkways and stairs. Place cords for telephones and lamps out of the way so that you do not need to walk over them. Tape them down if you cannot move them. Remove small rugs. If you cannot remove a rug, secure it with double-sided tape. This will prevent you from tripping.      •Install bright lights in your home. Use night lights to help light paths to the bathroom or kitchen. Always turn on the light before you start walking.      •Keep items you use often on shelves within reach. Do not use a step stool to help you reach an item.      •Paint or place reflective tape on the edges of your stairs. This will help you see the stairs better.      Follow up with your healthcare provider as directed: Write down your questions so you remember to ask them during your visits

## 2020-11-27 NOTE — ED PROVIDER NOTE - PROGRESS NOTE DETAILS
discussed with naren CAMEJO at [facility] will obtain xrays cth / cspine, analgesia, confirmed story no seizure like activity, not on ac [phone] ext [phone]

## 2020-11-27 NOTE — ED PROVIDER NOTE - PATIENT PORTAL LINK FT
You can access the FollowMyHealth Patient Portal offered by [facility] by registering at the following website: [email or web address]. By joining [facility]’s FollowMyHealth portal, you will also be able to view your health information using other applications (apps) compatible with our system.

## 2020-11-27 NOTE — ED PROVIDER NOTE - PHYSICAL EXAMINATION
Physical Exam:  Gen: NAD, Awake, aphasic, speaking some noises but able to respond with head nodding   Head: normal appearing  HEENT: normal conjunctiva, oral mucosa dry, right temporal ttp no neck ttp normal neck rom   Lung:  no respiratory distress, clear to ascultation bilaterally     CV: regular rate and rhythm, nontender chest wall   Abd: soft, ND, NT, pelvis stable   MSK: no visible deformities, rue contracture, strength 2/5 right ue and rle, 5/5 lue and lle, mild right shoulder pain with flexion, right shin ant abrasion without ttp superficial no lacerations   Neuro: No focal deficits  Skin: Warm  Psych: normal affect  ~Jose E Mendieta D.O. -Resident

## 2020-11-27 NOTE — ED PROVIDER NOTE - OBJECTIVE STATEMENT
58yo m PMHx schizophrenia, seizure, GERD, renal mass, recurrent DVT (on Lovenox, w/ IVC filter), C4/5 stenosis (c/b cord compression s/p discectomy and fusion), recent CVA, pw fall from wheelchair this am at [facility]. patient aphasic at baseline with wheelchair only not ambulatory no xfers but able to wheel himself, nurses heard loud noise and found him right sided down with face on floor in hallway. no loc. on aspirin but no other ac. only co right shin abrasion no other pain. Denies other recent trauma, fevers, chills, headache, dizziness, nausea, vomiting, dysuria, freq, hematuria, diarrhea, constipation, chest pain, shortness of breath, cough.

## 2020-11-27 NOTE — ED PROVIDER NOTE - PRINCIPAL DIAGNOSIS
Fall at nursing home, initial encounter Abrasion of lower extremity, unspecified laterality, initial encounter

## 2020-11-27 NOTE — ED ADULT NURSE NOTE - INTERVENTIONS DEFINITIONS
[place] to call system/Stretcher in lowest position, wheels locked, appropriate side rails in place/Instruct patient to call for assistance/Physically safe environment: no spills, clutter or unnecessary equipment/Call bell, personal items and telephone within reach

## 2021-03-15 NOTE — PACU DISCHARGE NOTE - NSPTMEETSDISCHCRITERIADT_GEN_A_CORE
FUTURE VISIT INFORMATION      FUTURE VISIT INFORMATION:    Date: 7/5/21    Time: 1 PM with SLP    Location: [facility]  REFERRAL INFORMATION:    Referring provider:      Referring providers clinic:      Reason for visit/diagnosis: VCD    RECORDS REQUESTED FROM:       Clinic name Comments Records Status Imaging Status   [facility] - Virtual 11/5/20 - Virtual Check with ERIN Alejo [facility] 3/15/21 - Referral from Dr. Nye  3/11/21, 1/25/21 - OV with Dr. Nye 3/16 Sent to Scan    [facility] - Procedure 3/11/21 - Spirometry (No Graphs)  1/25/21 - Spirometry (No graphs) 3/16 Sent to Scan                  * 3/15/21 10:15 AM Faxed req to [facility] for records. - Angie  * 3/16/21 9:55 AM Records received from [facility] and sent to [facility] to be scanned into the chart. - Angie  
24-May-2018 19:14

## 2021-03-25 NOTE — PATIENT PROFILE ADULT. - PRO ARRIVE FROM
rajani/other (specify) Mastoid Interpolation Flap Text: A decision was made to reconstruct the defect utilizing an interpolation axial flap and a staged reconstruction.  A telfa template was made of the defect.  This telfa template was then used to outline the mastoid interpolation flap.  The donor area for the pedicle flap was then injected with anesthesia.  The flap was excised through the skin and subcutaneous tissue down to the layer of the underlying musculature.  The pedicle flap was carefully excised within this deep plane to maintain its blood supply.  The edges of the donor site were undermined.   The donor site was closed in a primary fashion.  The pedicle was then rotated into position and sutured.  Once the tube was sutured into place, adequate blood supply was confirmed with blanching and refill.  The pedicle was then wrapped with xeroform gauze and dressed appropriately with a telfa and gauze bandage to ensure continued blood supply and protect the attached pedicle.

## 2022-02-03 NOTE — ED ADULT NURSE NOTE - NS PRO AD NO ADVANCE DIRECTIVE
[de-identified] : No Dressing (No Sutures): dry sterile dressing

## 2022-06-13 NOTE — PROGRESS NOTE ADULT - PROBLEM SELECTOR PLAN 2
Recent PHQ 2/9 Score    PHQ 2:  Date Adult PHQ 2 Score Adult PHQ 2 Interpretation   6/6/2022 0 No further screening needed       PHQ 9:      - continue haldol, namenda, cogentin, carbamazepine  - in-patient psychiatry consult for management assistance.

## 2022-07-01 NOTE — ED PROVIDER NOTE - WR ORDER STATUS 3
Performed Resulted Topical Clindamycin Pregnancy And Lactation Text: This medication is Pregnancy Category B and is considered safe during pregnancy. It is unknown if it is excreted in breast milk.

## 2022-09-16 NOTE — PRE-OP CHECKLIST - NS PREOP CHK MONITOR ANESTHESIA CONSENT
PT Acute Evaluation     Therapy Triage Evaluation: OT evaluation needed due to a decline in one or more of the following: safety, ADL/IADL independence, cognition, functional ambulation, balance, strength          Pt seen on 3EF nursing unit.                                                Frequency Comments: X, M-F     RECOMMENDATIONS FOR DISCHARGE:  Recommendation for Discharge: PT: Home (with family support) (05/19/18 1047)                                                                                                                 Admitting complaint: SBO  Intestinal adhesions with complete obstruction (CMS/HCC) [K56.52]                                     Precautions  Other Precautions: Abdominal precautions (05/19/18 1047)    SUBJECTIVE: Patient's Personal Goal: decrease pain (05/19/18 1047)  Subjective: pt agreeable to therapy with encouragement (05/19/18 1047)  Subjective/Objective Comments: chart reviewed, session cleared with RNMadhavi, prior to start and updated following session (05/19/18 1047)    OBJECTIVE:  Basic Lines: IV;Drain;Continuous pulse oximetry;Albert catheter (PCA) (05/19/18 1047)  Safety Measures: Other (comment) (call light in reach) (05/19/18 1047)    RN reported Lunsford Fall Scale Score: 45    Co-morbidities:   Patient Active Problem List   Diagnosis   • Abdominal pain   • S/P hysterectomy   • Complex ovarian cyst   • Ileus (CMS/HCC)   • Ileitis   • Bilateral lower abdominal pain   • SBO (small bowel obstruction) (CMS/HCC)   • Ulcerative colitis (CMS/HCC)       Clinical presentation: evolving clinical presentation with changing characteristics      ASSESSMENT:   Patient is seen for physical therapy initial evaluation during hospital stay s/p exploratory laparotomy with extensive lysis of adhesions, small bowel resection, pouch endoscopy, and incisional hernia repair with PMH including anemia, endometriosis, and ulcerative colitis. Patient is presenting below baseline for functional mobility  observed during session. Patient currently requires Min assist for bed mobility, transfers and ambulation. Patient would benefit from skilled therapy services during hospital stay to further address listed deficits. Patient is likely to discharge home with family support, pending progress.       Other Therapeutic Intervention: Increased time needed for pt mobility, education and encouragement for time spent out of bed (05/19/18 1047)     EDUCATION:   On this date, the patient was educated on role of PT in acute care, daily POC, progression of activity, importance of participation in therapy and activity throughout the day with staff.    The response to education was: Needs reinforcement    PT Identified Barriers to Discharge: medical, pain, decreased mobility, stairs to enter     PLAN:   Continue skilled PT, including the following Treatment/Interventions: Functional transfer training;Strengthening;Endurance training;Patient/Family training;Equipment eval/education;Bed mobility;Gait training;Stairs retraining;Safety Education;Neuromuscular re-education (05/19/18 1047)   Frequency Comments: X, M-F  (05/19/18 1047)    Treatment Plan for Next Session: progress ambulation, review log rolling, balance, stairs assessment, LE therapeutic exercises          RECOMMENDATIONS FOR DISCHARGE:  Recommendation for Discharge: PT: Home (with family support) (05/19/18 1047)    PT/OT Mobility Equipment for Discharge: pt owns no AD; continue to assess (05/19/18 1047)  PT/OT ADL Equipment for Discharge: continue to assess (05/19/18 1047)    ICU Mobility Assesment (PERME):       Last 24 hours of Functional Data  Bed Mobility   Bed Mobility  Rolling to the Left: Minimal Assist (Min) (05/19/18 1047)  Supine to Sit: Minimal Assist (Min) (05/19/18 1047)  Bed Mobility Comments: assist for positioning of BLE and elevation of trunk, verbal cues for sequencing, HOB elevated, rails used (05/19/18 1047)    Transfers  Transfers  Sit to Stand:  Minimal Assist (Min) (05/19/18 1047)  Stand to Sit: Touching/Steadying Assistance (05/19/18 1047)  Assistive Device/: 1 Person;Gait Belt (05/19/18 1047)  Transfer Comments 1: for safety and support, slow pace and unsteadienss upon standing, verbal cues for upright posture, HHA for support, progressed to light steayding assist for stand>sit with verbal cues for hand placement (05/19/18 1047)      Gait  Gait  Gait Assistance: Touching/Steadying Assistance (05/19/18 1047)  Assistive Device/: 1 Person;Gait Belt (05/19/18 1047)  Ambulation Distance (Feet): 20 Feet (05/19/18 1047)  Pattern: Shuffle (05/19/18 1047)  Ambulation Surface: Tile (05/19/18 1047)  Gait Comments 1: initial Min A with HHA progressing to light steadying assist with IV pole for UE support/ comfort, shuffled steps and downward gaze despite verbal cues, distance limited by pain and self limiting behavior (05/19/18 1047)  Gait Comments 2: guarded posture due to pain (05/19/18 1047),      Stairs  Stairs Mobility  Stairs Mobility Comments: deferred (05/19/18 1047)       Balance  Balance  Sitting - Static: Modified Independent (05/19/18 1047)  Sitting - Dynamic: Supervision (Supv) (05/19/18 1047)  Standing - Static: Supervision (Supv) (05/19/18 1047)  Standing - Dynamic (eyes open): Minimal Assist (Min) (05/19/18 1047)  Balance Comments #1: for safety, progressing to light min/ close supervision in standing, unsteadiness overall due to pain/ guarded posture (05/19/18 1047)  Balance Comments #2: no LOB demonstrated (05/19/18 1047)    Patient's Personal Goal: decrease pain (05/19/18 1047)    Therapy Goals:    Goals  Short Term Goals to Be Reviewed On: 05/26/18 (05/19/18 1047)  Short Term Goals = Discharge Goals: Yes (05/19/18 1047)  Goal Agreement: Patient agrees with goals and treatment plan (05/19/18 1047)  Bed Mobility Discharge Goal: all bed mobility modified independent with HOB flat (05/19/18 1047)  Transfer Discharge Goal:  all transfers modified independent (05/19/18 1047)  Ambulation Discharge Goal: ambulate >150ft modified independent (05/19/18 1047)  Stairs Discharge Goal: negotiate 24 steps (12+12) modified independent using rail (05/19/18 1047)        PT Time Spent: 65 minutes (05/19/18 1047)    See PT flowsheet for full details regarding the PT therapy provided.     done No

## 2023-02-28 NOTE — ED PROVIDER NOTE - OBJECTIVE STATEMENT
As per respiratory failure plan.   59 y.o. male Schizophrenia, cervical cord compression (chronic RLE weakness wheelchair-dependent) was discharged today from [facility] after a hospital stay for CVA.  He had slurred speech and new RUE weakness at [facility] which prompted them to send him to the ER, where he received tPA.  Per chart review, patient had some improvement of RUE strength, but slurred speech remained.  He was discharged to [facility] today, but the facility would not accept him given his nursing/rehab needs.

## 2023-05-01 NOTE — ED ADULT NURSE NOTE - NS ED NURSE RECORD ANOTHER VITAL SIGN
Patient returned call and is on the road from [place] and will be stopping in [place] for the night.   Dr. Rodríguez can contact her after 2pm today at [phone]. Patient was aware she may not get a call until the end of day when doctor is done seeing patients.   Yes, record another set of vital signs

## 2023-06-16 NOTE — OCCUPATIONAL THERAPY INITIAL EVALUATION ADULT - GENERAL OBSERVATIONS, REHAB EVAL
Pt received in semisupine position. Fever with headaches, slight numbness to left jaw on Wednesday, bp elevated today 163/84, feels wobbly when getting up.  HX htn. Patient very anxious and right hand shaking. Family hx stroke and she is concern about having a stroke.

## 2023-07-03 NOTE — PROGRESS NOTE ADULT - SUBJECTIVE AND OBJECTIVE BOX
CC: F/U for CVA    SUBJECTIVE / OVERNIGHT EVENTS:  Events from previous 24 hours noted.  Patient is not verbal - baseline. Physical exam difficult due to patient not participating in exam.  No overnight issues with F/C, vomiting, SOB, Cough, diarrhea.    MEDICATIONS  (STANDING):  aspirin enteric coated 81 milliGRAM(s) Oral daily  atorvastatin 80 milliGRAM(s) Oral at bedtime  benztropine 1 milliGRAM(s) Oral two times a day  carBAMazepine Chewable 100 milliGRAM(s) Chew two times a day  docusate sodium 200 milliGRAM(s) Oral daily  enoxaparin Injectable 90 milliGRAM(s) SubCutaneous two times a day  levETIRAcetam  IVPB 1000 milliGRAM(s) IV Intermittent every 12 hours  memantine 5 milliGRAM(s) Oral at bedtime  pantoprazole    Tablet 40 milliGRAM(s) Oral before breakfast  senna 2 Tablet(s) Oral at bedtime    MEDICATIONS  (PRN):  bisacodyl 5 milliGRAM(s) Oral daily PRN Constipation  LORazepam   Injectable 2 milliGRAM(s) IV Push every 6 hours PRN Agitation  polyethylene glycol 3350 17 Gram(s) Oral daily PRN Constipation      Vital Signs Last 24 Hrs  T(C): 37.1 (19 Feb 2019 05:53), Max: 37.4 (18 Feb 2019 16:28)  T(F): 98.8 (19 Feb 2019 05:53), Max: 99.3 (18 Feb 2019 16:28)  HR: 69 (19 Feb 2019 05:53) (69 - 98)  BP: 113/71 (19 Feb 2019 05:53) (113/71 - 126/63)  BP(mean): --  RR: 18 (19 Feb 2019 05:53) (16 - 19)  SpO2: 97% (19 Feb 2019 05:53) (96% - 98%)  CAPILLARY BLOOD GLUCOSE      POCT Blood Glucose.: 122 mg/dL (18 Feb 2019 13:31)    I&O's Summary    18 Feb 2019 07:01  -  19 Feb 2019 07:00  --------------------------------------------------------  IN: 100 mL / OUT: 0 mL / NET: 100 mL        PHYSICAL EXAM:   GENERAL: NAD, well-developed  HEAD:  Atraumatic, Normocephalic  EYES: EOMI, PERRLA, conjunctiva and sclera clear  NECK: Supple, No JVD  CHEST/LUNG: Clear to auscultation bilaterally; No wheeze  HEART: Regular rate and rhythm; No murmurs, rubs, or gallops  ABDOMEN: Soft, Nontender, Nondistended; Bowel sounds present  EXTREMITIES:  2+ Peripheral Pulses, No clubbing, cyanosis, or edema  PSYCH: non participatory with exam  SKIN: No rashes or lesions    LABS:                        15.2   6.59  )-----------( 182      ( 19 Feb 2019 06:36 )             45.0     02-19    142  |  103  |  25<H>  ----------------------------<  100<H>  4.1   |  24  |  1.16    Ca    9.8      19 Feb 2019 06:36  Mg     2.1     02-19        CARDIAC MARKERS ( 18 Feb 2019 06:05 )  x     / x     / 120 u/L / x     / x              RADIOLOGY & ADDITIONAL TESTS:    Imaging Personally Reviewed:    Care Discussed with Consultants/Other Providers: Dr. Libman - Neuro - CVA management. Initial Size Of Lesion: 1.8

## 2023-10-19 NOTE — PROGRESS NOTE ADULT - SUBJECTIVE AND OBJECTIVE BOX
Follow Up:      Inverval History/ROS:Patient is a 58y old  Male who presents with a chief complaint of LE weakness, urinary incontinence. (13 May 2018 07:53)    No fever.  No events      Allergies    No Known Allergies    Intolerances        ANTIMICROBIALS:      OTHER MEDS:  AQUAPHOR (petrolatum Ointment) 1 Application(s) Topical two times a day  benztropine 1 milliGRAM(s) Oral two times a day  clotrimazole 1% Cream 1 Application(s) Topical two times a day  docusate sodium 100 milliGRAM(s) Oral three times a day PRN  enoxaparin Injectable 40 milliGRAM(s) SubCutaneous daily  haloperidol     Tablet 10 milliGRAM(s) Oral two times a day  levETIRAcetam 1000 milliGRAM(s) Oral two times a day  memantine 5 milliGRAM(s) Oral at bedtime  multivitamin 1 Tablet(s) Oral daily  QUEtiapine 200 milliGRAM(s) Oral two times a day  senna 2 Tablet(s) Oral at bedtime PRN      Vital Signs Last 24 Hrs  T(C): 36.5 (21 May 2018 05:22), Max: 36.9 (20 May 2018 19:06)  T(F): 97.7 (21 May 2018 05:22), Max: 98.4 (20 May 2018 19:06)  HR: 67 (21 May 2018 05:22) (67 - 98)  BP: 100/62 (21 May 2018 05:22) (100/62 - 118/68)  BP(mean): --  RR: 17 (21 May 2018 05:22) (17 - 18)  SpO2: 100% (21 May 2018 05:22) (98% - 100%)    PHYSICAL EXAM:  General: [x ] non-toxic  HEAD/EYES: [ ] PERRL x[ ] white sclera [ ] icterus  ENT:  [ ] normal [x ] supple [ ] thrush [ ] pharyngeal exudate  Cardiovascular:   [ ] murmur [x ] normal [ ] PPM/AICD  Respiratory:  [x ] clear to ausculation bilaterally  GI:  [x ] soft, non-tender, normal bowel sounds  :  [ ] balbuena [ ] no CVA tenderness   Musculoskeletal:  [ x] no synovitis  Neurologic:  [ x] non-focal exam   Skin:  [ ] no rash  Lymph: [ ] no lymphadenopathy  Psychiatric:  [ ] appropriate affect [ ] alert & oriented  Lines:  [x ] no phlebitis [ ] central line                                14.3   4.82  )-----------( 184      ( 20 May 2018 15:50 )             42.0       05-20    139  |  103  |  17  ----------------------------<  93  4.4   |  25  |  0.90    Ca    9.0      20 May 2018 15:50  Phos  2.6     05-20  Mg     1.9     05-20            MICROBIOLOGY:    RADIOLOGY: Refusing vitals, refusing all medications, no PRNs for agitation.    Patient found in hallway, getting breakfast food from RN. Attempted to engage with patient, but they asked repeatedly to not speak, and then stopped answering entirely.

## 2024-01-02 ENCOUNTER — OUTPATIENT (OUTPATIENT)
Dept: OUTPATIENT SERVICES | Facility: HOSPITAL | Age: 64
LOS: 1 days | End: 2024-01-02
Payer: COMMERCIAL

## 2024-01-02 ENCOUNTER — APPOINTMENT (OUTPATIENT)
Dept: CT IMAGING | Facility: CLINIC | Age: 64
End: 2024-01-02
Payer: COMMERCIAL

## 2024-01-02 DIAGNOSIS — Z98.890 OTHER SPECIFIED POSTPROCEDURAL STATES: Chronic | ICD-10-CM

## 2024-01-02 DIAGNOSIS — Z00.8 ENCOUNTER FOR OTHER GENERAL EXAMINATION: ICD-10-CM

## 2024-01-02 PROCEDURE — 70470 CT HEAD/BRAIN W/O & W/DYE: CPT

## 2024-01-02 PROCEDURE — 70470 CT HEAD/BRAIN W/O & W/DYE: CPT | Mod: 26

## 2024-01-14 NOTE — OCCUPATIONAL THERAPY INITIAL EVALUATION ADULT - ANTICIPATED DISCHARGE DISPOSITION, OT EVAL
Will continue to follow to determine if pt is appropriate for rehab services. Pt unable to follow commands.
Clear

## 2024-05-15 NOTE — PROGRESS NOTE BEHAVIORAL HEALTH - NSBHCONSULTMEDPRNREASON_PSY_A_CORE
Problem: Adult Inpatient Plan of Care  Goal: Plan of Care Review  Outcome: Progressing  Goal: Patient-Specific Goal (Individualized)  Outcome: Progressing  Goal: Absence of Hospital-Acquired Illness or Injury  Outcome: Progressing  Goal: Optimal Comfort and Wellbeing  Outcome: Progressing  Goal: Readiness for Transition of Care  Outcome: Progressing     Problem: Wound  Goal: Optimal Coping  Outcome: Progressing     Problem: Infection  Goal: Absence of Infection Signs and Symptoms  Outcome: Progressing     Problem: Fall Injury Risk  Goal: Absence of Fall and Fall-Related Injury  Outcome: Progressing     Problem: Skin Injury Risk Increased  Goal: Skin Health and Integrity  Outcome: Progressing     Problem: Pain Acute  Goal: Optimal Pain Control and Function  Outcome: Progressing    PT AAOx4. RA. 18g L hand; infusing NS@50ml. IV abx continues. Allan intact and patent w/ yellow urine draining. 3xlap sites on abdomen and abdomen midline incision; dermabond. RLQ perez drain intact w/ bloody drainage noted. Bed in lowest position, bed alarm on, wheels locked, side rails up x2 and call light within reach. Pt's pain being controlled w/ continuous Dilaudid through PCA pump. No s/s of distress noted. Monitoring continues.       agitation...

## 2024-08-19 NOTE — PROGRESS NOTE BEHAVIORAL HEALTH - SUMMARY
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  Patient was also a recent discharge from [facility] earlier this month.   As per chart review, patient is nonverbal at baseline (since stroke). Patient did remove his hand, and look at writer however returned to same position and did not follow any commands that were asked of him.  Patient did allow writer to test for rigidity, none noted at this time.  No catalepsy echoalia. mitgehen, autonomic abnormalities, gegenhalten, posturing or sterotypy.  Due to patient nonverbal baseline taylor barbie scale for catatonia scoring can be altered.     3/22: Clinically unchanged from prior evaluation. Will review chart further and expand collateral.    As per staff on the unit, patient has been compliant with eating meals.  Calorie count was initiated and patient has eaten full tray of breakfast and lunch.  Nurse reported no problems with feds today.  [place] state patient does appear restless at times, however no severe behavioral issues.    PLAN  - no clear s/s of catatonia, may be CVA residual.  Patient also seen earlier this month, given ativan for r/o catatonia and was tapered due to no clear indication this was catatonia   - c/w home regime of psychotropic medications. med rec sent from [place] in chart from 3/20.  seroquel 200mg BID  Cogentin 1mg BID  Haldol 10mg BID  Carbamazepine 100BID  - PRN haldol 2mg PO/IM/IV for agitation q6hrs  - continue calorie count and assist with feeds to ensure adequate PO intake

## 2024-09-13 NOTE — BEHAVIORAL HEALTH ASSESSMENT NOTE - NS ED BHA REVIEW OF ED CHART AVAILABLE LABS REVIEWED
Patient is requesting refill of Adderall XR 25 mg BID.  Last refill 08/15/2024 #60 with no refills.    No protocol for requested medication.    Medication: Adderall XR 25 mg   Last office visit date: 08/29/2024  Ph[facility]:  [facility]    Order pended, routed to clinician for review.     Patient aware this request will be addressed on Monday.    ________________  Only need to return call if there is different/additional advice from pcp.     Yes

## 2024-10-07 NOTE — ED ADULT NURSE NOTE - NSFALLRSKASSESASSIST_ED_ALL_ED
When are patient's BG numbers >180?  If immediately after eating, that is fine.  Ideally, patient would have a blood glucose <180 at least 2 hours after a meal.  I would advise against starting glucose lowering medication as patient experienced frequent hypoglycemia with even low doses of medications previously.    If patient is still dropping low not on medication, he needs additional workup to look for explanation such as adrenal insufficiency or insulinoma, etc.  If still experiencing hypoglycemia, please have him schedule an appointment to discuss.   yes

## 2024-12-12 NOTE — PATIENT PROFILE ADULT. - HEALTHCARE QUESTIONS, PROFILE
Impression:    Mild restrictive lung disease with air trapping, moderate diffusion impairment, and no significant response to bronchodilators.  Clinical correlation is recommended.        Jose Maria Constantino MD  Pulmonary critical care and sleep medicine   none

## 2025-01-16 NOTE — SWALLOW BEDSIDE ASSESSMENT ADULT - SWALLOW EVAL: ANTICIPATED DISCHARGE DISPOSITION
Interventional Radiology Focus Note    VSS, AF.    WBC 10. H&H 8/28.  Creat 0.63.    Plan for 1 mo f/u with IR. Will be called to schedule.     No need for further IR follow up during this inpatient stay at this time.  IR to sign off.  Please do not hesitate to contact IR with questions or concerns and/or as further services are warranted in the future.  Thank you for allowing us to participate in this patient's care.    Rekha Tidwell PA-C      
Patient assessed by RN and Dr. Paredes.  Per Dr. Paredes patient needs stress portion rescheduled as patient is too congested and wheezing.  Will reevaluate tomorrow morning 1/7/2025 to make sure patient is appropriate to reschedule.  Floor RN Bridget updated.  No caffeine/stress diet still in place.  
Patient evaluated on floor.  Patient refusing to have second portion of stress test completed today.  Patient will be rescheduled for 1/8/2025.  
Patient's troponin came back elevated at 142.  He himself has no chest pain.  He continues to have significant shortness of breath.  Will continue to trend this and obtain echocardiogram and cardiology consultation in the morning.  
Still massive  anasarca with pitting edema up to the waist line . Denies pain     Coughs phlegm . Postvoid 196cc    Labs  - reviewed     MRI  - suspicion of the left 5th metatarsal osteomyelitis .      Continue diuresis for massive anasarca  - right acute/ chronic heart failure    Suspicion of 5th left MT osteomyelitis  - patient wants to be f/u by podiatry when hospitalized  - will ask team to f/u   COPD exacerbation  - improves  - lower prednisone .    Patient is not ready for discharge home yet today .           
extended care facility

## 2025-07-29 NOTE — PATIENT PROFILE ADULT - EQUIPMENT CURRENTLY USED AT HOME
Please review   ----- Message -----   From: Moriah Hutson   Sent: 7/29/2025  11:07 AM CDT   To: [facility] Int Med Scheduling Msg Pool   Subject: Appointment Request                                Appointment Request From: Moriah Hutson      With Provider: Bebe Ledbetter DO [facility]      Preferred Date Range: Any      Preferred Times: Any Time      Reason for visit: Primary Care      Health Maintenance Topic:      Comments:   I feel like I may have gastritis.   It's been 6 days. If no available openings,  could I get labs for H-pylori in [place] today?      yes